# Patient Record
Sex: MALE | Race: WHITE | Employment: UNEMPLOYED | URBAN - METROPOLITAN AREA
[De-identification: names, ages, dates, MRNs, and addresses within clinical notes are randomized per-mention and may not be internally consistent; named-entity substitution may affect disease eponyms.]

---

## 2017-01-05 DIAGNOSIS — C92.10 CML (CHRONIC MYELOCYTIC LEUKEMIA) (H): ICD-10-CM

## 2017-01-05 DIAGNOSIS — E05.90 HYPERTHYROIDISM: ICD-10-CM

## 2017-01-05 LAB
ALBUMIN SERPL-MCNC: 3.7 G/DL (ref 3.4–5)
ALP SERPL-CCNC: 97 U/L (ref 40–150)
ALT SERPL W P-5'-P-CCNC: 27 U/L (ref 0–70)
ANION GAP SERPL CALCULATED.3IONS-SCNC: 13 MMOL/L (ref 3–14)
AST SERPL W P-5'-P-CCNC: 20 U/L (ref 0–45)
BASOPHILS # BLD AUTO: 0 10E9/L (ref 0–0.2)
BASOPHILS NFR BLD AUTO: 0.7 %
BILIRUB SERPL-MCNC: 0.2 MG/DL (ref 0.2–1.3)
BUN SERPL-MCNC: 21 MG/DL (ref 7–30)
CALCIUM SERPL-MCNC: 9.2 MG/DL (ref 8.5–10.1)
CHLORIDE SERPL-SCNC: 104 MMOL/L (ref 94–109)
CO2 SERPL-SCNC: 21 MMOL/L (ref 20–32)
CREAT SERPL-MCNC: 1.47 MG/DL (ref 0.66–1.25)
DIFFERENTIAL METHOD BLD: ABNORMAL
EOSINOPHIL # BLD AUTO: 0.1 10E9/L (ref 0–0.7)
EOSINOPHIL NFR BLD AUTO: 1.5 %
ERYTHROCYTE [DISTWIDTH] IN BLOOD BY AUTOMATED COUNT: 16.9 % (ref 10–15)
GFR SERPL CREATININE-BSD FRML MDRD: 51 ML/MIN/1.7M2
GLUCOSE SERPL-MCNC: 92 MG/DL (ref 70–99)
HCT VFR BLD AUTO: 26.3 % (ref 40–53)
HGB BLD-MCNC: 8.1 G/DL (ref 13.3–17.7)
LYMPHOCYTES # BLD AUTO: 0.8 10E9/L (ref 0.8–5.3)
LYMPHOCYTES NFR BLD AUTO: 13.6 %
MAGNESIUM SERPL-MCNC: 2.1 MG/DL (ref 1.6–2.3)
MCH RBC QN AUTO: 26.1 PG (ref 26.5–33)
MCHC RBC AUTO-ENTMCNC: 30.8 G/DL (ref 31.5–36.5)
MCV RBC AUTO: 85 FL (ref 78–100)
MONOCYTES # BLD AUTO: 0.5 10E9/L (ref 0–1.3)
MONOCYTES NFR BLD AUTO: 8.7 %
NEUTROPHILS # BLD AUTO: 4.5 10E9/L (ref 1.6–8.3)
NEUTROPHILS NFR BLD AUTO: 75.5 %
PLATELET # BLD AUTO: 303 10E9/L (ref 150–450)
POTASSIUM SERPL-SCNC: 4 MMOL/L (ref 3.4–5.3)
PROT SERPL-MCNC: 7.3 G/DL (ref 6.8–8.8)
RBC # BLD AUTO: 3.1 10E12/L (ref 4.4–5.9)
SODIUM SERPL-SCNC: 138 MMOL/L (ref 133–144)
WBC # BLD AUTO: 6 10E9/L (ref 4–11)

## 2017-01-05 PROCEDURE — 99000 SPECIMEN HANDLING OFFICE-LAB: CPT | Performed by: INTERNAL MEDICINE

## 2017-01-05 PROCEDURE — 85025 COMPLETE CBC W/AUTO DIFF WBC: CPT | Performed by: INTERNAL MEDICINE

## 2017-01-05 PROCEDURE — 36415 COLL VENOUS BLD VENIPUNCTURE: CPT | Performed by: INTERNAL MEDICINE

## 2017-01-05 PROCEDURE — 80053 COMPREHEN METABOLIC PANEL: CPT | Performed by: INTERNAL MEDICINE

## 2017-01-05 PROCEDURE — 83735 ASSAY OF MAGNESIUM: CPT | Performed by: INTERNAL MEDICINE

## 2017-01-05 PROCEDURE — 80195 ASSAY OF SIROLIMUS: CPT | Mod: 90 | Performed by: INTERNAL MEDICINE

## 2017-01-06 LAB
CMV DNA SPEC NAA+PROBE-ACNC: NORMAL [IU]/ML
CMV DNA SPEC NAA+PROBE-LOG#: NORMAL {LOG_IU}/ML
SIROLIMUS BLD-MCNC: 8.9 UG/L (ref 5–15)
SPECIMEN SOURCE: NORMAL
TME LAST DOSE: 2330 H

## 2017-01-11 ENCOUNTER — TRANSFERRED RECORDS (OUTPATIENT)
Dept: HEALTH INFORMATION MANAGEMENT | Facility: CLINIC | Age: 52
End: 2017-01-11

## 2017-01-13 DIAGNOSIS — Z94.81 STATUS POST BONE MARROW TRANSPLANT (H): ICD-10-CM

## 2017-01-13 DIAGNOSIS — D84.9 IMMUNOSUPPRESSION (H): ICD-10-CM

## 2017-01-13 DIAGNOSIS — E05.00 GRAVES DISEASE: ICD-10-CM

## 2017-01-13 DIAGNOSIS — C92.10 CML (CHRONIC MYELOCYTIC LEUKEMIA) (H): Primary | ICD-10-CM

## 2017-01-13 DIAGNOSIS — I48.0 PAROXYSMAL ATRIAL FIBRILLATION (H): ICD-10-CM

## 2017-01-13 RX ORDER — MAGNESIUM OXIDE 400 MG/1
400 TABLET ORAL 2 TIMES DAILY
Qty: 180 TABLET | Refills: 2 | Status: SHIPPED
Start: 2017-01-13 | End: 2017-05-30

## 2017-01-16 ENCOUNTER — OFFICE VISIT (OUTPATIENT)
Dept: PODIATRY | Facility: CLINIC | Age: 52
End: 2017-01-16
Payer: COMMERCIAL

## 2017-01-16 VITALS — DIASTOLIC BLOOD PRESSURE: 64 MMHG | SYSTOLIC BLOOD PRESSURE: 120 MMHG

## 2017-01-16 DIAGNOSIS — B07.0 PLANTAR WART: Primary | ICD-10-CM

## 2017-01-16 PROCEDURE — 17110 DESTRUCTION B9 LES UP TO 14: CPT | Performed by: PODIATRIST

## 2017-01-16 ASSESSMENT — PAIN SCALES - GENERAL: PAINLEVEL: NO PAIN (0)

## 2017-01-16 NOTE — PATIENT INSTRUCTIONS
Thanks for coming today.  [facility]  [address] [place] [address]    To schedule future appointments in Ortho Clinic, you may call [phone].    To schedule ordered imaging by your provider:   Call Central [facility] [phone]    To schedule an injection ordered by your provider:  Call [facility] Injection scheduling line: [phone]  [employer] available online at:  [email or web address]    Please call if any further questions or concerns ([phone]).  Clinic hours 8 am to 5 pm.    Return to clinic (call) if symptoms worsen or fail to improve.

## 2017-01-16 NOTE — PROGRESS NOTES
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)     Hyperthyroidism     Hypogonadism male     Fatigue, unspecified type     Past Surgical History   Procedure Laterality Date     ------------other------------- Left 1/1/1985     left hand graft flap- work accident     ------------other------------- Right 1/1/1985     right foot graft- work accident     Esophagoscopy, gastroscopy, duodenoscopy (egd), combined N/A 3/23/2016     Procedure: COMBINED ESOPHAGOSCOPY, GASTROSCOPY, DUODENOSCOPY (EGD), BIOPSY SINGLE OR MULTIPLE;  Surgeon: Jay Tracy MD;  Location: [facility]     Social History     Social History     Marital Status:      Spouse Name: Nu     Number of Children: 4     Years of Education: N/A     Occupational History     Not on file.     Social History Main Topics     Smoking status: Former Smoker -- 0.50 packs/day for 30 years     Start date: 01/01/1985     Quit date: 06/28/2015     Smokeless tobacco: Not on file     Alcohol Use: Yes      Comment: once a week     Drug Use: No     Sexual Activity: Not on file      Comment:      Other Topics Concern     Not on file     Social History Narrative     to wife Nu.    4 children: 1 step daughter Ro age 27, 3 biological sons, 19 yo Jose L, 15 yo Sergio ([place]), 13 yo Elvis    Lives in [place]    Used to work in clerical work, now in school for electrical engineering    11 brothers and sisters, 3 live in the [facility], 1 brother incarcerated, 1 brother hepatitis C         Family History   Problem Relation Age of Onset     Thyroid Disease Sister      Thyroid Disease Sister       Thyroid Disease Sister      Thyroid Disease Mother      Lung Cancer Mother      Prostate Cancer Father      Dementia Father      Heart Murmur Father      Hepatitis Brother      Heart Murmur Brother      Asthma Son        SUBJECTIVE:  A 51-year-old male returns to clinic for plantar warts bilaterally.  He relates his heels are getting much better.  They are flattening out.        OBJECTIVE:  Plantar warts have hyperkeratotic tissue buildup with pinpoint ecchymosis.  These are thinning on the left first MPJ, left fifth MPJ, left fifth metatarsal base, left fourth toe, right fifth MPJ and left first MPJ.       ASSESSMENT/PLAN:  Plantar warts bilaterally.  These are improving.  Diagnosis and treatment options discussed with him.  The warts bilaterally were frozen with liquid nitrogen upon consent x3.  This is the 6th time we have frozen this.  He will return to clinic and see me in about 2-4 weeks.

## 2017-01-16 NOTE — NURSING NOTE
"Panchito Pierce's goals for this visit include: Recheck bilateral warts.  He requests these members of his care team be copied on today's visit information: yes    PCP: Leann Berry    Referring Provider:  Referred Self, MD  No address on file    Chief Complaint   Patient presents with     RECHECK     Recheck warts       Initial /64 mmHg Estimated body mass index is 29.78 kg/(m^2) as calculated from the following:    Height as of 12/1/16: 1.715 m (5' 7.5\").    Weight as of 12/16/16: 87.59 kg (193 lb 1.6 oz).  BP completed using cuff size: regular    "

## 2017-01-18 ENCOUNTER — ONCOLOGY VISIT (OUTPATIENT)
Dept: TRANSPLANT | Facility: CLINIC | Age: 52
End: 2017-01-18
Attending: INTERNAL MEDICINE
Payer: COMMERCIAL

## 2017-01-18 VITALS
OXYGEN SATURATION: 97 % | BODY MASS INDEX: 29.78 KG/M2 | DIASTOLIC BLOOD PRESSURE: 74 MMHG | RESPIRATION RATE: 16 BRPM | TEMPERATURE: 97.9 F | SYSTOLIC BLOOD PRESSURE: 134 MMHG | WEIGHT: 193.1 LBS | HEART RATE: 83 BPM

## 2017-01-18 DIAGNOSIS — C92.10 CML (CHRONIC MYELOCYTIC LEUKEMIA) (H): Primary | ICD-10-CM

## 2017-01-18 DIAGNOSIS — C92.10 CML (CHRONIC MYELOCYTIC LEUKEMIA) (H): ICD-10-CM

## 2017-01-18 DIAGNOSIS — Z94.81 STATUS POST BONE MARROW TRANSPLANT (H): ICD-10-CM

## 2017-01-18 LAB
ALBUMIN SERPL-MCNC: 3.5 G/DL (ref 3.4–5)
ALP SERPL-CCNC: 109 U/L (ref 40–150)
ALT SERPL W P-5'-P-CCNC: 20 U/L (ref 0–70)
ANION GAP SERPL CALCULATED.3IONS-SCNC: 6 MMOL/L (ref 3–14)
AST SERPL W P-5'-P-CCNC: 16 U/L (ref 0–45)
BASOPHILS # BLD AUTO: 0 10E9/L (ref 0–0.2)
BASOPHILS NFR BLD AUTO: 0.4 %
BILIRUB SERPL-MCNC: 0.2 MG/DL (ref 0.2–1.3)
BUN SERPL-MCNC: 19 MG/DL (ref 7–30)
CALCIUM SERPL-MCNC: 8.8 MG/DL (ref 8.5–10.1)
CHLORIDE SERPL-SCNC: 109 MMOL/L (ref 94–109)
CO2 SERPL-SCNC: 24 MMOL/L (ref 20–32)
CREAT SERPL-MCNC: 1.41 MG/DL (ref 0.66–1.25)
DIFFERENTIAL METHOD BLD: ABNORMAL
EOSINOPHIL # BLD AUTO: 0.1 10E9/L (ref 0–0.7)
EOSINOPHIL NFR BLD AUTO: 2.6 %
ERYTHROCYTE [DISTWIDTH] IN BLOOD BY AUTOMATED COUNT: 17 % (ref 10–15)
GFR SERPL CREATININE-BSD FRML MDRD: 53 ML/MIN/1.7M2
GLUCOSE SERPL-MCNC: 100 MG/DL (ref 70–99)
HCT VFR BLD AUTO: 24.7 % (ref 40–53)
HGB BLD-MCNC: 7.6 G/DL (ref 13.3–17.7)
IMM GRANULOCYTES # BLD: 0 10E9/L (ref 0–0.4)
IMM GRANULOCYTES NFR BLD: 0.4 %
LYMPHOCYTES # BLD AUTO: 0.6 10E9/L (ref 0.8–5.3)
LYMPHOCYTES NFR BLD AUTO: 13.4 %
MAGNESIUM SERPL-MCNC: 2.3 MG/DL (ref 1.6–2.3)
MCH RBC QN AUTO: 25.5 PG (ref 26.5–33)
MCHC RBC AUTO-ENTMCNC: 30.8 G/DL (ref 31.5–36.5)
MCV RBC AUTO: 83 FL (ref 78–100)
MONOCYTES # BLD AUTO: 0.5 10E9/L (ref 0–1.3)
MONOCYTES NFR BLD AUTO: 11.1 %
NEUTROPHILS # BLD AUTO: 3.4 10E9/L (ref 1.6–8.3)
NEUTROPHILS NFR BLD AUTO: 72.1 %
NRBC # BLD AUTO: 0 10*3/UL
NRBC BLD AUTO-RTO: 0 /100
PLATELET # BLD AUTO: 277 10E9/L (ref 150–450)
POTASSIUM SERPL-SCNC: 3.7 MMOL/L (ref 3.4–5.3)
PROT SERPL-MCNC: 7 G/DL (ref 6.8–8.8)
RBC # BLD AUTO: 2.98 10E12/L (ref 4.4–5.9)
SIROLIMUS BLD-MCNC: 8.5 UG/L (ref 5–15)
SODIUM SERPL-SCNC: 139 MMOL/L (ref 133–144)
TME LAST DOSE: NORMAL H
WBC # BLD AUTO: 4.7 10E9/L (ref 4–11)

## 2017-01-18 PROCEDURE — 80053 COMPREHEN METABOLIC PANEL: CPT | Performed by: INTERNAL MEDICINE

## 2017-01-18 PROCEDURE — 36415 COLL VENOUS BLD VENIPUNCTURE: CPT | Performed by: INTERNAL MEDICINE

## 2017-01-18 PROCEDURE — 83735 ASSAY OF MAGNESIUM: CPT | Performed by: INTERNAL MEDICINE

## 2017-01-18 PROCEDURE — 85025 COMPLETE CBC W/AUTO DIFF WBC: CPT | Performed by: INTERNAL MEDICINE

## 2017-01-18 PROCEDURE — 99213 OFFICE O/P EST LOW 20 MIN: CPT | Mod: ZF

## 2017-01-18 PROCEDURE — 80195 ASSAY OF SIROLIMUS: CPT | Performed by: INTERNAL MEDICINE

## 2017-01-18 NOTE — PROGRESS NOTES
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 He continues to do well is off steroids now. Has achy joint in AM but he used to be like that. More tired it seems and mood does not seem as good but does not feel depressed. Denied any fevers, chills, cough, or new cold like symptoms. No symptoms of oral and optho chronic GVH . No bleeding. Libido still poor but occasional improvement. Found to have sleep apnea.      Review of Systems: 10 point ROS negative except as noted above.    PHYSICAL EXAM   /74 mmHg  Pulse 83  Temp(Src) 97.9  F (36.6  C) (Oral)  Resp 16  Wt 87.59 kg (193 lb 1.6 oz)  SpO2 97%  Wt Readings from Last 4 Encounters:   01/18/17 87.59 kg (193 lb 1.6 oz)   12/16/16 87.59 kg (193 lb 1.6 oz)   12/13/16 89.404 kg (197 lb 1.6 oz)   12/01/16 87.045 kg (191 lb 14.4 oz)   General: NAD, pleasant appearing male, much less chushingoid     Eyes: CARY, sclera anicteric     Nose/Mouth/Throat: OP clear, buccal mucosa dry, no ulcerations; still coated tongue     Lungs: CTA bilaterally    Cardiovascular: RRR, no M/R/G    Abdominal/Rectal: +BS, soft, NT, ND, No masses    Lymphatics: no LE edema    Skin: no rashes and no fibrotic changes.  Neuro: A&O, grossly nonfocal.      LABS AND IMAGING      Lab Results   Component Value Date    WBC 4.7 01/18/2017    ANEU 3.4 01/18/2017    HGB 7.6* 01/18/2017    HCT 24.7* 01/18/2017     01/18/2017     01/18/2017    POTASSIUM 3.7 01/18/2017    CHLORIDE 109 01/18/2017    CO2 24 01/18/2017    * 01/18/2017    BUN 19 01/18/2017    CR 1.41* 01/18/2017    MAG 2.3  01/18/2017    INR 0.9 06/15/2016    BILITOTAL 0.2 01/18/2017    AST 16 01/18/2017    ALT 20 01/18/2017    ALKPHOS 109 01/18/2017    PROTTOTAL 7.0 01/18/2017    ALBUMIN 3.5 01/18/2017   AST       16   1/18/2017  ALT       20   1/18/2017  No results found for this basename: BiliConj   BILITOTAL      0.2   1/18/2017  ALBUMIN      3.5   1/18/2017  PROTTOTAL      7.0   1/18/2017   ALKPHOS      109   1/18/2017      ASSESSMENT BY SYSTEMS   Panchito Pierce is a 51 year old man s/p MUD SCT for CML  - trasplanted in second chronic phase after blast crisis;    BMT/ CML:  continue dasatinib 40mg/d (5/24); repeat PCR 12/2016 and 100% donor. Plan to continue TKI for another 3 months and stop in April.  - restage per protocol, 1 year evaluation upcoming     HEMATOLOGY/COAGULATION: anemia. Plan to recheck in 2 weeks. Ordered retic count for next visit. Transfuse if < 7.0. Continue folate. Possibly secondary to dasatinib. No bleeding. Despite HGB 7.6, no transfusions needed.   - Stable good counts with mild anemia not requiring transfusions. PLT and WBC stable.      INFECTIONS AND PROPHYLAXIS: Afebrile. No focal sxs.  CMV Repeat pending. Flu shot 11/4/16. Start immunizations today 12/16/17. Continue reduced dose acyclovir and bactrim in half to adjust for renal function.    Prophylaxis: Levaquin (steroids), LD ACV (CMV-, HSV-, EBV+, VZV+), Fluconazole, Pentamidine (last given 7/27/16).    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 Stopped prednisone on 12/06/16. Continue biotene and dexa mouth wash. Sirolimus level is good.   -  No N/V/D.   - Advised to wear sun block when outside for extended periods of time to prevent flare of GVH  - Chronic: Schirmer's test in clinic 4/5 showed R 0 mm and L 0 mm; lip biopsy negative though mouth was and remains dry though improving. Also failure to thrive; 80 lb weight loss.   - EGD negative for GVHD, but with Schirmer's test results,   - he was started on systemic steroids 1  mg/kg on 4/5/16 stop 12/06.     GASTROINTESTINAL/LIVER: Appetite good, wt stable. 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 Upper GI biopsies (3/23 EGD negative for GVHD, CMV, H.Pylori) negative as was lip bx.   - Continue Protonix for GI prophylaxis.     - dry mouth: biotene mouthwash and toothpaste (continue)  - soft stools: monitor; can decrease oral Mg if worse    RENAL/FEN/Nutrition: Cr 1.40 and electrolytes WNL  - Hx HypoMg: cont MgOx 400mg bid and if okay today we will stop replacement.     CARDIOVASCULAR:  Normotensive. Hx Hypertension. Cont metoprolol, cont Norvasc. Normotensive, asymptomatic today. Discontinue Norvasc.   - Hx of A fib with RVR (new 12/24); resolved with dilt, now on Metoprolol 12.5mg BID.     ENDOCRINE: following with Dr Guevara. thyroid and testosterone issues. Hx Graves Disease s/p VELA therapy. Cont synthroid and liothyronine. TSH and Free T3 low; Free T4 elevated. Mixed picture; After endocrine consult:  Mild thyrotoxicosis due to large weight loss. On levothyroxine 112mcg.    - On selenium supplement, T3 and T4.    PSYCHIATRIC:  Mood seems to be down. If needed we may use Wellbutrim that has no libido side effects.     DERMATOLOGY: no rashes.   - Sun: Advised to wear SPF when out in the sun for prolonged periods of time  - Acne: resolved. Likely due to Siro. A few lesions scattered over shoulders and chest. Consider topical clinda cream or doxy if persists or becomes worse.       Plan:  Continue Dasatinib to 40mg/d  Hold sirolimus for level 2/3/17    RTC Rick on 2/3/17 with labs

## 2017-01-18 NOTE — NURSING NOTE
BMT Heme Malignancy Rooming Note    Panchito Pierce - 1/18/2017 11:45 AM     Chief Complaint   Patient presents with     Blood Draw     lab draw in right arm, vitals taken     RECHECK     post bmt for CML here for labs and md visit        /74 mmHg  Pulse 83  Temp(Src) 97.9  F (36.6  C) (Oral)  Resp 16  Wt 87.59 kg (193 lb 1.6 oz)  SpO2 97%     Medications reviewed: Yes    Labs drawn: Yes    Drawn by: Clinic Staff  Via: venipuncture  See Doc Flowsheet for details.      Dressing changed: Not applicable     Medications given: No    Staff time:8    Additional information if applicable: pt reports feeling well    Kemi Greene, RN

## 2017-01-18 NOTE — MR AVS SNAPSHOT
After Visit Summary   1/18/2017    Panchito Pierce    MRN: [id number]           Patient Information     Date Of Birth          1965        Visit Information        Provider Department      1/18/2017 12:00 PM Jermaine Cabrera MD [facility] Blood and Marrow Transplant        Today's Diagnoses     CML (chronic myelocytic leukemia) (H)    -  1     Status post bone marrow transplant (H)               [facility] ([facility])  [address] [place] [id number]  Phone: [phone]  Clinic Hours:   Monday-Friday:    8am to 4:30pm   Weekends and holidays:    8am to noon (in general)  If your fever is 100.5  or greater,   call the clinic.  After hours call the   hospital at [phone] or   [phone]. Ask for the BMT   fellow for pediatric or adult patients           Care Instructions    2/3 10am arrival with labs and         Follow-ups after your visit        Your next 10 appointments already scheduled     Feb 03, 2017  9:00 AM   (Arrive by 8:30 AM)   RETURN ENDOCRINE with Pattie Guevara MD   [facility] Endocrinology ([facility])    [address] [place] [address]  3rd [facility] [id number] [phone]            Feb 03, 2017 10:30 AM   Return with  BMT [facility] Blood and Marrow Transplant ([facility])    [address] [place] [address]  2nd [facility] [id number] [phone]            Feb 14, 2017 11:00 AM   Return Visit with Brando Sagastume DPM   [facility] ([facility])    [address] [place] [id number] [phone]              Future tests that were ordered for you today     Open Future Orders        Priority Expected Expires Ordered    Lactate Dehydrogenase Routine 2/3/2017 2/3/2017 1/18/2017    CBC with platelets differential Routine 2/3/2017 2/3/2017 1/18/2017    Comprehensive metabolic panel Routine 2/3/2017 2/3/2017 1/18/2017    Magnesium  Routine 2/3/2017 2/3/2017 1/18/2017    Sirolimus level Routine 2/3/2017 2/3/2017 1/18/2017    Reticulocyte count Routine 2/3/2017 2/3/2017 1/18/2017            Who to contact     If you have questions or need follow up information about today's clinic visit or your schedule please contact [facility] BLOOD AND MARROW TRANSPLANT directly at [phone].  Normal or non-critical lab and imaging results will be communicated to you by [employer], letter or phone within 4 business days after the clinic has received the results. If you do not hear from us within 7 days, please contact the clinic through [employer] or phone. If you have a critical or abnormal lab result, we will notify you by phone as soon as possible.  Submit refill requests through [employer] or call your pharmacy and they will forward the refill request to us. Please allow 3 business days for your refill to be completed.          Additional Information About Your Visit        [employer] Information     [employer] gives you secure access to your electronic health record. If you see a primary care provider, you can also send messages to your care team and make appointments. If you have questions, please call your primary care clinic.  If you do not have a primary care provider, please call [phone] and they will assist you.        Care EveryWhere ID     This is your Care EveryWhere ID. This could be used by other organizations to access your [facility] medical records  [id number]        Your Vitals Were     Pulse Temperature Respirations Pulse Oximetry          83 97.9  F (36.6  C) (Oral) 16 97%         Blood Pressure from Last 3 Encounters:   01/18/17 134/74   01/16/17 120/64   12/16/16 121/69    Weight from Last 3 Encounters:   01/18/17 87.59 kg (193 lb 1.6 oz)   12/16/16 87.59 kg (193 lb 1.6 oz)   12/13/16 89.404 kg (197 lb 1.6 oz)               Recent Review Flowsheet Data     BMT Recent Results Latest Ref Rng 11/4/2016 11/30/2016 12/1/2016 12/13/2016 12/16/2016  1/5/2017 1/18/2017    WBC 4.0 - 11.0 10e9/L 5.8 4.2 - 5.0 4.9 6.0 4.7    Hemoglobin 13.3 - 17.7 g/dL 9.0(L) 9.0(L) - 7.9(L) 7.9(L) 8.1(L) 7.6(L)    Platelet Count 150 - 450 10e9/L 239 260 - 238 239 303 277    Neutrophils (Absolute) 1.6 - 8.3 10e9/L 4.5 3.1 - 3.8 3.6 4.5 3.4    Sodium 133 - 144 mmol/L 141 142 - 141 138 138 139    Potassium 3.4 - 5.3 mmol/L 3.8 3.9 - 3.8 4.0 4.0 3.7    Chloride 94 - 109 mmol/L 109 106 - 106 107 104 109    Glucose 70 - 99 mg/dL 110(H) 109(H) - 107(H) 89 92 100(H)    Urea Nitrogen 7 - 30 mg/dL 19 18 - 20 20 21 19    Creatinine 0.66 - 1.25 mg/dL 1.36(H) 1.47(H) - 1.40(H) 1.41(H) 1.47(H) 1.41(H)    Calcium (Total) 8.5 - 10.1 mg/dL 8.8 9.0 - 8.6 8.8 9.2 8.8    Protein (Total) 6.8 - 8.8 g/dL 7.2 7.3 - 6.8 6.8 7.3 7.0    Albumin 3.4 - 5.0 g/dL 3.8 3.6 - 3.5 3.5 3.7 3.5    Alkaline Phosphatase 40 - 150 U/L 78 85 - 91 89 97 109    AST 0 - 45 U/L 20 21 20 20 19 20 16    ALT 0 - 70 U/L 34 28 28 27 22 27 20    MCV 78 - 100 fl 87 85 - 84 85 85 83               Primary Care Provider Office Phone # Fax #    Leann Berry [phone]       [facility] [address] [place] [address]        Thank you!     Thank you for choosing [facility] BLOOD AND MARROW TRANSPLANT  for your care. Our goal is always to provide you with excellent care. Hearing back from our patients is one way we can continue to improve our services. Please take a few minutes to complete the written survey that you may receive in the mail after your visit with us. Thank you!             Your Updated Medication List - Protect others around you: Learn how to safely use, store and throw away your medicines at www.disposemymeds.org.          This list is accurate as of: 1/18/17 12:39 PM.  Always use your most recent med list.                   Brand Name Dispense Instructions for use    acyclovir 200 MG capsule    ZOVIRAX    120 capsule    Take 2 capsules (400 mg) by mouth 2 times daily       artificial  saliva Liqd liquid     1 Bottle    Swish and spit 10 mLs in mouth 4 times daily       BIOTENE DRY MOUTH SENSITIVE Pste     1 Tube    Apply 1 inch to affected area 2 times daily       calcium carbonate-vitamin D 500-400 MG-UNIT Tabs per tablet     180 tablet    Take 2 tablets by mouth daily       cycloSPORINE 0.05 % ophthalmic emulsion    RESTASIS    2 Box    Apply 1 drop to eye every 12 hours       dasatinib 20 MG tablet CHEMO    SPRYCEL    60 tablet    Take 2 tablets (40 mg) by mouth daily       fluconazole 100 MG tablet    DIFLUCAN    30 tablet    Take 1 tablet (100 mg) by mouth daily       levofloxacin 250 MG tablet    LEVAQUIN    30 tablet    Take 1 tablet (250 mg) by mouth daily       levothyroxine 112 MCG tablet    SYNTHROID/LEVOTHROID    90 tablet    Take 1 tablet (112 mcg) by mouth daily       liothyronine 5 MCG tablet    CYTOMEL    180 tablet    Take 1 tablet (5 mcg) by mouth 2 times daily       LORazepam 0.5 MG tablet    ATIVAN    30 tablet    Take 1-2 tablets (0.5-1 mg) by mouth every 6 hours as needed for other (nausea)       magnesium oxide 400 MG tablet    MAG-OX    180 tablet    Take 1 tablet (400 mg) by mouth 2 times daily       metoprolol 25 MG tablet    LOPRESSOR    60 tablet    Take 0.5 tablets (12.5 mg) by mouth 2 times daily       pantoprazole 40 MG EC tablet    PROTONIX    30 tablet    Take 1 tablet (40 mg) by mouth daily       Selenium 100 MCG Caps     90 capsule    1 tablet daily       Senna 8.6-50 MG tablet     60 tablet    Take 1 tablet by mouth 2 times daily as needed for constipation       sirolimus 1 MG tablet    RAPAMUNE - GENERIC EQUIVALENT    120 tablet    Take 4 tablets (4 mg) by mouth daily       sulfamethoxazole-trimethoprim 800-160 MG per tablet    BACTRIM DS/SEPTRA DS    30 tablet    Take 1 tablet twice a day on Mondays and Tuesdays       INDU HUDSON

## 2017-01-18 NOTE — NURSING NOTE
Chief Complaint   Patient presents with     Blood Draw     lab draw in right arm, vitals taken     Sandra Mai CMA

## 2017-01-20 ASSESSMENT — ENCOUNTER SYMPTOMS
DEPRESSION: 0
EYE WATERING: 0
CONSTIPATION: 0
POLYDIPSIA: 0
BOWEL INCONTINENCE: 0
HALLUCINATIONS: 0
POOR WOUND HEALING: 0
JAUNDICE: 0
EYE REDNESS: 0
DECREASED CONCENTRATION: 1
STIFFNESS: 1
ALTERED TEMPERATURE REGULATION: 1
DIARRHEA: 1
MYALGIAS: 1
JOINT SWELLING: 0
MUSCLE WEAKNESS: 1
BACK PAIN: 0
HEARTBURN: 0
EYE PAIN: 0
NERVOUS/ANXIOUS: 1
NECK PAIN: 0
DOUBLE VISION: 0
ARTHRALGIAS: 1
VOMITING: 1
FEVER: 0
NAUSEA: 1
FATIGUE: 1
SKIN CHANGES: 0
INSOMNIA: 1
RECTAL PAIN: 0
RECTAL BLEEDING: 0
NIGHT SWEATS: 1
BLOATING: 1
POLYPHAGIA: 0
BLOOD IN STOOL: 0
WEIGHT LOSS: 0
DECREASED APPETITE: 0
NAIL CHANGES: 0
INCREASED ENERGY: 1
PANIC: 0
MUSCLE CRAMPS: 0
WEIGHT GAIN: 0
CHILLS: 1
EYE IRRITATION: 0
ABDOMINAL PAIN: 0

## 2017-01-23 ENCOUNTER — MYC MEDICAL ADVICE (OUTPATIENT)
Dept: ENDOCRINOLOGY | Facility: CLINIC | Age: 52
End: 2017-01-23

## 2017-01-24 ENCOUNTER — TRANSFERRED RECORDS (OUTPATIENT)
Dept: HEALTH INFORMATION MANAGEMENT | Facility: CLINIC | Age: 52
End: 2017-01-24

## 2017-02-03 ENCOUNTER — APPOINTMENT (OUTPATIENT)
Dept: LAB | Facility: CLINIC | Age: 52
End: 2017-02-03
Attending: INTERNAL MEDICINE
Payer: COMMERCIAL

## 2017-02-03 ENCOUNTER — OFFICE VISIT (OUTPATIENT)
Dept: ENDOCRINOLOGY | Facility: CLINIC | Age: 52
End: 2017-02-03

## 2017-02-03 ENCOUNTER — ONCOLOGY VISIT (OUTPATIENT)
Dept: TRANSPLANT | Facility: CLINIC | Age: 52
End: 2017-02-03
Attending: INTERNAL MEDICINE
Payer: COMMERCIAL

## 2017-02-03 VITALS — OXYGEN SATURATION: 100 % | TEMPERATURE: 97.5 F | WEIGHT: 193 LBS | BODY MASS INDEX: 28.49 KG/M2

## 2017-02-03 VITALS
SYSTOLIC BLOOD PRESSURE: 132 MMHG | WEIGHT: 196.7 LBS | BODY MASS INDEX: 29.13 KG/M2 | HEART RATE: 82 BPM | DIASTOLIC BLOOD PRESSURE: 73 MMHG | HEIGHT: 69 IN

## 2017-02-03 DIAGNOSIS — C92.10 CML (CHRONIC MYELOCYTIC LEUKEMIA) (H): ICD-10-CM

## 2017-02-03 DIAGNOSIS — E29.1 HYPOGONADISM MALE: Primary | ICD-10-CM

## 2017-02-03 DIAGNOSIS — Z94.81 STATUS POST BONE MARROW TRANSPLANT (H): ICD-10-CM

## 2017-02-03 LAB
ALBUMIN SERPL-MCNC: 3.7 G/DL (ref 3.4–5)
ALP SERPL-CCNC: 108 U/L (ref 40–150)
ALT SERPL W P-5'-P-CCNC: 20 U/L (ref 0–70)
ANION GAP SERPL CALCULATED.3IONS-SCNC: 11 MMOL/L (ref 3–14)
AST SERPL W P-5'-P-CCNC: 16 U/L (ref 0–45)
BASOPHILS # BLD AUTO: 0 10E9/L (ref 0–0.2)
BASOPHILS NFR BLD AUTO: 0.2 %
BILIRUB SERPL-MCNC: 0.2 MG/DL (ref 0.2–1.3)
BUN SERPL-MCNC: 19 MG/DL (ref 7–30)
CALCIUM SERPL-MCNC: 8.8 MG/DL (ref 8.5–10.1)
CHLORIDE SERPL-SCNC: 105 MMOL/L (ref 94–109)
CO2 SERPL-SCNC: 23 MMOL/L (ref 20–32)
CREAT SERPL-MCNC: 1.35 MG/DL (ref 0.66–1.25)
DIFFERENTIAL METHOD BLD: ABNORMAL
EOSINOPHIL # BLD AUTO: 0.1 10E9/L (ref 0–0.7)
EOSINOPHIL NFR BLD AUTO: 1.8 %
ERYTHROCYTE [DISTWIDTH] IN BLOOD BY AUTOMATED COUNT: 17.1 % (ref 10–15)
GFR SERPL CREATININE-BSD FRML MDRD: 56 ML/MIN/1.7M2
GLUCOSE SERPL-MCNC: 96 MG/DL (ref 70–99)
HCT VFR BLD AUTO: 25.6 % (ref 40–53)
HGB BLD-MCNC: 7.9 G/DL (ref 13.3–17.7)
IMM GRANULOCYTES # BLD: 0 10E9/L (ref 0–0.4)
IMM GRANULOCYTES NFR BLD: 0.3 %
LDH SERPL L TO P-CCNC: 173 U/L (ref 85–227)
LYMPHOCYTES # BLD AUTO: 0.8 10E9/L (ref 0.8–5.3)
LYMPHOCYTES NFR BLD AUTO: 13.3 %
MAGNESIUM SERPL-MCNC: 2.3 MG/DL (ref 1.6–2.3)
MCH RBC QN AUTO: 25.6 PG (ref 26.5–33)
MCHC RBC AUTO-ENTMCNC: 30.9 G/DL (ref 31.5–36.5)
MCV RBC AUTO: 83 FL (ref 78–100)
MONOCYTES # BLD AUTO: 0.5 10E9/L (ref 0–1.3)
MONOCYTES NFR BLD AUTO: 8.1 %
NEUTROPHILS # BLD AUTO: 4.7 10E9/L (ref 1.6–8.3)
NEUTROPHILS NFR BLD AUTO: 76.3 %
NRBC # BLD AUTO: 0 10*3/UL
NRBC BLD AUTO-RTO: 0 /100
PLATELET # BLD AUTO: 263 10E9/L (ref 150–450)
POTASSIUM SERPL-SCNC: 4.1 MMOL/L (ref 3.4–5.3)
PROT SERPL-MCNC: 7.1 G/DL (ref 6.8–8.8)
RBC # BLD AUTO: 3.09 10E12/L (ref 4.4–5.9)
RETICS # AUTO: 68 10E9/L (ref 25–95)
RETICS/RBC NFR AUTO: 2.2 % (ref 0.5–2)
SIROLIMUS BLD-MCNC: 9.8 UG/L (ref 5–15)
SODIUM SERPL-SCNC: 138 MMOL/L (ref 133–144)
TME LAST DOSE: NORMAL H
WBC # BLD AUTO: 6.2 10E9/L (ref 4–11)

## 2017-02-03 PROCEDURE — 85045 AUTOMATED RETICULOCYTE COUNT: CPT | Performed by: INTERNAL MEDICINE

## 2017-02-03 PROCEDURE — 80195 ASSAY OF SIROLIMUS: CPT | Performed by: INTERNAL MEDICINE

## 2017-02-03 PROCEDURE — 85025 COMPLETE CBC W/AUTO DIFF WBC: CPT | Performed by: INTERNAL MEDICINE

## 2017-02-03 PROCEDURE — 83735 ASSAY OF MAGNESIUM: CPT | Performed by: INTERNAL MEDICINE

## 2017-02-03 PROCEDURE — 36415 COLL VENOUS BLD VENIPUNCTURE: CPT

## 2017-02-03 PROCEDURE — 83615 LACTATE (LD) (LDH) ENZYME: CPT | Performed by: INTERNAL MEDICINE

## 2017-02-03 PROCEDURE — 80053 COMPREHEN METABOLIC PANEL: CPT | Performed by: INTERNAL MEDICINE

## 2017-02-03 ASSESSMENT — PAIN SCALES - GENERAL: PAINLEVEL: NO PAIN (0)

## 2017-02-03 NOTE — Clinical Note
2/3/2017       RE: Panchito Pierce  [address]  [place] [address]     Dear Colleague,    Thank you for referring your patient, Panchito Pierce, to the [facility] ENDOCRINOLOGY at [facility]. Please see a copy of my visit note below.    Endocrinology Clinic Visit    Subjective:     HPI: Panchito Pierce is a 51 year old year old male with history of Graves Disease and Hyperthyroidism s/p VELA and CML s/p MUD SCT who is seen for 2 month follow up.     #1 hypothyroidism due to Graves' disease status post radioactive iodide  Patient report, was diagnosed with Graves' disease in 2002.  He underwent radioactive therapy of the thyroid and was treated with levothyroxine.  Per patient report, he does best on a T3 and T4 combination.  Patient does have a history of Graves eye disease and is currently on selenium. Had been on steroids (for jutgc-dwjmfh-dqwx disease since April 2016) but now off steroids. Pt  noted to be mildly thyrotoxic with weight loss during his inpatient hospitalization where April 2016 TSH was undetectable, free T4 was high at 1.51 and free T3 was low at 1.9.  Levothyroxine was adjusted to 125 ug each day and he continued with Cytomel 5  g twice daily    He has a family history of hypothyroidism.    Interval history:  November 2016 TSH is 1.11 and 1.28 at last visit on 12/13/16.  Patient reports doing very well from a thyroid standpoint and continues on levothyroxine at 112  g daily and Cytomel at 5  g twice daily.  He denies any eye symptoms and currently does not really uses eyedrops.  He reports that he does take his selenium every day. Does endorse some cold intolerance, but denies weight changes, palpitations hair changes or restlessness.     #2 Joint Pain  Patient reports joint aches and pain for the last month that is primarily affecting his ankles and knees, but does endorse generalized joint pains occasionally. Mainly noticeable when he goes to stand up. Has  "pain in the am that lasts for only about 20 mins. Had knee surgery in 2004/2005 and noted to have \"arthritis\" during surgery. Denies joint swelling, erythema or warmth. Is not taking any medications for his joint pain.     #3 Fatigue  During previous visit in 12/1/16, patient endorsed worsening fatigue and there was concern for sleep apnea. Patient's wife had accompanied him and reported excessive snoring as well as periods of hypocapnic episodes, even apneic episodes at night per her report. Patient was also complaining of excessive daytime somnolence. Recommendation at that time was for patient to have sleep study performed.     Interval History: Patient had sleep polysomnography performed at [facility]. Report indicated severe ALVARO with AHI of 53. Recommendation per Dr. Metcalf at that time was pursue CPAP which patient was very hesitant to proceed with. Patient continues to endorse excessive daytime somnolence, often having a mid-afternoon nap or else he will need to go to bed at 8pm. He endorses continued fatigue and wife continues to endorse snoring and periods of apnea. Patient states his sleep is often very restless.     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  He also reports a history of rare morning erections.  He reports he shakes every few weeks and has also noted loss of his axillary hair, chest here, as well as decrease in testicular size.  September 2016 total testosterone of 141, PSA of 0.5.  Repeat check in October 2016 showed a testosterone 186, FSH of 25.2, and elevated to 7.7.     Interval History: Patient had labs done 12/1/16 with FSH elevated at 28.9. LH, Prolactin, SHBG were all normal. Testosterone had increased to 354 and free testosterone was also normal at 8.25. Patient states that his morning erections are occuring more frequently and he had a nighttime emission a couple weeks ago.     #5 History of " steroid use  He reports that he had been put on prednisone daily since April 2016 for hyqtr-sbawns-bafj disease.  Was tapered with last dose in mid December.         Allergies   Allergen Reactions     Allopurinol Rash     Unclear if it was from allopurinol, but went away when it was stopped.  But also had steroid cream at the same time.       Current Outpatient Prescriptions   Medication Sig Dispense Refill     magnesium oxide (MAG-OX) 400 MG tablet Take 1 tablet (400 mg) by mouth 2 times daily 180 tablet 2     Ondansetron HCl (ZOFRAN PO)        acyclovir (ZOVIRAX) 200 MG capsule Take 2 capsules (400 mg) by mouth 2 times daily 120 capsule 11     pantoprazole (PROTONIX) 40 MG enteric coated tablet Take 1 tablet (40 mg) by mouth daily 30 tablet 11     sirolimus (RAPAMUNE - GENERIC EQUIVALENT) 1 MG tablet Take 4 tablets (4 mg) by mouth daily 120 tablet 3     sulfamethoxazole-trimethoprim (BACTRIM DS,SEPTRA DS) 800-160 MG per tablet Take 1 tablet twice a day on Mondays and Tuesdays 30 tablet 6     dasatinib (SPRYCEL) 20 MG tablet Take 2 tablets (40 mg) by mouth daily 60 tablet 8     levothyroxine (SYNTHROID, LEVOTHROID) 112 MCG tablet Take 1 tablet (112 mcg) by mouth daily 90 tablet 3     liothyronine (CYTOMEL) 5 MCG tablet Take 1 tablet (5 mcg) by mouth 2 times daily 180 tablet 3     Selenium 100 MCG CAPS 1 tablet daily 90 capsule 3     cycloSPORINE (RESTASIS) 0.05 % ophthalmic emulsion Apply 1 drop to eye every 12 hours 2 Box 11     fluconazole (DIFLUCAN) 100 MG tablet Take 1 tablet (100 mg) by mouth daily 30 tablet 11     Sennosides-Docusate Sodium (SENNA) 8.6-50 MG tablet Take 1 tablet by mouth 2 times daily as needed for constipation 60 tablet 3     metoprolol (LOPRESSOR) 25 MG tablet Take 0.5 tablets (12.5 mg) by mouth 2 times daily 60 tablet 11     Dentifrices (BIOTENE DRY MOUTH SENSITIVE) PSTE Apply 1 inch to affected area 2 times daily 1 Tube 2     levofloxacin (LEVAQUIN) 250 MG tablet Take 1 tablet (250 mg) by  mouth daily 30 tablet 3     calcium carbonate-vitamin D 500-400 MG-UNIT TABS tablt Take 2 tablets by mouth daily 180 tablet 3     artificial saliva (BIOTENE DRY MOUTHWASH) LIQD solution Swish and spit 10 mLs in mouth 4 times daily 1 Bottle 2     LORazepam (ATIVAN) 0.5 MG tablet Take 1-2 tablets (0.5-1 mg) by mouth every 6 hours as needed for other (nausea) 30 tablet 5       Past Medical History   Diagnosis Date     Graves disease 1/1/2003     treated with radioiodine, meds T3 and T4     Murmur, heart      has not been fully evaluated       Past Surgical History   Procedure Laterality Date     ------------other------------- Left 1/1/1985     left hand graft flap- work accident     ------------other------------- Right 1/1/1985     right foot graft- work accident     Esophagoscopy, gastroscopy, duodenoscopy (egd), combined N/A 3/23/2016     Procedure: COMBINED ESOPHAGOSCOPY, GASTROSCOPY, DUODENOSCOPY (EGD), BIOPSY SINGLE OR MULTIPLE;  Surgeon: Jay Tracy MD;  Location:  GI       Family History   Problem Relation Age of Onset     Thyroid Disease Sister      Thyroid Disease Sister      Thyroid Disease Sister      Thyroid Disease Mother      Lung Cancer Mother      Prostate Cancer Father      Dementia Father      Heart Murmur Father      Hepatitis Brother      Heart Murmur Brother      Asthma Son        Social History     Social History     Marital Status:      Spouse Name: Nu     Number of Children: 4     Years of Education: N/A     Social History Main Topics     Smoking status: Former Smoker -- 0.50 packs/day for 30 years     Start date: 01/01/1985     Quit date: 06/28/2015     Smokeless tobacco: Not on file     Alcohol Use: Yes      Comment: once a week     Drug Use: No     Sexual Activity: Not on file      Comment:      Other Topics Concern     Not on file     Social History Narrative     to wife Nu.    4 children: 1 step daughter Ro age 27, 3 biological sons, 19 yo Jose L,  "15 yo Sergio (Asbjessica), 11 yo Elvis    Lives in [place]    Used to work in clerical work, now in school for electrical engineering    11 brothers and sisters, 3 live in the [facility], 1 brother incarcerated, 1 brother hepatitis C           Objective:   /73 mmHg  Pulse 82  Ht 1.753 m (5' 9\")  Wt 89.223 kg (196 lb 11.2 oz)  BMI 29.03 kg/m2  Constitutional: Pleasant no acute cardiopulmonary distress.   EYES: anicteric, normal extra-ocular movements, no lid lag or retraction   HEENT: Mouth/Throat: Mucous membrane is moist. Oropharynx is clear. No adenopathy. Normal thyroid   Cardiovascular: RRR, S1, S2 normal. No m/g/r   Pulmonary/Chest: CTAB. No wheezing or rales   Abdominal: +BS. Non tender to palpation. No organomegaly present.  Neurological: Alert and oriented. CNII-XII intact. Muscle strength 5/5. Sensory is intact.  Extremities: No clubbing, cyanosis or inflammation   Skin: normal texture, color  Lymphatic: no cervical lymphadenopathy.  Psychological: appropriate mood and affect     In House Labs:   No results found for this basename: a1c    TSH   Date Value Ref Range Status   12/13/2016 1.28 0.40 - 4.00 mU/L Final   11/30/2016 1.11 0.40 - 4.00 mU/L Final   08/19/2016 0.88 0.40 - 4.00 mU/L Final   06/03/2016 0.08* 0.40 - 4.00 mU/L Final   04/08/2016 <0.01* 0.40 - 4.00 mU/L Final     T4 FREE   Date Value Ref Range Status   12/13/2016 0.80 0.76 - 1.46 ng/dL Final   06/03/2016 0.78 0.76 - 1.46 ng/dL Final   04/08/2016 1.51* 0.76 - 1.46 ng/dL Final   03/04/2016 1.39 0.76 - 1.46 ng/dL Final   02/12/2016 1.31 0.76 - 1.46 ng/dL Final       CREATININE   Date Value Ref Range Status   01/18/2017 1.41* 0.66 - 1.25 mg/dL Final   ]    Recent Labs   Lab Test  04/11/16   0305  04/07/16   0313   TRIG  377*  159*       25 OH VIT D2   Date Value Ref Range Status   04/06/2016 <5 ug/L Final     25 OH VIT D3   Date Value Ref Range Status   04/06/2016 21 ug/L Final     25 OH VIT D TOTAL   Date Value Ref Range Status "   04/06/2016  20 - 75 ug/L Final    <26  Season, race, dietary intake, and treatment affect the concentration of   25-hydroxy-Vitamin D. Values may decrease during winter months and increase   during summer months. Values 20-29 ug/L may indicate Vitamin D insufficiency   and values <20 ug/L may indicate Vitamin D deficiency.   This test was developed and its performance characteristics determined by the   [facility],  [facility]. It has   not been cleared or approved by the FDA. The laboratory is regulated under CLIA   as qualified to perform high-complexity testing. This test is used for clinical   purposes. It should not be regarded as investigational or for research.           Assessment/Treatment Plan:    Panchito Pierce is a 51 year old year old male with history of Graves Disease and Hyperthyroidism s/p VELA and CML s/p MUD SCT who is seen for 2 month follow up and now with new diagnosis of ALVARO.     #1 hypothyroidism due to Graves' disease status post radioactive iodide  Pt needs continue on Cytomel in addition to his levothyroxine.  We will also have the patient continue with the selenium at 100  g daily for now.  December 2016 TSH and free T4 were normal.    #2 Joint Pain  No concerning signs or symptoms suggestive of inflammatory arthritis or septic joint and patient appears very stable. Likely osteoarthritis or secondary to the chemotherapy. Patient recently discontinued his steroids as well and his joint pain could simply be rebound from that. Encouraged patient to follow up with Heme-Onc provider and see if there were options for oral or even topical treatments, but understand risks in covering up fever. May start topical gel in the future.     #3 History of low testosterone/Decreased Libido - Resolved  The patient has a history of low testosterone.  October 2016 FSH was slightly higher.  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  The patient also had  evidence of smaller than expected testicles (about 8 grams) on exam. Confirmed ALVARO could also lower testosterone as well. Recheck of labs at visit in 12/1/16 were unremarkable with normal testosterone but persistently elevated FSH. Explained to patient that there was currently no need for testosterone replacement, as unlikely to get higher than current value. Will recheck FSH and free and total testosterone today to follow trend. Concerns regarding replacement that could also affect ALVARO.     #4 Obstructive Sleep Apnea  Patient had sleep polysomnography performed on 1/11/17 that was positive for severe ALVARO with AHI of 53.3. Recommendation was for CPAP at min pressure of 5 and max of 15 with heated humidity and lifetime use most likely. Patient expressed some overall hesitancy to initiate CPAP until discussion today. Went over the health benefits of treatment with CPAP and patient is willing to try it in order to help treat his excessive daytime somnolence. Patient will follow up with Dr Metcalf at [facility] to discuss treatment options and mask fitting. If fatigue does not improve with treated ALVARO, can consider alternative etiologies such as adrenal insufficiency with prolonged steroid use (unlikely given normal electrolytes) or side effects of chemo medications.       There are no Patient Instructions on file for this visit.    I will contact the patient with the test results.  Return to clinic in 4 months.    Test and/or medications prescribed today:  Orders Placed This Encounter   Procedures     Follicle stimulating hormone     Testosterone Free and Total     Patient seen and discussed with endocrinology attending, Dr. Ismael Arboleda MD  PGY-2 Internal Medicine  P: [phone]    I have seen and examined the patient and agree with the resident's plan of care as noted.  Dr. Pattie Guevara [phone]

## 2017-02-03 NOTE — NURSING NOTE
"Chief Complaint   Patient presents with     RECHECK     f/u hypogonadism        Initial /73 mmHg  Pulse 82  Ht 1.753 m (5' 9\")  Wt 89.223 kg (196 lb 11.2 oz)  BMI 29.03 kg/m2 Estimated body mass index is 29.03 kg/(m^2) as calculated from the following:    Height as of this encounter: 1.753 m (5' 9\").    Weight as of this encounter: 89.223 kg (196 lb 11.2 oz).  BP completed using cuff size: nicola Manning CMA     "

## 2017-02-03 NOTE — MR AVS SNAPSHOT
After Visit Summary   2/3/2017    Panchito Pierce    MRN: [id number]           Patient Information     Date Of Birth          1965        Visit Information        Provider Department      2/3/2017 10:30 AM  BMT [facility] Blood and Marrow Transplant        Today's Diagnoses     CML (chronic myelocytic leukemia) (H)         Status post bone marrow transplant (H)               [facility] ([facility])  [address] [place] [id number]  Phone: [phone]  Clinic Hours:   Monday-Friday:    8am to 4:30pm   Weekends and holidays:    8am to noon (in general)  If your fever is 100.5  or greater,   call the clinic.  After hours call the   hospital at [phone] or   [phone]. Ask for the BMT   fellow for pediatric or adult patients           Care Instructions    3/17 1130am arrival with labs and         Follow-ups after your visit        Your next 10 appointments already scheduled     Feb 14, 2017 11:00 AM   [facility]    [address] [place] [address] [phone]            Mar 17, 2017 12:00 PM   Return with Jermaine Cabrera MD   [facility] Blood and Marrow Transplant ([facility])    [address] [place] [address] [phone]              Future tests that were ordered for you today     Open Future Orders        Priority Expected Expires Ordered    Magnesium Routine 3/17/2017 3/17/2017 2/3/2017    CBC with platelets differential Routine 3/17/2017 3/17/2017 2/3/2017    CMV DNA quantification Routine 3/17/2017 3/17/2017 2/3/2017    BCR ABL1 Major Breakpoint Quant p210 Routine 3/17/2017 3/17/2017 2/3/2017    Comprehensive metabolic panel Routine 3/17/2017 3/17/2017 2/3/2017            Who to contact     If you have questions or need follow up information about today's clinic visit or your  schedule please contact [facility] directly at [phone].  Normal or non-critical lab and imaging results will be communicated to you by MyChart, letter or phone within 4 business days after the clinic has received the results. If you do not hear from us within 7 days, please contact the clinic through [employer] or phone. If you have a critical or abnormal lab result, we will notify you by phone as soon as possible.  Submit refill requests through [employer] or call your pharmacy and they will forward the refill request to us. Please allow 3 business days for your refill to be completed.          Additional Information About Your Visit        [employer] Information     [employer] gives you secure access to your electronic health record. If you see a primary care provider, you can also send messages to your care team and make appointments. If you have questions, please call your primary care clinic.  If you do not have a primary care provider, please call [phone] and they will assist you.        Care EveryWhere ID     This is your Care EveryWhere ID. This could be used by other organizations to access your [place] medical records  [id number]        Your Vitals Were     Temperature Pulse Oximetry                97.5  F (36.4  C) (Oral) 100%           Blood Pressure from Last 3 Encounters:   02/03/17 132/73   01/18/17 134/74   01/16/17 120/64    Weight from Last 3 Encounters:   02/03/17 87.544 kg (193 lb)   02/03/17 89.223 kg (196 lb 11.2 oz)   01/18/17 87.59 kg (193 lb 1.6 oz)              We Performed the Following     CBC with platelets differential     Comprehensive metabolic panel     Lactate Dehydrogenase     Magnesium     Reticulocyte count     Sirolimus level        Recent Review Flowsheet Data     BMT Recent Results Latest Ref Rng 11/30/2016 12/1/2016 12/13/2016 12/16/2016 1/5/2017 1/18/2017 2/3/2017    WBC 4.0 - 11.0 10e9/L 4.2 - 5.0 4.9 6.0 4.7 6.2    Hemoglobin 13.3 - 17.7 g/dL  9.0(L) - 7.9(L) 7.9(L) 8.1(L) 7.6(L) 7.9(L)    Platelet Count 150 - 450 10e9/L 260 - 238 239 303 277 263    Neutrophils (Absolute) 1.6 - 8.3 10e9/L 3.1 - 3.8 3.6 4.5 3.4 4.7    Sodium 133 - 144 mmol/L 142 - 141 138 138 139 138    Potassium 3.4 - 5.3 mmol/L 3.9 - 3.8 4.0 4.0 3.7 4.1    Chloride 94 - 109 mmol/L 106 - 106 107 104 109 105    Glucose 70 - 99 mg/dL 109(H) - 107(H) 89 92 100(H) 96    Urea Nitrogen 7 - 30 mg/dL 18 - 20 20 21 19 19    Creatinine 0.66 - 1.25 mg/dL 1.47(H) - 1.40(H) 1.41(H) 1.47(H) 1.41(H) 1.35(H)    Calcium (Total) 8.5 - 10.1 mg/dL 9.0 - 8.6 8.8 9.2 8.8 8.8    Protein (Total) 6.8 - 8.8 g/dL 7.3 - 6.8 6.8 7.3 7.0 7.1    Albumin 3.4 - 5.0 g/dL 3.6 - 3.5 3.5 3.7 3.5 3.7    Alkaline Phosphatase 40 - 150 U/L 85 - 91 89 97 109 108    AST 0 - 45 U/L 21 20 20 19 20 16 16    ALT 0 - 70 U/L 28 28 27 22 27 20 20    MCV 78 - 100 fl 85 - 84 85 85 83 83               Primary Care Provider Office Phone # Fax #    Leann Berry [phone]       [facility] [id number] [address] [address]        Thank you!     Thank you for choosing [facility]ANSPL[facility]  for your care. Our goal is always to provide you with excellent care. Hearing back from our patients is one way we can continue to improve our services. Please take a few minutes to complete the written survey that you may receive in the mail after your visit with us. Thank you!             Your Updated Medication List - Protect others around you: Learn how to safely use, store and throw away your medicines at www.disposemymeds.org.          This list is accurate as of: 2/3/17 11:32 AM.  Always use your most recent med list.                   Brand Name Dispense Instructions for use    acyclovir 200 MG capsule    ZOVIRAX    120 capsule    Take 2 capsules (400 mg) by mouth 2 times daily       artificial saliva Liqd liquid     1 Bottle    Swish and spit 10 mLs in mouth 4 times daily       BIOTENE DRY  MOUTH SENSITIVE Pste     1 Tube    Apply 1 inch to affected area 2 times daily       calcium carbonate-vitamin D 500-400 MG-UNIT Tabs per tablet     180 tablet    Take 2 tablets by mouth daily       cycloSPORINE 0.05 % ophthalmic emulsion    RESTASIS    2 Box    Apply 1 drop to eye every 12 hours       dasatinib 20 MG tablet CHEMO    SPRYCEL    60 tablet    Take 2 tablets (40 mg) by mouth daily       fluconazole 100 MG tablet    DIFLUCAN    30 tablet    Take 1 tablet (100 mg) by mouth daily       levofloxacin 250 MG tablet    LEVAQUIN    30 tablet    Take 1 tablet (250 mg) by mouth daily       levothyroxine 112 MCG tablet    SYNTHROID/LEVOTHROID    90 tablet    Take 1 tablet (112 mcg) by mouth daily       liothyronine 5 MCG tablet    CYTOMEL    180 tablet    Take 1 tablet (5 mcg) by mouth 2 times daily       LORazepam 0.5 MG tablet    ATIVAN    30 tablet    Take 1-2 tablets (0.5-1 mg) by mouth every 6 hours as needed for other (nausea)       magnesium oxide 400 MG tablet    MAG-OX    180 tablet    Take 1 tablet (400 mg) by mouth 2 times daily       metoprolol 25 MG tablet    LOPRESSOR    60 tablet    Take 0.5 tablets (12.5 mg) by mouth 2 times daily       pantoprazole 40 MG EC tablet    PROTONIX    30 tablet    Take 1 tablet (40 mg) by mouth daily       Selenium 100 MCG Caps     90 capsule    1 tablet daily       Senna 8.6-50 MG tablet     60 tablet    Take 1 tablet by mouth 2 times daily as needed for constipation       sirolimus 1 MG tablet    RAPAMUNE - GENERIC EQUIVALENT    120 tablet    Take 4 tablets (4 mg) by mouth daily       sulfamethoxazole-trimethoprim 800-160 MG per tablet    BACTRIM DS/SEPTRA DS    30 tablet    Take 1 tablet twice a day on Mondays and Tuesdays       INDU HUDSON

## 2017-02-03 NOTE — PROGRESS NOTES
"Endocrinology Clinic Visit    Subjective:     HPI: Panchito Pierce is a 51 year old year old male with history of Graves Disease and Hyperthyroidism s/p VELA and CML s/p MUD SCT who is seen for 2 month follow up.     #1 hypothyroidism due to Graves' disease status post radioactive iodide  Patient report, was diagnosed with Graves' disease in 2002.  He underwent radioactive therapy of the thyroid and was treated with levothyroxine.  Per patient report, he does best on a T3 and T4 combination.  Patient does have a history of Graves eye disease and is currently on selenium. Had been on steroids (for iqisv-ekwcca-jziq disease since April 2016) but now off steroids. Pt  noted to be mildly thyrotoxic with weight loss during his inpatient hospitalization where April 2016 TSH was undetectable, free T4 was high at 1.51 and free T3 was low at 1.9.  Levothyroxine was adjusted to 125 ug each day and he continued with Cytomel 5  g twice daily    He has a family history of hypothyroidism.    Interval history:  November 2016 TSH is 1.11 and 1.28 at last visit on 12/13/16.  Patient reports doing very well from a thyroid standpoint and continues on levothyroxine at 112  g daily and Cytomel at 5  g twice daily.  He denies any eye symptoms and currently does not really uses eyedrops.  He reports that he does take his selenium every day. Does endorse some cold intolerance, but denies weight changes, palpitations hair changes or restlessness.     #2 Joint Pain  Patient reports joint aches and pain for the last month that is primarily affecting his ankles and knees, but does endorse generalized joint pains occasionally. Mainly noticeable when he goes to stand up. Has pain in the am that lasts for only about 20 mins. Had knee surgery in 2004/2005 and noted to have \"arthritis\" during surgery. Denies joint swelling, erythema or warmth. Is not taking any medications for his joint pain.     #3 Fatigue  During previous visit in 12/1/16, patient " endorsed worsening fatigue and there was concern for sleep apnea. Patient's wife had accompanied him and reported excessive snoring as well as periods of hypocapnic episodes, even apneic episodes at night per her report. Patient was also complaining of excessive daytime somnolence. Recommendation at that time was for patient to have sleep study performed.     Interval History: Patient had sleep polysomnography performed at [facility]. Report indicated severe ALVARO with AHI of 53. Recommendation per Dr. Metcalf at that time was pursue CPAP which patient was very hesitant to proceed with. Patient continues to endorse excessive daytime somnolence, often having a mid-afternoon nap or else he will need to go to bed at 8pm. He endorses continued fatigue and wife continues to endorse snoring and periods of apnea. Patient states his sleep is often very restless.     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  He also reports a history of rare morning erections.  He reports he shakes every few weeks and has also noted loss of his axillary hair, chest here, as well as decrease in testicular size.  September 2016 total testosterone of 141, PSA of 0.5.  Repeat check in October 2016 showed a testosterone 186, FSH of 25.2, and elevated to 7.7.     Interval History: Patient had labs done 12/1/16 with FSH elevated at 28.9. LH, Prolactin, SHBG were all normal. Testosterone had increased to 354 and free testosterone was also normal at 8.25. Patient states that his morning erections are occuring more frequently and he had a nighttime emission a couple weeks ago.     #5 History of steroid use  He reports that he had been put on prednisone daily since April 2016 for tlqdk-iagllo-crkf disease.  Was tapered with last dose in mid December.         Allergies   Allergen Reactions     Allopurinol Rash     Unclear if it was from allopurinol, but went away when it  was stopped.  But also had steroid cream at the same time.       Current Outpatient Prescriptions   Medication Sig Dispense Refill     magnesium oxide (MAG-OX) 400 MG tablet Take 1 tablet (400 mg) by mouth 2 times daily 180 tablet 2     Ondansetron HCl (ZOFRAN PO)        acyclovir (ZOVIRAX) 200 MG capsule Take 2 capsules (400 mg) by mouth 2 times daily 120 capsule 11     pantoprazole (PROTONIX) 40 MG enteric coated tablet Take 1 tablet (40 mg) by mouth daily 30 tablet 11     sirolimus (RAPAMUNE - GENERIC EQUIVALENT) 1 MG tablet Take 4 tablets (4 mg) by mouth daily 120 tablet 3     sulfamethoxazole-trimethoprim (BACTRIM DS,SEPTRA DS) 800-160 MG per tablet Take 1 tablet twice a day on Mondays and Tuesdays 30 tablet 6     dasatinib (SPRYCEL) 20 MG tablet Take 2 tablets (40 mg) by mouth daily 60 tablet 8     levothyroxine (SYNTHROID, LEVOTHROID) 112 MCG tablet Take 1 tablet (112 mcg) by mouth daily 90 tablet 3     liothyronine (CYTOMEL) 5 MCG tablet Take 1 tablet (5 mcg) by mouth 2 times daily 180 tablet 3     Selenium 100 MCG CAPS 1 tablet daily 90 capsule 3     cycloSPORINE (RESTASIS) 0.05 % ophthalmic emulsion Apply 1 drop to eye every 12 hours 2 Box 11     fluconazole (DIFLUCAN) 100 MG tablet Take 1 tablet (100 mg) by mouth daily 30 tablet 11     Sennosides-Docusate Sodium (SENNA) 8.6-50 MG tablet Take 1 tablet by mouth 2 times daily as needed for constipation 60 tablet 3     metoprolol (LOPRESSOR) 25 MG tablet Take 0.5 tablets (12.5 mg) by mouth 2 times daily 60 tablet 11     Dentifrices (BIOTENE DRY MOUTH SENSITIVE) PSTE Apply 1 inch to affected area 2 times daily 1 Tube 2     levofloxacin (LEVAQUIN) 250 MG tablet Take 1 tablet (250 mg) by mouth daily 30 tablet 3     calcium carbonate-vitamin D 500-400 MG-UNIT TABS tablt Take 2 tablets by mouth daily 180 tablet 3     artificial saliva (BIOTENE DRY MOUTHWASH) LIQD solution Swish and spit 10 mLs in mouth 4 times daily 1 Bottle 2     LORazepam (ATIVAN) 0.5 MG tablet  Take 1-2 tablets (0.5-1 mg) by mouth every 6 hours as needed for other (nausea) 30 tablet 5       Review of Systems       Answers for HPI/ROS submitted by the patient on 1/20/2017   General Symptoms: Yes  Skin Symptoms: Yes  HENT Symptoms: No  EYE SYMPTOMS: Yes  HEART SYMPTOMS: No  LUNG SYMPTOMS: No  INTESTINAL SYMPTOMS: Yes  URINARY SYMPTOMS: No  REPRODUCTIVE SYMPTOMS: Yes  SKELETAL SYMPTOMS: Yes  BLOOD SYMPTOMS: No  NERVOUS SYSTEM SYMPTOMS: No  MENTAL HEALTH SYMPTOMS: Yes  Fever: No  Loss of appetite: No  Weight loss: No  Weight gain: No  Fatigue: Yes  Night sweats: Yes  Chills: Yes  Increased stress: Yes  Excessive hunger: No  Excessive thirst: No  Feeling hot or cold when others believe the temperature is normal: Yes  Loss of height: No  Post-operative complications: No  Surgical site pain: No  Hallucinations: No  Change in or Loss of Energy: Yes  Hyperactivity: No  Confusion: No  Changes in hair: No  Changes in moles/birth marks: No  Itching: Yes  Rashes: No  Changes in nails: No  Acne: No  Change in facial hair: No  Warts: Yes  Non-healing sores: No  Scarring: No  Flaking of skin: No  Color changes of hands/feet in cold : No  Sun sensitivity: No  Skin thickening: No  Eye pain: No  Vision loss: No  Dry eyes: Yes  Watery eyes: No  Eye bulging: No  Double vision: No  Flashing of lights: No  Spots: No  Floaters: Yes  Redness: No  Crossed eyes: No  Tunnel Vision: No  Yellowing of eyes: No  Eye irritation: No  Heart burn or indigestion: No  Nausea: Yes  Vomiting: Yes  Abdominal pain: No  Bloating: Yes  Constipation: No  Diarrhea: Yes  Blood in stool: No  Black stools: No  Rectal or Anal pain: No  Fecal incontinence: No  Rectal bleeding: No  Yellowing of skin or eyes: No  Vomit with blood: No  Change in stools: No  Hemorrhoids: No  Back pain: No  Muscle aches: Yes  Neck pain: No  Swollen joints: No  Joint pain: Yes  Bone pain: No  Muscle cramps: No  Muscle weakness: Yes  Joint stiffness: Yes  Bone fracture:  No  Nervous or Anxious: Yes  Depression: No  Trouble sleeping: Yes  Trouble thinking or concentrating: Yes  Mood changes: Yes  Panic attacks: No    Past Medical History   Diagnosis Date     Graves disease 1/1/2003     treated with radioiodine, meds T3 and T4     Murmur, heart      has not been fully evaluated       Past Surgical History   Procedure Laterality Date     ------------other------------- Left 1/1/1985     left hand graft flap- work accident     ------------other------------- Right 1/1/1985     right foot graft- work accident     Esophagoscopy, gastroscopy, duodenoscopy (egd), combined N/A 3/23/2016     Procedure: COMBINED ESOPHAGOSCOPY, GASTROSCOPY, DUODENOSCOPY (EGD), BIOPSY SINGLE OR MULTIPLE;  Surgeon: Jay Tracy MD;  Location: [facility]       Family History   Problem Relation Age of Onset     Thyroid Disease Sister      Thyroid Disease Sister      Thyroid Disease Sister      Thyroid Disease Mother      Lung Cancer Mother      Prostate Cancer Father      Dementia Father      Heart Murmur Father      Hepatitis Brother      Heart Murmur Brother      Asthma Son        Social History     Social History     Marital Status:      Spouse Name: Nu     Number of Children: 4     Years of Education: N/A     Social History Main Topics     Smoking status: Former Smoker -- 0.50 packs/day for 30 years     Start date: 01/01/1985     Quit date: 06/28/2015     Smokeless tobacco: Not on file     Alcohol Use: Yes      Comment: once a week     Drug Use: No     Sexual Activity: Not on file      Comment:      Other Topics Concern     Not on file     Social History Narrative     to wife Nu.    4 children: 1 step daughter Ro age 27, 3 biological sons, 19 yo Jose L, 15 yo Sergio ([place]berg[place]), 11 yo Elvis    Lives in [place]    Used to work in clerical work, now in school for electrical engineering    11 brothers and sisters, 3 live in the [facility], 1 brother incarcerated, 1 brother  "hepatitis C           Objective:   /73 mmHg  Pulse 82  Ht 1.753 m (5' 9\")  Wt 89.223 kg (196 lb 11.2 oz)  BMI 29.03 kg/m2  Constitutional: Pleasant no acute cardiopulmonary distress.   EYES: anicteric, normal extra-ocular movements, no lid lag or retraction   HEENT: Mouth/Throat: Mucous membrane is moist. Oropharynx is clear. No adenopathy. Normal thyroid   Cardiovascular: RRR, S1, S2 normal. No m/g/r   Pulmonary/Chest: CTAB. No wheezing or rales   Abdominal: +BS. Non tender to palpation. No organomegaly present.  Neurological: Alert and oriented. CNII-XII intact. Muscle strength 5/5. Sensory is intact.  Extremities: No clubbing, cyanosis or inflammation   Skin: normal texture, color  Lymphatic: no cervical lymphadenopathy.  Psychological: appropriate mood and affect     In House Labs:   No results found for this basename: a1c    TSH   Date Value Ref Range Status   12/13/2016 1.28 0.40 - 4.00 mU/L Final   11/30/2016 1.11 0.40 - 4.00 mU/L Final   08/19/2016 0.88 0.40 - 4.00 mU/L Final   06/03/2016 0.08* 0.40 - 4.00 mU/L Final   04/08/2016 <0.01* 0.40 - 4.00 mU/L Final     T4 FREE   Date Value Ref Range Status   12/13/2016 0.80 0.76 - 1.46 ng/dL Final   06/03/2016 0.78 0.76 - 1.46 ng/dL Final   04/08/2016 1.51* 0.76 - 1.46 ng/dL Final   03/04/2016 1.39 0.76 - 1.46 ng/dL Final   02/12/2016 1.31 0.76 - 1.46 ng/dL Final       CREATININE   Date Value Ref Range Status   01/18/2017 1.41* 0.66 - 1.25 mg/dL Final   ]    Recent Labs   Lab Test  04/11/16   0305  04/07/16   0313   TRIG  377*  159*       25 OH VIT D2   Date Value Ref Range Status   04/06/2016 <5 ug/L Final     25 OH VIT D3   Date Value Ref Range Status   04/06/2016 21 ug/L Final     25 OH VIT D TOTAL   Date Value Ref Range Status   04/06/2016  20 - 75 ug/L Final    <26  Season, race, dietary intake, and treatment affect the concentration of   25-hydroxy-Vitamin D. Values may decrease during winter months and increase   during summer months. Values 20-29 " ug/L may indicate Vitamin D insufficiency   and values <20 ug/L may indicate Vitamin D deficiency.   This test was developed and its performance characteristics determined by the   [facility],  Special Chemistry Laboratory. It has   not been cleared or approved by the FDA. The laboratory is regulated under CLIA   as qualified to perform high-complexity testing. This test is used for clinical   purposes. It should not be regarded as investigational or for research.           Assessment/Treatment Plan:    Panchito Pierce is a 51 year old year old male with history of Graves Disease and Hyperthyroidism s/p VELA and CML s/p MUD SCT who is seen for 2 month follow up and now with new diagnosis of ALVARO.     #1 hypothyroidism due to Graves' disease status post radioactive iodide  Pt needs continue on Cytomel in addition to his levothyroxine.  We will also have the patient continue with the selenium at 100  g daily for now.  December 2016 TSH and free T4 were normal.    #2 Joint Pain  No concerning signs or symptoms suggestive of inflammatory arthritis or septic joint and patient appears very stable. Likely osteoarthritis or secondary to the chemotherapy. Patient recently discontinued his steroids as well and his joint pain could simply be rebound from that. Encouraged patient to follow up with Heme-Onc provider and see if there were options for oral or even topical treatments, but understand risks in covering up fever. May start topical gel in the future.     #3 History of low testosterone/Decreased Libido - Resolved  The patient has a history of low testosterone.  October 2016 FSH was slightly higher.  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  The patient also had evidence of smaller than expected testicles (about 8 grams) on exam. Confirmed ALVARO could also lower testosterone as well. Recheck of labs at visit in 12/1/16 were unremarkable with normal testosterone but persistently elevated FSH.  Explained to patient that there was currently no need for testosterone replacement, as unlikely to get higher than current value. Will recheck FSH and free and total testosterone today to follow trend. Concerns regarding replacement that could also affect ALAVRO.     #4 Obstructive Sleep Apnea  Patient had sleep polysomnography performed on 1/11/17 that was positive for severe ALVARO with AHI of 53.3. Recommendation was for CPAP at min pressure of 5 and max of 15 with heated humidity and lifetime use most likely. Patient expressed some overall hesitancy to initiate CPAP until discussion today. Went over the health benefits of treatment with CPAP and patient is willing to try it in order to help treat his excessive daytime somnolence. Patient will follow up with Dr Metcalf at [facility] to discuss treatment options and mask fitting. If fatigue does not improve with treated ALVARO, can consider alternative etiologies such as adrenal insufficiency with prolonged steroid use (unlikely given normal electrolytes) or side effects of chemo medications.       There are no Patient Instructions on file for this visit.    I will contact the patient with the test results.  Return to clinic in 4 months.    Test and/or medications prescribed today:  Orders Placed This Encounter   Procedures     Follicle stimulating hormone     Testosterone Free and Total     Patient seen and discussed with endocrinology attending, Dr. Ismael Arboleda MD  PGY-2 Internal Medicine  P: [phone]    I have seen and examined the patient and agree with the resident's plan of care as noted.  Dr. Pattie Guevara [phone]

## 2017-02-03 NOTE — NURSING NOTE
Chief Complaint   Patient presents with     Blood Draw     vs/labs by cma     RECHECK     S/P BMT for CML--here for labs and to see provider     Pt is here for labs and to see provider.    Jami Morton, MA

## 2017-02-03 NOTE — PROGRESS NOTES
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 He continues to do well is off steroids now. achy joints are better. Better energy and appetite as compared to last visit. Found to have sleep apnea on sleep study positive and he will get a CPAP machine. does not feel depressed. Denied any fevers, chills, cough, or new cold like symptoms. No symptoms of oral and optho chronic GVH . No bleeding. Libido still poor but occasional improvement.      Review of Systems: 10 point ROS negative except as noted above.    PHYSICAL EXAM   Temp(Src) 97.5  F (36.4  C) (Oral)  Wt 87.544 kg (193 lb)  SpO2 100%  Wt Readings from Last 4 Encounters:   02/03/17 87.544 kg (193 lb)   02/03/17 89.223 kg (196 lb 11.2 oz)   01/18/17 87.59 kg (193 lb 1.6 oz)   12/16/16 87.59 kg (193 lb 1.6 oz)   General: NAD, pleasant appearing male, much less chushingoid     Eyes: CARY, sclera anicteric     Nose/Mouth/Throat: OP clear, buccal mucosa dry, no ulcerations; still coated tongue     Lungs: CTA bilaterally    Cardiovascular: RRR, no M/R/G    Abdominal/Rectal: +BS, soft, NT, ND, No masses    Lymphatics: no LE edema    Skin: no rashes and no fibrotic changes.  Neuro: A&O, grossly nonfocal.      LABS AND IMAGING      Lab Results   Component Value Date    WBC 6.2 02/03/2017    ANEU 4.7 02/03/2017    HGB 7.9* 02/03/2017    HCT 25.6* 02/03/2017     02/03/2017     02/03/2017    POTASSIUM 4.1 02/03/2017    CHLORIDE 105 02/03/2017    CO2 23 02/03/2017    GLC 96 02/03/2017    BUN 19 02/03/2017    CR 1.35* 02/03/2017    MAG 2.3 02/03/2017    INR 0.9 06/15/2016     BILITOTAL 0.2 02/03/2017    AST 16 02/03/2017    ALT 20 02/03/2017    ALKPHOS 108 02/03/2017    PROTTOTAL 7.1 02/03/2017    ALBUMIN 3.7 02/03/2017   AST       16   2/3/2017  ALT       20   2/3/2017  No results found for this basename: BiliConj   BILITOTAL      0.2   2/3/2017  ALBUMIN      3.7   2/3/2017  PROTTOTAL      7.1   2/3/2017   ALKPHOS      108   2/3/2017      ASSESSMENT BY SYSTEMS   Panchito Pierce is a 51 year old man s/p MUD SCT for CML  - trasplanted in second chronic phase after blast crisis;    BMT/ CML:  continue dasatinib 40mg/d (5/24); repeat PCR 12/2016 and 100% donor. Plan to continue TKI for another 3 months and stop in April.  - restage per protocol, 1 year evaluation upcoming     HEMATOLOGY/COAGULATION: anemia. Due to dasatinib. Slight elevated retic count. LDH normal. HGB stable. Transfuse if < 7.0. Continue folate. No bleeding.   - Stable good counts with mild anemia not requiring transfusions. PLT and WBC stable.      INFECTIONS AND PROPHYLAXIS: Afebrile. No focal sxs.  CMV Repeat pending. Flu shot 11/4/16. Start immunizations today 12/16/17. Continue reduced dose acyclovir and bactrim in half to adjust for renal function.    Prophylaxis: Levaquin (steroids), LD ACV (CMV-, HSV-, EBV+, VZV+), Fluconazole, Pentamidine (last given 7/27/16).    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 Stopped prednisone on 12/06/16. Continue biotene and dexa mouth wash. Sirolimus level is good.   -  No N/V/D.   - Advised to wear sun block when outside for extended periods of time to prevent flare of GVH  - Chronic: Schirmer's test in clinic 4/5 showed R 0 mm and L 0 mm; lip biopsy negative though mouth was and remains dry though improving. Also failure to thrive; 80 lb weight loss.   - EGD negative for GVHD, but with Schirmer's test results,   - he was started on systemic steroids 1 mg/kg on 4/5/16 stop 12/06.     GASTROINTESTINAL/LIVER: Appetite good, wt stable. 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 Upper GI biopsies (3/23 EGD negative for GVHD, CMV, H.Pylori) negative as was lip bx.   - Continue Protonix for GI prophylaxis.     - dry mouth: biotene mouthwash and toothpaste (continue)  - soft stools: monitor; can decrease oral Mg if worse    RENAL/FEN/Nutrition: Cr and electrolytes stable  - Hx HypoMg: cont MgOx 400mg bid and if okay today we will stop replacement.     CARDIOVASCULAR:  Normotensive. Hx Hypertension. Cont metoprolol, cont Norvasc. Normotensive, asymptomatic today. Discontinue Norvasc.   - Hx of A fib with RVR (new 12/24); resolved with dilt, now on Metoprolol 12.5mg BID.     ENDOCRINE: following with Dr Guevara. thyroid and testosterone issues. Hx Graves Disease s/p VELA therapy. Cont synthroid and liothyronine. TSH and Free T3 low; Free T4 elevated. Mixed picture; After endocrine consult:  Mild thyrotoxicosis due to large weight loss. On levothyroxine 112mcg.    - On selenium supplement, T3 and T4.    PSYCHIATRIC:  Mood seems to be down. If needed we may use Wellbutrim that has no libido side effects.     DERMATOLOGY: no rashes.   - Sun: Advised to wear SPF when out in the sun for prolonged periods of time  - Acne: resolved. Likely due to Siro. A few lesions scattered over shoulders and chest. Consider topical clinda cream or doxy if persists or becomes worse.       Plan:  Continue Dasatinib to 40mg/d  Hold sirolimus for level 3/17/17    RTC Rick on 3/17/17 with labs

## 2017-02-06 ENCOUNTER — TELEPHONE (OUTPATIENT)
Dept: TRANSPLANT | Facility: CLINIC | Age: 52
End: 2017-02-06

## 2017-02-06 NOTE — TELEPHONE ENCOUNTER
Prior Authorization Approval    Authorization Effective Date: 2/3/2017  Authorization Expiration Date: 2/3/2018  Medication: Sprycel - Approved  Approved Dose/Quantity: 60 per 30 days  Reference #: [id number]   Insurance Company: [employer] - Phone [phone] Fax [phone]  Expected CoPay:       CoPay Card Available:      Foundation Assistance Needed:    Which Pharmacy is filling the prescription (Not needed for infusion/clinic administered): [facility] - [place], [place] - [facility] [address]  Pharmacy Notified: Yes  Patient Notified: No

## 2017-02-09 ENCOUNTER — TELEPHONE (OUTPATIENT)
Dept: ENDOCRINOLOGY | Facility: CLINIC | Age: 52
End: 2017-02-09

## 2017-02-09 NOTE — TELEPHONE ENCOUNTER
Panchito will see Podiatry [facility] next week and will  Make sure he goes for the lab draw . There are  2 orders  for labs is he to get both labs done or just the testosterone?

## 2017-02-09 NOTE — TELEPHONE ENCOUNTER
----- Message from Pattie Guevara MD sent at 2/6/2017  9:43 PM CST -----  Regarding: RE: ADD on lab  Well, I wanted them to do it on the same day as 2/3 to minimize travel and blood draw for pt, but it looks like they missed it. Can the pt do it at a [facility] locally or when he comes back for another draw?     ----- Message -----     From: Juliann Fam, RN     Sent: 2/6/2017   8:02 AM       To: Pattie Guevara MD  Subject: ADD on lab                                       The labs that were drawn  2/3/17 were from another provider and  There was no red tube so testosterone  cannot be added. When did you want him to do the  Lab orders you  Entered 2/3/17.  ----- Message -----     From: Pattie Guevara MD     Sent: 2/4/2017  11:47 PM       To: Juliann Fam RN    Can testosterone be added to recent blood draw?

## 2017-02-14 ENCOUNTER — OFFICE VISIT (OUTPATIENT)
Dept: PODIATRY | Facility: CLINIC | Age: 52
End: 2017-02-14
Payer: COMMERCIAL

## 2017-02-14 VITALS — HEART RATE: 83 BPM | DIASTOLIC BLOOD PRESSURE: 65 MMHG | OXYGEN SATURATION: 98 % | SYSTOLIC BLOOD PRESSURE: 120 MMHG

## 2017-02-14 DIAGNOSIS — B07.0 PLANTAR WART: ICD-10-CM

## 2017-02-14 DIAGNOSIS — B35.3 TINEA PEDIS OF BOTH FEET: Primary | ICD-10-CM

## 2017-02-14 PROCEDURE — 17110 DESTRUCTION B9 LES UP TO 14: CPT | Performed by: PODIATRIST

## 2017-02-14 PROCEDURE — 99212 OFFICE O/P EST SF 10 MIN: CPT | Mod: 25 | Performed by: PODIATRIST

## 2017-02-14 RX ORDER — CLOTRIMAZOLE AND BETAMETHASONE DIPROPIONATE 10; .64 MG/G; MG/G
CREAM TOPICAL 2 TIMES DAILY
Qty: 45 G | Refills: 1 | Status: SHIPPED | OUTPATIENT
Start: 2017-02-14 | End: 2017-02-28

## 2017-02-14 NOTE — PROGRESS NOTES
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)     Hyperthyroidism     Hypogonadism male     Fatigue, unspecified type     Past Surgical History   Procedure Laterality Date     ------------other------------- Left 1/1/1985     left hand graft flap- work accident     ------------other------------- Right 1/1/1985     right foot graft- work accident     Esophagoscopy, gastroscopy, duodenoscopy (egd), combined N/A 3/23/2016     Procedure: COMBINED ESOPHAGOSCOPY, GASTROSCOPY, DUODENOSCOPY (EGD), BIOPSY SINGLE OR MULTIPLE;  Surgeon: Jay Tracy MD;  Location: [facility]     Social History     Social History     Marital status:      Spouse name: Nu     Number of children: 4     Years of education: N/A     Occupational History     Not on file.     Social History Main Topics     Smoking status: Former Smoker     Packs/day: 0.50     Years: 30.00     Start date: 1/1/1985     Quit date: 6/28/2015     Smokeless tobacco: Not on file     Alcohol use Yes      Comment: once a week     Drug use: No     Sexual activity: Not on file      Comment:      Other Topics Concern     Not on file     Social History Narrative     to wife Nu.    4 children: 1 step daughter Ro age 27, 3 biological sons, 17 yo Jose L, 15 yo Sergio (Asbergers), 11 yo Elvis    Lives in [place]    Used to work in clerical work, now in school for electrical engineering    11 brothers and sisters, 3 live in the [facility], 1 brother incarcerated, 1 brother hepatitis C         Family History   Problem Relation Age of Onset     Thyroid Disease Sister      Thyroid Disease Sister       Thyroid Disease Sister      Thyroid Disease Mother      Lung Cancer Mother      Prostate Cancer Father      Dementia Father      Heart Murmur Father      Hepatitis Brother      Heart Murmur Brother      Asthma Son      SUBJECTIVE FINDINGS:  A 51-year-old male returns to clinic for plantar warts bilaterally.  He relates they are doing well, no new problems, other than he relates he does have a rash on the medial ankle that he just noticed a couple days ago.  No injuries.  No specific relieving or aggravating factors.        OBJECTIVE FINDINGS:  He has hyperkeratotic tissue buildup with pinpoint ecchymosis, plantar right fifth MPJ, plantar left second toe, left fourth toe, left fifth metatarsal base.  These are thinning out.  He has a tinea type rash on the medial ankle on the right with some varicosities present.  There is no erythema, no drainage, no odor, no calor bilaterally.      ASSESSMENT AND PLAN:  Plantar warts bilaterally.  These are doing well.  They are nearly resolved.  Tinea pedis on the right.  Diagnosis and treatment discussed with him.  All the lesions were frozen with liquid nitrogen x3 upon consent.  This is the second time we have frozen these.  A prescription for Lotrisone cream for the tinea given and use discussed with him.  He will return to clinic in 2-4 weeks.

## 2017-02-14 NOTE — MR AVS SNAPSHOT
After Visit Summary   2/14/2017    Panchito Pierce    MRN: [id number]           Patient Information     Date Of Birth          1965        Visit Information        Provider Department      2/14/2017 11:00 AM Brando Sagastume DPM [facility]        Today's Diagnoses     Tinea pedis of both feet    -  1    Plantar wart          Care Instructions    Thanks for coming today.  Ortho/Sports Medicine Clinic  [address] [place] [address]    To schedule future appointments in Ortho Clinic, you may call [phone].    To schedule ordered imaging by your Provider: Call [facility] at [phone]    Socruise available online at:   [email or web address]    Please call if any further questions or concerns [phone] and ask for the Orthopedic Department. Clinic hours 8 am to 5 pm.    Return to clinic if symptoms worsen.          Follow-ups after your visit        Your next 10 appointments already scheduled     Mar 07, 2017 10:00 AM CST   Return Visit with Brando Sagastume DPM   [facility] ([facility]    [address] [place] [id number] [phone]            Mar 17, 2017  2:30 PM CDT   Masonic Lab Draw with  MASONIC LAB DRAW   [facility] Masonic Lab Draw ([facility])    [address] [place] [address] [phone]            Mar 17, 2017  3:00 PM CDT   Return with Jermaine Cabrera MD   [facility] Blood and Marrow Transplant ([facility])    [address] [place] [address] [phone]            Jun 02, 2017  9:30 AM CDT   (Arrive by 9:15 AM)   RETURN ENDOCRINE with Pattie Guevara MD   [facility] Endocrinology ([facility])    [address] [place] [address] [phone]              Who to contact     If you have questions or need follow up  information about today's clinic visit or your schedule please contact [facility] directly at [phone].  Normal or non-critical lab and imaging results will be communicated to you by DNA Guidehart, letter or phone within 4 business days after the clinic has received the results. If you do not hear from us within 7 days, please contact the clinic through DNA Guidehart or phone. If you have a critical or abnormal lab result, we will notify you by phone as soon as possible.  Submit refill requests through Meez or call your pharmacy and they will forward the refill request to us. Please allow 3 business days for your refill to be completed.          Additional Information About Your Visit        DNA Guidehart Information     Meez gives you secure access to your electronic health record. If you see a primary care provider, you can also send messages to your care team and make appointments. If you have questions, please call your primary care clinic.  If you do not have a primary care provider, please call [phone] and they will assist you.      Meez is an electronic gateway that provides easy, online access to your medical records. With Meez, you can request a clinic appointment, read your test results, renew a prescription or communicate with your care team.     To access your existing account, please contact your [facility] or call [phone] for assistance.        Care EveryWhere ID     This is your Care EveryWhere ID. This could be used by other organizations to access your [place] medical records  [id number]        Your Vitals Were     Pulse Pulse Oximetry                83 98%           Blood Pressure from Last 3 Encounters:   02/14/17 120/65   02/03/17 132/73   01/18/17 134/74    Weight from Last 3 Encounters:   02/03/17 87.5 kg (193 lb)   02/03/17 89.2 kg (196 lb 11.2 oz)   01/18/17 87.6 kg (193 lb 1.6 oz)              We Performed the Following     DESTRUCT  BENIGN LESION, UP TO 14          Today's Medication Changes          These changes are accurate as of: 2/14/17  1:47 PM.  If you have any questions, ask your nurse or doctor.               Start taking these medicines.        Dose/Directions    clotrimazole-betamethasone cream   Commonly known as:  LOTRISONE   Used for:  Tinea pedis of both feet        Apply topically 2 times daily for 14 days   Quantity:  45 g   Refills:  1            Where to get your medicines      These medications were sent to [id number] - [place] [[facility] [place] - [address] [facility]  [address], [place]  [place] [address]     Phone:  [phone]     clotrimazole-betamethasone cream                Primary Care Provider Office Phone # Fax #    Leann REICH Jamal [phone]       [facility] [place] [address] [address]  [facility] [address]        Thank you!     Thank you for choosing [facility]  for your care. Our goal is always to provide you with excellent care. Hearing back from our patients is one way we can continue to improve our services. Please take a few minutes to complete the written survey that you may receive in the mail after your visit with us. Thank you!             Your Updated Medication List - Protect others around you: Learn how to safely use, store and throw away your medicines at www.disposemymeds.org.          This list is accurate as of: 2/14/17  1:47 PM.  Always use your most recent med list.                   Brand Name Dispense Instructions for use    acyclovir 200 MG capsule    ZOVIRAX    120 capsule    Take 2 capsules (400 mg) by mouth 2 times daily       artificial saliva Liqd liquid     1 Bottle    Swish and spit 10 mLs in mouth 4 times daily       BIOTENE DRY MOUTH SENSITIVE Pste     1 Tube    Apply 1 inch to affected area 2 times daily       calcium carbonate-vitamin D 500-400 MG-UNIT Tabs per tablet     180 tablet    Take 2 tablets by mouth daily        clotrimazole-betamethasone cream    LOTRISONE    45 g    Apply topically 2 times daily for 14 days       cycloSPORINE 0.05 % ophthalmic emulsion    RESTASIS    2 Box    Apply 1 drop to eye every 12 hours       dasatinib 20 MG tablet CHEMO    SPRYCEL    60 tablet    Take 2 tablets (40 mg) by mouth daily       fluconazole 100 MG tablet    DIFLUCAN    30 tablet    Take 1 tablet (100 mg) by mouth daily       levofloxacin 250 MG tablet    LEVAQUIN    30 tablet    Take 1 tablet (250 mg) by mouth daily       levothyroxine 112 MCG tablet    SYNTHROID/LEVOTHROID    90 tablet    Take 1 tablet (112 mcg) by mouth daily       liothyronine 5 MCG tablet    CYTOMEL    180 tablet    Take 1 tablet (5 mcg) by mouth 2 times daily       LORazepam 0.5 MG tablet    ATIVAN    30 tablet    Take 1-2 tablets (0.5-1 mg) by mouth every 6 hours as needed for other (nausea)       magnesium oxide 400 MG tablet    MAG-OX    180 tablet    Take 1 tablet (400 mg) by mouth 2 times daily       metoprolol 25 MG tablet    LOPRESSOR    60 tablet    Take 0.5 tablets (12.5 mg) by mouth 2 times daily       pantoprazole 40 MG EC tablet    PROTONIX    30 tablet    Take 1 tablet (40 mg) by mouth daily       Selenium 100 MCG Caps     90 capsule    1 tablet daily       Senna 8.6-50 MG tablet     60 tablet    Take 1 tablet by mouth 2 times daily as needed for constipation       sirolimus 1 MG tablet    RAPAMUNE - GENERIC EQUIVALENT    120 tablet    Take 4 tablets (4 mg) by mouth daily       sulfamethoxazole-trimethoprim 800-160 MG per tablet    BACTRIM DS/SEPTRA DS    30 tablet    Take 1 tablet twice a day on Mondays and Tuesdays       INDU HUDSON

## 2017-02-14 NOTE — PATIENT INSTRUCTIONS
Thanks for coming today.  Ortho/Sports Medicine Clinic  [address] [place] [address]    To schedule future appointments in Ortho Clinic, you may call [phone].    To schedule ordered imaging by your Provider: Call [facility] at [phone]    ExactFlat available online at:   [email or web address]    Please call if any further questions or concerns [phone] and ask for the Orthopedic Department. Clinic hours 8 am to 5 pm.    Return to clinic if symptoms worsen.

## 2017-02-14 NOTE — NURSING NOTE
"Panchito Pierce's goals for this visit include: Recheck plantars warts  He requests these members of his care team be copied on today's visit information: yes    PCP: Leann Berry    Referring Provider:  No referring provider defined for this encounter.    Chief Complaint   Patient presents with     RECHECK     recheck plantars warts       Initial There were no vitals taken for this visit. Estimated body mass index is 28.5 kg/(m^2) as calculated from the following:    Height as of 2/3/17: 1.753 m (5' 9\").    Weight as of 2/3/17: 87.5 kg (193 lb).  Medication Reconciliation: complete    "

## 2017-02-27 ENCOUNTER — TELEPHONE (OUTPATIENT)
Dept: PODIATRY | Facility: CLINIC | Age: 52
End: 2017-02-27

## 2017-03-07 RX ORDER — NYSTATIN 100000 U/G
CREAM TOPICAL
Qty: 90 G | Refills: 5 | Status: SHIPPED | OUTPATIENT
Start: 2017-03-07 | End: 2017-05-30

## 2017-03-07 NOTE — TELEPHONE ENCOUNTER
Spoke with Medicaid PA team and was told that the following are covered alternatives: ketaconozole cream, nystatin, generic loprox, clotrimazole, OTC micatin, or OTC tinactin. Routing to Dr. Sagastume for review and new presciption.   SHANE BerumenA

## 2017-03-07 NOTE — TELEPHONE ENCOUNTER
Medication changed to covered alternative and sent in to pharmacy by Dr. Sagastume. Closing PA encounter and routing to that pool as DELIA.   CLAUS Berumen

## 2017-03-14 ENCOUNTER — OFFICE VISIT (OUTPATIENT)
Dept: PODIATRY | Facility: CLINIC | Age: 52
End: 2017-03-14
Payer: COMMERCIAL

## 2017-03-14 VITALS — DIASTOLIC BLOOD PRESSURE: 68 MMHG | OXYGEN SATURATION: 93 % | SYSTOLIC BLOOD PRESSURE: 126 MMHG | HEART RATE: 83 BPM

## 2017-03-14 DIAGNOSIS — B07.0 PLANTAR WART: Primary | ICD-10-CM

## 2017-03-14 PROCEDURE — 17110 DESTRUCTION B9 LES UP TO 14: CPT | Performed by: PODIATRIST

## 2017-03-14 NOTE — PROGRESS NOTES
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)     Hyperthyroidism     Hypogonadism male     Fatigue, unspecified type     Past Surgical History   Procedure Laterality Date     ------------other------------- Left 1/1/1985     left hand graft flap- work accident     ------------other------------- Right 1/1/1985     right foot graft- work accident     Esophagoscopy, gastroscopy, duodenoscopy (egd), combined N/A 3/23/2016     Procedure: COMBINED ESOPHAGOSCOPY, GASTROSCOPY, DUODENOSCOPY (EGD), BIOPSY SINGLE OR MULTIPLE;  Surgeon: Jay Tracy MD;  Location: [facility]     Social History     Social History     Marital status:      Spouse name: Nu     Number of children: 4     Years of education: N/A     Occupational History     Not on file.     Social History Main Topics     Smoking status: Former Smoker     Packs/day: 0.50     Years: 30.00     Start date: 1/1/1985     Quit date: 6/28/2015     Smokeless tobacco: Not on file     Alcohol use Yes      Comment: once a week     Drug use: No     Sexual activity: Not on file      Comment:      Other Topics Concern     Not on file     Social History Narrative     to wife Nu.    4 children: 1 step daughter Ro age 27, 3 biological sons, 19 yo Jose L, 15 yo Sergio (Asbergers), 13 yo Elvis    Lives in [place]    Used to work in clerical work, now in school for electrical engineering    11 brothers and sisters, 3 live in the [facility], 1 brother incarcerated, 1 brother hepatitis C         Family History   Problem Relation Age of Onset     Thyroid Disease Sister      Thyroid Disease Sister       Thyroid Disease Sister      Thyroid Disease Mother      Lung Cancer Mother      Prostate Cancer Father      Dementia Father      Heart Murmur Father      Hepatitis Brother      Heart Murmur Brother      Asthma Son      SUBJECTIVE FINDINGS:  This 51-year-old male returns to clinic for plantar warts bilaterally.  Relates he is doing well.  No new problems.      OBJECTIVE FINDINGS:  He has hyperkeratotic tissue buildup with pinpoint ecchymosis in the left second and fourth toes.  There is no erythema, no drainage, no odor, no calor bilaterally.      ASSESSMENT AND PLAN:  Plantar warts bilaterally.  He still has some remaining on the second and fourth toes on the left and the other ones appear to be gone.  Tinea pedis appears to be resolved.  This is doing well.  Diagnosis and treatment discussed with him.  The lesions on the second and fourth toe, left foot, were frozen with liquid nitrogen upon consent x3.  He will return to clinic to see me in about 2-4 weeks.

## 2017-03-14 NOTE — NURSING NOTE
"Panchito Pierce's goals for this visit include: Recheck plantars warts  He requests these members of his care team be copied on today's visit information: yes    PCP: Leann Berry    Referring Provider:  ESTABLISHED PATIENT  No address on file    Chief Complaint   Patient presents with     RECHECK     Plantars wart recheck       Initial /68  Pulse 83  SpO2 93% Estimated body mass index is 28.5 kg/(m^2) as calculated from the following:    Height as of 2/3/17: 1.753 m (5' 9\").    Weight as of 2/3/17: 87.5 kg (193 lb).  Medication Reconciliation: complete    "

## 2017-03-14 NOTE — PATIENT INSTRUCTIONS
Thanks for coming today.  Ortho/Sports Medicine Clinic  [address] [place] [address]    To schedule future appointments in Ortho Clinic, you may call [phone].    To schedule ordered imaging by your Provider: Call [facility] at [phone]    [employer] available online at:   [email or web address]    Please call if any further questions or concerns [phone] and ask for the Orthopedic Department. Clinic hours 8 am to 5 pm.    Return to clinic if symptoms worsen.

## 2017-03-14 NOTE — MR AVS SNAPSHOT
After Visit Summary   3/14/2017    Panchito Pierce    MRN: [id number]           Patient Information     Date Of Birth          1965        Visit Information        Provider Department      3/14/2017 2:30 PM Brando Sagastume DPM [facility]        Today's Diagnoses     Plantar wart    -  1      Care Instructions    Thanks for coming today.  Ortho/Sports Medicine Clinic  [address] [place] [address]    To schedule future appointments in Ortho Clinic, you may call [phone].    To schedule ordered imaging by your Provider: Call [facility] at [phone]    Buru Buru available online at:   [email or web address]    Please call if any further questions or concerns [phone] and ask for the Orthopedic Department. Clinic hours 8 am to 5 pm.    Return to clinic if symptoms worsen.          Follow-ups after your visit        Your next 10 appointments already scheduled     Mar 17, 2017  2:30 PM CDT   Masonic Lab Draw with  MASONIC LAB DRAW   [facility] Masonic Lab Draw ([facility])    [address] [place] [id number] [phone]            Mar 17, 2017  3:00 PM CDT   Return with Jermaine Cabrera MD   [facility] Blood and Marrow Transplant ([facility])    [address] [place] [address] [phone]            Apr 17, 2017  9:00 AM CDT   Return Visit with Brando Sagastume DPM   [facility]    [address] [place] [address] [phone]            Jun 02, 2017  9:30 AM CDT   (Arrive by 9:15 AM)   RETURN ENDOCRINE with Pattie Guevara MD   [facility] Endocrinology ([facility])    [address] [place] [address] [phone]              Who to contact     If you have questions or need follow up information about today's clinic visit  or your schedule please contact [facility] directly at [phone].  Normal or non-critical lab and imaging results will be communicated to you by StartBullhart, letter or phone within 4 business days after the clinic has received the results. If you do not hear from us within 7 days, please contact the clinic through StartBullhart or phone. If you have a critical or abnormal lab result, we will notify you by phone as soon as possible.  Submit refill requests through [email or web address] or call your pharmacy and they will forward the refill request to us. Please allow 3 business days for your refill to be completed.          Additional Information About Your Visit        [email or web address] Information     [email or web address] gives you secure access to your electronic health record. If you see a primary care provider, you can also send messages to your care team and make appointments. If you have questions, please call your primary care clinic.  If you do not have a primary care provider, please call [phone] and they will assist you.      [email or web address] is an electronic gateway that provides easy, online access to your medical records. With [email or web address], you can request a clinic appointment, read your test results, renew a prescription or communicate with your care team.     To access your existing account, please contact your [facility] or call [phone] for assistance.        Care EveryWhere ID     This is your Care EveryWhere ID. This could be used by other organizations to access your [place] medical records  [id number]        Your Vitals Were     Pulse Pulse Oximetry                83 93%           Blood Pressure from Last 3 Encounters:   03/14/17 126/68   02/14/17 120/65   02/03/17 132/73    Weight from Last 3 Encounters:   02/03/17 87.5 kg (193 lb)   02/03/17 89.2 kg (196 lb 11.2 oz)   01/18/17 87.6 kg (193 lb 1.6 oz)              We Performed the Following     DESTRUCT BENIGN LESION, UP TO 14        Primary  Care Provider Office Phone # Fax #    Leann Berry [phone]       [facility] [address] [address] [address]        Thank you!     Thank you for choosing [facility]  for your care. Our goal is always to provide you with excellent care. Hearing back from our patients is one way we can continue to improve our services. Please take a few minutes to complete the written survey that you may receive in the mail after your visit with us. Thank you!             Your Updated Medication List - Protect others around you: Learn how to safely use, store and throw away your medicines at www.disposemymeds.org.          This list is accurate as of: 3/14/17 11:59 PM.  Always use your most recent med list.                   Brand Name Dispense Instructions for use    acyclovir 200 MG capsule    ZOVIRAX    120 capsule    Take 2 capsules (400 mg) by mouth 2 times daily       artificial saliva Liqd liquid     1 Bottle    Swish and spit 10 mLs in mouth 4 times daily       BIOTENE DRY MOUTH SENSITIVE Pste     1 Tube    Apply 1 inch to affected area 2 times daily       calcium carbonate-vitamin D 500-400 MG-UNIT Tabs per tablet     180 tablet    Take 2 tablets by mouth daily       cycloSPORINE 0.05 % ophthalmic emulsion    RESTASIS    2 Box    Apply 1 drop to eye every 12 hours       dasatinib 20 MG tablet CHEMO    SPRYCEL    60 tablet    Take 2 tablets (40 mg) by mouth daily       fluconazole 100 MG tablet    DIFLUCAN    30 tablet    Take 1 tablet (100 mg) by mouth daily       levofloxacin 250 MG tablet    LEVAQUIN    30 tablet    Take 1 tablet (250 mg) by mouth daily       levothyroxine 112 MCG tablet    SYNTHROID/LEVOTHROID    90 tablet    Take 1 tablet (112 mcg) by mouth daily       liothyronine 5 MCG tablet    CYTOMEL    180 tablet    Take 1 tablet (5 mcg) by mouth 2 times daily       LORazepam 0.5 MG tablet    ATIVAN    30 tablet    Take 1-2 tablets (0.5-1 mg) by mouth  every 6 hours as needed for other (nausea)       magnesium oxide 400 MG tablet    MAG-OX    180 tablet    Take 1 tablet (400 mg) by mouth 2 times daily       metoprolol 25 MG tablet    LOPRESSOR    60 tablet    Take 0.5 tablets (12.5 mg) by mouth 2 times daily       nystatin cream    MYCOSTATIN    90 g    To feet twice daily.       pantoprazole 40 MG EC tablet    PROTONIX    30 tablet    Take 1 tablet (40 mg) by mouth daily       Selenium 100 MCG Caps     90 capsule    1 tablet daily       Senna 8.6-50 MG tablet     60 tablet    Take 1 tablet by mouth 2 times daily as needed for constipation       sirolimus 1 MG tablet    RAPAMUNE - GENERIC EQUIVALENT    120 tablet    Take 4 tablets (4 mg) by mouth daily       sulfamethoxazole-trimethoprim 800-160 MG per tablet    BACTRIM DS/SEPTRA DS    30 tablet    Take 1 tablet twice a day on Mondays and Tuesdays       INDU HUDSON

## 2017-03-17 ENCOUNTER — ONCOLOGY VISIT (OUTPATIENT)
Dept: TRANSPLANT | Facility: CLINIC | Age: 52
End: 2017-03-17
Attending: INTERNAL MEDICINE
Payer: COMMERCIAL

## 2017-03-17 VITALS
TEMPERATURE: 97.6 F | OXYGEN SATURATION: 95 % | HEART RATE: 79 BPM | SYSTOLIC BLOOD PRESSURE: 125 MMHG | DIASTOLIC BLOOD PRESSURE: 61 MMHG | RESPIRATION RATE: 16 BRPM | WEIGHT: 192 LBS | BODY MASS INDEX: 28.35 KG/M2

## 2017-03-17 DIAGNOSIS — C92.10 CML (CHRONIC MYELOCYTIC LEUKEMIA) (H): ICD-10-CM

## 2017-03-17 DIAGNOSIS — Z94.81 STATUS POST BONE MARROW TRANSPLANT (H): ICD-10-CM

## 2017-03-17 DIAGNOSIS — E29.1 HYPOGONADISM MALE: ICD-10-CM

## 2017-03-17 LAB
ALBUMIN SERPL-MCNC: 3.6 G/DL (ref 3.4–5)
ALBUMIN UR-MCNC: 30 MG/DL
ALP SERPL-CCNC: 117 U/L (ref 40–150)
ALT SERPL W P-5'-P-CCNC: 19 U/L (ref 0–70)
ANION GAP SERPL CALCULATED.3IONS-SCNC: 9 MMOL/L (ref 3–14)
APPEARANCE UR: CLEAR
AST SERPL W P-5'-P-CCNC: 16 U/L (ref 0–45)
BASOPHILS # BLD AUTO: 0 10E9/L (ref 0–0.2)
BASOPHILS NFR BLD AUTO: 0.2 %
BILIRUB SERPL-MCNC: 0.2 MG/DL (ref 0.2–1.3)
BILIRUB UR QL STRIP: NEGATIVE
BUN SERPL-MCNC: 22 MG/DL (ref 7–30)
CALCIUM SERPL-MCNC: 8.5 MG/DL (ref 8.5–10.1)
CHLORIDE SERPL-SCNC: 108 MMOL/L (ref 94–109)
CO2 SERPL-SCNC: 25 MMOL/L (ref 20–32)
COLOR UR AUTO: YELLOW
CREAT SERPL-MCNC: 1.64 MG/DL (ref 0.66–1.25)
DIFFERENTIAL METHOD BLD: ABNORMAL
EOSINOPHIL # BLD AUTO: 0.1 10E9/L (ref 0–0.7)
EOSINOPHIL NFR BLD AUTO: 1.9 %
ERYTHROCYTE [DISTWIDTH] IN BLOOD BY AUTOMATED COUNT: 18 % (ref 10–15)
FSH SERPL-ACNC: 27.7 IU/L (ref 0.7–10.8)
GFR SERPL CREATININE-BSD FRML MDRD: 45 ML/MIN/1.7M2
GLUCOSE SERPL-MCNC: 96 MG/DL (ref 70–99)
GLUCOSE UR STRIP-MCNC: NEGATIVE MG/DL
HCT VFR BLD AUTO: 22.7 % (ref 40–53)
HGB BLD-MCNC: 7.3 G/DL (ref 13.3–17.7)
HGB UR QL STRIP: NEGATIVE
IMM GRANULOCYTES # BLD: 0 10E9/L (ref 0–0.4)
IMM GRANULOCYTES NFR BLD: 0.4 %
KETONES UR STRIP-MCNC: NEGATIVE MG/DL
LEUKOCYTE ESTERASE UR QL STRIP: NEGATIVE
LYMPHOCYTES # BLD AUTO: 0.6 10E9/L (ref 0.8–5.3)
LYMPHOCYTES NFR BLD AUTO: 12.1 %
MAGNESIUM SERPL-MCNC: 2.5 MG/DL (ref 1.6–2.3)
MCH RBC QN AUTO: 25.8 PG (ref 26.5–33)
MCHC RBC AUTO-ENTMCNC: 32.2 G/DL (ref 31.5–36.5)
MCV RBC AUTO: 80 FL (ref 78–100)
MONOCYTES # BLD AUTO: 0.6 10E9/L (ref 0–1.3)
MONOCYTES NFR BLD AUTO: 11.9 %
MUCOUS THREADS #/AREA URNS LPF: PRESENT /LPF
NEUTROPHILS # BLD AUTO: 3.5 10E9/L (ref 1.6–8.3)
NEUTROPHILS NFR BLD AUTO: 73.5 %
NITRATE UR QL: NEGATIVE
NRBC # BLD AUTO: 0 10*3/UL
NRBC BLD AUTO-RTO: 0 /100
PH UR STRIP: 6 PH (ref 5–7)
PLATELET # BLD AUTO: 223 10E9/L (ref 150–450)
POTASSIUM SERPL-SCNC: 4.1 MMOL/L (ref 3.4–5.3)
PROT SERPL-MCNC: 6.8 G/DL (ref 6.8–8.8)
RBC # BLD AUTO: 2.83 10E12/L (ref 4.4–5.9)
RBC #/AREA URNS AUTO: <1 /HPF (ref 0–2)
SODIUM SERPL-SCNC: 141 MMOL/L (ref 133–144)
SP GR UR STRIP: 1.02 (ref 1–1.03)
URN SPEC COLLECT METH UR: ABNORMAL
UROBILINOGEN UR STRIP-MCNC: 0 MG/DL (ref 0–2)
WBC # BLD AUTO: 4.8 10E9/L (ref 4–11)
WBC #/AREA URNS AUTO: 1 /HPF (ref 0–2)

## 2017-03-17 PROCEDURE — 81001 URINALYSIS AUTO W/SCOPE: CPT | Performed by: INTERNAL MEDICINE

## 2017-03-17 PROCEDURE — 83001 ASSAY OF GONADOTROPIN (FSH): CPT | Performed by: INTERNAL MEDICINE

## 2017-03-17 PROCEDURE — 81206 BCR/ABL1 GENE MAJOR BP: CPT | Performed by: INTERNAL MEDICINE

## 2017-03-17 PROCEDURE — 85025 COMPLETE CBC W/AUTO DIFF WBC: CPT | Performed by: INTERNAL MEDICINE

## 2017-03-17 PROCEDURE — 83735 ASSAY OF MAGNESIUM: CPT | Performed by: INTERNAL MEDICINE

## 2017-03-17 PROCEDURE — 80053 COMPREHEN METABOLIC PANEL: CPT | Performed by: INTERNAL MEDICINE

## 2017-03-17 PROCEDURE — 99211 OFF/OP EST MAY X REQ PHY/QHP: CPT | Mod: ZF

## 2017-03-17 PROCEDURE — 36415 COLL VENOUS BLD VENIPUNCTURE: CPT

## 2017-03-17 RX ORDER — SIROLIMUS 0.5 MG/1
0.5 TABLET, FILM COATED ORAL DAILY
Qty: 30 TABLET | Refills: 0 | Status: SHIPPED | OUTPATIENT
Start: 2017-03-17 | End: 2017-04-13

## 2017-03-17 NOTE — MR AVS SNAPSHOT
After Visit Summary   3/17/2017    Panchito Pierce    MRN: [id number]           Patient Information     Date Of Birth          1965        Visit Information        Provider Department      3/17/2017 3:00 PM Jermaine Cabrera MD [facility] Blood [facility] Marrow Transplant        Today's Diagnoses     CML (chronic myelocytic leukemia) (H)        Status post bone marrow transplant (H)              [facility] ([facility]  [address] [place] [address]  Phone: [phone]  Clinic Hours:   Monday-Friday:    8am to 4:30pm   Weekends and holidays:    8am to noon (in general)  If your fever is 100.5  or greater,   call the clinic.  After hours call the   hospital at [phone] or   [phone]. Ask for the BMT   fellow for pediatric or adult patients           Care Instructions    Discontinue levaquin and dasatinib and magnesium  Start to taper the sirolimus  RTC Rick on 4/14 with labs  Urin for UA and sediment today      4/14 1130am labs and visit        Follow-ups after your visit        Your next 10 appointments already scheduled     Apr 14, 2017 11:30 AM CDT   [facility] with  [facility]   [facility] ([facility])    [address] [place] [id number] [phone]            Apr 14, 2017 12:00 PM CDT   Return with Jermaine Cabrera MD   [facility] Marrow Transplant ([facility])    [address] [place] [id number] [phone]            Apr 17, 2017  9:00 AM CDT   [facility] Brando Sagastume DPM   [facility]    [id number] [place] [id number] [phone]            Jun 02, 2017  9:30 AM CDT   (Arrive by 9:15 AM)   RETURN ENDOCRINE with Pattie Guevara MD   [facility] Endocrinology ([facility])    [address] [place] [address]  [facility] [address] [phone]              Future tests that were ordered for you today     Open Future Orders        Priority Expected Expires Ordered    Comprehensive metabolic panel Routine 4/14/2017 4/21/2017 3/17/2017    CBC with platelets differential Routine 4/14/2017 4/21/2017 3/17/2017    Basic metabolic panel Routine 3/22/2017 9/13/2017 3/17/2017            Who to contact     If you have questions or need follow up information about today's clinic visit or your schedule please contact [facility] directly at [phone].  Normal or non-critical lab and imaging results will be communicated to you by SureSpeakhart, letter or phone within 4 business days after the clinic has received the results. If you do not hear from us within 7 days, please contact the clinic through [employer] or phone. If you have a critical or abnormal lab result, we will notify you by phone as soon as possible.  Submit refill requests through Milabra or call your pharmacy and they will forward the refill request to us. Please allow 3 business days for your refill to be completed.          Additional Information About Your Visit        SureSpeakhart Information     Milabra gives you secure access to your electronic health record. If you see a primary care provider, you can also send messages to your care team and make appointments. If you have questions, please call your primary care clinic.  If you do not have a primary care provider, please call [phone] and they will assist you.        Care EveryWhere ID     This is your Care EveryWhere ID. This could be used by other organizations to access your [facility] medical records  [id number]        Your Vitals Were     Pulse Temperature Respirations Pulse Oximetry BMI (Body Mass Index)       79 97.6  F (36.4  C) (Tympanic) 16 95% 28.35 kg/m2        Blood Pressure from Last 3 Encounters:   03/17/17 125/61   03/14/17 126/68   02/14/17 120/65    Weight from  Last 3 Encounters:   03/17/17 87.1 kg (192 lb)   02/03/17 87.5 kg (193 lb)   02/03/17 89.2 kg (196 lb 11.2 oz)              We Performed the Following     BCR ABL1 Major Breakpoint Quant p210     CBC with platelets differential     CMV DNA quantification     Comprehensive metabolic panel     Magnesium     Routine UA with Microscopic - NOW          Today's Medication Changes          These changes are accurate as of: 3/17/17  3:17 PM.  If you have any questions, ask your nurse or doctor.               These medicines have changed or have updated prescriptions.        Dose/Directions    * sirolimus 1 MG tablet   Commonly known as:  RAPAMUNE - GENERIC EQUIVALENT   This may have changed:  Another medication with the same name was added. Make sure you understand how and when to take each.   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 Make sure you understand how and when to take each.   Used for:  Status post bone marrow transplant (H)   Changed by:  Jermaine Cabrera MD        Dose:  0.5 mg   Take 1 tablet (0.5 mg) by mouth daily   Quantity:  30 tablet   Refills:  0       * Notice:  This list has 2 medication(s) that are the same as other medications prescribed for you. Read the directions carefully, and ask your doctor or other care provider to review them with you.      Stop taking these medicines if you haven't already. Please contact  your care team if you have questions.     dasatinib 20 MG tablet CHEMO   Commonly known as:  SPRYCEL   Stopped by:  Jermaine Cabrera MD           levofloxacin 250 MG tablet   Commonly known as:  LEVAQUIN   Stopped by:  Jermaine Cabrera MD                Where to get your medicines      These medications were sent to [id number] - [place] - [address] [facility]  [address] [place] [address]     Phone:  [phone]     sirolimus 0.5 MG tablet                Recent Review Flowsheet Data     BMT Recent Results Latest Ref Rng & Units 12/1/2016 12/13/2016 12/16/2016 1/5/2017 1/18/2017 2/3/2017 3/17/2017    WBC 4.0 - 11.0 10e9/L - 5.0 4.9 6.0 4.7 6.2 4.8    Hemoglobin 13.3 - 17.7 g/dL - 7.9(L) 7.9(L) 8.1(L) 7.6(L) 7.9(L) 7.3(L)    Platelet Count 150 - 450 10e9/L - 238 239 303 277 263 223    Neutrophils (Absolute) 1.6 - 8.3 10e9/L - 3.8 3.6 4.5 3.4 4.7 3.5    Sodium 133 - 144 mmol/L - 141 138 138 139 138 141    Potassium 3.4 - 5.3 mmol/L - 3.8 4.0 4.0 3.7 4.1 4.1    Chloride 94 - 109 mmol/L - 106 107 104 109 105 108    Glucose 70 - 99 mg/dL - 107(H) 89 92 100(H) 96 96    Urea Nitrogen 7 - 30 mg/dL - 20 20 21 19 19 22    Creatinine 0.66 - 1.25 mg/dL - 1.40(H) 1.41(H) 1.47(H) 1.41(H) 1.35(H) 1.64(H)    Calcium (Total) 8.5 - 10.1 mg/dL - 8.6 8.8 9.2 8.8 8.8 8.5    Protein (Total) 6.8 - 8.8 g/dL - 6.8 6.8 7.3 7.0 7.1 6.8    Albumin 3.4 - 5.0 g/dL - 3.5 3.5 3.7 3.5 3.7 3.6    Alkaline Phosphatase 40 - 150 U/L - 91 89 97 109 108 117    AST 0 - 45 U/L 20 20 19 20 16 16 16    ALT 0 - 70 U/L 28 27 22 27 20 20 19    MCV 78 - 100 fl - 84 85 85 83 83 80               Primary Care Provider Office Phone # Fax #    Leann M Jamal [phone]       [facility] [address] [address] [address]        Thank you!     Thank you for choosing [facility]ARR[facility]PL[facility]  for your care. Our goal is always to provide you with excellent care.  Hearing back from our patients is one way we can continue to improve our services. Please take a few minutes to complete the written survey that you may receive in the mail after your visit with us. Thank you!             Your Updated Medication List - Protect others around you: Learn how to safely use, store and throw away your medicines at www.disposemymeds.org.          This list is accurate as of: 3/17/17  3:17 PM.  Always use your most recent med list.                   Brand Name Dispense Instructions for use    acyclovir 200 MG capsule    ZOVIRAX    120 capsule    Take 2 capsules (400 mg) by mouth 2 times daily       artificial saliva Liqd liquid     1 Bottle    Swish and spit 10 mLs in mouth 4 times daily       BIOTENE DRY MOUTH SENSITIVE Pste     1 Tube    Apply 1 inch to affected area 2 times daily       calcium carbonate-vitamin D 500-400 MG-UNIT Tabs per tablet     180 tablet    Take 2 tablets by mouth daily       cycloSPORINE 0.05 % ophthalmic emulsion    RESTASIS    2 Box    Apply 1 drop to eye every 12 hours       fluconazole 100 MG tablet    DIFLUCAN    30 tablet    Take 1 tablet (100 mg) by mouth daily       levothyroxine 112 MCG tablet    SYNTHROID/LEVOTHROID    90 tablet    Take 1 tablet (112 mcg) by mouth daily       liothyronine 5 MCG tablet    CYTOMEL    180 tablet    Take 1 tablet (5 mcg) by mouth 2 times daily       LORazepam 0.5 MG tablet    ATIVAN    30 tablet    Take 1-2 tablets (0.5-1 mg) by mouth every 6 hours as needed for other (nausea)       magnesium oxide 400 MG tablet    MAG-OX    180 tablet    Take 1 tablet (400 mg) by mouth 2 times daily       metoprolol 25 MG tablet    LOPRESSOR    60 tablet    Take 0.5 tablets (12.5 mg) by mouth 2 times daily       nystatin cream    MYCOSTATIN    90 g    To feet twice daily.       pantoprazole 40 MG EC tablet    PROTONIX    30 tablet    Take 1 tablet (40 mg) by mouth daily       Selenium 100 MCG Caps     90 capsule    1 tablet daily       Senna  8.6-50 MG tablet     60 tablet    Take 1 tablet by mouth 2 times daily as needed for constipation       * sirolimus 1 MG tablet    RAPAMUNE - GENERIC EQUIVALENT    120 tablet    Take 4 tablets (4 mg) by mouth daily       * sirolimus 0.5 MG tablet    RAPAMUNE - GENERIC EQUIVALENT    30 tablet    Take 1 tablet (0.5 mg) by mouth daily       sulfamethoxazole-trimethoprim 800-160 MG per tablet    BACTRIM DS/SEPTRA DS    30 tablet    Take 1 tablet twice a day on Mondays and Tuesdays       FR PO          * Notice:  This list has 2 medication(s) that are the same as other medications prescribed for you. Read the directions carefully, and ask your doctor or other care provider to review them with you.

## 2017-03-17 NOTE — PHARMACY-TAPERING SERVICE
Sirolimus taper    James Monday Tuesday Wednesday Thursday Friday Saturday   12-Mar-2017 13-Mar-2017 14-Mar-2017 15-Mar-2017 16-Mar-2017 17-Mar-2017 18-Mar-2017   4 mg 4 mg 4 mg 4 mg 4 mg 4 mg 3.5 mg   19-Mar-2017 20-Mar-2017 21-Mar-2017 22-Mar-2017 23-Mar-2017 24-Mar-2017 25-Mar-2017   3.5 mg 3.5 mg 3.5 mg 3.5 mg 3.5 mg 3.5 mg 3.5 mg   26-Mar-2017 27-Mar-2017 28-Mar-2017 29-Mar-2017 30-Mar-2017 31-Mar-2017 1-Apr-2017   3.5 mg 3.5 mg 3.5 mg 3.5 mg 3.5 mg 3.5 mg 2.5 mg   2-Apr-2017 3-Apr-2017 4-Apr-2017 5-Apr-2017 6-Apr-2017 7-Apr-2017 8-Apr-2017   2.5 mg 2.5 mg 2.5 mg 2.5 mg 2.5 mg 2.5 mg 2 mg   9-Apr-2017 10-Apr-2017 11-Apr-2017 12-Apr-2017 13-Apr-2017 14-Apr-2017 15-Apr-2017   2 mg 2 mg 2 mg 2 mg 2 mg 2 mg 1.5 mg   16-Apr-2017 17-Apr-2017 18-Apr-2017 19-Apr-2017 20-Apr-2017 21-Apr-2017 22-Apr-2017   1.5 mg 1.5 mg 1.5 mg 1.5 mg 1.5 mg 1.5 mg 1 mg   23-Apr-2017 24-Apr-2017 25-Apr-2017 26-Apr-2017 27-Apr-2017 28-Apr-2017 29-Apr-2017   1 mg 1 mg 1 mg 1 mg 1 mg 1 mg 1 mg   30-Apr-2017 1-May-2017 2-May-2017 3-May-2017 4-May-2017 5-May-2017 6-May-2017   1 mg 1 mg 1 mg 1 mg 1 mg 1 mg 0.5 mg   7-May-2017 8-May-2017 9-May-2017 10-May-2017 11-May-2017 12-May-2017 13-May-2017   0.5 mg 0.5 mg 0.5 mg 0.5 mg 0.5 mg 0.5 mg 0.5 mg   14-May-2017 15-May-2017 16-May-2017 17-May-2017 18-May-2017 19-May-2017 20-May-2017   0.5 mg 0.5 mg 0 0.5 mg 0 0.5 mg 0.5 mg   21-May-2017 22-May-2017 23-May-2017 24-May-2017 25-May-2017 26-May-2017 27-May-2017   0.5 mg 0 0.5 mg 0 0.5 mg 0 0.5 mg   28-May-2017 29-May-2017 30-May-2017 31-May-2017 1-Frank-2017 2-Frank-2017 3-Frank-2017   0 STOP

## 2017-03-17 NOTE — PROGRESS NOTES
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 He continues to do well is off steroids now. No achy joints. Stable energy and good appetite. Found to have sleep apnea on sleep study positive and he will get a CPAP machine. Does not feel depressed. Denied any fevers, chills, cough, or new cold like symptoms. No symptoms of oral and optho chronic GVH . No bleeding. Libido still poor but occasional improvement.      Review of Systems: 10 point ROS negative except as noted above.    PHYSICAL EXAM   /61  Pulse 79  Temp 97.6  F (36.4  C) (Tympanic)  Resp 16  Wt 87.1 kg (192 lb)  SpO2 95%  BMI 28.35 kg/m2  Wt Readings from Last 4 Encounters:   03/17/17 87.1 kg (192 lb)   02/03/17 87.5 kg (193 lb)   02/03/17 89.2 kg (196 lb 11.2 oz)   01/18/17 87.6 kg (193 lb 1.6 oz)   General: NAD, pleasant appearing male, much less chushingoid     Eyes: CARY, sclera anicteric     Nose/Mouth/Throat: OP clear, buccal mucosa dry, no ulcerations; still coated tongue     Lungs: CTA bilaterally    Cardiovascular: RRR, no M/R/G    Abdominal/Rectal: +BS, soft, NT, ND, No masses    Lymphatics: no LE edema    Skin: no rashes and no fibrotic changes.  Neuro: A&O, grossly nonfocal.      LABS AND IMAGING      Lab Results   Component Value Date    WBC 4.8 03/17/2017    ANEU 3.5 03/17/2017    HGB 7.3 (L) 03/17/2017    HCT 22.7 (L) 03/17/2017     03/17/2017     03/17/2017    POTASSIUM 4.1 03/17/2017    CHLORIDE 108 03/17/2017    CO2 25 03/17/2017    GLC 96 03/17/2017    BUN 22 03/17/2017    CR 1.64 (H) 03/17/2017    MAG 2.5 (H) 03/17/2017    INR 0.9  06/15/2016    BILITOTAL 0.2 03/17/2017    AST 16 03/17/2017    ALT 19 03/17/2017    ALKPHOS 117 03/17/2017    PROTTOTAL 6.8 03/17/2017    ALBUMIN 3.6 03/17/2017   AST       16   2/3/2017  ALT       20   2/3/2017  No results found for this basename: BiliConj   BILITOTAL      0.2   2/3/2017  ALBUMIN      3.7   2/3/2017  PROTTOTAL      7.1   2/3/2017   ALKPHOS      108   2/3/2017      ASSESSMENT BY SYSTEMS   Panchito REICH Kari is a 51 year old man s/p MUD SCT for CML  - trasplanted in second chronic phase after blast crisis;    BMT/ CML:  continue dasatinib 40mg/d (5/24); repeat PCR 12/2016 and 100% donor. We will discontinue dasatinib today 3/17/17  - restage per protocol, 1 year evaluation upcoming     HEMATOLOGY/COAGULATION: anemia. Likely due to dasatinib. Cr 1.6 could be contributing. Slight elevated retic count. LDH normal. HGB stable. Transfuse if < 7.0. Continue folate. No bleeding.   - Stable good counts with mild anemia not requiring transfusions. PLT and WBC stable.      INFECTIONS AND PROPHYLAXIS: Afebrile. No focal sxs.  CMV Repeat pending. Flu shot 11/4/16. Discontinue levaquin. Start immunizations today 12/16/17. Continue reduced dose acyclovir and bactrim in half to adjust for renal function.    Prophylaxis: LD ACV (CMV-, HSV-, EBV+, VZV+), Fluconazole, Pentamidine (last given 7/27/16).    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 Stopped prednisone on 12/06/16. Start tapering sirolimus tomorrow 3/18; provided printed taper. May continue biotene and dexa mouth wash. No need to further follow Sirolimus level.   -  No N/V/D.   - Advised to wear sun block when outside for extended periods of time to prevent flare of GVH  - Chronic: Schirmer's test in clinic 4/5 showed R 0 mm and L 0 mm; lip biopsy negative though mouth was and remains dry though improving. Also failure to thrive; 80 lb weight loss.   - EGD negative for GVHD, but with Schirmer's test results,   - he was  started on systemic steroids 1 mg/kg on 4/5/16 stop 12/06.     GASTROINTESTINAL/LIVER: Appetite good, wt stable. 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 Upper GI biopsies (3/23 EGD negative for GVHD, CMV, H.Pylori) negative as was lip bx.   - Continue Protonix for GI prophylaxis.     - dry mouth: biotene mouthwash and toothpaste (continue)  - soft stools: monitor; can decrease oral Mg if worse    RENAL/FEN/Nutrition: Cr up to 1.6. Encourage PO fluids, sent UA and sediment and repeat next week. If worse needs to see Renal.  electrolytes are good. May stop oral Mg    CARDIOVASCULAR:  Normotensive. Hx Hypertension. Cont metoprolol. Normotensive  - Hx of A fib with RVR (new 12/24); resolved with dilt, now on Metoprolol 12.5mg BID.     ENDOCRINE: following with Dr Guevara. thyroid and testosterone issues. Hx Graves Disease s/p VELA therapy. Cont synthroid and liothyronine. TSH and Free T3 low; Free T4 elevated. Mixed picture; After endocrine consult:  Mild thyrotoxicosis due to large weight loss. On levothyroxine 112mcg.    - On selenium supplement, T3 and T4.    PSYCHIATRIC:  Mood seems good today. If needed we may use Wellbutrim that has no libido side effects.     DERMATOLOGY: no rashes.   - Sun: Advised to wear SPF when out in the sun for prolonged periods of time  - Acne: resolved. Likely due to Siro. A few lesions scattered over shoulders and chest. Consider topical clinda cream or doxy if persists or becomes worse.       Plan:  Discontinue levaquin and dasatinib   Start to taper the sirolimus  SHWETHA Cabrera on 4/14 with labs  Labs only on 3/22 to check Cr

## 2017-03-17 NOTE — PATIENT INSTRUCTIONS
Discontinue levaquin and dasatinib and magnesium  Start to taper the sirolimus  RTMARYANN Cabrera on 4/14 with labs  Urine for UA and sediment today      4/14 1130am labs and visit

## 2017-03-17 NOTE — NURSING NOTE
Chief Complaint   Patient presents with     Blood Draw     Vitals done and labs drawn peripherally from right arm.    Melissa Barajas, NAYEN

## 2017-03-17 NOTE — NURSING NOTE
BMT Heme Malignancy Rooming Note    Panchito Pierce - 3/17/2017 2:59 PM     Chief Complaint   Patient presents with     Blood Draw     RECHECK     Patient with CML here for provider visit and lab review         /61  Pulse 79  Temp 97.6  F (36.4  C) (Tympanic)  Resp 16  Wt 87.1 kg (192 lb)  SpO2 95%  BMI 28.35 kg/m2     Medications reviewed: No, provider was ready to see patient    Dressing changed: No     Medications given: No    Staff time:2 minutes     Additional information if applicable:      Jaida Elizalde, CMA

## 2017-03-18 LAB
CMV DNA SPEC NAA+PROBE-ACNC: NORMAL [IU]/ML
CMV DNA SPEC NAA+PROBE-LOG#: NORMAL {LOG_IU}/ML
SPECIMEN SOURCE: NORMAL

## 2017-03-22 DIAGNOSIS — Z94.81 STATUS POST BONE MARROW TRANSPLANT (H): ICD-10-CM

## 2017-03-22 LAB
ANION GAP SERPL CALCULATED.3IONS-SCNC: 8 MMOL/L (ref 3–14)
BUN SERPL-MCNC: 19 MG/DL (ref 7–30)
CALCIUM SERPL-MCNC: 8.4 MG/DL (ref 8.5–10.1)
CHLORIDE SERPL-SCNC: 109 MMOL/L (ref 94–109)
CO2 SERPL-SCNC: 25 MMOL/L (ref 20–32)
COPATH REPORT: NORMAL
CREAT SERPL-MCNC: 1.41 MG/DL (ref 0.66–1.25)
GFR SERPL CREATININE-BSD FRML MDRD: 53 ML/MIN/1.7M2
GLUCOSE SERPL-MCNC: 89 MG/DL (ref 70–99)
POTASSIUM SERPL-SCNC: 4.2 MMOL/L (ref 3.4–5.3)
SODIUM SERPL-SCNC: 142 MMOL/L (ref 133–144)

## 2017-03-22 PROCEDURE — 80048 BASIC METABOLIC PNL TOTAL CA: CPT | Performed by: INTERNAL MEDICINE

## 2017-03-22 NOTE — NURSING NOTE
Chief Complaint   Patient presents with     Blood Draw     Patient with CML here for lab draw     Lab was drawn from left arm by NICOLE.   Jaida Elizalde CMA March 22, 2017

## 2017-04-04 ENCOUNTER — MYC MEDICAL ADVICE (OUTPATIENT)
Dept: ORTHOPEDICS | Facility: CLINIC | Age: 52
End: 2017-04-04

## 2017-04-11 ENCOUNTER — OFFICE VISIT (OUTPATIENT)
Dept: PODIATRY | Facility: CLINIC | Age: 52
End: 2017-04-11
Payer: COMMERCIAL

## 2017-04-11 VITALS — SYSTOLIC BLOOD PRESSURE: 123 MMHG | DIASTOLIC BLOOD PRESSURE: 77 MMHG | HEART RATE: 70 BPM | OXYGEN SATURATION: 100 %

## 2017-04-11 DIAGNOSIS — B07.0 PLANTAR WARTS: Primary | ICD-10-CM

## 2017-04-11 PROCEDURE — 17110 DESTRUCTION B9 LES UP TO 14: CPT | Performed by: PODIATRIST

## 2017-04-11 NOTE — MR AVS SNAPSHOT
After Visit Summary   4/11/2017    Panchito Pierce    MRN: [id number]           Patient Information     Date Of Birth          1965        Visit Information        Provider Department      4/11/2017 8:45 AM Brando Sagastume DPM [facility]        Today's Diagnoses     Plantar warts    -  1      Care Instructions    Thanks for coming today.  Ortho/Sports Medicine Clinic  [address] [address] [place] [address]    To schedule future appointments in Ortho Clinic, you may call [phone].    To schedule ordered imaging by your Provider: Call [facility] at [phone]    FastCAP available online at:   [email or web address]    Please call if any further questions or concerns [phone] and ask for the Orthopedic Department. Clinic hours 8 am to 5 pm.    Return to clinic if symptoms worsen.          Follow-ups after your visit        Your next 10 appointments already scheduled     Apr 12, 2017 11:15 AM CDT   [facility] with  [facility]   [facility] Draw ([facility])    [address] [place] [address] [phone]            Apr 21, 2017  2:00 PM CDT   Return with Jermaine Cabrera MD   [facility] Blood and Marrow Transplant ([facility])    [address] [place] [address] [phone]            May 02, 2017  8:30 AM CDT   Return Visit with Brando Sagastume DPM   [facility] ([facility])    [address] [place] [address] [phone]            Jun 02, 2017  9:30 AM CDT   (Arrive by 9:15 AM)   RETURN ENDOCRINE with Pattie Guevara MD   [facility] Endocrinology ([facility])    [address] [place] [address] [phone]              Who to contact     If you have questions or need follow up information about today's clinic visit  or your schedule please contact [facility] directly at [phone].  Normal or non-critical lab and imaging results will be communicated to you by FOURward Thoughthart, letter or phone within 4 business days after the clinic has received the results. If you do not hear from us within 7 days, please contact the clinic through FOURward Thoughthart or phone. If you have a critical or abnormal lab result, we will notify you by phone as soon as possible.  Submit refill requests through Play It Interactive or call your pharmacy and they will forward the refill request to us. Please allow 3 business days for your refill to be completed.          Additional Information About Your Visit        Play It Interactive Information     Play It Interactive gives you secure access to your electronic health record. If you see a primary care provider, you can also send messages to your care team and make appointments. If you have questions, please call your primary care clinic.  If you do not have a primary care provider, please call [phone] and they will assist you.      Play It Interactive is an electronic gateway that provides easy, online access to your medical records. With Play It Interactive, you can request a clinic appointment, read your test results, renew a prescription or communicate with your care team.     To access your existing account, please contact your [facility] or call [phone] for assistance.        Care EveryWhere ID     This is your Care EveryWhere ID. This could be used by other organizations to access your [facility] medical records  [id number]        Your Vitals Were     Pulse Pulse Oximetry                70 100%           Blood Pressure from Last 3 Encounters:   04/11/17 123/77   03/17/17 125/61   03/14/17 126/68    Weight from Last 3 Encounters:   03/17/17 87.1 kg (192 lb)   02/03/17 87.5 kg (193 lb)   02/03/17 89.2 kg (196 lb 11.2 oz)              We Performed the Following     DESTRUCT BENIGN LESION, UP TO 14        Primary Care  Provider Office Phone # Fax #    Leann Berry [phone]       [facility] [address] [address] [address]        Thank you!     Thank you for choosing [facility]  for your care. Our goal is always to provide you with excellent care. Hearing back from our patients is one way we can continue to improve our services. Please take a few minutes to complete the written survey that you may receive in the mail after your visit with us. Thank you!             Your Updated Medication List - Protect others around you: Learn how to safely use, store and throw away your medicines at www.disposemymeds.org.          This list is accurate as of: 4/11/17  1:50 PM.  Always use your most recent med list.                   Brand Name Dispense Instructions for use    acyclovir 200 MG capsule    ZOVIRAX    120 capsule    Take 2 capsules (400 mg) by mouth 2 times daily       artificial saliva Liqd liquid     1 Bottle    Swish and spit 10 mLs in mouth 4 times daily       BIOTENE DRY MOUTH SENSITIVE Pste     1 Tube    Apply 1 inch to affected area 2 times daily       calcium carbonate-vitamin D 500-400 MG-UNIT Tabs per tablet     180 tablet    Take 2 tablets by mouth daily       cycloSPORINE 0.05 % ophthalmic emulsion    RESTASIS    2 Box    Apply 1 drop to eye every 12 hours       fluconazole 100 MG tablet    DIFLUCAN    30 tablet    Take 1 tablet (100 mg) by mouth daily       levothyroxine 112 MCG tablet    SYNTHROID/LEVOTHROID    90 tablet    Take 1 tablet (112 mcg) by mouth daily       liothyronine 5 MCG tablet    CYTOMEL    180 tablet    Take 1 tablet (5 mcg) by mouth 2 times daily       LORazepam 0.5 MG tablet    ATIVAN    30 tablet    Take 1-2 tablets (0.5-1 mg) by mouth every 6 hours as needed for other (nausea)       magnesium oxide 400 MG tablet    MAG-OX    180 tablet    Take 1 tablet (400 mg) by mouth 2 times daily       metoprolol 25 MG tablet    LOPRESSOR    60  tablet    Take 0.5 tablets (12.5 mg) by mouth 2 times daily       nystatin cream    MYCOSTATIN    90 g    To feet twice daily.       pantoprazole 40 MG EC tablet    PROTONIX    30 tablet    Take 1 tablet (40 mg) by mouth daily       Selenium 100 MCG Caps     90 capsule    1 tablet daily       Senna 8.6-50 MG tablet     60 tablet    Take 1 tablet by mouth 2 times daily as needed for constipation       * sirolimus 1 MG tablet    RAPAMUNE - GENERIC EQUIVALENT    120 tablet    Take 4 tablets (4 mg) by mouth daily       * sirolimus 0.5 MG tablet    RAPAMUNE - GENERIC EQUIVALENT    30 tablet    Take 1 tablet (0.5 mg) by mouth daily       sulfamethoxazole-trimethoprim 800-160 MG per tablet    BACTRIM DS/SEPTRA DS    30 tablet    Take 1 tablet twice a day on Mondays and Tuesdays       [facility] PO          * Notice:  This list has 2 medication(s) that are the same as other medications prescribed for you. Read the directions carefully, and ask your doctor or other care provider to review them with you.

## 2017-04-11 NOTE — PROGRESS NOTES
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)     Hyperthyroidism     Hypogonadism male     Fatigue, unspecified type     Past Surgical History:   Procedure Laterality Date     ------------OTHER------------- Left 1/1/1985    left hand graft flap- work accident     ------------OTHER------------- Right 1/1/1985    right foot graft- work accident     ESOPHAGOSCOPY, GASTROSCOPY, DUODENOSCOPY (EGD), COMBINED N/A 3/23/2016    Procedure: COMBINED ESOPHAGOSCOPY, GASTROSCOPY, DUODENOSCOPY (EGD), BIOPSY SINGLE OR MULTIPLE;  Surgeon: Jay Tracy MD;  Location: [facility]     Social History     Social History     Marital status:      Spouse name: Nu     Number of children: 4     Years of education: N/A     Occupational History     Not on file.     Social History Main Topics     Smoking status: Former Smoker     Packs/day: 0.50     Years: 30.00     Start date: 1/1/1985     Quit date: 6/28/2015     Smokeless tobacco: Not on file     Alcohol use Yes      Comment: once a week     Drug use: No     Sexual activity: Not on file      Comment:      Other Topics Concern     Not on file     Social History Narrative     to wife Nu.    4 children: 1 step daughter Ro age 27, 3 biological sons, 17 yo Jose L, 15 yo Sergio (Asbergers), 11 yo Elvis    Lives in [place]    Used to work in clerical work, now in school for electrical engineering    11 brothers and sisters, 3 live in the [facility], 1 brother incarcerated, 1 brother hepatitis C         Family History   Problem Relation Age of Onset     Thyroid Disease Sister      Thyroid Disease Sister       Thyroid Disease Sister      Thyroid Disease Mother      Lung Cancer Mother      Prostate Cancer Father      Dementia Father      Heart Murmur Father      Hepatitis Brother      Heart Murmur Brother      Asthma Son    SUBJECTIVE FINDINGS:  52-year-old male returns to clinic for plantar warts bilaterally.  Relates he is doing well.  No new problems.      OBJECTIVE FINDINGS:  He has hyperkeratotic tissue buildup and pinpoint ecchymoses-type lesions left second and fourth toes.  These are nearly resolved.  No erythema, no drainage, no odor, no calor bilaterally.      ASSESSMENT/PLAN:  Plantar warts left second and fourth toes.  Diagnosis and treatment options discussed with patient.  Lesions were frozen with liquid nitrogen x3.  This is the ninth time we have frozen these it looks like.  He will return to clinic and see me in 2-4 weeks.

## 2017-04-11 NOTE — NURSING NOTE
"Panchito Pierce's goals for this visit include: Plantarmauricio brunst recheck   He requests these members of his care team be copied on today's visit information: yes    PCP: Leann Berry    Referring Provider:  No referring provider defined for this encounter.    Chief Complaint   Patient presents with     RECHECK     Plantars wart recheck        Initial /77  Pulse 70  SpO2 100% Estimated body mass index is 28.35 kg/(m^2) as calculated from the following:    Height as of 2/3/17: 1.753 m (5' 9\").    Weight as of 3/17/17: 87.1 kg (192 lb).  Medication Reconciliation: complete    "

## 2017-04-11 NOTE — PATIENT INSTRUCTIONS
Thanks for coming today.  Ortho/Sports Medicine Clinic  [address] [place] [address]    To schedule future appointments in Ortho Clinic, you may call [phone].    To schedule ordered imaging by your Provider: Call [facility] at [phone]    Finario available online at:   [email or web address]    Please call if any further questions or concerns [phone] and ask for the Orthopedic Department. Clinic hours 8 am to 5 pm.    Return to clinic if symptoms worsen.

## 2017-04-12 DIAGNOSIS — Z94.81 STATUS POST BONE MARROW TRANSPLANT (H): ICD-10-CM

## 2017-04-12 LAB
ALBUMIN SERPL-MCNC: 3.6 G/DL (ref 3.4–5)
ALP SERPL-CCNC: 130 U/L (ref 40–150)
ALT SERPL W P-5'-P-CCNC: 28 U/L (ref 0–70)
ANION GAP SERPL CALCULATED.3IONS-SCNC: 6 MMOL/L (ref 3–14)
AST SERPL W P-5'-P-CCNC: 21 U/L (ref 0–45)
BASOPHILS # BLD AUTO: 0 10E9/L (ref 0–0.2)
BASOPHILS NFR BLD AUTO: 0.4 %
BILIRUB SERPL-MCNC: 0.2 MG/DL (ref 0.2–1.3)
BUN SERPL-MCNC: 23 MG/DL (ref 7–30)
CALCIUM SERPL-MCNC: 9 MG/DL (ref 8.5–10.1)
CHLORIDE SERPL-SCNC: 108 MMOL/L (ref 94–109)
CO2 SERPL-SCNC: 25 MMOL/L (ref 20–32)
CREAT SERPL-MCNC: 1.52 MG/DL (ref 0.66–1.25)
DIFFERENTIAL METHOD BLD: ABNORMAL
EOSINOPHIL # BLD AUTO: 0.1 10E9/L (ref 0–0.7)
EOSINOPHIL NFR BLD AUTO: 1.3 %
ERYTHROCYTE [DISTWIDTH] IN BLOOD BY AUTOMATED COUNT: 17.4 % (ref 10–15)
GFR SERPL CREATININE-BSD FRML MDRD: 49 ML/MIN/1.7M2
GLUCOSE SERPL-MCNC: 101 MG/DL (ref 70–99)
HCT VFR BLD AUTO: 29.7 % (ref 40–53)
HGB BLD-MCNC: 9.1 G/DL (ref 13.3–17.7)
IMM GRANULOCYTES # BLD: 0 10E9/L (ref 0–0.4)
IMM GRANULOCYTES NFR BLD: 0.2 %
LYMPHOCYTES # BLD AUTO: 0.6 10E9/L (ref 0.8–5.3)
LYMPHOCYTES NFR BLD AUTO: 11.8 %
MCH RBC QN AUTO: 25.1 PG (ref 26.5–33)
MCHC RBC AUTO-ENTMCNC: 30.6 G/DL (ref 31.5–36.5)
MCV RBC AUTO: 82 FL (ref 78–100)
MONOCYTES # BLD AUTO: 0.5 10E9/L (ref 0–1.3)
MONOCYTES NFR BLD AUTO: 9.7 %
NEUTROPHILS # BLD AUTO: 3.6 10E9/L (ref 1.6–8.3)
NEUTROPHILS NFR BLD AUTO: 76.6 %
NRBC # BLD AUTO: 0 10*3/UL
NRBC BLD AUTO-RTO: 0 /100
PLATELET # BLD AUTO: 215 10E9/L (ref 150–450)
POTASSIUM SERPL-SCNC: 4.3 MMOL/L (ref 3.4–5.3)
PROT SERPL-MCNC: 7.2 G/DL (ref 6.8–8.8)
RBC # BLD AUTO: 3.62 10E12/L (ref 4.4–5.9)
SODIUM SERPL-SCNC: 139 MMOL/L (ref 133–144)
WBC # BLD AUTO: 4.8 10E9/L (ref 4–11)

## 2017-04-12 PROCEDURE — 36415 COLL VENOUS BLD VENIPUNCTURE: CPT | Performed by: INTERNAL MEDICINE

## 2017-04-12 PROCEDURE — 80053 COMPREHEN METABOLIC PANEL: CPT | Performed by: INTERNAL MEDICINE

## 2017-04-12 PROCEDURE — 85025 COMPLETE CBC W/AUTO DIFF WBC: CPT | Performed by: INTERNAL MEDICINE

## 2017-04-12 NOTE — NURSING NOTE
Chief Complaint   Patient presents with     Blood Draw     lab draw in left arm, vitals not needed     Sandra Mai, CMA

## 2017-04-13 DIAGNOSIS — F43.21 ADJUSTMENT DISORDER WITH DEPRESSED MOOD: ICD-10-CM

## 2017-04-13 DIAGNOSIS — C92.10 CML (CHRONIC MYELOCYTIC LEUKEMIA) (H): ICD-10-CM

## 2017-04-13 DIAGNOSIS — Z94.81 STATUS POST BONE MARROW TRANSPLANT (H): ICD-10-CM

## 2017-04-13 DIAGNOSIS — K59.00 CONSTIPATION, UNSPECIFIED CONSTIPATION TYPE: ICD-10-CM

## 2017-04-13 DIAGNOSIS — I48.0 PAROXYSMAL ATRIAL FIBRILLATION (H): ICD-10-CM

## 2017-04-13 DIAGNOSIS — E05.00 GRAVES DISEASE: ICD-10-CM

## 2017-04-13 RX ORDER — SIROLIMUS 0.5 MG/1
0.5 TABLET, FILM COATED ORAL DAILY
Qty: 54 TABLET | Refills: 1 | Status: SHIPPED | OUTPATIENT
Start: 2017-04-13 | End: 2017-05-30

## 2017-04-13 RX ORDER — SIROLIMUS 0.5 MG/1
TABLET, FILM COATED ORAL
Qty: 30 TABLET | Refills: 0 | Status: CANCELLED | OUTPATIENT
Start: 2017-04-13

## 2017-04-18 RX ORDER — LEVOFLOXACIN 250 MG/1
TABLET, FILM COATED ORAL
Qty: 30 TABLET | Refills: 10 | OUTPATIENT
Start: 2017-04-18

## 2017-04-19 RX ORDER — FOLIC ACID 1 MG/1
1 TABLET ORAL DAILY
Qty: 30 TABLET | Refills: 3 | Status: SHIPPED | OUTPATIENT
Start: 2017-04-19 | End: 2017-06-14

## 2017-04-21 ENCOUNTER — ONCOLOGY VISIT (OUTPATIENT)
Dept: TRANSPLANT | Facility: CLINIC | Age: 52
End: 2017-04-21
Attending: INTERNAL MEDICINE
Payer: COMMERCIAL

## 2017-04-21 ENCOUNTER — APPOINTMENT (OUTPATIENT)
Dept: LAB | Facility: CLINIC | Age: 52
End: 2017-04-21
Attending: INTERNAL MEDICINE
Payer: COMMERCIAL

## 2017-04-21 VITALS
BODY MASS INDEX: 28.6 KG/M2 | SYSTOLIC BLOOD PRESSURE: 128 MMHG | WEIGHT: 193.7 LBS | HEART RATE: 69 BPM | TEMPERATURE: 97.5 F | DIASTOLIC BLOOD PRESSURE: 81 MMHG | OXYGEN SATURATION: 100 %

## 2017-04-21 DIAGNOSIS — Z94.81 STATUS POST BONE MARROW TRANSPLANT (H): ICD-10-CM

## 2017-04-21 DIAGNOSIS — E29.1 HYPOGONADISM MALE: ICD-10-CM

## 2017-04-21 DIAGNOSIS — C92.10 CML (CHRONIC MYELOCYTIC LEUKEMIA) (H): Primary | ICD-10-CM

## 2017-04-21 DIAGNOSIS — E05.90 HYPERTHYROIDISM: ICD-10-CM

## 2017-04-21 DIAGNOSIS — R21 RASH AND NONSPECIFIC SKIN ERUPTION: ICD-10-CM

## 2017-04-21 LAB
ALBUMIN SERPL-MCNC: 3.4 G/DL (ref 3.4–5)
ALP SERPL-CCNC: 130 U/L (ref 40–150)
ALT SERPL W P-5'-P-CCNC: 37 U/L (ref 0–70)
ANION GAP SERPL CALCULATED.3IONS-SCNC: 10 MMOL/L (ref 3–14)
AST SERPL W P-5'-P-CCNC: 26 U/L (ref 0–45)
BASOPHILS # BLD AUTO: 0 10E9/L (ref 0–0.2)
BASOPHILS NFR BLD AUTO: 0.2 %
BILIRUB SERPL-MCNC: 0.2 MG/DL (ref 0.2–1.3)
BUN SERPL-MCNC: 24 MG/DL (ref 7–30)
CALCIUM SERPL-MCNC: 8.8 MG/DL (ref 8.5–10.1)
CHLORIDE SERPL-SCNC: 106 MMOL/L (ref 94–109)
CO2 SERPL-SCNC: 24 MMOL/L (ref 20–32)
CREAT SERPL-MCNC: 1.09 MG/DL (ref 0.66–1.25)
DIFFERENTIAL METHOD BLD: ABNORMAL
EOSINOPHIL # BLD AUTO: 0.1 10E9/L (ref 0–0.7)
EOSINOPHIL NFR BLD AUTO: 1.7 %
ERYTHROCYTE [DISTWIDTH] IN BLOOD BY AUTOMATED COUNT: 17.3 % (ref 10–15)
GFR SERPL CREATININE-BSD FRML MDRD: 71 ML/MIN/1.7M2
GLUCOSE SERPL-MCNC: 95 MG/DL (ref 70–99)
HCT VFR BLD AUTO: 29 % (ref 40–53)
HGB BLD-MCNC: 9 G/DL (ref 13.3–17.7)
IMM GRANULOCYTES # BLD: 0 10E9/L (ref 0–0.4)
IMM GRANULOCYTES NFR BLD: 0.7 %
LYMPHOCYTES # BLD AUTO: 0.5 10E9/L (ref 0.8–5.3)
LYMPHOCYTES NFR BLD AUTO: 12 %
MCH RBC QN AUTO: 25.4 PG (ref 26.5–33)
MCHC RBC AUTO-ENTMCNC: 31 G/DL (ref 31.5–36.5)
MCV RBC AUTO: 82 FL (ref 78–100)
MONOCYTES # BLD AUTO: 0.5 10E9/L (ref 0–1.3)
MONOCYTES NFR BLD AUTO: 12.7 %
NEUTROPHILS # BLD AUTO: 2.9 10E9/L (ref 1.6–8.3)
NEUTROPHILS NFR BLD AUTO: 72.7 %
NRBC # BLD AUTO: 0 10*3/UL
NRBC BLD AUTO-RTO: 0 /100
PLATELET # BLD AUTO: 227 10E9/L (ref 150–450)
POTASSIUM SERPL-SCNC: 4.2 MMOL/L (ref 3.4–5.3)
PROT SERPL-MCNC: 6.4 G/DL (ref 6.8–8.8)
RBC # BLD AUTO: 3.55 10E12/L (ref 4.4–5.9)
SODIUM SERPL-SCNC: 140 MMOL/L (ref 133–144)
WBC # BLD AUTO: 4 10E9/L (ref 4–11)

## 2017-04-21 PROCEDURE — 36415 COLL VENOUS BLD VENIPUNCTURE: CPT

## 2017-04-21 PROCEDURE — 25000134 H RX MED IP 250 OP 636 PS 250: Mod: ZF | Performed by: INTERNAL MEDICINE

## 2017-04-21 PROCEDURE — 90648 HIB PRP-T VACCINE 4 DOSE IM: CPT | Mod: ZF | Performed by: INTERNAL MEDICINE

## 2017-04-21 PROCEDURE — 80053 COMPREHEN METABOLIC PANEL: CPT | Performed by: INTERNAL MEDICINE

## 2017-04-21 PROCEDURE — 84270 ASSAY OF SEX HORMONE GLOBUL: CPT | Performed by: INTERNAL MEDICINE

## 2017-04-21 PROCEDURE — 90723 DTAP-HEP B-IPV VACCINE IM: CPT | Mod: ZF | Performed by: INTERNAL MEDICINE

## 2017-04-21 PROCEDURE — 90670 PCV13 VACCINE IM: CPT | Mod: ZF | Performed by: INTERNAL MEDICINE

## 2017-04-21 PROCEDURE — 85025 COMPLETE CBC W/AUTO DIFF WBC: CPT | Performed by: INTERNAL MEDICINE

## 2017-04-21 PROCEDURE — G0009 ADMIN PNEUMOCOCCAL VACCINE: HCPCS

## 2017-04-21 PROCEDURE — 25000125 ZZHC RX 250: Mod: ZF | Performed by: INTERNAL MEDICINE

## 2017-04-21 PROCEDURE — 25000128 H RX IP 250 OP 636: Mod: ZF | Performed by: INTERNAL MEDICINE

## 2017-04-21 PROCEDURE — 90472 IMMUNIZATION ADMIN EACH ADD: CPT

## 2017-04-21 PROCEDURE — 84403 ASSAY OF TOTAL TESTOSTERONE: CPT | Performed by: INTERNAL MEDICINE

## 2017-04-21 RX ORDER — TRIAMCINOLONE ACETONIDE 1 MG/G
CREAM TOPICAL
Qty: 160 G | Refills: 3 | Status: SHIPPED | OUTPATIENT
Start: 2017-04-21 | End: 2017-09-13

## 2017-04-21 RX ADMIN — PNEUMOCOCCAL 13-VALENT CONJUGATE VACCINE 0.5 ML: 2.2; 2.2; 2.2; 2.2; 2.2; 4.4; 2.2; 2.2; 2.2; 2.2; 2.2; 2.2; 2.2 INJECTION, SUSPENSION INTRAMUSCULAR at 15:24

## 2017-04-21 RX ADMIN — DIPHTHERIA AND TETANUS TOXOIDS AND ACELLULAR PERTUSSIS ADSORBED, HEPATITIS B (RECOMBINANT) AND INACTIVATED POLIOVIRUS VACCINE COMBINED 0.5 ML: 25; 10; 25; 25; 8; 10; 40; 8; 32 INJECTION, SUSPENSION INTRAMUSCULAR at 15:22

## 2017-04-21 RX ADMIN — HAEMOPHILUS B POLYSACCHARIDE CONJUGATE VACCINE FOR INJ 0.5 ML: RECON SOLN at 15:24

## 2017-04-21 NOTE — NURSING NOTE
Chief Complaint   Patient presents with     Blood Draw   See flowsheets.  Genie Kaiser,[facility]

## 2017-04-21 NOTE — LETTER
Patient:  Panchito Pierce  :   1965  MRN:     [id number]        Mr.Paul RACHANA Pierce  [address] [facility] [address]        2017    Dear Panchito    Here is your testosterone level which is slightly lower than previous levels, 4 months ago but still much better than it was 6 months ago.  I would recommend that you we continue to monitor your testosterone at this time. I look forward to seeing you in follow up.    If you have any questions, please feel free to contact my nurse at [phone] select option #3 for triage nurse  or  option #1 for scheduling related questions.    Regards    Pattie Guevara MD        Resulted Orders   Testosterone Free and Total   Result Value Ref Range    Testosterone Total 238 (L) 240 - 950 ng/dL      Comment:      This test was developed and its performance characteristics determined by the   [facility],  Special Chemistry Laboratory. It has   not been cleared or approved by the FDA. The laboratory is regulated under   CLIA   as qualified to perform high-complexity testing. This test is used for   clinical   purposes. It should not be regarded as investigational or for research.      Sex Hormone Binding Globulin 22 11 - 80 nmol/L    Free Testosterone Calculated 5.69 4.7 - 24.4 ng/dL       Jermaine Cabrera MD

## 2017-04-21 NOTE — NURSING NOTE
Chief Complaint   Patient presents with     Blood Draw     RECHECK     CML       Molly Zimmer,CMA April 21, 2017 3:31 PM  3 IM injections were given and tolerated well. See MAR.

## 2017-04-21 NOTE — PROGRESS NOTES
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 He continues to do well is off steroids and tapering the sirolimus. Good energy and good appetite. Had great trip with his son. Does not feel depressed. Denied any fevers, chills, cough, or new cold like symptoms. No symptoms of oral and optho chronic GVH . Quarter size rash on each calf. Occasional peeling from scalp. Weak fingernails. No bleeding.      Review of Systems: 10 point ROS negative except as noted above.    PHYSICAL EXAM   /81  Pulse 69  Temp 97.5  F (36.4  C) (Oral)  Wt 87.9 kg (193 lb 11.2 oz)  SpO2 100%  BMI 28.6 kg/m2  Wt Readings from Last 4 Encounters:   04/21/17 87.9 kg (193 lb 11.2 oz)   03/17/17 87.1 kg (192 lb)   02/03/17 87.5 kg (193 lb)   02/03/17 89.2 kg (196 lb 11.2 oz)   General: NAD, pleasant appearing male, much less chushingoid     Eyes: CARY, sclera anicteric     Nose/Mouth/Throat: OP clear, buccal mucosa dry, no ulcerations; still coated tongue     Lungs: CTA bilaterally    Cardiovascular: RRR, no M/R/G    Abdominal/Rectal: +BS, soft, NT, ND, No masses    Lymphatics: no LE edema    Skin: has 2 quarter size lesion one on each calf/ did not find dry lesions on scalp. Fingernails thin but normal appearing. rashes and no fibrotic changes.  Neuro: A&O, grossly nonfocal.      LABS AND IMAGING      Lab Results   Component Value Date    WBC 4.0 04/21/2017    ANEU 2.9 04/21/2017    HGB 9.0 (L) 04/21/2017    HCT 29.0 (L) 04/21/2017     04/21/2017     04/21/2017    POTASSIUM 4.2 04/21/2017    CHLORIDE 106 04/21/2017    CO2 24 04/21/2017     GLC 95 04/21/2017    BUN 24 04/21/2017    CR 1.09 04/21/2017    MAG 2.5 (H) 03/17/2017    INR 0.9 06/15/2016    BILITOTAL 0.2 04/21/2017    AST 26 04/21/2017    ALT 37 04/21/2017    ALKPHOS 130 04/21/2017    PROTTOTAL 6.4 (L) 04/21/2017    ALBUMIN 3.4 04/21/2017     ASSESSMENT BY SYSTEMS   Panchito Pierce is a 51 year old man s/p MUD SCT for CML  - trasplanted in second chronic phase after blast crisis;    BMT/ CML:  Off dasatinib ; repeat PCR 3/2017 and 100% donor. - restage per protocol,     HEMATOLOGY/COAGULATION: improved anemia. Likely due to dasatinib. Slight elevated retic count. LDH normal. HGB stable. Transfuse if < 7.0. Continue folate. No bleeding.     INFECTIONS AND PROPHYLAXIS: Afebrile. No focal sxs.  Flu shot 11/4/16. 2nd dose of immunizations today 4/21/17. Continue reduced dose acyclovir and bactrim in half to adjust for renal function.    Prophylaxis: LD ACV (CMV-, HSV-, EBV+, VZV+), Fluconazole, Pentamidine (last given 7/27/16).    GRAFT VS HOST DISEASE: quarter size rash, fingernails and scalp could be GVHD. 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 Stopped prednisone on 12/06/16. Started tapering sirolimus march 2017; provided printed taper. May continue biotene and dexa mouth wash. No need to further follow Sirolimus level.   -  No N/V/D.   - Advised to wear sun block when outside for extended periods of time to prevent flare of GVH  - Chronic: Schirmer's test in clinic 4/5 showed R 0 mm and L 0 mm; lip biopsy negative though mouth was and remains dry though improving. Also failure to thrive; 80 lb weight loss.   - EGD negative for GVHD, but with Schirmer's test results,   - he was started on systemic steroids 1 mg/kg on 4/5/16 stop 12/06.     GASTROINTESTINAL/LIVER: Appetite good, wt stable. 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 Upper GI biopsies (3/23 EGD negative for GVHD, CMV, H.Pylori) negative as was lip bx.   - Continue Protonix for GI prophylaxis.     - dry mouth: biotene mouthwash  and toothpaste (continue)  - soft stools: monitor; can decrease oral Mg if worse    RENAL/FEN/Nutrition: Cr normal. Good po intake. No weight loss.     CARDIOVASCULAR:  Normotensive. Hx Hypertension. Cont metoprolol. Normotensive  - Hx of A fib with RVR (new 12/24); resolved with dilt, now on Metoprolol 12.5mg BID.     ENDOCRINE: following with Dr Guevara. thyroid and testosterone issues. Hx Graves Disease s/p VELA therapy. Cont synthroid and liothyronine. TSH and Free T3 low; Free T4 elevated. Mixed picture; After endocrine consult:  Mild thyrotoxicosis due to large weight loss. On levothyroxine 112mcg.    - On selenium supplement, T3 and T4.    PSYCHIATRIC:  Mood seems good today. If needed we may use Wellbutrim that has no libido side effects.     DERMATOLOGY: no rashes.   - Sun: Advised to wear SPF when out in the sun for prolonged periods of time  - Acne: resolved. Likely due to Siro. A few lesions scattered over shoulders and chest. Consider topical clinda cream or doxy if persists or becomes worse.       Plan:  Continue tapering the sirolimus  [facility] on 6/2/17 with labs  Vaccines round 2 today 4/21/17  Dermatology referral in Epic. Evaluate rash, on leg, scalp and brittle fingernails

## 2017-04-21 NOTE — MR AVS SNAPSHOT
After Visit Summary   4/21/2017    Panchito Pierce    MRN: [id number]           Patient Information     Date Of Birth          1965        Visit Information        Provider Department      4/21/2017 2:00 PM Jermaine Cabrera MD [facility] Blood and Marrow Transplant        Today's Diagnoses     CML (chronic myelocytic leukemia) (H)    -  1    Status post bone marrow transplant (H)        Hyperthyroidism        Hypogonadism male        Rash and nonspecific skin eruption              [facility] [place] [place]  [address] [place] [address]  Phone: [phone]  Clinic Hours:   Monday-Friday:    8am to 4:30pm   Weekends and holidays:    8am to noon (in general)  If your fever is 100.5  or greater,   call the clinic.  After hours call the   hospital at [phone] or   [phone]. Ask for the BMT   fellow for pediatric or adult patients           Care Instructions    Continue tapering the sirolimus  RTC Rick on 6/2/17 with labs  Vaccines round 2 today 4/21/17  Dermatology referral in Epic. Evaluate rash, on leg, scalp and brittle fingernails      6/2 10am arrival with labs and visit        Follow-ups after your visit        Additional Services     Dermatology Referral                 Your next 10 appointments already scheduled     May 02, 2017  8:30 AM CDT   [facility]    [address] [place] [id number] [phone]            Jun 02, 2017  9:30 AM CDT   (Arrive by 9:15 AM)   RETURN ENDOCRINE with Pattie Guevara MD   [facility] Endocrinology ([facility])    [address] [place] [id number] [phone]            Jun 02, 2017 10:00 AM CDT   [facility] with  [facility]   [facility] ([facility])    [address] [place] [id number]    [phone]            Jun 02, 2017 10:30 AM CDT   Return with Jermaine Cabrera MD   [facility] [place]    [address] [place] [id number] [phone]            Jun 14, 2017 11:00 AM CDT   [facility] Draw with  [facility] D[facility]   [facility] [place]    [address] [place] [id number] [phone]            Jun 14, 2017 11:30 AM CDT   BMT Anniversary Visit with Jermaine Cabrera MD   [facility]row [facility] [place]    [address] [place] [id number] [phone]              Future tests that were ordered for you today     Open Future Orders        Priority Expected Expires Ordered    CBC with platelets differential Routine 6/2/2017 8/21/2017 4/21/2017    Comprehensive metabolic panel Routine 6/2/2017 8/21/2017 4/21/2017    BCR ABL1 Major Breakpoint Quant p210 Routine 6/2/2017 8/21/2017 4/21/2017            Who to contact     If you have questions or need follow up information about today's clinic visit or your schedule please contact [facility] BLOOD AND MARROW TRANSPLANT directly at [phone].  Normal or non-critical lab and imaging results will be communicated to you by Bswifthart, letter or phone within 4 business days after the clinic has received the results. If you do not hear from us within 7 days, please contact the clinic through newScalet or phone. If you have a critical or abnormal lab result, we will notify you by phone as soon as possible.  Submit refill requests through [employer] or call your pharmacy and they will forward the refill request to us. Please allow 3 business days for your refill to be completed.          Additional Information About Your Visit        [employer] Information     [employer] gives you secure access to your electronic health record. If  you see a primary care provider, you can also send messages to your care team and make appointments. If you have questions, please call your primary care clinic.  If you do not have a primary care provider, please call [phone] and they will assist you.        Care EveryWhere ID     This is your Care EveryWhere ID. This could be used by other organizations to access your [facility] medical records  [id number]        Your Vitals Were     Pulse Temperature Pulse Oximetry BMI (Body Mass Index)          69 97.5  F (36.4  C) (Oral) 100% 28.6 kg/m2         Blood Pressure from Last 3 Encounters:   04/21/17 128/81   04/11/17 123/77   03/17/17 125/61    Weight from Last 3 Encounters:   04/21/17 87.9 kg (193 lb 11.2 oz)   03/17/17 87.1 kg (192 lb)   02/03/17 87.5 kg (193 lb)              We Performed the Following     CBC with platelets differential     Comprehensive metabolic panel     Dermatology Referral     Testosterone Free and Total          Today's Medication Changes          These changes are accurate as of: 4/21/17  3:38 PM.  If you have any questions, ask your nurse or doctor.               Start taking these medicines.        Dose/Directions    folic acid 1 MG tablet   Commonly known as:  FOLVITE   Used for:  CML (chronic myelocytic leukemia) (H), Hyperthyroidism, Status post bone marrow transplant (H), Hypogonadism male, Rash and nonspecific skin eruption   Started by:  Jermaine Cabrera MD        Dose:  1 mg   Take 1 tablet (1 mg) by mouth daily   Quantity:  30 tablet   Refills:  3       triamcinolone 0.1 % cream   Commonly known as:  KENALOG   Used for:  CML (chronic myelocytic leukemia) (H), Status post bone marrow transplant (H), Hyperthyroidism, Hypogonadism male, Rash and nonspecific skin eruption   Started by:  Jermaine Cabrera MD        Apply sparingly to affected area three times daily as needed   Quantity:  160 g   Refills:  3            Where to get your medicines      These  medications were sent to [id number] - [place] - [address]  [address] [place]  [place] [address]     Phone:  [phone]     folic acid 1 MG tablet    triamcinolone 0.1 % cream                Recent Review Flowsheet Data     BMT Recent Results Latest Ref Rng & Units 1/5/2017 1/18/2017 2/3/2017 3/17/2017 3/22/2017 4/12/2017 4/21/2017    WBC 4.0 - 11.0 10e9/L 6.0 4.7 6.2 4.8 - 4.8 4.0    Hemoglobin 13.3 - 17.7 g/dL 8.1(L) 7.6(L) 7.9(L) 7.3(L) - 9.1(L) 9.0(L)    Platelet Count 150 - 450 10e9/L 303 277 263 223 - 215 227    Neutrophils (Absolute) 1.6 - 8.3 10e9/L 4.5 3.4 4.7 3.5 - 3.6 2.9    Sodium 133 - 144 mmol/L 138 139 138 141 142 139 140    Potassium 3.4 - 5.3 mmol/L 4.0 3.7 4.1 4.1 4.2 4.3 4.2    Chloride 94 - 109 mmol/L 104 109 105 108 109 108 106    Glucose 70 - 99 mg/dL 92 100(H) 96 96 89 101(H) 95    Urea Nitrogen 7 - 30 mg/dL 21 19 19 22 19 23 24    Creatinine 0.66 - 1.25 mg/dL 1.47(H) 1.41(H) 1.35(H) 1.64(H) 1.41(H) 1.52(H) 1.09    Calcium (Total) 8.5 - 10.1 mg/dL 9.2 8.8 8.8 8.5 8.4(L) 9.0 8.8    Protein (Total) 6.8 - 8.8 g/dL 7.3 7.0 7.1 6.8 - 7.2 6.4(L)    Albumin 3.4 - 5.0 g/dL 3.7 3.5 3.7 3.6 - 3.6 3.4    Alkaline Phosphatase 40 - 150 U/L 97 109 108 117 - 130 130    AST 0 - 45 U/L 20 16 16 16 - 21 26    ALT 0 - 70 U/L 27 20 20 19 - 28 37    MCV 78 - 100 fl 85 83 83 80 - 82 82               Primary Care Provider Office Phone # Fax #    Leann Berry [phone]       [facility] [id number] [address] [address]        Thank you!     Thank you for choosing [facility] BLOOD AND MARROW TRANSPLANT  for your care. Our goal is always to provide you with excellent care. Hearing back from our patients is one way we can continue to improve our services. Please take a few minutes to complete the written survey that you may receive in the mail after your visit with us. Thank you!             Your Updated Medication List - Protect others  around you: Learn how to safely use, store and throw away your medicines at www.disposemymeds.org.          This list is accurate as of: 4/21/17  3:38 PM.  Always use your most recent med list.                   Brand Name Dispense Instructions for use    acyclovir 200 MG capsule    ZOVIRAX    120 capsule    Take 2 capsules (400 mg) by mouth 2 times daily       calcium carbonate-vitamin D 500-400 MG-UNIT Tabs per tablet     180 tablet    Take 2 tablets by mouth daily       fluconazole 100 MG tablet    DIFLUCAN    30 tablet    Take 1 tablet (100 mg) by mouth daily       folic acid 1 MG tablet    FOLVITE    30 tablet    Take 1 tablet (1 mg) by mouth daily       levothyroxine 112 MCG tablet    SYNTHROID/LEVOTHROID    90 tablet    Take 1 tablet (112 mcg) by mouth daily       liothyronine 5 MCG tablet    CYTOMEL    180 tablet    Take 1 tablet (5 mcg) by mouth 2 times daily       LORazepam 0.5 MG tablet    ATIVAN    30 tablet    Take 1-2 tablets (0.5-1 mg) by mouth every 6 hours as needed for other (nausea)       magnesium oxide 400 MG tablet    MAG-OX    180 tablet    Take 1 tablet (400 mg) by mouth 2 times daily       metoprolol 25 MG tablet    LOPRESSOR    60 tablet    Take 0.5 tablets (12.5 mg) by mouth 2 times daily       nystatin cream    MYCOSTATIN    90 g    To feet twice daily.       pantoprazole 40 MG EC tablet    PROTONIX    30 tablet    Take 1 tablet (40 mg) by mouth daily       Selenium 100 MCG Caps     90 capsule    1 tablet daily       * sirolimus 1 MG tablet    RAPAMUNE - GENERIC EQUIVALENT    120 tablet    Take 4 tablets (4 mg) by mouth daily       * sirolimus 0.5 MG tablet    RAPAMUNE - GENERIC EQUIVALENT    54 tablet    Take 1 tablet (0.5 mg) by mouth daily Dosing per taper calendar from [facility] clinic       sulfamethoxazole-trimethoprim 800-160 MG per tablet    BACTRIM DS/SEPTRA DS    30 tablet    Take 1 tablet twice a day on Mondays and Tuesdays       triamcinolone 0.1 % cream    KENALOG    160 g    Apply  sparingly to affected area three times daily as needed       ZOFRAN PO          * Notice:  This list has 2 medication(s) that are the same as other medications prescribed for you. Read the directions carefully, and ask your doctor or other care provider to review them with you.

## 2017-04-21 NOTE — LETTER
4/21/2017      RE: Panchito Pierce  [address]  [place] [address]       BMT Clinic Progress Note   04/21/2017    Patient ID:  Panchito Pierce is a 52 year old man D+ 493 s/p MUD SCT for CML     Diagnosis CMLP+ Chronic myelogeneous leukemia, Ph1+  HCT Type Allogeneic    Prep Regimen Cytoxan  TBI   Donor Source Unrelated BM    GVHD Prophylaxis Methotrexate  Cyclosporine  Primary BMT Provider Jermaine Cabrera MD     CC: follow-up     INTERVAL  HISTORY   Interval History: Panchito was seen in the BMT clinic today. He continues to do well is off steroids and tapering the sirolimus. Good energy and good appetite. Had great trip with his son. Does not feel depressed. Denied any fevers, chills, cough, or new cold like symptoms. No symptoms of oral and optho chronic GVH . Quarter size rash on each calf. Occasional peeling from scalp. Weak fingernails. No bleeding.      Review of Systems: 10 point ROS negative except as noted above.    PHYSICAL EXAM   /81  Pulse 69  Temp 97.5  F (36.4  C) (Oral)  Wt 87.9 kg (193 lb 11.2 oz)  SpO2 100%  BMI 28.6 kg/m2  Wt Readings from Last 4 Encounters:   04/21/17 87.9 kg (193 lb 11.2 oz)   03/17/17 87.1 kg (192 lb)   02/03/17 87.5 kg (193 lb)   02/03/17 89.2 kg (196 lb 11.2 oz)   General: NAD, pleasant appearing male, much less chushingoid     Eyes: CARY, sclera anicteric     Nose/Mouth/Throat: OP clear, buccal mucosa dry, no ulcerations; still coated tongue     Lungs: CTA bilaterally    Cardiovascular: RRR, no M/R/G    Abdominal/Rectal: +BS, soft, NT, ND, No masses    Lymphatics: no LE edema    Skin: has 2 quarter size lesion one on each calf/ did not find dry lesions on scalp. Fingernails thin but normal appearing. rashes and no fibrotic changes.  Neuro: A&O, grossly nonfocal.      LABS AND IMAGING      Lab Results   Component Value Date    WBC 4.0 04/21/2017    ANEU 2.9 04/21/2017    HGB 9.0 (L) 04/21/2017    HCT 29.0 (L) 04/21/2017     04/21/2017      04/21/2017    POTASSIUM 4.2 04/21/2017    CHLORIDE 106 04/21/2017    CO2 24 04/21/2017    GLC 95 04/21/2017    BUN 24 04/21/2017    CR 1.09 04/21/2017    MAG 2.5 (H) 03/17/2017    INR 0.9 06/15/2016    BILITOTAL 0.2 04/21/2017    AST 26 04/21/2017    ALT 37 04/21/2017    ALKPHOS 130 04/21/2017    PROTTOTAL 6.4 (L) 04/21/2017    ALBUMIN 3.4 04/21/2017     ASSESSMENT BY SYSTEMS   Panchito RACHANA Pierce is a 51 year old man s/p MUD SCT for CML  - trasplanted in second chronic phase after blast crisis;    BMT/ CML:  Off dasatinib ; repeat PCR 3/2017 and 100% donor. - restage per protocol,     HEMATOLOGY/COAGULATION: improved anemia. Likely due to dasatinib. Slight elevated retic count. LDH normal. HGB stable. Transfuse if < 7.0. Continue folate. No bleeding.     INFECTIONS AND PROPHYLAXIS: Afebrile. No focal sxs.  Flu shot 11/4/16. 2nd dose of immunizations today 4/21/17. Continue reduced dose acyclovir and bactrim in half to adjust for renal function.    Prophylaxis: LD ACV (CMV-, HSV-, EBV+, VZV+), Fluconazole, Pentamidine (last given 7/27/16).    GRAFT VS HOST DISEASE: quarter size rash, fingernails and scalp could be GVHD. 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 Stopped prednisone on 12/06/16. Started tapering sirolimus march 2017; provided printed taper. May continue biotene and dexa mouth wash. No need to further follow Sirolimus level.   -  No N/V/D.   - Advised to wear sun block when outside for extended periods of time to prevent flare of GVH  - Chronic: Schirmer's test in clinic 4/5 showed R 0 mm and L 0 mm; lip biopsy negative though mouth was and remains dry though improving. Also failure to thrive; 80 lb weight loss.   - EGD negative for GVHD, but with Schirmer's test results,   - he was started on systemic steroids 1 mg/kg on 4/5/16 stop 12/06.     GASTROINTESTINAL/LIVER: Appetite good, wt stable. 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 Upper GI biopsies (3/23 EGD negative for GVHD, CMV, H.Pylori) negative as was  lip bx.   - Continue Protonix for GI prophylaxis.     - dry mouth: biotene mouthwash and toothpaste (continue)  - soft stools: monitor; can decrease oral Mg if worse    RENAL/FEN/Nutrition: Cr normal. Good po intake. No weight loss.     CARDIOVASCULAR:  Normotensive. Hx Hypertension. Cont metoprolol. Normotensive  - Hx of A fib with RVR (new 12/24); resolved with dilt, now on Metoprolol 12.5mg BID.     ENDOCRINE: following with Dr Guevara. thyroid and testosterone issues. Hx Graves Disease s/p VELA therapy. Cont synthroid and liothyronine. TSH and Free T3 low; Free T4 elevated. Mixed picture; After endocrine consult:  Mild thyrotoxicosis due to large weight loss. On levothyroxine 112mcg.    - On selenium supplement, T3 and T4.    PSYCHIATRIC:  Mood seems good today. If needed we may use Wellbutrim that has no libido side effects.     DERMATOLOGY: no rashes.   - Sun: Advised to wear SPF when out in the sun for prolonged periods of time  - Acne: resolved. Likely due to Siro. A few lesions scattered over shoulders and chest. Consider topical clinda cream or doxy if persists or becomes worse.       Plan:  Continue tapering the sirolimus  [facility] Rick on 6/2/17 with labs  Vaccines round 2 today 4/21/17  Dermatology referral in Epic. Evaluate rash, on leg, scalp and brittle fingernails        Jermaine Cabrera MD

## 2017-04-21 NOTE — PATIENT INSTRUCTIONS
Continue tapering the sirolimus  RTC Rick on 6/2/17 with labs  Vaccines round 2 today 4/21/17  Dermatology referral in Epic. Evaluate rash, on leg, scalp and brittle fingernails      6/2 10am arrival with labs and visit

## 2017-04-27 LAB
SHBG SERPL-SCNC: 22 NMOL/L (ref 11–80)
TESTOST FREE SERPL-MCNC: 5.69 NG/DL (ref 4.7–24.4)
TESTOST SERPL-MCNC: 238 NG/DL (ref 240–950)

## 2017-04-27 NOTE — PROGRESS NOTES
Dear Panchito    Here is your testosterone level which is slightly lower than previous levels, 4 months ago but still much better than it was 6 months ago.  I would recommend that you we continue to monitor your testosterone at this time. I look forward to seeing you in follow up.    If you have any questions, please feel free to contact my nurse at [phone] select option #3 for triage nurse  or  option #1 for scheduling related questions.    Regards    Pattie Guevara MD

## 2017-05-01 ENCOUNTER — MEDICAL CORRESPONDENCE (OUTPATIENT)
Dept: TRANSPLANT | Facility: CLINIC | Age: 52
End: 2017-05-01

## 2017-05-02 ENCOUNTER — MYC MEDICAL ADVICE (OUTPATIENT)
Dept: ORTHOPEDICS | Facility: CLINIC | Age: 52
End: 2017-05-02

## 2017-05-16 DIAGNOSIS — D84.9 IMMUNOSUPPRESSION (H): ICD-10-CM

## 2017-05-16 DIAGNOSIS — K59.00 CONSTIPATION, UNSPECIFIED CONSTIPATION TYPE: ICD-10-CM

## 2017-05-16 DIAGNOSIS — E05.00 GRAVES DISEASE: ICD-10-CM

## 2017-05-16 DIAGNOSIS — D89.813 GVH (GRAFT VERSUS HOST DISEASE) (H): ICD-10-CM

## 2017-05-16 DIAGNOSIS — I48.0 PAROXYSMAL ATRIAL FIBRILLATION (H): ICD-10-CM

## 2017-05-16 DIAGNOSIS — C92.10 CML (CHRONIC MYELOCYTIC LEUKEMIA) (H): ICD-10-CM

## 2017-05-16 DIAGNOSIS — Z94.81 STATUS POST BONE MARROW TRANSPLANT (H): ICD-10-CM

## 2017-05-16 RX ORDER — METOPROLOL TARTRATE 25 MG/1
12.5 TABLET, FILM COATED ORAL 2 TIMES DAILY
Qty: 30 TABLET | Refills: 10 | Status: SHIPPED | OUTPATIENT
Start: 2017-05-16 | End: 2018-05-17

## 2017-05-30 ENCOUNTER — OFFICE VISIT (OUTPATIENT)
Dept: DERMATOLOGY | Facility: CLINIC | Age: 52
End: 2017-05-30

## 2017-05-30 DIAGNOSIS — L82.0 SEBORRHEIC KERATOSES, INFLAMED: ICD-10-CM

## 2017-05-30 DIAGNOSIS — L73.9 FOLLICULITIS: ICD-10-CM

## 2017-05-30 DIAGNOSIS — L30.9 CHRONIC DERMATITIS OF HANDS: Primary | ICD-10-CM

## 2017-05-30 RX ORDER — CLINDAMYCIN PHOSPHATE 10 UG/ML
LOTION TOPICAL 2 TIMES DAILY
Qty: 60 ML | Refills: 3 | Status: SHIPPED | OUTPATIENT
Start: 2017-05-30 | End: 2017-09-13

## 2017-05-30 RX ORDER — CLOBETASOL PROPIONATE 0.5 MG/G
OINTMENT TOPICAL 2 TIMES DAILY
Qty: 60 G | Refills: 1 | Status: SHIPPED | OUTPATIENT
Start: 2017-05-30 | End: 2017-09-13

## 2017-05-30 ASSESSMENT — ENCOUNTER SYMPTOMS
POOR WOUND HEALING: 0
LIGHT-HEADEDNESS: 0
SNORES LOUDLY: 1
LEG SWELLING: 1
ORTHOPNEA: 0
RESPIRATORY PAIN: 0
HYPERTENSION: 0
RECTAL BLEEDING: 0
CONSTIPATION: 0
COUGH DISTURBING SLEEP: 0
SLEEP DISTURBANCES DUE TO BREATHING: 0
HEARTBURN: 0
ABDOMINAL PAIN: 0
LEG PAIN: 0
TACHYCARDIA: 0
SYNCOPE: 0
CLAUDICATION: 0
BOWEL INCONTINENCE: 0
DYSPNEA ON EXERTION: 1
BLOOD IN STOOL: 0
PALPITATIONS: 0
JAUNDICE: 0
SHORTNESS OF BREATH: 0
RECTAL PAIN: 0
NAIL CHANGES: 1
SKIN CHANGES: 0
WHEEZING: 0
DIARRHEA: 1
HEMOPTYSIS: 0
HYPOTENSION: 0
COUGH: 0
BLOATING: 0
POSTURAL DYSPNEA: 0
EXERCISE INTOLERANCE: 0
VOMITING: 0
SPUTUM PRODUCTION: 0
NAUSEA: 0

## 2017-05-30 NOTE — NURSING NOTE
Dermatology Rooming Note    Panchito Pierce's goals for this visit include:   Chief Complaint   Patient presents with     Derm Problem     Panchito is here today for a skin check.  States he has a skin condition on lower extremities and bumps on his scalp, also has a spot on his upper back that is concerning.         Mimi Hayden LPN

## 2017-05-30 NOTE — PROGRESS NOTES
CHIEF COMPLAINT:  Sore spot on back.      HISTORY OF PRESENT ILLNESS:  Panchito is an extremely pleasant 52-year-old male with a history of CML status post matched unrelated donor stem cell transplant in 2016.  His course was complicated by GVHD, which he reports was predominantly gastrointestinal.  He has never had a skin biopsy and he does not believe he has had any definite evidence of cutaneous GVHD, though Dr. Cabrera's note from 03/17/2017 suggests that he practiced careful sun protection to prevent cutaneous involvement.      Today Panchito has several concerns, the first of which is a persistent irritated spot on his mid-back which he thinks appeared shortly after his transplant in the hospital.  This area is itchy and is occasionally sore.  He also has several sore crusted areas on his scalp, which he also thinks may have appeared around the time of his transplant.  He also has several small areas of rash on his legs in areas were fluid tends to build up, for which Dr. Cabrera gave him triamcinolone ointment which seems to improve the affected areas.  Finally, he has persistent sore itchy spots on his thumbs and other fingers around the nails.  He acknowledges that he picks at these and scratches frequently, but they do come and go.      REVIEW OF SYSTEMS:  No oral sores.  No recent fevers or chills.      PHYSICAL EXAMINATION:   GENERAL:  This is a well-appearing, well-nourished male with a normal mood and affect who is oriented x3.   SKIN:  A cutaneous exam of the head, neck, scalp, back, bilateral upper and lower extremities was performed.  On the crown of the scalp, there are scattered discrete 2-3 mm erosions with slight crust.  No palpable component is apparent on any of these lesions.  On the upper mid-back, there is a crusted, domed, 3 mm, flesh-toned papule which demonstrates uniform hairpin telangiectasia on dermoscopy.  On the bilateral shins and calves, there are 3 oval faint pink 1 cm plaques with  fine scale consistent with partially treated eczematous dermatitis.  On the bilateral thumbs in a periungual distribution as well as on the finger, there are ill-defined, erythematous, crusted eczematous plaques with fissures.      ASSESSMENT AND PLAN:   1.  Likely irritated seborrheic keratosis of the upper back.  I explained to Panchito today that it is not uncommon for patients with preexisting benign keratoses to become inflamed after chemotherapy.  I saw no features either grossly or on dermoscopy that this area is suspicious for skin cancer or infection.  It was treated with liquid nitrogen x10 seconds x2 cycles.  He tolerated this without complication.   2.  Scattered erosions of the scalp.  I suspect that this represents a simple folliculitis.  He was given a prescription for clindamycin 1% solution to be applied once to twice daily.  If at his next visit, this is not improved, we could consider either a biopsy or a topical steroid.   3.  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  He was given a prescription for clobetasol ointment to be applied once to twice daily to the affected areas.  We also emphasized heavy liberal emollients as an important part of skin care.  We also explained the concept of dilute bleach soaks given the fissuring and he may perform these once to twice weekly.  Written instructions were provided.   4.  Likely stasis dermatitis of the legs.  This appears overall to be improving and he may spot treat with clobetasol on an as needed basis.   5.  He will follow up in our clinic in 2 months' time.       Thompson Landeros MD  Dermatology Attending

## 2017-05-30 NOTE — LETTER
5/30/2017       RE: Panchito Pierce  [address]  [place] [address]     Dear Colleague,    Thank you for referring your patient, Panchito Pierce, to the [facility] DERMATOLOGY at [facility]. Please see a copy of my visit note below.    CHIEF COMPLAINT:  Sore spot on back.      HISTORY OF PRESENT ILLNESS:  Panchito is an extremely pleasant 52-year-old male with a history of CML status post matched unrelated donor stem cell transplant in 2016.  His course was complicated by GVHD, which he reports was predominantly gastrointestinal.  He has never had a skin biopsy and he does not believe he has had any definite evidence of cutaneous GVHD, though Dr. Cabrera's note from 03/17/2017 suggests that he practiced careful sun protection to prevent cutaneous involvement.      Today Panchito has several concerns, the first of which is a persistent irritated spot on his mid-back which he thinks appeared shortly after his transplant in the hospital.  This area is itchy and is occasionally sore.  He also has several sore crusted areas on his scalp, which he also thinks may have appeared around the time of his transplant.  He also has several small areas of rash on his legs in areas were fluid tends to build up, for which Dr. Cabrera gave him triamcinolone ointment which seems to improve the affected areas.  Finally, he has persistent sore itchy spots on his thumbs and other fingers around the nails.  He acknowledges that he picks at these and scratches frequently, but they do come and go.      REVIEW OF SYSTEMS:  No oral sores.  No recent fevers or chills.      PHYSICAL EXAMINATION:   GENERAL:  This is a well-appearing, well-nourished male with a normal mood and affect who is oriented x3.   SKIN:  A cutaneous exam of the head, neck, scalp, back, bilateral upper and lower extremities was performed.  On the crown of the scalp, there are scattered discrete 2-3 mm erosions with slight crust.  No  palpable component is apparent on any of these lesions.  On the upper mid-back, there is a crusted, domed, 3 mm, flesh-toned papule which demonstrates uniform hairpin telangiectasia on dermoscopy.  On the bilateral shins and calves, there are 3 oval faint pink 1 cm plaques with fine scale consistent with partially treated eczematous dermatitis.  On the bilateral thumbs in a periungual distribution as well as on the finger, there are ill-defined, erythematous, crusted eczematous plaques with fissures.      ASSESSMENT AND PLAN:   1.  Likely irritated seborrheic keratosis of the upper back.  I explained to Panchito today that it is not uncommon for patients with preexisting benign keratoses to become inflamed after chemotherapy.  I saw no features either grossly or on dermoscopy that this area is suspicious for skin cancer or infection.  It was treated with liquid nitrogen x10 seconds x2 cycles.  He tolerated this without complication.   2.  Scattered erosions of the scalp.  I suspect that this represents a simple folliculitis.  He was given a prescription for clindamycin 1% solution to be applied once to twice daily.  If at his next visit, this is not improved, we could consider either a biopsy or a topical steroid.   3.  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  He was given a prescription for clobetasol ointment to be applied once to twice daily to the affected areas.  We also emphasized heavy liberal emollients as an important part of skin care.  We also explained the concept of dilute bleach soaks given the fissuring and he may perform these once to twice weekly.  Written instructions were provided.   4.  Likely stasis dermatitis of the legs.  This appears overall to be improving and he may spot treat with clobetasol on an as needed basis.   5.  He will follow up in our clinic in 2 months' time.       Thompson Landeros MD  Dermatology Attending

## 2017-05-30 NOTE — PATIENT INSTRUCTIONS
Get an empty milk jug    Fill with tap water    Add 1 tablespoon clorox bleach    Twice a week, soak the cracked areas for 15 minutes, then apply either the steroid cream or a heavy moisturizer like Aquaphor or vaseline    Cryotherapy    What is it?    Use of a very cold liquid, such as liquid nitrogen, to freeze and destroy abnormal skin cells that need to be removed    What should I expect?    Tenderness and redness    A small blister that might grow and fill with dark purple blood. There may be crusting.    More than one treatment may be needed if the lesions do not go away.    How do I care for the treated area?    Gently wash the area with your hands when bathing.    Use a thin layer of Vaseline to help with healing. You may use a Band-Aid.     The area should heal within 7-10 days and may leave behind a pink or lighter color.     Do not use an antibiotic or Neosporin ointment.     You may take acetaminophen (Tylenol) for pain.     Call your Doctor if you have:    Severe pain    Signs of infection (warmth, redness, cloudy yellow drainage, and or a bad smell)    Questions or concerns    Who should I call with questions?       [facility]: [phone]       [facility]: [phone]       For urgent needs outside of business hours call the [facility] at [phone]        and ask for the dermatology resident on call

## 2017-05-30 NOTE — MR AVS SNAPSHOT
After Visit Summary   5/30/2017    Panchito Pierce    MRN: [id number]           Patient Information     Date Of Birth          1965        Visit Information        Provider Department      5/30/2017 11:00 AM Thompson Landeros MD [facility] Dermatology        Today's Diagnoses     Chronic dermatitis of hands    -  1    Folliculitis          Care Instructions    Get an empty milk jug    Fill with tap water    Add 1 tablespoon clorox bleach    Twice a week, soak the cracked areas for 15 minutes, then apply either the steroid cream or a heavy moisturizer like Aquaphor or vaseline    Cryotherapy    What is it?    Use of a very cold liquid, such as liquid nitrogen, to freeze and destroy abnormal skin cells that need to be removed    What should I expect?    Tenderness and redness    A small blister that might grow and fill with dark purple blood. There may be crusting.    More than one treatment may be needed if the lesions do not go away.    How do I care for the treated area?    Gently wash the area with your hands when bathing.    Use a thin layer of Vaseline to help with healing. You may use a Band-Aid.     The area should heal within 7-10 days and may leave behind a pink or lighter color.     Do not use an antibiotic or Neosporin ointment.     You may take acetaminophen (Tylenol) for pain.     Call your Doctor if you have:    Severe pain    Signs of infection (warmth, redness, cloudy yellow drainage, and or a bad smell)    Questions or concerns    Who should I call with questions?       [facility]: [phone]       [facility]: [phone]       For urgent needs outside of business hours call the [facility] at [phone]        and ask for the dermatology resident on call            Follow-ups after your visit        Your next 10 appointments already scheduled     Jun 02, 2017  9:30 AM CDT   (Arrive by 9:15 AM)   RETURN  ENDOCRINE with Pattie Guevara MD   [facility] Endocrinology ([facility])    [address] [place] [address]  3rd [facility] [address]   [phone]            Jun 02, 2017 10:00 AM CDT   Masonic Lab Draw with  [facility] LAB DRAW   [facility] Masonic Lab Draw ([facility])    [address] [place] [address]   [phone]            Jun 02, 2017 10:30 AM CDT   Return with Jermaine Cabrera MD   [facility] Blood and Marrow Transplant ([facility])    [address] [place] [address]   [phone]            Jun 14, 2017 11:00 AM CDT   Masonic Lab Draw with  M[facility]ONIC LAB DRAW   [facility] Masonic Lab Draw ([facility])    [address] [place] [address]   [phone]            Jun 14, 2017 11:30 AM CDT   BMT Anniversary Visit with Jermaine Cabrera MD   [facility] Blood and Marrow Transplant ([facility])    [address] [place] [address]   [phone]            Aug 08, 2017  9:15 AM CDT   (Arrive by 9:00 AM)   Return Visit with Thompson Landeros MD   [facility] Dermatology ([facility])    [address] [place] [address]   [phone]              Who to contact     Please call your clinic at [phone] to:    Ask questions about your health    Make or cancel appointments    Discuss your medicines    Learn about your test results    Speak to your doctor   If you have compliments or concerns about an experience at your clinic, or if you wish to file a complaint, please contact [facility] Patient Relations at [phone] or email us at [email or web address]         Additional Information About Your Visit        MyChart Information     [employer] gives you secure access to your electronic health  record. If you see a primary care provider, you can also send messages to your care team and make appointments. If you have questions, please call your primary care clinic.  If you do not have a primary care provider, please call [phone] and they will assist you.      Gota[employer] is an electronic gateway that provides easy, online access to your medical records. With [employer], you can request a clinic appointment, read your test results, renew a prescription or communicate with your care team.     To access your existing account, please contact your [facility] or call [phone] for assistance.        Care EveryWhere ID     This is your Care EveryWhere ID. This could be used by other organizations to access your [place] medical records  [id number]         Blood Pressure from Last 3 Encounters:   04/21/17 128/81   04/11/17 123/77   03/17/17 125/61    Weight from Last 3 Encounters:   04/21/17 87.9 kg (193 lb 11.2 oz)   03/17/17 87.1 kg (192 lb)   02/03/17 87.5 kg (193 lb)              Today, you had the following     No orders found for display         Today's Medication Changes          These changes are accurate as of: 5/30/17 12:05 PM.  If you have any questions, ask your nurse or doctor.               Start taking these medicines.        Dose/Directions    clindamycin 1 % lotion   Commonly known as:  CLEOCIN T   Used for:  Folliculitis   Started by:  Thompson Landeros MD        Apply topically 2 times daily To areas of itch on scalp   Quantity:  60 mL   Refills:  3       clobetasol 0.05 % ointment   Commonly known as:  TEMOVATE   Used for:  Chronic dermatitis of hands   Started by:  Thompson Landeros MD        Apply topically 2 times daily   Quantity:  60 g   Refills:  1         Stop taking these medicines if you haven't already. Please contact your care team if you have questions.     LORazepam 0.5 MG tablet   Commonly known as:  ATIVAN   Stopped by:  Thompson Landeros MD            magnesium oxide 400 MG tablet   Commonly known as:  MAG-OX   Stopped by:  Thompson Landeros MD           nystatin cream   Commonly known as:  MYCOSTATIN   Stopped by:  Thompson Landeros MD           sirolimus 0.5 MG tablet   Commonly known as:  RAPAMUNE - GENERIC EQUIVALENT   Stopped by:  Thompson Landeros MD           sirolimus 1 MG tablet   Commonly known as:  RAPAMUNE - GENERIC EQUIVALENT   Stopped by:  Thompson Landeros MD                Where to get your medicines      These medications were sent to [id number] - [place] [[facility] [place] - [address]  [address], [facility] [address]     Phone:  [phone]     clindamycin 1 % lotion    clobetasol 0.05 % ointment                Primary Care Provider Office Phone # Fax #    Leann Berry [phone]       [facility] [address] [address] [address]        Thank you!     Thank you for choosing [facility] DERMATOLOGY  for your care. Our goal is always to provide you with excellent care. Hearing back from our patients is one way we can continue to improve our services. Please take a few minutes to complete the written survey that you may receive in the mail after your visit with us. Thank you!             Your Updated Medication List - Protect others around you: Learn how to safely use, store and throw away your medicines at www.disposemymeds.org.          This list is accurate as of: 5/30/17 12:05 PM.  Always use your most recent med list.                   Brand Name Dispense Instructions for use    acyclovir 200 MG capsule    ZOVIRAX    120 capsule    Take 2 capsules (400 mg) by mouth 2 times daily       calcium carbonate-vitamin D 500-400 MG-UNIT Tabs per tablet     180 tablet    Take 2 tablets by mouth daily       clindamycin 1 % lotion    CLEOCIN T    60 mL    Apply topically 2 times daily To areas of itch on scalp       clobetasol 0.05 % ointment    TEMOVATE    60 g    Apply topically 2  times daily       fluconazole 100 MG tablet    DIFLUCAN    30 tablet    Take 1 tablet (100 mg) by mouth daily       folic acid 1 MG tablet    FOLVITE    30 tablet    Take 1 tablet (1 mg) by mouth daily       levothyroxine 112 MCG tablet    SYNTHROID/LEVOTHROID    90 tablet    Take 1 tablet (112 mcg) by mouth daily       liothyronine 5 MCG tablet    CYTOMEL    180 tablet    Take 1 tablet (5 mcg) by mouth 2 times daily       metoprolol 25 MG tablet    LOPRESSOR    30 tablet    Take 0.5 tablets (12.5 mg) by mouth 2 times daily       pantoprazole 40 MG EC tablet    PROTONIX    30 tablet    Take 1 tablet (40 mg) by mouth daily       Selenium 100 MCG Caps     90 capsule    1 tablet daily       sulfamethoxazole-trimethoprim 800-160 MG per tablet    BACTRIM DS/SEPTRA DS    30 tablet    Take 1 tablet twice a day on Mondays and Tuesdays       triamcinolone 0.1 % cream    KENALOG    160 g    Apply sparingly to affected area three times daily as needed       ZOFRAN PO

## 2017-06-02 ENCOUNTER — TELEPHONE (OUTPATIENT)
Dept: ENDOCRINOLOGY | Facility: CLINIC | Age: 52
End: 2017-06-02

## 2017-06-02 ENCOUNTER — OFFICE VISIT (OUTPATIENT)
Dept: ENDOCRINOLOGY | Facility: CLINIC | Age: 52
End: 2017-06-02

## 2017-06-02 ENCOUNTER — ONCOLOGY VISIT (OUTPATIENT)
Dept: TRANSPLANT | Facility: CLINIC | Age: 52
End: 2017-06-02
Attending: INTERNAL MEDICINE
Payer: COMMERCIAL

## 2017-06-02 VITALS
SYSTOLIC BLOOD PRESSURE: 130 MMHG | DIASTOLIC BLOOD PRESSURE: 82 MMHG | HEART RATE: 81 BPM | HEIGHT: 69 IN | WEIGHT: 201 LBS | BODY MASS INDEX: 29.77 KG/M2

## 2017-06-02 DIAGNOSIS — C92.10 CML (CHRONIC MYELOCYTIC LEUKEMIA) (H): ICD-10-CM

## 2017-06-02 DIAGNOSIS — E05.90 HYPERTHYROIDISM: ICD-10-CM

## 2017-06-02 DIAGNOSIS — Z94.81 STATUS POST BONE MARROW TRANSPLANT (H): ICD-10-CM

## 2017-06-02 DIAGNOSIS — E05.90 HYPERTHYROIDISM: Primary | ICD-10-CM

## 2017-06-02 DIAGNOSIS — R21 RASH AND NONSPECIFIC SKIN ERUPTION: ICD-10-CM

## 2017-06-02 DIAGNOSIS — E29.1 HYPOGONADISM MALE: ICD-10-CM

## 2017-06-02 LAB
ALBUMIN SERPL-MCNC: 3.6 G/DL (ref 3.4–5)
ALP SERPL-CCNC: 169 U/L (ref 40–150)
ALT SERPL W P-5'-P-CCNC: 54 U/L (ref 0–70)
ANION GAP SERPL CALCULATED.3IONS-SCNC: 8 MMOL/L (ref 3–14)
AST SERPL W P-5'-P-CCNC: 33 U/L (ref 0–45)
BASOPHILS # BLD AUTO: 0.1 10E9/L (ref 0–0.2)
BASOPHILS NFR BLD AUTO: 0.6 %
BILIRUB SERPL-MCNC: 0.3 MG/DL (ref 0.2–1.3)
BUN SERPL-MCNC: 24 MG/DL (ref 7–30)
CALCIUM SERPL-MCNC: 9.2 MG/DL (ref 8.5–10.1)
CD19 CELLS # BLD: 141 CELLS/UL (ref 107–698)
CD19 CELLS NFR BLD: 15 % (ref 6–27)
CD3 CELLS # BLD: 549 CELLS/UL (ref 603–2990)
CD3 CELLS NFR BLD: 57 % (ref 49–84)
CD3+CD4+ CELLS # BLD: 364 CELLS/UL (ref 441–2156)
CD3+CD4+ CELLS NFR BLD: 38 % (ref 28–63)
CD3+CD4+ CELLS/CD3+CD8+ CLL BLD: 2.38 % (ref 1.4–2.6)
CD3+CD8+ CELLS # BLD: 150 CELLS/UL (ref 125–1312)
CD3+CD8+ CELLS NFR BLD: 16 % (ref 10–40)
CD3-CD16+CD56+ CELLS # BLD: 259 CELLS/UL (ref 95–640)
CD3-CD16+CD56+ CELLS NFR BLD: 27 % (ref 4–25)
CHLORIDE SERPL-SCNC: 107 MMOL/L (ref 94–109)
CO2 SERPL-SCNC: 25 MMOL/L (ref 20–32)
CREAT SERPL-MCNC: 1.16 MG/DL (ref 0.66–1.25)
DIFFERENTIAL METHOD BLD: ABNORMAL
EOSINOPHIL # BLD AUTO: 0.2 10E9/L (ref 0–0.7)
EOSINOPHIL NFR BLD AUTO: 2.8 %
ERYTHROCYTE [DISTWIDTH] IN BLOOD BY AUTOMATED COUNT: 18.6 % (ref 10–15)
GFR SERPL CREATININE-BSD FRML MDRD: 66 ML/MIN/1.7M2
GLUCOSE SERPL-MCNC: 94 MG/DL (ref 70–99)
HCT VFR BLD AUTO: 33.8 % (ref 40–53)
HGB BLD-MCNC: 10.9 G/DL (ref 13.3–17.7)
IFC SPECIMEN: ABNORMAL
IGG SERPL-MCNC: 584 MG/DL (ref 695–1620)
IMM GRANULOCYTES # BLD: 0 10E9/L (ref 0–0.4)
IMM GRANULOCYTES NFR BLD: 0.3 %
IMMUNODEFICIENCY MARKERS SPEC-IMP: ABNORMAL
LYMPHOCYTES # BLD AUTO: 0.9 10E9/L (ref 0.8–5.3)
LYMPHOCYTES NFR BLD AUTO: 10.1 %
MCH RBC QN AUTO: 26.4 PG (ref 26.5–33)
MCHC RBC AUTO-ENTMCNC: 32.2 G/DL (ref 31.5–36.5)
MCV RBC AUTO: 82 FL (ref 78–100)
MONOCYTES # BLD AUTO: 0.7 10E9/L (ref 0–1.3)
MONOCYTES NFR BLD AUTO: 8.3 %
NEUTROPHILS # BLD AUTO: 6.8 10E9/L (ref 1.6–8.3)
NEUTROPHILS NFR BLD AUTO: 77.9 %
NRBC # BLD AUTO: 0 10*3/UL
NRBC BLD AUTO-RTO: 0 /100
PLATELET # BLD AUTO: 207 10E9/L (ref 150–450)
POTASSIUM SERPL-SCNC: 4.4 MMOL/L (ref 3.4–5.3)
PROT SERPL-MCNC: 7 G/DL (ref 6.8–8.8)
RBC # BLD AUTO: 4.13 10E12/L (ref 4.4–5.9)
SODIUM SERPL-SCNC: 139 MMOL/L (ref 133–144)
T3 SERPL-MCNC: 96 NG/DL (ref 60–181)
T4 FREE SERPL-MCNC: 0.66 NG/DL (ref 0.76–1.46)
TSH SERPL DL<=0.05 MIU/L-ACNC: 6.49 MU/L (ref 0.4–4)
WBC # BLD AUTO: 8.7 10E9/L (ref 4–11)

## 2017-06-02 PROCEDURE — 86360 T CELL ABSOLUTE COUNT/RATIO: CPT | Performed by: INTERNAL MEDICINE

## 2017-06-02 PROCEDURE — 84443 ASSAY THYROID STIM HORMONE: CPT | Performed by: INTERNAL MEDICINE

## 2017-06-02 PROCEDURE — 84439 ASSAY OF FREE THYROXINE: CPT | Performed by: INTERNAL MEDICINE

## 2017-06-02 PROCEDURE — 84270 ASSAY OF SEX HORMONE GLOBUL: CPT | Performed by: INTERNAL MEDICINE

## 2017-06-02 PROCEDURE — 99211 OFF/OP EST MAY X REQ PHY/QHP: CPT | Mod: ZF

## 2017-06-02 PROCEDURE — 85025 COMPLETE CBC W/AUTO DIFF WBC: CPT | Performed by: INTERNAL MEDICINE

## 2017-06-02 PROCEDURE — 84480 ASSAY TRIIODOTHYRONINE (T3): CPT | Performed by: INTERNAL MEDICINE

## 2017-06-02 PROCEDURE — 81206 BCR/ABL1 GENE MAJOR BP: CPT | Performed by: INTERNAL MEDICINE

## 2017-06-02 PROCEDURE — 86359 T CELLS TOTAL COUNT: CPT | Performed by: INTERNAL MEDICINE

## 2017-06-02 PROCEDURE — 82784 ASSAY IGA/IGD/IGG/IGM EACH: CPT | Performed by: INTERNAL MEDICINE

## 2017-06-02 PROCEDURE — 84403 ASSAY OF TOTAL TESTOSTERONE: CPT | Performed by: INTERNAL MEDICINE

## 2017-06-02 PROCEDURE — 86357 NK CELLS TOTAL COUNT: CPT | Performed by: INTERNAL MEDICINE

## 2017-06-02 PROCEDURE — 80053 COMPREHEN METABOLIC PANEL: CPT | Performed by: INTERNAL MEDICINE

## 2017-06-02 PROCEDURE — 36415 COLL VENOUS BLD VENIPUNCTURE: CPT | Performed by: INTERNAL MEDICINE

## 2017-06-02 PROCEDURE — 86355 B CELLS TOTAL COUNT: CPT | Performed by: INTERNAL MEDICINE

## 2017-06-02 RX ORDER — LEVOTHYROXINE SODIUM 137 UG/1
137 TABLET ORAL DAILY
Qty: 90 TABLET | Refills: 3 | Status: SHIPPED | OUTPATIENT
Start: 2017-06-02 | End: 2017-12-21

## 2017-06-02 ASSESSMENT — PAIN SCALES - GENERAL: PAINLEVEL: NO PAIN (0)

## 2017-06-02 NOTE — TELEPHONE ENCOUNTER
Called pt - results reviewed    --  Dear Panchito    Here are your  It looks like you might need a little bit more thyroid hormone.  I increased your levothyroxine to 137  g daily.  Let's plan on rechecking your thyroid function test in roughly 2 months.  Please let me know if you have any questions    If you have any questions, please feel free to contact my nurse at [phone] select option #3 for triage nurse  or  option #1 for scheduling related questions.    Regards    Pattie Guevara MD

## 2017-06-02 NOTE — MR AVS SNAPSHOT
After Visit Summary   6/2/2017    Panchito Pierce    MRN: [id number]           Patient Information     Date Of Birth          1965        Visit Information        Provider Department      6/2/2017 10:30 AM Jermaine Cabrera MD [facility] Blood and Marrow Transplant        Today's Diagnoses     CML (chronic myelocytic leukemia) (H)        Status post bone marrow transplant (H)        Hyperthyroidism        Hypogonadism male        Rash and nonspecific skin eruption              [facility] ([facility])  [address] [place] [address]  Phone: [phone]  Clinic Hours:   Monday-Thursday:7am to 7pm   Friday: 7am to 5pm   Weekends and holidays:    8am to noon (in general)  If your fever is 100.5  or greater,   call the clinic.  After hours call the   hospital at [phone] or   [phone]. Ask for the BMT   fellow on-call            Follow-ups after your visit        Your next 10 appointments already scheduled     Jun 14, 2017 11:00 AM CDT   M[facility] Lab Draw with  [facility] LAB DRAW   [facility] Masonic Lab Draw ([facility])    [address] [place] [id number] [phone]            Jun 14, 2017 11:30 AM CDT   BMT Anniversary Visit with Jermaine Cabrera MD   [facility] Blood and Marrow Transplant ([facility])    [address] [place] [id number] [phone]            Aug 08, 2017  9:15 AM CDT   (Arrive by 9:00 AM)   Return Visit with Thompson Landeros MD   [facility] Dermatology ([facility])    [address] [place] [id number] [phone]              Who to contact     If you have questions or need follow up information about today's clinic visit or your schedule please contact [facility] BLOOD AND MARROW TRANSPLANT directly at [phone].  Normal or non-critical lab and imaging results will be communicated  to you by SendMet, letter or phone within 4 business days after the clinic has received the results. If you do not hear from us within 7 days, please contact the clinic through Thismoment or phone. If you have a critical or abnormal lab result, we will notify you by phone as soon as possible.  Submit refill requests through Thismoment or call your pharmacy and they will forward the refill request to us. Please allow 3 business days for your refill to be completed.          Additional Information About Your Visit        Th[employer]oment Information     [employer] gives you secure access to your electronic health record. If you see a primary care provider, you can also send messages to your care team and make appointments. If you have questions, please call your primary care clinic.  If you do not have a primary care provider, please call [phone] and they will assist you.        Care EveryWhere ID     This is your Care EveryWhere ID. This could be used by other organizations to access your [place] medical records  [id number]         Blood Pressure from Last 3 Encounters:   06/02/17 130/82   04/21/17 128/81   04/11/17 123/77    Weight from Last 3 Encounters:   06/02/17 91.2 kg (201 lb)   04/21/17 87.9 kg (193 lb 11.2 oz)   03/17/17 87.1 kg (192 lb)              We Performed the Following     BCR ABL1 Major Breakpoint Quant p210     CBC with platelets differential     Comprehensive metabolic panel     IgG     T cell subset extended profile        Recent Review Flowsheet Data     BMT Recent Results Latest Ref Rng & Units 1/18/2017 2/3/2017 3/17/2017 3/22/2017 4/12/2017 4/21/2017 6/2/2017    WBC 4.0 - 11.0 10e9/L 4.7 6.2 4.8 - 4.8 4.0 8.7    Hemoglobin 13.3 - 17.7 g/dL 7.6(L) 7.9(L) 7.3(L) - 9.1(L) 9.0(L) 10.9(L)    Platelet Count 150 - 450 10e9/L 277 263 223 - 215 227 207    Neutrophils (Absolute) 1.6 - 8.3 10e9/L 3.4 4.7 3.5 - 3.6 2.9 6.8    Sodium 133 - 144 mmol/L 139 138 141 142 139 140 139    Potassium 3.4 - 5.3 mmol/L 3.7 4.1  4.1 4.2 4.3 4.2 4.4    Chloride 94 - 109 mmol/L 109 105 108 109 108 106 107    Glucose 70 - 99 mg/dL 100(H) 96 96 89 101(H) 95 94    Urea Nitrogen 7 - 30 mg/dL 19 19 22 19 23 24 24    Creatinine 0.66 - 1.25 mg/dL 1.41(H) 1.35(H) 1.64(H) 1.41(H) 1.52(H) 1.09 1.16    Calcium (Total) 8.5 - 10.1 mg/dL 8.8 8.8 8.5 8.4(L) 9.0 8.8 9.2    Protein (Total) 6.8 - 8.8 g/dL 7.0 7.1 6.8 - 7.2 6.4(L) 7.0    Albumin 3.4 - 5.0 g/dL 3.5 3.7 3.6 - 3.6 3.4 3.6    Alkaline Phosphatase 40 - 150 U/L 109 108 117 - 130 130 169(H)    AST 0 - 45 U/L 16 16 16 - 21 26 33    ALT 0 - 70 U/L 20 20 19 - 28 37 54    MCV 78 - 100 fl 83 83 80 - 82 82 82               Primary Care Provider Office Phone # Fax #    Leann Berry [phone]       [facility] [address] [address] [address]        Thank you!     Thank you for choosing [facility] BLOOD AND MARROW TRANSPLANT  for your care. Our goal is always to provide you with excellent care. Hearing back from our patients is one way we can continue to improve our services. Please take a few minutes to complete the written survey that you may receive in the mail after your visit with us. Thank you!             Your Updated Medication List - Protect others around you: Learn how to safely use, store and throw away your medicines at www.disposemymeds.org.          This list is accurate as of: 6/2/17 11:23 AM.  Always use your most recent med list.                   Brand Name Dispense Instructions for use    acyclovir 200 MG capsule    ZOVIRAX    120 capsule    Take 2 capsules (400 mg) by mouth 2 times daily       calcium carbonate-vitamin D 500-400 MG-UNIT Tabs per tablet     180 tablet    Take 2 tablets by mouth daily       clindamycin 1 % lotion    CLEOCIN T    60 mL    Apply topically 2 times daily To areas of itch on scalp       clobetasol 0.05 % ointment    TEMOVATE    60 g    Apply topically 2 times daily       fluconazole 100 MG tablet    DIFLUCAN    30 tablet     Take 1 tablet (100 mg) by mouth daily       folic acid 1 MG tablet    FOLVITE    30 tablet    Take 1 tablet (1 mg) by mouth daily       levothyroxine 112 MCG tablet    SYNTHROID/LEVOTHROID    90 tablet    Take 1 tablet (112 mcg) by mouth daily       liothyronine 5 MCG tablet    CYTOMEL    180 tablet    Take 1 tablet (5 mcg) by mouth 2 times daily       metoprolol 25 MG tablet    LOPRESSOR    30 tablet    Take 0.5 tablets (12.5 mg) by mouth 2 times daily       pantoprazole 40 MG EC tablet    PROTONIX    30 tablet    Take 1 tablet (40 mg) by mouth daily       Selenium 100 MCG Caps     90 capsule    1 tablet daily       sulfamethoxazole-trimethoprim 800-160 MG per tablet    BACTRIM DS/SEPTRA DS    30 tablet    Take 1 tablet twice a day on Mondays and Tuesdays       triamcinolone 0.1 % cream    KENALOG    160 g    Apply sparingly to affected area three times daily as needed       ZOFRAN PO

## 2017-06-02 NOTE — NURSING NOTE
Chief Complaint   Patient presents with     Blood Draw     LABS ONLY     Vitals and labs done peripherally.  See doc flow sheets for details.  Emely Dorman CMA

## 2017-06-02 NOTE — LETTER
6/2/2017      RE: Panchito Pierce  [address]  [place] [address]       BMT Clinic Progress Note   06/02/2017    Patient ID:  Panchito Pierce is a 52 year old man D+ 17 months s/p MUD SCT for CML     Diagnosis CMLP+ Chronic myelogeneous leukemia, Ph1+  HCT Type Allogeneic    Prep Regimen Cytoxan  TBI   Donor Source Unrelated BM    GVHD Prophylaxis Methotrexate  Cyclosporine  Primary BMT Provider Jermaine Cabrera MD     CC: follow-up     INTERVAL  HISTORY   Interval History: Panchito was seen in the BMT clinic today. He continues to do well is off steroids and tapering the sirolimus. Good energy and good appetite. Had great trip with his son. Does not feel depressed. Denied any fevers, chills, cough, or new cold like symptoms. No symptoms of oral and optho chronic GVH . Quarter size rash on each calf. Occasional peeling from scalp. Weak fingernails. No bleeding.      Review of Systems: 10 point ROS negative except as noted above.    PHYSICAL EXAM   There were no vitals taken for this visit. BP   Wt Readings from Last 4 Encounters:   06/02/17 91.2 kg (201 lb)   04/21/17 87.9 kg (193 lb 11.2 oz)   03/17/17 87.1 kg (192 lb)   02/03/17 87.5 kg (193 lb)   General: NAD, pleasant appearing male, much less chushingoid     Eyes: CARY, sclera anicteric     Nose/Mouth/Throat: OP clear, buccal mucosa dry, no ulcerations; still coated tongue     Lungs: CTA bilaterally    Cardiovascular: RRR, no M/R/G    Abdominal/Rectal: +BS, soft, NT, ND, No masses    Lymphatics: no LE edema    Skin: almost resolved calf lesions. Fingernails thin but normal appearing. No other rashes and no fibrotic changes.  Neuro: A&O, grossly nonfocal.      LABS AND IMAGING      Lab Results   Component Value Date    WBC 8.7 06/02/2017    ANEU 6.8 06/02/2017    HGB 10.9 (L) 06/02/2017    HCT 33.8 (L) 06/02/2017     06/02/2017     04/21/2017    POTASSIUM 4.2 04/21/2017    CHLORIDE 106 04/21/2017    CO2 24 04/21/2017    GLC 95 04/21/2017    BUN  24 04/21/2017    CR 1.09 04/21/2017    MAG 2.5 (H) 03/17/2017    INR 0.9 06/15/2016    BILITOTAL 0.2 04/21/2017    AST 26 04/21/2017    ALT 37 04/21/2017    ALKPHOS 130 04/21/2017    PROTTOTAL 6.4 (L) 04/21/2017    ALBUMIN 3.4 04/21/2017     ASSESSMENT BY SYSTEMS   Panchito Pierce is a 51 year old man s/p MUD SCT for CML  - trasplanted in second chronic phase after blast crisis;    BMT/ CML:  Off dasatinib ; repeat BCR/ABL from today pending. - restage per protocol,     HEMATOLOGY/COAGULATION: improved anemia. Transfuse if < 7.0. Continue folate. No bleeding.     INFECTIONS AND PROPHYLAXIS: Afebrile. No focal sxs.  Checking T cell subset and IgG to decide on need to continue acyclovir and bactrim, in half to adjust for renal function.    Prophylaxis: LD ACV (CMV-, HSV-, EBV+, VZV+), Fluconazole, Pentamidine (last given 7/27/16).    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 Off immunesuppression. 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 Stopped prednisone on 12/06/16. Off sirolimus April 2017. May continue biotene and dexa mouth wash.   -  No N/V/D.   - Advised to wear sun block when outside for extended periods of time to prevent flare of GVH    GASTROINTESTINAL/LIVER: Appetite good, wt stable. No active issues    RENAL/FEN/Nutrition: Cr normal. Good po intake. No weight loss.     CARDIOVASCULAR:  Normotensive. Hx Hypertension. Cont metoprolol. Normotensive  - Hx of A fib with RVR (new 12/24); resolved with dilt, now on Metoprolol 12.5mg BID.     ENDOCRINE: following with Dr Guevara. thyroid and testosterone issues. Hx Graves Disease s/p VELA therapy. Cont synthroid and liothyronine. TSH and Free T3 low; Free T4 elevated. Mixed picture; After endocrine consult:  Mild thyrotoxicosis due to large weight loss. On levothyroxine 112mcg.    - On selenium supplement, T3 and T4.    PSYCHIATRIC:  Good stable mood. If needed we may use Wellbutrim that has no libido side effects.     DERMATOLOGY: no rashes. Using  cream prescribed by Dr Landeros.   - Sun: Advised to wear SPF when out in the sun for prolonged periods of time  - Acne: resolved. Likely due to Siro. A few lesions scattered over shoulders and chest. Consider topical clinda cream or doxy if persists or becomes worse.       Plan:  SHWETHA Cabrera on 6/14/17 with labs  Draw T cell subset and IgG to decide on antibiotic prophylaxis    Jermaine Cabrera MD

## 2017-06-02 NOTE — LETTER
6/2/2017       RE: Panchito Pierce  [address]  [place] [address]     Dear Colleague,    Thank you for referring your patient, Panchito Pierce, to the [facility] ENDOCRINOLOGY at [facility]. Please see a copy of my visit note below.    Panchito presents today for RECHECK (graves disease )  .    HPI  #1 hypothyroidism due to Graves' disease status post radioactive iodide  Patient report, was diagnosed with Graves' disease in 2002.  He underwent radioactive therapy of the thyroid and was treated with levothyroxine.  Per patient report, he does best on a T3 and T4 combination.  Patient does have a history of Graves eye disease and is currently on selenium and most recently, and steroids (for erlmb-fhuwqm-dppp disease since April 2016) Pt  noted to be mildly thyrotoxic with weight loss during his inpatient hospitalization.  April 2016 TSH was undetectable, free T4 was high at 1.51 and free T3 was low at 1.9.  Levothyroxine was adjusted and he continued with Cytomel 5  g twice daily. November 2016 TSH is 1.11.    This a family history of hypothyroidism.    Interval history: He continues to feel extremely good energy and generally well.  He continues on the levothyroxine and Cytomel program.  He denies any eye symptoms.  He continues on the selenium.      #2 steroid use  He reports that he's been put on prednisone daily since April 2016 for ffusc-ufpakh-jxop disease.  He is currently on a taper.     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  He also reports a rare morning erections.  He reports he shakes every few weeks and has also noted loss of his axillary hair, chest here, as well as decrease in testicular size.  September 2016 total testosterone of 141, PSA of 0.5.  Repeat check in October 2016 showed a testosterone 186, FSH  of 25.2, and elevated to 7.7.     He reports a history of prostate cancer in his father.  He does report extremely low libido which concerns his wife.      Interval history: Per patient report this has improved    #4 sleep apnea  This is noted at his previous visit.  He has seen sleep medicine in 2017 with a sleep study.  Evaluation is suggestive of sleep apnea (sat at 81%, 96 hypoapneic episodes over 4 hrs) but he has not yet started C Pap.    #5 slightly impaired vision  Patient reports frequently needing to use the magnifying glass.  He would like to see ophthalmology    Past Medical History  Past Medical History:   Diagnosis Date     Graves disease 1/1/2003    treated with radioiodine, meds T3 and T4     Murmur, heart     has not been fully evaluated       Allergies  Allergies   Allergen Reactions     Allopurinol Rash     Unclear if it was from allopurinol, but went away when it was stopped.  But also had steroid cream at the same time.     Medications  Current Outpatient Prescriptions   Medication Sig Dispense Refill     clobetasol (TEMOVATE) 0.05 % ointment Apply topically 2 times daily 60 g 1     clindamycin (CLEOCIN T) 1 % lotion Apply topically 2 times daily To areas of itch on scalp 60 mL 3     metoprolol (LOPRESSOR) 25 MG tablet Take 0.5 tablets (12.5 mg) by mouth 2 times daily 30 tablet 10     triamcinolone (KENALOG) 0.1 % cream Apply sparingly to affected area three times daily as needed 160 g 3     folic acid (FOLVITE) 1 MG tablet Take 1 tablet (1 mg) by mouth daily 30 tablet 3     calcium carbonate-vitamin D 500-400 MG-UNIT TABS per tablet Take 2 tablets by mouth daily 180 tablet 11     Ondansetron HCl (ZOFRAN PO)        acyclovir (ZOVIRAX) 200 MG capsule Take 2 capsules (400 mg) by mouth 2 times daily 120 capsule 11     pantoprazole (PROTONIX) 40 MG enteric coated tablet Take 1 tablet (40 mg) by mouth daily 30 tablet 11     sulfamethoxazole-trimethoprim (BACTRIM DS,SEPTRA DS) 800-160 MG per tablet  "Take 1 tablet twice a day on Mondays and Tuesdays 30 tablet 6     levothyroxine (SYNTHROID, LEVOTHROID) 112 MCG tablet Take 1 tablet (112 mcg) by mouth daily 90 tablet 3     liothyronine (CYTOMEL) 5 MCG tablet Take 1 tablet (5 mcg) by mouth 2 times daily 180 tablet 3     Selenium 100 MCG CAPS 1 tablet daily 90 capsule 3     fluconazole (DIFLUCAN) 100 MG tablet Take 1 tablet (100 mg) by mouth daily 30 tablet 11     Family History  family history includes Asthma in his son; Dementia in his father; Heart Murmur in his brother and father; Hepatitis in his brother; Lung Cancer in his mother; Prostate Cancer in his father; Thyroid Disease in his mother, sister, sister, and sister.  Social History  Social History     Social History     Marital status:      Spouse name: Nu     Number of children: 4     Years of education: N/A     Occupational History     Not on file.     Social History Main Topics     Smoking status: Former Smoker     Packs/day: 0.50     Years: 30.00     Start date: 1/1/1985     Quit date: 6/28/2015     Smokeless tobacco: Not on file     Alcohol use Yes      Comment: once a week     Drug use: No     Sexual activity: Not on file      Comment:      Other Topics Concern     Not on file     Social History Narrative     to wife Nu.    4 children: 1 step daughter Ro age 27, 3 biological sons, 19 yo Jose L, 15 yo Sergio (Asbjessica), 11 yo Elvis    Lives in [place]    Used to work in clerical work, now in school for electrical engineering    11 brothers and sisters, 3 live in the [facility], 1 brother incarcerated, 1 brother hepatitis C         ROS    Physical Exam  /82  Pulse 81  Ht 1.753 m (5' 9\")  Wt 91.2 kg (201 lb)  BMI 29.68 kg/m2  Body mass index is 29.68 kg/(m^2).    Exam:  GENERAL APPEARANCE: Alert and no distress  NECK: No lymphadenopathy appreciated  Thyroid: No obvious nodules palpated   CV: RRR   Lungs: CTA bilaterally  Abdomen: Soft, Nontender, non distended, " positive bowel sounds      Results:  Orders Only on 06/02/2017   Component Date Value Ref Range Status     T4 Free 06/02/2017 0.66* 0.76 - 1.46 ng/dL Final     Triiodothyronine (T3) 06/02/2017 96  60 - 181 ng/dL Final     TSH 06/02/2017 6.49* 0.40 - 4.00 mU/L Final           ASSESSMENT:    #1 hypothyroidism due to Graves' disease status post radioactive iodide  Pt needs continue on Cytomel in addition to his levothyroxine.  We will also the patient continue with the selenium at 100  g daily for now.  Since his TSH today is 6.49, T3 is normal at 96, and free T4 was little lower 0.66, we will have the patient increase his levothyroxine 137  g daily and have him recheck in roughly 6-8 weeks.  Patient to continue with Cytomel at 5  g twice daily.    #2 steroid use  Patient has stopped his steroids in December 2016 and currently feels quite well.    #3 history of low testosterone  The patient has a history of low testosterone.  October 2016 FSH was slightly higher.  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  The patient also has evidence of smaller than expected testicles (about 8 grams) on exam.      Currently, the patient reports feeling better with improved libido and good hair growth.  We will recheck testosterone levels.    #4 possible sleep apnea  The patient has seen sleep medicine and he does have sleep apnea.  Encourage patient to consider C Pap.  This will improve his libido, energy, and testosterone levels.  He will look at this.    #5 decreased libido  This has improved    #6 slightly impaired vision  The patient may need glasses.  Referral made for ophthalmology    Return visit planned in 6 months    25 minutes spent with patient of which 20 minutes was spent in face-to-face discussion coordination of care      Pattie Guevara MD

## 2017-06-02 NOTE — MR AVS SNAPSHOT
After Visit Summary   6/2/2017    Panchito Pierce    MRN: [id number]           Patient Information     Date Of Birth          1965        Visit Information        Provider Department      6/2/2017 9:30 AM Pattie Guevara MD [facility]        Today's Diagnoses     Hyperthyroidism    -  1      Care Instructions    EYE CLINIC: [phone] (Request appt here at the [facility])    Pt to consider getting sleep apnea treatment    To expedite your medication refill(s), please contact your pharmacy and have them fax a refill request to: [phone].  *Please allow 3 business days for routine medication refills.  *Please allow 5 business days for controlled substance medication refills.  --------------------  For scheduling appointments (including lab work), please request an appointment through [employer], or call: [phone].    For questions for your provider or the endocrine nurse, please send a [employer] message.  For after-hours urgent issues, please dial [phone], and ask to speak with the Endocrinologist On-Call.  --------------------  Please Note: If you are active on [employer], all future test results will be sent by [employer] message only and will no longer be sent by mail. You may also receive communication directly from your physician.            Follow-ups after your visit        Additional Services     OPHTHALMOLOGY ADULT REFERRAL       Pt reports need for magnifying glass use - see here at [facility] -hx of thyroid disease and cancer                  Follow-up notes from your care team     Return in about 6 months (around 12/2/2017).      Your next 10 appointments already scheduled     Jun 14, 2017 11:00 AM CDT   [facility] Lab Draw with  [employer] LAB DRAW   [facility] Draw ([facility] [[email or web address] and [facility])    [address] [address] [phone]            Jun 14, 2017 11:30 AM CDT   BMT Anniversary Visit with Jermaine Cabrera MD     [facility] Blood and Marrow Transplant ([facility])    [address]  2nd [address]  [facility] [address]   [phone]            Aug 08, 2017  9:15 AM CDT   (Arrive by 9:00 AM)   Return Visit with Thompson Landeros MD   [facility] Dermatology ([facility])    [address]  [address] [facility] [address]   [phone]            Dec 01, 2017  9:30 AM CST   (Arrive by 9:15 AM)   RETURN ENDOCRINE with Pattie Guevara MD   [facility] Endocrinology ([facility])    [address]  [address] [facility] [address] [phone]              Who to contact     Please call your clinic at [phone] to:    Ask questions about your health    Make or cancel appointments    Discuss your medicines    Learn about your test results    Speak to your doctor   If you have compliments or concerns about an experience at your clinic, or if you wish to file a complaint, please contact [facility] Patient Relations at [phone] or email us at [email or web address]         Additional Information About Your Visit        [employer] Content Information     [employer] gives you secure access to your electronic health record. If you see a primary care provider, you can also send messages to your care team and make appointments. If you have questions, please call your primary care clinic.  If you do not have a primary care provider, please call [phone] and they will assist you.      [employer] is an electronic gateway that provides easy, online access to your medical records. With [employer], you can request a clinic appointment, read your test results, renew a prescription or communicate with your care team.     To access your existing account, please contact your [facility] Clinic or call [phone] for assistance.        Care EveryWhere ID     This is your Care EveryWhere ID. This could be  "used by other organizations to access your [place] medical records  [id number]        Your Vitals Were     Pulse Height BMI (Body Mass Index)             81 1.753 m (5' 9\") 29.68 kg/m2          Blood Pressure from Last 3 Encounters:   06/02/17 130/82   04/21/17 128/81   04/11/17 123/77    Weight from Last 3 Encounters:   06/02/17 91.2 kg (201 lb)   04/21/17 87.9 kg (193 lb 11.2 oz)   03/17/17 87.1 kg (192 lb)              We Performed the Following     OPHTHALMOLOGY ADULT REFERRAL        Primary Care Provider Office Phone # Fax #    Leann REICH Jamal [phone]       [facility] [address] [address] [address]        Thank you!     Thank you for choosing [facility] ENDOCRINOLOGY  for your care. Our goal is always to provide you with excellent care. Hearing back from our patients is one way we can continue to improve our services. Please take a few minutes to complete the written survey that you may receive in the mail after your visit with us. Thank you!             Your Updated Medication List - Protect others around you: Learn how to safely use, store and throw away your medicines at www.disposemymeds.org.          This list is accurate as of: 6/2/17 11:39 AM.  Always use your most recent med list.                   Brand Name Dispense Instructions for use    acyclovir 200 MG capsule    ZOVIRAX    120 capsule    Take 2 capsules (400 mg) by mouth 2 times daily       calcium carbonate-vitamin D 500-400 MG-UNIT Tabs per tablet     180 tablet    Take 2 tablets by mouth daily       clindamycin 1 % lotion    CLEOCIN T    60 mL    Apply topically 2 times daily To areas of itch on scalp       clobetasol 0.05 % ointment    TEMOVATE    60 g    Apply topically 2 times daily       fluconazole 100 MG tablet    DIFLUCAN    30 tablet    Take 1 tablet (100 mg) by mouth daily       folic acid 1 MG tablet    FOLVITE    30 tablet    Take 1 tablet (1 mg) by mouth daily       levothyroxine 112 MCG " tablet    SYNTHROID/LEVOTHROID    90 tablet    Take 1 tablet (112 mcg) by mouth daily       liothyronine 5 MCG tablet    CYTOMEL    180 tablet    Take 1 tablet (5 mcg) by mouth 2 times daily       metoprolol 25 MG tablet    LOPRESSOR    30 tablet    Take 0.5 tablets (12.5 mg) by mouth 2 times daily       pantoprazole 40 MG EC tablet    PROTONIX    30 tablet    Take 1 tablet (40 mg) by mouth daily       Selenium 100 MCG Caps     90 capsule    1 tablet daily       sulfamethoxazole-trimethoprim 800-160 MG per tablet    BACTRIM DS/SEPTRA DS    30 tablet    Take 1 tablet twice a day on Mondays and Tuesdays       triamcinolone 0.1 % cream    KENALOG    160 g    Apply sparingly to affected area three times daily as needed       ZOFRAN PO

## 2017-06-02 NOTE — PROGRESS NOTES
Dear Panchito    Here are your  It looks like you might need a little bit more thyroid hormone.  I increased your levothyroxine to 137  g daily.  Let's plan on rechecking your thyroid function test in roughly 2 months.  Please let me know if you have any questions    If you have any questions, please feel free to contact my nurse at [phone] select option #3 for triage nurse  or  option #1 for scheduling related questions.    Regards    Pattie Guevara MD

## 2017-06-02 NOTE — PROGRESS NOTES
BMT Clinic Progress Note   06/02/2017    Patient ID:  Panchito Pierce is a 52 year old man D+ 17 months s/p MUD SCT for CML     Diagnosis CMLP+ Chronic myelogeneous leukemia, Ph1+  HCT Type Allogeneic    Prep Regimen Cytoxan  TBI   Donor Source Unrelated BM    GVHD Prophylaxis Methotrexate  Cyclosporine  Primary BMT Provider Jermaine Cabrera MD     CC: follow-up     INTERVAL  HISTORY   Interval History: Panchito was seen in the BMT clinic today. He continues to do well is off steroids and tapering the sirolimus. Good energy and good appetite. Had great trip with his son. Does not feel depressed. Denied any fevers, chills, cough, or new cold like symptoms. No symptoms of oral and optho chronic GVH . Quarter size rash on each calf. Occasional peeling from scalp. Weak fingernails. No bleeding.      Review of Systems: 10 point ROS negative except as noted above.    PHYSICAL EXAM   There were no vitals taken for this visit. BP   Wt Readings from Last 4 Encounters:   06/02/17 91.2 kg (201 lb)   04/21/17 87.9 kg (193 lb 11.2 oz)   03/17/17 87.1 kg (192 lb)   02/03/17 87.5 kg (193 lb)   General: NAD, pleasant appearing male, much less chushingoid     Eyes: CARY, sclera anicteric     Nose/Mouth/Throat: OP clear, buccal mucosa dry, no ulcerations; still coated tongue     Lungs: CTA bilaterally    Cardiovascular: RRR, no M/R/G    Abdominal/Rectal: +BS, soft, NT, ND, No masses    Lymphatics: no LE edema    Skin: almost resolved calf lesions. Fingernails thin but normal appearing. No other rashes and no fibrotic changes.  Neuro: A&O, grossly nonfocal.      LABS AND IMAGING      Lab Results   Component Value Date    WBC 8.7 06/02/2017    ANEU 6.8 06/02/2017    HGB 10.9 (L) 06/02/2017    HCT 33.8 (L) 06/02/2017     06/02/2017     04/21/2017    POTASSIUM 4.2 04/21/2017    CHLORIDE 106 04/21/2017    CO2 24 04/21/2017    GLC 95 04/21/2017    BUN 24 04/21/2017    CR 1.09 04/21/2017    MAG 2.5 (H) 03/17/2017    INR 0.9  06/15/2016    BILITOTAL 0.2 04/21/2017    AST 26 04/21/2017    ALT 37 04/21/2017    ALKPHOS 130 04/21/2017    PROTTOTAL 6.4 (L) 04/21/2017    ALBUMIN 3.4 04/21/2017     ASSESSMENT BY SYSTEMS   Panchito RACHANA Pierce is a 51 year old man s/p MUD SCT for CML  - trasplanted in second chronic phase after blast crisis;    BMT/ CML:  Off dasatinib ; repeat BCR/ABL from today pending. - restage per protocol,     HEMATOLOGY/COAGULATION: improved anemia. Transfuse if < 7.0. Continue folate. No bleeding.     INFECTIONS AND PROPHYLAXIS: Afebrile. No focal sxs.  Checking T cell subset and IgG to decide on need to continue acyclovir and bactrim, in half to adjust for renal function.    Prophylaxis: LD ACV (CMV-, HSV-, EBV+, VZV+), Fluconazole, Pentamidine (last given 7/27/16).    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 Off immunesuppression. 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 Stopped prednisone on 12/06/16. Off sirolimus April 2017. May continue biotene and dexa mouth wash.   -  No N/V/D.   - Advised to wear sun block when outside for extended periods of time to prevent flare of GVH    GASTROINTESTINAL/LIVER: Appetite good, wt stable. No active issues    RENAL/FEN/Nutrition: Cr normal. Good po intake. No weight loss.     CARDIOVASCULAR:  Normotensive. Hx Hypertension. Cont metoprolol. Normotensive  - Hx of A fib with RVR (new 12/24); resolved with dilt, now on Metoprolol 12.5mg BID.     ENDOCRINE: following with Dr Guevara. thyroid and testosterone issues. Hx Graves Disease s/p VELA therapy. Cont synthroid and liothyronine. TSH and Free T3 low; Free T4 elevated. Mixed picture; After endocrine consult:  Mild thyrotoxicosis due to large weight loss. On levothyroxine 112mcg.    - On selenium supplement, T3 and T4.    PSYCHIATRIC:  Good stable mood. If needed we may use Wellbutrim that has no libido side effects.     DERMATOLOGY: no rashes. Using cream prescribed by Dr Landeros.   - Sun: Advised to wear SPF when out in the  sun for prolonged periods of time  - Acne: resolved. Likely due to Siro. A few lesions scattered over shoulders and chest. Consider topical clinda cream or doxy if persists or becomes worse.       Plan:  SHWETHA Cabrera on 6/14/17 with labs  Draw T cell subset and IgG to decide on antibiotic prophylaxis

## 2017-06-02 NOTE — PROGRESS NOTES
Panchito presents today for RECHECK (graves disease )  .    HPI  #1 hypothyroidism due to Graves' disease status post radioactive iodide  Patient report, was diagnosed with Graves' disease in 2002.  He underwent radioactive therapy of the thyroid and was treated with levothyroxine.  Per patient report, he does best on a T3 and T4 combination.  Patient does have a history of Graves eye disease and is currently on selenium and most recently, and steroids (for mhpix-tvrxhv-kesw disease since April 2016) Pt  noted to be mildly thyrotoxic with weight loss during his inpatient hospitalization.  April 2016 TSH was undetectable, free T4 was high at 1.51 and free T3 was low at 1.9.  Levothyroxine was adjusted and he continued with Cytomel 5  g twice daily. November 2016 TSH is 1.11.    This a family history of hypothyroidism.    Interval history: He continues to feel extremely good energy and generally well.  He continues on the levothyroxine and Cytomel program.  He denies any eye symptoms.  He continues on the selenium.      #2 steroid use  He reports that he's been put on prednisone daily since April 2016 for mfwqs-cjuudz-nmsa disease.  He is currently on a taper.     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  He also reports a rare morning erections.  He reports he shakes every few weeks and has also noted loss of his axillary hair, chest here, as well as decrease in testicular size.  September 2016 total testosterone of 141, PSA of 0.5.  Repeat check in October 2016 showed a testosterone 186, FSH of 25.2, and elevated to 7.7.     He reports a history of prostate cancer in his father.  He does report extremely low libido which concerns his wife.      Interval history: Per patient report this has improved    #4 sleep apnea  This is noted at his previous visit.  He has  seen sleep medicine in 2017 with a sleep study.  Evaluation is suggestive of sleep apnea (sat at 81%, 96 hypoapneic episodes over 4 hrs) but he has not yet started C Pap.    #5 slightly impaired vision  Patient reports frequently needing to use the magnifying glass.  He would like to see ophthalmology    Past Medical History  Past Medical History:   Diagnosis Date     Graves disease 1/1/2003    treated with radioiodine, meds T3 and T4     Murmur, heart     has not been fully evaluated       Allergies  Allergies   Allergen Reactions     Allopurinol Rash     Unclear if it was from allopurinol, but went away when it was stopped.  But also had steroid cream at the same time.     Medications  Current Outpatient Prescriptions   Medication Sig Dispense Refill     clobetasol (TEMOVATE) 0.05 % ointment Apply topically 2 times daily 60 g 1     clindamycin (CLEOCIN T) 1 % lotion Apply topically 2 times daily To areas of itch on scalp 60 mL 3     metoprolol (LOPRESSOR) 25 MG tablet Take 0.5 tablets (12.5 mg) by mouth 2 times daily 30 tablet 10     triamcinolone (KENALOG) 0.1 % cream Apply sparingly to affected area three times daily as needed 160 g 3     folic acid (FOLVITE) 1 MG tablet Take 1 tablet (1 mg) by mouth daily 30 tablet 3     calcium carbonate-vitamin D 500-400 MG-UNIT TABS per tablet Take 2 tablets by mouth daily 180 tablet 11     Ondansetron HCl (ZOFRAN PO)        acyclovir (ZOVIRAX) 200 MG capsule Take 2 capsules (400 mg) by mouth 2 times daily 120 capsule 11     pantoprazole (PROTONIX) 40 MG enteric coated tablet Take 1 tablet (40 mg) by mouth daily 30 tablet 11     sulfamethoxazole-trimethoprim (BACTRIM DS,SEPTRA DS) 800-160 MG per tablet Take 1 tablet twice a day on Mondays and Tuesdays 30 tablet 6     levothyroxine (SYNTHROID, LEVOTHROID) 112 MCG tablet Take 1 tablet (112 mcg) by mouth daily 90 tablet 3     liothyronine (CYTOMEL) 5 MCG tablet Take 1 tablet (5 mcg) by mouth 2 times daily 180 tablet 3      Selenium 100 MCG CAPS 1 tablet daily 90 capsule 3     fluconazole (DIFLUCAN) 100 MG tablet Take 1 tablet (100 mg) by mouth daily 30 tablet 11     Family History  family history includes Asthma in his son; Dementia in his father; Heart Murmur in his brother and father; Hepatitis in his brother; Lung Cancer in his mother; Prostate Cancer in his father; Thyroid Disease in his mother, sister, sister, and sister.  Social History  Social History     Social History     Marital status:      Spouse name: Nu     Number of children: 4     Years of education: N/A     Occupational History     Not on file.     Social History Main Topics     Smoking status: Former Smoker     Packs/day: 0.50     Years: 30.00     Start date: 1/1/1985     Quit date: 6/28/2015     Smokeless tobacco: Not on file     Alcohol use Yes      Comment: once a week     Drug use: No     Sexual activity: Not on file      Comment:      Other Topics Concern     Not on file     Social History Narrative     to wife Nu.    4 children: 1 step daughter Ro age 27, 3 biological sons, 19 yo Jose L, 15 yo Sergio ([facility]), 13 yo Elvis    Lives in [place]    Used to work in clerical work, now in school for electrical engineering    11 brothers and sisters, 3 live in the [facility], 1 brother incarcerated, 1 brother hepatitis C           ROS  Answers for HPI/ROS submitted by the patient on 5/30/2017   General Symptoms: No  Skin Symptoms: Yes  HENT Symptoms: No  EYE SYMPTOMS: No  HEART SYMPTOMS: Yes  LUNG SYMPTOMS: Yes  INTESTINAL SYMPTOMS: Yes  URINARY SYMPTOMS: No  REPRODUCTIVE SYMPTOMS: No  SKELETAL SYMPTOMS: No  BLOOD SYMPTOMS: No  NERVOUS SYSTEM SYMPTOMS: No  MENTAL HEALTH SYMPTOMS: No  Changes in hair: No  Changes in moles/birth marks: No  Itching: No  Rashes: No  Changes in nails: Yes  Acne: No  Change in facial hair: No  Warts: No  Non-healing sores: No  Scarring: No  Flaking of skin: No  Color changes of hands/feet in cold : No  Sun  "sensitivity: No  Skin thickening: No  Cough: No  Sputum or phlegm: No  Coughing up blood: No  Difficulty breating or shortness of breath: No  Snoring: Yes  Wheezing: No  Difficulty breathing on exertion: Yes  Respiratory pain: No  Nighttime Cough: No  Difficulty breathing when lying flat: No  Chest pain or pressure: No  Fast or irregular heartbeat: No  Pain in legs with walking: No  Swelling in feet or ankles: Yes  Trouble breathing while lying down: No  Fingers or Toes appear blue: No  High blood pressure: No  Low blood pressure: No  Fainting: No  Murmurs: No  Chest pain on exertion: No  Chest pain at rest: No  Cramping pain in leg during exercise: No  Pacemaker: No  Varicose veins: Yes  Edema or swelling: Yes  Fast heart beat: No  Wake up at night with shortness of breath: No  Heart flutters: No  Light-headedness: No  Exercise intolerance: No  Heart burn or indigestion: No  Nausea: No  Vomiting: No  Abdominal pain: No  Bloating: No  Constipation: No  Diarrhea: Yes  Blood in stool: No  Black stools: No  Rectal or Anal pain: No  Fecal incontinence: No  Rectal bleeding: No  Yellowing of skin or eyes: No  Vomit with blood: No  Change in stools: No  Hemorrhoids: No      Physical Exam  /82  Pulse 81  Ht 1.753 m (5' 9\")  Wt 91.2 kg (201 lb)  BMI 29.68 kg/m2  Body mass index is 29.68 kg/(m^2).    Exam:  GENERAL APPEARANCE: Alert and no distress  NECK: No lymphadenopathy appreciated  Thyroid: No obvious nodules palpated   CV: RRR   Lungs: CTA bilaterally  Abdomen: Soft, Nontender, non distended, positive bowel sounds      Results:  Orders Only on 06/02/2017   Component Date Value Ref Range Status     T4 Free 06/02/2017 0.66* 0.76 - 1.46 ng/dL Final     Triiodothyronine (T3) 06/02/2017 96  60 - 181 ng/dL Final     TSH 06/02/2017 6.49* 0.40 - 4.00 mU/L Final           ASSESSMENT:    #1 hypothyroidism due to Graves' disease status post radioactive iodide  Pt needs continue on Cytomel in addition to his " levothyroxine.  We will also the patient continue with the selenium at 100  g daily for now.  Since his TSH today is 6.49, T3 is normal at 96, and free T4 was little lower 0.66, we will have the patient increase his levothyroxine 137  g daily and have him recheck in roughly 6-8 weeks.  Patient to continue with Cytomel at 5  g twice daily.    #2 steroid use  Patient has stopped his steroids in December 2016 and currently feels quite well.    #3 history of low testosterone  The patient has a history of low testosterone.  October 2016 FSH was slightly higher.  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  The patient also has evidence of smaller than expected testicles (about 8 grams) on exam.      Currently, the patient reports feeling better with improved libido and good hair growth.  We will recheck testosterone levels.    #4 possible sleep apnea  The patient has seen sleep medicine and he does have sleep apnea.  Encourage patient to consider C Pap.  This will improve his libido, energy, and testosterone levels.  He will look at this.    #5 decreased libido  This has improved    #6 slightly impaired vision  The patient may need glasses.  Referral made for ophthalmology    Return visit planned in 6 months    25 minutes spent with patient of which 20 minutes was spent in face-to-face discussion coordination of care

## 2017-06-02 NOTE — LETTER
Patient:  Panchito Pierce  :   1965  MRN:     [id number]        Mr.Paul RACHANA Pierce  [address] [facility] [address]        2017    Dear Panchito    Here are your  It looks like you might need a little bit more thyroid hormone.  I increased your levothyroxine to 137  g daily.  Let's plan on rechecking your thyroid function test in roughly 2 months.  Please let me know if you have any questions    If you have any questions, please feel free to contact my nurse at [phone] select option #3 for triage nurse  or  option #1 for scheduling related questions.    Regards    Pattie Guevara MD      Resulted Orders   T4 free   Result Value Ref Range    T4 Free 0.66 (L) 0.76 - 1.46 ng/dL   T3 total   Result Value Ref Range    Triiodothyronine (T3) 96 60 - 181 ng/dL   TSH   Result Value Ref Range    TSH 6.49 (H) 0.40 - 4.00 mU/L       [facility] Lab Draw

## 2017-06-02 NOTE — PATIENT INSTRUCTIONS
EYE CLINIC: [phone] (Request appt here at the [facility])    Pt to consider getting sleep apnea treatment    To expedite your medication refill(s), please contact your pharmacy and have them fax a refill request to: [phone].  *Please allow 3 business days for routine medication refills.  *Please allow 5 business days for controlled substance medication refills.  --------------------  For scheduling appointments (including lab work), please request an appointment through Maktoob, or call: [phone].    For questions for your provider or the endocrine nurse, please send a Maktoob message.  For after-hours urgent issues, please dial [phone], and ask to speak with the Endocrinologist On-Call.  --------------------  Please Note: If you are active on Maktoob, all future test results will be sent by Maktoob message only and will no longer be sent by mail. You may also receive communication directly from your physician.

## 2017-06-02 NOTE — LETTER
Patient:  Panchito Pierce  :   1965  MRN:     [id number]        Mr.Paul RACHANA Pierce  [address] [facility] [address]        2017    Dear Panchito    This is a very normal testosterone level. This is great news. We will work on the thyroid by increasing the dosage of the levothyroxine as discussed.    If you have any questions, please feel free to contact my nurse at [phone] select option #3 for triage nurse  or  option #1 for scheduling related questions.    Regards    Pattie Guevara MD        Resulted Orders   T4 free   Result Value Ref Range    T4 Free 0.66 (L) 0.76 - 1.46 ng/dL   T3 total   Result Value Ref Range    Triiodothyronine (T3) 96 60 - 181 ng/dL   TSH   Result Value Ref Range    TSH 6.49 (H) 0.40 - 4.00 mU/L   Testosterone Free and Total   Result Value Ref Range    Testosterone Total 333 240 - 950 ng/dL      Comment:      This test was developed and its performance characteristics determined by the   [facility],  Special [facility] Laboratory. It has   not been cleared or approved by the FDA. The laboratory is regulated under   CLIA   as qualified to perform high-complexity testing. This test is used for   clinical   purposes. It should not be regarded as investigational or for research.      Sex Hormone Binding Globulin 25 11 - 80 nmol/L    Free Testosterone Calculated 7.71 4.7 - 24.4 ng/dL       [facility] Lab Draw

## 2017-06-05 LAB — COPATH REPORT: NORMAL

## 2017-06-06 LAB
SHBG SERPL-SCNC: 25 NMOL/L (ref 11–80)
TESTOST FREE SERPL-MCNC: 7.71 NG/DL (ref 4.7–24.4)
TESTOST SERPL-MCNC: 333 NG/DL (ref 240–950)

## 2017-06-06 NOTE — PROGRESS NOTES
Dear Panchito    This is a very normal testosterone level. This is great news. We will work on the thyroid by increasing the dosage of the levothyroxine as discussed.    If you have any questions, please feel free to contact my nurse at [phone] select option #3 for triage nurse  or  option #1 for scheduling related questions.    Regards    Pattie Guevara MD

## 2017-06-14 ENCOUNTER — OFFICE VISIT (OUTPATIENT)
Dept: TRANSPLANT | Facility: CLINIC | Age: 52
End: 2017-06-14
Attending: INTERNAL MEDICINE
Payer: COMMERCIAL

## 2017-06-14 VITALS
RESPIRATION RATE: 18 BRPM | WEIGHT: 202.5 LBS | SYSTOLIC BLOOD PRESSURE: 130 MMHG | DIASTOLIC BLOOD PRESSURE: 82 MMHG | OXYGEN SATURATION: 98 % | HEART RATE: 63 BPM | TEMPERATURE: 97.9 F | BODY MASS INDEX: 29.9 KG/M2

## 2017-06-14 DIAGNOSIS — Z94.81 STATUS POST BONE MARROW TRANSPLANT (H): ICD-10-CM

## 2017-06-14 DIAGNOSIS — C92.10 CML (CHRONIC MYELOCYTIC LEUKEMIA) (H): ICD-10-CM

## 2017-06-14 DIAGNOSIS — C92.10 CML (CHRONIC MYELOCYTIC LEUKEMIA) (H): Primary | ICD-10-CM

## 2017-06-14 PROBLEM — L30.9 CHRONIC DERMATITIS OF HANDS: Status: ACTIVE | Noted: 2017-06-14

## 2017-06-14 PROBLEM — L82.0 SEBORRHEIC KERATOSES, INFLAMED: Status: ACTIVE | Noted: 2017-06-14

## 2017-06-14 LAB
ALBUMIN SERPL-MCNC: 3.4 G/DL (ref 3.4–5)
ALP SERPL-CCNC: 122 U/L (ref 40–150)
ALT SERPL W P-5'-P-CCNC: 35 U/L (ref 0–70)
ANION GAP SERPL CALCULATED.3IONS-SCNC: 8 MMOL/L (ref 3–14)
AST SERPL W P-5'-P-CCNC: 25 U/L (ref 0–45)
BASOPHILS # BLD AUTO: 0 10E9/L (ref 0–0.2)
BASOPHILS NFR BLD AUTO: 0.5 %
BILIRUB SERPL-MCNC: 0.3 MG/DL (ref 0.2–1.3)
BUN SERPL-MCNC: 22 MG/DL (ref 7–30)
CALCIUM SERPL-MCNC: 9.1 MG/DL (ref 8.5–10.1)
CHLORIDE SERPL-SCNC: 107 MMOL/L (ref 94–109)
CO2 SERPL-SCNC: 24 MMOL/L (ref 20–32)
CREAT SERPL-MCNC: 1.2 MG/DL (ref 0.66–1.25)
DIFFERENTIAL METHOD BLD: ABNORMAL
EOSINOPHIL # BLD AUTO: 0.3 10E9/L (ref 0–0.7)
EOSINOPHIL NFR BLD AUTO: 4.1 %
ERYTHROCYTE [DISTWIDTH] IN BLOOD BY AUTOMATED COUNT: 19.5 % (ref 10–15)
GFR SERPL CREATININE-BSD FRML MDRD: 64 ML/MIN/1.7M2
GLUCOSE SERPL-MCNC: 88 MG/DL (ref 70–99)
HCT VFR BLD AUTO: 32.4 % (ref 40–53)
HGB BLD-MCNC: 10.3 G/DL (ref 13.3–17.7)
IMM GRANULOCYTES # BLD: 0 10E9/L (ref 0–0.4)
IMM GRANULOCYTES NFR BLD: 0.5 %
LYMPHOCYTES # BLD AUTO: 0.9 10E9/L (ref 0.8–5.3)
LYMPHOCYTES NFR BLD AUTO: 15.4 %
MCH RBC QN AUTO: 26.8 PG (ref 26.5–33)
MCHC RBC AUTO-ENTMCNC: 31.8 G/DL (ref 31.5–36.5)
MCV RBC AUTO: 84 FL (ref 78–100)
MONOCYTES # BLD AUTO: 0.6 10E9/L (ref 0–1.3)
MONOCYTES NFR BLD AUTO: 9.8 %
NEUTROPHILS # BLD AUTO: 4.2 10E9/L (ref 1.6–8.3)
NEUTROPHILS NFR BLD AUTO: 69.7 %
NRBC # BLD AUTO: 0 10*3/UL
NRBC BLD AUTO-RTO: 0 /100
PLATELET # BLD AUTO: 191 10E9/L (ref 150–450)
POTASSIUM SERPL-SCNC: 4.4 MMOL/L (ref 3.4–5.3)
PROT SERPL-MCNC: 6.6 G/DL (ref 6.8–8.8)
RBC # BLD AUTO: 3.85 10E12/L (ref 4.4–5.9)
SODIUM SERPL-SCNC: 139 MMOL/L (ref 133–144)
WBC # BLD AUTO: 6.1 10E9/L (ref 4–11)

## 2017-06-14 PROCEDURE — 36415 COLL VENOUS BLD VENIPUNCTURE: CPT | Performed by: INTERNAL MEDICINE

## 2017-06-14 PROCEDURE — 85025 COMPLETE CBC W/AUTO DIFF WBC: CPT | Performed by: INTERNAL MEDICINE

## 2017-06-14 PROCEDURE — 99212 OFFICE O/P EST SF 10 MIN: CPT | Mod: ZF

## 2017-06-14 PROCEDURE — 80053 COMPREHEN METABOLIC PANEL: CPT | Performed by: INTERNAL MEDICINE

## 2017-06-14 ASSESSMENT — PAIN SCALES - GENERAL: PAINLEVEL: NO PAIN (0)

## 2017-06-14 NOTE — PATIENT INSTRUCTIONS
Discontinue acyclovir, fluconazole and bactrim  SHWETHA Cabrera on 9/8/17 with labs  See Dr Crawley in 6 weeks for reconnecting.

## 2017-06-14 NOTE — MR AVS SNAPSHOT
After Visit Summary   6/14/2017    Panchito Pierce    MRN: [id number]           Patient Information     Date Of Birth          1965        Visit Information        Provider Department      6/14/2017 11:30 AM Jermaine Cabrera MD [facility]        Today's Diagnoses     CML (chronic myelocytic leukemia) (H)        Status post bone marrow transplant (H)              [facility]  [address] [place] [address]  Phone: [phone]  Clinic Hours:   Monday-Thursday:7am to 7pm   Friday: 7am to 5pm   Weekends and holidays:    8am to noon (in general)  If your fever is 100.5  or greater,   call the clinic.  After hours call the   hospital at [phone] or   [phone]. Ask for the BMT   fellow on-call           Care Instructions    Discontinue acyclovir, fluconazole and bactrim  RTC Rick on 9/8/17 with labs  See Dr Crawley in 6 weeks for reconnecting.            Follow-ups after your visit        Your next 10 appointments already scheduled     Aug 08, 2017  9:15 AM CDT   (Arrive by 9:00 AM)   Return Visit with Thompson Landeros MD   [facility] ([facility])    [address] [place] [id number] [phone]            Sep 08, 2017 10:00 AM CDT   [facility] with  [facility]   [facility] ([facility])    [address] [place] [id number]   [phone]            Sep 08, 2017 10:30 AM CDT   Return with Jermaine Cabrera MD   [facility] ([facility])    [address] [place] [id number]   [phone]            Dec 01, 2017  9:30 AM CST   (Arrive by 9:15 AM)   RETURN ENDOCRINE with Pattie Guevara MD   [facility] ([facility])    [address] [place] [address]  [id number] [phone]              Future tests that were ordered for you today     Open Future Orders        Priority Expected Expires Ordered    CBC with platelets differential Routine 9/8/2017 9/8/2017 6/14/2017    Comprehensive metabolic panel Routine 9/8/2017 9/8/2017 6/14/2017    Magnesium Routine 9/8/2017 9/8/2017 6/14/2017    BCR ABL1 Major Breakpoint Quant p210 Routine 9/8/2017 9/8/2017 6/14/2017            Who to contact     If you have questions or need follow up information about today's clinic visit or your schedule please contact [facility] directly at [phone].  Normal or non-critical lab and imaging results will be communicated to you by [email or web address], letter or phone within 4 business days after the clinic has received the results. If you do not hear from us within 7 days, please contact the clinic through [employer] or phone. If you have a critical or abnormal lab result, we will notify you by phone as soon as possible.  Submit refill requests through [employer] or call your pharmacy and they will forward the refill request to us. Please allow 3 business days for your refill to be completed.          Additional Information About Your Visit        KidStart Information     KidStart gives you secure access to your electronic health record. If you see a primary care provider, you can also send messages to your care team and make appointments. If you have questions, please call your primary care clinic.  If you do not have a primary care provider, please call [phone] and they will assist you.        Care EveryWhere ID     This is your Care EveryWhere ID. This could be used by other organizations to access your [facility] medical records  [id number]        Your Vitals Were     Pulse Temperature Respirations Pulse Oximetry BMI (Body Mass Index)       63 97.9  F (36.6  C) (Oral) 18 98% 29.9 kg/m2        Blood Pressure from Last 3 Encounters:   06/14/17 130/82   06/02/17 130/82    04/21/17 128/81    Weight from Last 3 Encounters:   06/14/17 91.9 kg (202 lb 8 oz)   06/02/17 91.2 kg (201 lb)   04/21/17 87.9 kg (193 lb 11.2 oz)                 Today's Medication Changes          These changes are accurate as of: 6/14/17  1:51 PM.  If you have any questions, ask your nurse or doctor.               Stop taking these medicines if you haven't already. Please contact your care team if you have questions.     acyclovir 200 MG capsule   Commonly known as:  ZOVIRAX   Stopped by:  Jermaine Cabrera MD           fluconazole 100 MG tablet   Commonly known as:  DIFLUCAN   Stopped by:  Jermaine Cabrera MD           folic acid 1 MG tablet   Commonly known as:  FOLVITE   Stopped by:  Jermaine Cabrera MD           sulfamethoxazole-trimethoprim 800-160 MG per tablet   Commonly known as:  BACTRIM DS/SEPTRA DS   Stopped by:  Jermaine Cabrera MD           ZOFRAN PO   Stopped by:  Jermaine Cabrera MD                    Recent Review Flowsheet Data     BMT Recent Results Latest Ref Rng & Units 2/3/2017 3/17/2017 3/22/2017 4/12/2017 4/21/2017 6/2/2017 6/14/2017    WBC 4.0 - 11.0 10e9/L 6.2 4.8 - 4.8 4.0 8.7 6.1    Hemoglobin 13.3 - 17.7 g/dL 7.9(L) 7.3(L) - 9.1(L) 9.0(L) 10.9(L) 10.3(L)    Platelet Count 150 - 450 10e9/L 263 223 - 215 227 207 191    Neutrophils (Absolute) 1.6 - 8.3 10e9/L 4.7 3.5 - 3.6 2.9 6.8 4.2    Sodium 133 - 144 mmol/L 138 141 142 139 140 139 139    Potassium 3.4 - 5.3 mmol/L 4.1 4.1 4.2 4.3 4.2 4.4 4.4    Chloride 94 - 109 mmol/L 105 108 109 108 106 107 107    Glucose 70 - 99 mg/dL 96 96 89 101(H) 95 94 88    Urea Nitrogen 7 - 30 mg/dL 19 22 19 23 24 24 22    Creatinine 0.66 - 1.25 mg/dL 1.35(H) 1.64(H) 1.41(H) 1.52(H) 1.09 1.16 1.20    Calcium (Total) 8.5 - 10.1 mg/dL 8.8 8.5 8.4(L) 9.0 8.8 9.2 9.1    Protein (Total) 6.8 - 8.8 g/dL 7.1 6.8 - 7.2 6.4(L) 7.0 6.6(L)    Albumin 3.4 - 5.0 g/dL 3.7 3.6 - 3.6 3.4 3.6 3.4    Alkaline Phosphatase 40 -  150 U/L 108 117 - 130 130 169(H) 122    AST 0 - 45 U/L 16 16 - 21 26 33 25    ALT 0 - 70 U/L 20 19 - 28 37 54 35    MCV 78 - 100 fl 83 80 - 82 82 82 84               Primary Care Provider Office Phone # Fax #    Leann Berry [phone]       [facility] [address] [facility] [address]  [facility] [address]        Thank you!     Thank you for choosing [facility]  for your care. Our goal is always to provide you with excellent care. Hearing back from our patients is one way we can continue to improve our services. Please take a few minutes to complete the written survey that you may receive in the mail after your visit with us. Thank you!             Your Updated Medication List - Protect others around you: Learn how to safely use, store and throw away your medicines at www.disposemymeds.org.          This list is accurate as of: 6/14/17  1:52 PM.  Always use your most recent med list.                   Brand Name Dispense Instructions for use    calcium carbonate-vitamin D 500-400 MG-UNIT Tabs per tablet     180 tablet    Take 2 tablets by mouth daily       clindamycin 1 % lotion    CLEOCIN T    60 mL    Apply topically 2 times daily To areas of itch on scalp       clobetasol 0.05 % ointment    TEMOVATE    60 g    Apply topically 2 times daily       levothyroxine 137 MCG tablet    SYNTHROID/LEVOTHROID    90 tablet    Take 1 tablet (137 mcg) by mouth daily       liothyronine 5 MCG tablet    CYTOMEL    180 tablet    Take 1 tablet (5 mcg) by mouth 2 times daily       metoprolol 25 MG tablet    LOPRESSOR    30 tablet    Take 0.5 tablets (12.5 mg) by mouth 2 times daily       pantoprazole 40 MG EC tablet    PROTONIX    30 tablet    Take 1 tablet (40 mg) by mouth daily       Selenium 100 MCG Caps     90 capsule    1 tablet daily       triamcinolone 0.1 % cream    KENALOG    160 g    Apply sparingly to affected area three times daily as needed

## 2017-06-14 NOTE — NURSING NOTE
"Oncology Rooming Note    June 14, 2017 11:51 AM   Panchito Pierce is a 52 year old male who presents for:    Chief Complaint   Patient presents with     RECHECK     Post BMT for CML here for labs and provider     Initial Vitals: /82  Pulse 63  Temp 97.9  F (36.6  C) (Oral)  Resp 18  Wt 91.9 kg (202 lb 8 oz)  SpO2 98%  BMI 29.9 kg/m2 Estimated body mass index is 29.9 kg/(m^2) as calculated from the following:    Height as of 6/2/17: 1.753 m (5' 9\").    Weight as of this encounter: 91.9 kg (202 lb 8 oz). Body surface area is 2.12 meters squared.  No Pain (0) Comment: Data Unavailable   No LMP for male patient.  Allergies reviewed: Yes  Medications reviewed: Yes    Medications: Medication refills not needed today.  Pharmacy name entered into Youneeq:    [id number] - [place] [[place], [place] - [address] [place] [address] PHARMACY [facility] [place], [place] - [address] [facility] [phone]  [place], [place] - [address] [place] [address] [facility] - [place], [place] - [address] [place] [address]    Clinical concerns: NA NA was NOT notified.    5 minutes for nursing intake (face to face time)     Molly Zimmer [facility]              "

## 2017-06-14 NOTE — LETTER
6/14/2017      RE: Panchito Pierce  [address]  [place] [address]       BMT Clinic Progress Note   06/14/2017    Patient ID:  Panchito Pierce is a 52 year old man D+ 17 months s/p MUD SCT for CML     Diagnosis CMLP+ Chronic myelogeneous leukemia, Ph1+  HCT Type Allogeneic    Prep Regimen Cytoxan  TBI   Donor Source Unrelated BM    GVHD Prophylaxis Methotrexate  Cyclosporine  Primary BMT Provider Jermaine Cabrera MD     CC: follow-up     INTERVAL  HISTORY   Interval History: Panchito was seen in the BMT clinic today. He continues to do well is off steroids and sirolimus. Good energy and good appetite. Does not feel depressed although there is some anxiety with wife's health issues. Denied any fevers, chills, cough, or new cold like symptoms. No symptoms of oral and optho chronic GVH . Occasional peeling from scalp. Weak fingernails. No bleeding.      Review of Systems: 10 point ROS negative except as noted above.    PHYSICAL EXAM   /82  Pulse 63  Temp 97.9  F (36.6  C) (Oral)  Resp 18  Wt 91.9 kg (202 lb 8 oz)  SpO2 98%  BMI 29.9 kg/m2 BP   Wt Readings from Last 4 Encounters:   06/14/17 91.9 kg (202 lb 8 oz)   06/02/17 91.2 kg (201 lb)   04/21/17 87.9 kg (193 lb 11.2 oz)   03/17/17 87.1 kg (192 lb)   General: NAD, pleasant appearing male, much less chushingoid       LABS AND IMAGING      Lab Results   Component Value Date    WBC 6.1 06/14/2017    ANEU 4.2 06/14/2017    HGB 10.3 (L) 06/14/2017    HCT 32.4 (L) 06/14/2017     06/14/2017     06/14/2017    POTASSIUM 4.4 06/14/2017    CHLORIDE 107 06/14/2017    CO2 24 06/14/2017    GLC 88 06/14/2017    BUN 22 06/14/2017    CR 1.20 06/14/2017    MAG 2.5 (H) 03/17/2017    INR 0.9 06/15/2016    BILITOTAL 0.3 06/14/2017    AST 25 06/14/2017    ALT 35 06/14/2017    ALKPHOS 122 06/14/2017    PROTTOTAL 6.6 (L) 06/14/2017    ALBUMIN 3.4 06/14/2017     BcrAbl undetectable    ASSESSMENT BY SYSTEMS   Panchito Pierce is a 51 year old man s/p MUD SCT for  CML  - trasplanted in second chronic phase after blast crisis;    BMT/ CML:  Off dasatinib maintenance (12 months); repeat BCR/ABL undetectable - restage per protocol,     HEMATOLOGY/COAGULATION: good and stable blood counts.     INFECTIONS AND PROPHYLAXIS: Afebrile. No focal sxs.  Goo IgG and CD4+ counts. 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 Off immunesuppression. 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 Stopped prednisone on 12/06/16. Off sirolimus April 2017. May continue biotene and dexa mouth wash.   -  No N/V/D.   - Advised to wear sun block when outside for extended periods of time to prevent flare of GVH    GASTROINTESTINAL/LIVER: Appetite good, wt stable. No active issues    RENAL/FEN/Nutrition: Cr normal. Good po intake. No weight loss.     CARDIOVASCULAR:  Normotensive. Hx Hypertension. Cont metoprolol. Normotensive  - Hx of A fib with RVR (new 12/24); resolved with dilt, now on Metoprolol 12.5mg BID.     ENDOCRINE: following with Dr Guevara. thyroid and testosterone issues. Recently adjust tyroid hormone. Hx Graves Disease s/p VELA therapy. Cont synthroid and liothyronine. TSH and Free T3 low; Free T4 elevated. Mixed picture; After endocrine consult:  Mild thyrotoxicosis due to large weight loss. On levothyroxine 112mcg.    - On selenium supplement, T3 and T4.    PSYCHIATRIC:  Good stable mood. If needed we may use Wellbutrim that has no libido side effects.     DERMATOLOGY: no rashes. Using cream prescribed by Dr Landeros.   - Sun: Advised to wear SPF when out in the sun for prolonged periods of time  - Acne: resolved. Likely due to Siro. A few lesions scattered over shoulders and chest. Consider topical clinda cream or doxy if persists or becomes worse.       Plan:  Discontinue acyclovir, fluconazole and bactrim  SHWETHA Cabrera on 9/8/17 with labs  See Dr Crawley in 6 weeks for reconnecting.        Jermaine Cabrera MD

## 2017-06-14 NOTE — PROGRESS NOTES
BMT Clinic Progress Note   06/14/2017    Patient ID:  Panchito Pierce is a 52 year old man D+ 17 months s/p MUD SCT for CML     Diagnosis CMLP+ Chronic myelogeneous leukemia, Ph1+  HCT Type Allogeneic    Prep Regimen Cytoxan  TBI   Donor Source Unrelated BM    GVHD Prophylaxis Methotrexate  Cyclosporine  Primary BMT Provider Jermaine Cabrera MD     CC: follow-up     INTERVAL  HISTORY   Interval History: Panchito was seen in the BMT clinic today. He continues to do well is off steroids and sirolimus. Good energy and good appetite. Does not feel depressed although there is some anxiety with wife's health issues. Denied any fevers, chills, cough, or new cold like symptoms. No symptoms of oral and optho chronic GVH . Occasional peeling from scalp. Weak fingernails. No bleeding.      Review of Systems: 10 point ROS negative except as noted above.    PHYSICAL EXAM   /82  Pulse 63  Temp 97.9  F (36.6  C) (Oral)  Resp 18  Wt 91.9 kg (202 lb 8 oz)  SpO2 98%  BMI 29.9 kg/m2 BP   Wt Readings from Last 4 Encounters:   06/14/17 91.9 kg (202 lb 8 oz)   06/02/17 91.2 kg (201 lb)   04/21/17 87.9 kg (193 lb 11.2 oz)   03/17/17 87.1 kg (192 lb)   General: NAD, pleasant appearing male, much less chushingoid       LABS AND IMAGING      Lab Results   Component Value Date    WBC 6.1 06/14/2017    ANEU 4.2 06/14/2017    HGB 10.3 (L) 06/14/2017    HCT 32.4 (L) 06/14/2017     06/14/2017     06/14/2017    POTASSIUM 4.4 06/14/2017    CHLORIDE 107 06/14/2017    CO2 24 06/14/2017    GLC 88 06/14/2017    BUN 22 06/14/2017    CR 1.20 06/14/2017    MAG 2.5 (H) 03/17/2017    INR 0.9 06/15/2016    BILITOTAL 0.3 06/14/2017    AST 25 06/14/2017    ALT 35 06/14/2017    ALKPHOS 122 06/14/2017    PROTTOTAL 6.6 (L) 06/14/2017    ALBUMIN 3.4 06/14/2017     BcrAbl undetectable    ASSESSMENT BY SYSTEMS   Panchito Pierce is a 51 year old man s/p MUD SCT for CML  - trasplanted in second chronic phase after blast crisis;    BMT/ CML:  Off  dasatinib maintenance (12 months); repeat BCR/ABL undetectable - restage per protocol,     HEMATOLOGY/COAGULATION: good and stable blood counts.     INFECTIONS AND PROPHYLAXIS: Afebrile. No focal sxs.  Goo IgG and CD4+ counts. 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 Off immunesuppression. 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 Stopped prednisone on 12/06/16. Off sirolimus April 2017. May continue biotene and dexa mouth wash.   -  No N/V/D.   - Advised to wear sun block when outside for extended periods of time to prevent flare of GVH    GASTROINTESTINAL/LIVER: Appetite good, wt stable. No active issues    RENAL/FEN/Nutrition: Cr normal. Good po intake. No weight loss.     CARDIOVASCULAR:  Normotensive. Hx Hypertension. Cont metoprolol. Normotensive  - Hx of A fib with RVR (new 12/24); resolved with dilt, now on Metoprolol 12.5mg BID.     ENDOCRINE: following with Dr Guevara. thyroid and testosterone issues. Recently adjust tyroid hormone. Hx Graves Disease s/p VELA therapy. Cont synthroid and liothyronine. TSH and Free T3 low; Free T4 elevated. Mixed picture; After endocrine consult:  Mild thyrotoxicosis due to large weight loss. On levothyroxine 112mcg.    - On selenium supplement, T3 and T4.    PSYCHIATRIC:  Good stable mood. If needed we may use Wellbutrim that has no libido side effects.     DERMATOLOGY: no rashes. Using cream prescribed by Dr Landeros.   - Sun: Advised to wear SPF when out in the sun for prolonged periods of time  - Acne: resolved. Likely due to Siro. A few lesions scattered over shoulders and chest. Consider topical clinda cream or doxy if persists or becomes worse.       Plan:  Discontinue acyclovir, fluconazole and bactrim  RTMARYANN Cabrera on 9/8/17 with labs  See Dr Crawley in 6 weeks for reconnecting.

## 2017-06-30 RX ORDER — FLUCONAZOLE 100 MG/1
TABLET ORAL
Qty: 30 TABLET | Refills: 10 | OUTPATIENT
Start: 2017-06-30

## 2017-07-20 ENCOUNTER — TRANSFERRED RECORDS (OUTPATIENT)
Dept: HEALTH INFORMATION MANAGEMENT | Facility: CLINIC | Age: 52
End: 2017-07-20

## 2017-08-08 ENCOUNTER — OFFICE VISIT (OUTPATIENT)
Dept: DERMATOLOGY | Facility: CLINIC | Age: 52
End: 2017-08-08

## 2017-08-08 DIAGNOSIS — L30.9 CHRONIC DERMATITIS OF HANDS: ICD-10-CM

## 2017-08-08 DIAGNOSIS — I73.00 RAYNAUD'S PHENOMENON WITHOUT GANGRENE: Primary | ICD-10-CM

## 2017-08-08 RX ORDER — NIFEDIPINE 30 MG/1
30 TABLET, EXTENDED RELEASE ORAL DAILY
Qty: 30 TABLET | Refills: 1 | Status: SHIPPED | OUTPATIENT
Start: 2017-08-08 | End: 2017-10-08

## 2017-08-08 ASSESSMENT — PAIN SCALES - GENERAL: PAINLEVEL: NO PAIN (0)

## 2017-08-08 NOTE — PROGRESS NOTES
"[facility] Dermatology Note      Dermatology Problem List:  1.Scalp folliculitis: improved  2.Chronic hand dermatitis: improved  3.Stasis dermatitis of legs: improved    Encounter Date: Aug 8, 2017    CC:   Chief Complaint   Patient presents with     Derm Problem     Panchito is here today for a follow up, He states \" it is better since he was last in\"       History of Present Illness:  Mr. Panchito Pierce is a 52 year old male who presents for 2 months follow up for his irritated seborrheic keratosis, scattered erosions of scalp, chronic hand dermatitis with fissuring, and xerosis of legs. Patient was prescribed clobetasol ointment, but by the time the pharmacy was able to fill his medication (~2 weeks), he reports that all of his skin conditions had improved. Because of this, he has still not used the ointment. His skin has remained clear since then and he was thinking of canceling his appointment today.     He mentions that during winter months, he has a severe sensitivity to cold. If his hands are exposed to the cold, they turn white and become numb. It can take up to an hour for them to return to normal.     He is otherwise feeling healthy today and denies fever, chills, unexplained weight loss, or swollen/tender lymph nodes.     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)     Hyperthyroidism     Hypogonadism male     Fatigue, unspecified type     Seborrheic keratoses, inflamed     Chronic dermatitis of hands     Past Medical History:   Diagnosis Date     Graves disease 1/1/2003    treated with radioiodine, meds T3 and T4     Murmur, heart     has not been fully evaluated     Past Surgical History:   Procedure Laterality Date     ------------OTHER------------- Left 1/1/1985    left " hand graft flap- work accident     ------------OTHER------------- Right 1/1/1985    right foot graft- work accident     ESOPHAGOSCOPY, GASTROSCOPY, DUODENOSCOPY (EGD), COMBINED N/A 3/23/2016    Procedure: COMBINED ESOPHAGOSCOPY, GASTROSCOPY, DUODENOSCOPY (EGD), BIOPSY SINGLE OR MULTIPLE;  Surgeon: Jay Trcay MD;  Location:  GI         Medications:  Current Outpatient Prescriptions   Medication Sig Dispense Refill     levothyroxine (SYNTHROID/LEVOTHROID) 137 MCG tablet Take 1 tablet (137 mcg) by mouth daily 90 tablet 3     clobetasol (TEMOVATE) 0.05 % ointment Apply topically 2 times daily 60 g 1     clindamycin (CLEOCIN T) 1 % lotion Apply topically 2 times daily To areas of itch on scalp 60 mL 3     metoprolol (LOPRESSOR) 25 MG tablet Take 0.5 tablets (12.5 mg) by mouth 2 times daily 30 tablet 10     triamcinolone (KENALOG) 0.1 % cream Apply sparingly to affected area three times daily as needed 160 g 3     calcium carbonate-vitamin D 500-400 MG-UNIT TABS per tablet Take 2 tablets by mouth daily 180 tablet 11     pantoprazole (PROTONIX) 40 MG enteric coated tablet Take 1 tablet (40 mg) by mouth daily 30 tablet 11     liothyronine (CYTOMEL) 5 MCG tablet Take 1 tablet (5 mcg) by mouth 2 times daily 180 tablet 3     Selenium 100 MCG CAPS 1 tablet daily 90 capsule 3        Allergies   Allergen Reactions     Allopurinol Rash     Unclear if it was from allopurinol, but went away when it was stopped.  But also had steroid cream at the same time.         Review of Systems:  -As per HPI  -Constitutional: The patient denies fatigue, fevers, chills, unintended weight loss, and night sweats.  -HEENT: Patient denies nonhealing oral sores.  -Skin: As above in HPI. No additional skin concerns.    Physical exam:  Vitals: There were no vitals taken for this visit.  GEN: This is a well developed, well-nourished male in no acute distress, in a pleasant mood.    SKIN: Focused examination of the scalp, face, neck, upper  extremities, lower extremities was performed.  -Scattered brown macules on sun exposed areas.  -Multiple regular brown pigmented macules and papules are identified on the upper and lower extremities.   -No erythema, scarring, or alopecia of scalp  -No other lesions of concern on areas examined.     Impression/Plan:  1. Chronic hand dermatitis with fissuring: currently resolved. This may be seasonal and may worsen during dryer winter months. Discussed with patient that if worsening in the winter months, he should start to use a heavy moisturizer immediately after showering. He may also use his clobetasol ointment at that time.    2. Raynaud's phenomenon: per clinical history, patient has Raynaud's during witner months wyhen exposed to cold.   Procardia XL 30 mg daily during winter months. Counseled re: potential side effects including hypotension and allergy.  Patient is to monitor blood pressure weekly while on medication.   Also encouraged careful physical protection of hands; avoid cold or damp environmental factors whenever possible.    CC Dr. Cabrera on close of this encounter.  Follow-up 6-8 months.    Dr. Landeros staffed the patient.    Staff Involved:  Resident(Bebo Ayers)/Staff(as above)    Bebo Ayers MD, PhD  Medicine-Dermatology PGY-2    I have seen and examined this patient and agree with the assessment and plan as documented in the resident's note.    Thompson Landeros MD  Dermatology Attending

## 2017-08-08 NOTE — NURSING NOTE
"Dermatology Rooming Note    Panchito Pierce's goals for this visit include:   Chief Complaint   Patient presents with     Derm Problem     Panchito is here today for a follow up, He states \" it is better since he was last in\"     Jenna Sarmiento MA student  "

## 2017-08-08 NOTE — MR AVS SNAPSHOT
After Visit Summary   8/8/2017    Panchito Pierce    MRN: [id number]           Patient Information     Date Of Birth          1965        Visit Information        Provider Department      8/8/2017 9:15 AM Thompson Landeros MD [facility] Dermatology        Today's Diagnoses     Raynaud's phenomenon without gangrene    -  1      Care Instructions    - If your eczema becomes worse in the fall or winter, you should start to use a heavy moisturizer immediately after taking a shower to help hold the moisture in your skin.     - You may take nifedipine XL 30 mg once daily during winter months to prevent flares of Raynaud's phenomenon. Make sure to check your blood pressure while taking this medication.           Follow-ups after your visit        Follow-up notes from your care team     Return in about 8 months (around 4/8/2018).      Your next 10 appointments already scheduled     Sep 08, 2017 10:00 AM CDT   [facility] with  [facility]   [facility] ([facility])    [address] [place] [address] [id number] [phone]            Sep 08, 2017 10:30 AM CDT   Return with Jermaine Cabrera MD   [facility] Blood and Marrow Transplant ([facility])    [address] [place] [address] [id number] [phone]            Dec 01, 2017  9:30 AM CST   (Arrive by 9:15 AM)   RETURN ENDOCRINE with Pattie Guevara MD   [facility] Endocrinology ([facility])    [address] [place] [address] [id number] [phone]              Who to contact     Please call your clinic at [phone] to:    Ask questions about your health    Make or cancel appointments    Discuss your medicines    Learn about your test results    Speak to your doctor   If you have compliments or concerns about an experience at your clinic, or if you wish to file a complaint, please contact  [facility] Patient Relations at [phone] or email us at [email or web address]         Additional Information About Your Visit        [employer] Information     [employer] gives you secure access to your electronic health record. If you see a primary care provider, you can also send messages to your care team and make appointments. If you have questions, please call your primary care clinic.  If you do not have a primary care provider, please call [phone] and they will assist you.      [employer] is an electronic gateway that provides easy, online access to your medical records. With [employer], you can request a clinic appointment, read your test results, renew a prescription or communicate with your care team.     To access your existing account, please contact your [facility] or call [phone] for assistance.        Care EveryWhere ID     This is your Care EveryWhere ID. This could be used by other organizations to access your [place] medical records  [id number]         Blood Pressure from Last 3 Encounters:   06/14/17 130/82   06/02/17 130/82   04/21/17 128/81    Weight from Last 3 Encounters:   06/14/17 91.9 kg (202 lb 8 oz)   06/02/17 91.2 kg (201 lb)   04/21/17 87.9 kg (193 lb 11.2 oz)              Today, you had the following     No orders found for display         Today's Medication Changes          These changes are accurate as of: 8/8/17 10:04 AM.  If you have any questions, ask your nurse or doctor.               Start taking these medicines.        Dose/Directions    NIFEdipine ER osmotic 30 MG 24 hr tablet   Commonly known as:  PROCARDIA XL   Used for:  Raynaud's phenomenon without gangrene   Started by:  Thompson Landeros MD        Dose:  30 mg   Take 1 tablet (30 mg) by mouth daily Take 1 tablet by mouth daily during winter months to prevent flares.   Quantity:  30 tablet   Refills:  1            Where to get your medicines       These medications were sent to [id number] - [place] [[facility] [place] - [address]  [place] [address]     Phone:  [phone]     NIFEdipine ER osmotic 30 MG 24 hr tablet                Primary Care Provider Office Phone # Fax #    Leann Berry [phone]       [facility] [address] [address]  [place] [address]        Equal Access to Services     [facility]: Hadii aad ku hadasho Soomaali, waaxda luqadaha, qaybta kaalmada adeegyada, waxay idiin hayaan adeeg kharash la'maxin . So [facility] [phone].    ATENCIÓN: Si habla español, tiene a vigil disposición servicios gratuitos de asistencia lingüística. [facility] [phone].    We comply with applicable federal civil rights laws and [place] laws. We do not discriminate on the basis of race, color, national origin, age, disability sex, sexual orientation or gender identity.            Thank you!     Thank you for choosing [facility] DERMATOLOGY  for your care. Our goal is always to provide you with excellent care. Hearing back from our patients is one way we can continue to improve our services. Please take a few minutes to complete the written survey that you may receive in the mail after your visit with us. Thank you!             Your Updated Medication List - Protect others around you: Learn how to safely use, store and throw away your medicines at www.disposemymeds.org.          This list is accurate as of: 8/8/17 10:04 AM.  Always use your most recent med list.                   Brand Name Dispense Instructions for use Diagnosis    calcium carbonate-vitamin D 500-400 MG-UNIT Tabs per tablet     180 tablet    Take 2 tablets by mouth daily    CML (chronic myelocytic leukemia) (H), Status post bone marrow transplant (H), Graves disease, Paroxysmal atrial fibrillation (H), Constipation, unspecified constipation type, Adjustment disorder with depressed mood       clindamycin 1 % lotion     CLEOCIN T    60 mL    Apply topically 2 times daily To areas of itch on scalp    Folliculitis       clobetasol 0.05 % ointment    TEMOVATE    60 g    Apply topically 2 times daily    Chronic dermatitis of hands       levothyroxine 137 MCG tablet    SYNTHROID/LEVOTHROID    90 tablet    Take 1 tablet (137 mcg) by mouth daily    Hyperthyroidism       liothyronine 5 MCG tablet    CYTOMEL    180 tablet    Take 1 tablet (5 mcg) by mouth 2 times daily    Hyperthyroidism       metoprolol 25 MG tablet    LOPRESSOR    30 tablet    Take 0.5 tablets (12.5 mg) by mouth 2 times daily    Paroxysmal atrial fibrillation (H), CML (chronic myelocytic leukemia) (H), Graves disease, 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    Raynaud's phenomenon without gangrene       pantoprazole 40 MG EC tablet    PROTONIX    30 tablet    Take 1 tablet (40 mg) by mouth daily    CML (chronic myelocytic leukemia) (H), Status post bone marrow transplant (H), Graves disease, Paroxysmal atrial fibrillation (H), Constipation, unspecified constipation type, Adjustment disorder with depressed mood       Selenium 100 MCG Caps     90 capsule    1 tablet daily    Graves disease       triamcinolone 0.1 % cream    KENALOG    160 g    Apply sparingly to affected area three times daily as needed    CML (chronic myelocytic leukemia) (H), Status post bone marrow transplant (H), Hyperthyroidism, Hypogonadism male, Rash and nonspecific skin eruption

## 2017-08-08 NOTE — PATIENT INSTRUCTIONS
- If your eczema becomes worse in the fall or winter, you should start to use a heavy moisturizer immediately after taking a shower to help hold the moisture in your skin.     - You may take nifedipine XL 30 mg once daily during winter months to prevent flares of Raynaud's phenomenon. Make sure to check your blood pressure while taking this medication.

## 2017-08-08 NOTE — LETTER
"8/8/2017       RE: Panchito Pierce  [address]  [place] [address]     Dear Colleague,    Thank you for referring your patient, Panchito Pierce, to the [facility] DERMATOLOGY at [facility]. Please see a copy of my visit note below.    [facility] Dermatology Note      Dermatology Problem List:  1.Scalp folliculitis: improved  2.Chronic hand dermatitis: improved  3.Stasis dermatitis of legs: improved    Encounter Date: Aug 8, 2017    CC:   Chief Complaint   Patient presents with     Derm Problem     Panchito is here today for a follow up, He states \" it is better since he was last in\"       History of Present Illness:  Mr. Panchito Pierce is a 52 year old male who presents for 2 months follow up for his irritated seborrheic keratosis, scattered erosions of scalp, chronic hand dermatitis with fissuring, and xerosis of legs. Patient was prescribed clobetasol ointment, but by the time the pharmacy was able to fill his medication (~2 weeks), he reports that all of his skin conditions had improved. Because of this, he has still not used the ointment. His skin has remained clear since then and he was thinking of canceling his appointment today.     He mentions that during winter months, he has a severe sensitivity to cold. If his hands are exposed to the cold, they turn white and become numb. It can take up to an hour for them to return to normal.     He is otherwise feeling healthy today and denies fever, chills, unexplained weight loss, or swollen/tender lymph nodes.     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)     Hyperthyroidism     Hypogonadism male     Fatigue, unspecified type     Seborrheic keratoses, inflamed     Chronic dermatitis of hands "     Past Medical History:   Diagnosis Date     Graves disease 1/1/2003    treated with radioiodine, meds T3 and T4     Murmur, heart     has not been fully evaluated     Past Surgical History:   Procedure Laterality Date     ------------OTHER------------- Left 1/1/1985    left hand graft flap- work accident     ------------OTHER------------- Right 1/1/1985    right foot graft- work accident     ESOPHAGOSCOPY, GASTROSCOPY, DUODENOSCOPY (EGD), COMBINED N/A 3/23/2016    Procedure: COMBINED ESOPHAGOSCOPY, GASTROSCOPY, DUODENOSCOPY (EGD), BIOPSY SINGLE OR MULTIPLE;  Surgeon: Jay Tracy MD;  Location:  GI         Medications:  Current Outpatient Prescriptions   Medication Sig Dispense Refill     levothyroxine (SYNTHROID/LEVOTHROID) 137 MCG tablet Take 1 tablet (137 mcg) by mouth daily 90 tablet 3     clobetasol (TEMOVATE) 0.05 % ointment Apply topically 2 times daily 60 g 1     clindamycin (CLEOCIN T) 1 % lotion Apply topically 2 times daily To areas of itch on scalp 60 mL 3     metoprolol (LOPRESSOR) 25 MG tablet Take 0.5 tablets (12.5 mg) by mouth 2 times daily 30 tablet 10     triamcinolone (KENALOG) 0.1 % cream Apply sparingly to affected area three times daily as needed 160 g 3     calcium carbonate-vitamin D 500-400 MG-UNIT TABS per tablet Take 2 tablets by mouth daily 180 tablet 11     pantoprazole (PROTONIX) 40 MG enteric coated tablet Take 1 tablet (40 mg) by mouth daily 30 tablet 11     liothyronine (CYTOMEL) 5 MCG tablet Take 1 tablet (5 mcg) by mouth 2 times daily 180 tablet 3     Selenium 100 MCG CAPS 1 tablet daily 90 capsule 3        Allergies   Allergen Reactions     Allopurinol Rash     Unclear if it was from allopurinol, but went away when it was stopped.  But also had steroid cream at the same time.         Review of Systems:  -As per HPI  -Constitutional: The patient denies fatigue, fevers, chills, unintended weight loss, and night sweats.  -HEENT: Patient denies nonhealing oral  sores.  -Skin: As above in HPI. No additional skin concerns.    Physical exam:  Vitals: There were no vitals taken for this visit.  GEN: This is a well developed, well-nourished male in no acute distress, in a pleasant mood.    SKIN: Focused examination of the scalp, face, neck, upper extremities, lower extremities was performed.  -Scattered brown macules on sun exposed areas.  -Multiple regular brown pigmented macules and papules are identified on the upper and lower extremities.   -No erythema, scarring, or alopecia of scalp  -No other lesions of concern on areas examined.     Impression/Plan:  1. Chronic hand dermatitis with fissuring: currently resolved. This may be seasonal and may worsen during dryer winter months. Discussed with patient that if worsening in the winter months, he should start to use a heavy moisturizer immediately after showering. He may also use his clobetasol ointment at that time.    2. Raynaud's phenomenon: per clinical history, patient has Raynaud's during witner months wyhen exposed to cold.   Procardia XL 30 mg daily during winter months. Counseled re: potential side effects including hypotension and allergy.  Patient is to monitor blood pressure weekly while on medication.   Also encouraged careful physical protection of hands; avoid cold or damp environmental factors whenever possible.    CC Dr. Cabrera on close of this encounter.  Follow-up 6-8 months.    Dr. Landeros staffed the patient.    Staff Involved:  Resident(Bebo Ayers)/Staff(as above)    Bebo Ayers MD, PhD  Medicine-Dermatology PGY-2    I have seen and examined this patient and agree with the assessment and plan as documented in the resident's note.    Thompson Landeros MD  Dermatology Attending

## 2017-08-21 ENCOUNTER — CARE COORDINATION (OUTPATIENT)
Dept: TRANSPLANT | Facility: CLINIC | Age: 52
End: 2017-08-21

## 2017-08-21 DIAGNOSIS — D84.9 IMMUNOSUPPRESSION (H): ICD-10-CM

## 2017-08-21 DIAGNOSIS — C92.10 CML (CHRONIC MYELOCYTIC LEUKEMIA) (H): Primary | ICD-10-CM

## 2017-08-21 DIAGNOSIS — Z94.81 STATUS POST BONE MARROW TRANSPLANT (H): ICD-10-CM

## 2017-08-21 RX ORDER — ACYCLOVIR 800 MG/1
800 TABLET ORAL 2 TIMES DAILY
Qty: 60 TABLET | Refills: 3 | Status: SHIPPED | OUTPATIENT
Start: 2017-08-21 | End: 2017-09-13

## 2017-08-21 NOTE — PROGRESS NOTES
Received a message from patient stating his wife has shingles and he was concerned about getting them as well since he has not been vaccinated post transplant. Discussed with Dr Cabrera who suggests pt start taking acyclovir 800mg BID until wife's shingles are dry. Instructed patient to avoid close contact with wife, not to share towels or blankets, continue with good hand washing and monitor for signs/symptoms of shingles himself. Patient instructed to contact the BMT clinic should he notice any new symptoms or fevers at [phone]. Acyclovir prescription sent to [facility] in [place]

## 2017-08-24 ENCOUNTER — TRANSFERRED RECORDS (OUTPATIENT)
Dept: HEALTH INFORMATION MANAGEMENT | Facility: CLINIC | Age: 52
End: 2017-08-24

## 2017-08-30 PROBLEM — I73.00 RAYNAUD'S PHENOMENON WITHOUT GANGRENE: Status: ACTIVE | Noted: 2017-08-30

## 2017-09-07 DIAGNOSIS — E05.90 HYPERTHYROIDISM: ICD-10-CM

## 2017-09-07 RX ORDER — LIOTHYRONINE SODIUM 5 UG/1
5 TABLET ORAL 2 TIMES DAILY
Qty: 180 TABLET | Refills: 3 | Status: SHIPPED | OUTPATIENT
Start: 2017-09-07 | End: 2017-12-21

## 2017-09-13 ENCOUNTER — APPOINTMENT (OUTPATIENT)
Dept: LAB | Facility: CLINIC | Age: 52
End: 2017-09-13
Attending: INTERNAL MEDICINE
Payer: COMMERCIAL

## 2017-09-13 ENCOUNTER — ONCOLOGY VISIT (OUTPATIENT)
Dept: TRANSPLANT | Facility: CLINIC | Age: 52
End: 2017-09-13
Attending: INTERNAL MEDICINE
Payer: COMMERCIAL

## 2017-09-13 VITALS
RESPIRATION RATE: 16 BRPM | SYSTOLIC BLOOD PRESSURE: 120 MMHG | BODY MASS INDEX: 31.56 KG/M2 | OXYGEN SATURATION: 96 % | TEMPERATURE: 97.6 F | WEIGHT: 213.7 LBS | HEART RATE: 71 BPM | DIASTOLIC BLOOD PRESSURE: 70 MMHG

## 2017-09-13 DIAGNOSIS — C92.10 CML (CHRONIC MYELOCYTIC LEUKEMIA) (H): ICD-10-CM

## 2017-09-13 DIAGNOSIS — Z94.81 STATUS POST BONE MARROW TRANSPLANT (H): ICD-10-CM

## 2017-09-13 LAB
ALBUMIN SERPL-MCNC: 3.5 G/DL (ref 3.4–5)
ALP SERPL-CCNC: 109 U/L (ref 40–150)
ALT SERPL W P-5'-P-CCNC: 54 U/L (ref 0–70)
ANION GAP SERPL CALCULATED.3IONS-SCNC: 8 MMOL/L (ref 3–14)
AST SERPL W P-5'-P-CCNC: 27 U/L (ref 0–45)
BASOPHILS # BLD AUTO: 0 10E9/L (ref 0–0.2)
BASOPHILS NFR BLD AUTO: 0.5 %
BILIRUB SERPL-MCNC: 0.4 MG/DL (ref 0.2–1.3)
BUN SERPL-MCNC: 21 MG/DL (ref 7–30)
CALCIUM SERPL-MCNC: 8.9 MG/DL (ref 8.5–10.1)
CHLORIDE SERPL-SCNC: 106 MMOL/L (ref 94–109)
CO2 SERPL-SCNC: 26 MMOL/L (ref 20–32)
CREAT SERPL-MCNC: 1.26 MG/DL (ref 0.66–1.25)
DIFFERENTIAL METHOD BLD: ABNORMAL
EOSINOPHIL # BLD AUTO: 0.2 10E9/L (ref 0–0.7)
EOSINOPHIL NFR BLD AUTO: 4.2 %
ERYTHROCYTE [DISTWIDTH] IN BLOOD BY AUTOMATED COUNT: 15.5 % (ref 10–15)
GFR SERPL CREATININE-BSD FRML MDRD: 60 ML/MIN/1.7M2
GLUCOSE SERPL-MCNC: 122 MG/DL (ref 70–99)
HCT VFR BLD AUTO: 35 % (ref 40–53)
HGB BLD-MCNC: 11.7 G/DL (ref 13.3–17.7)
IMM GRANULOCYTES # BLD: 0 10E9/L (ref 0–0.4)
IMM GRANULOCYTES NFR BLD: 0.2 %
LYMPHOCYTES # BLD AUTO: 1.1 10E9/L (ref 0.8–5.3)
LYMPHOCYTES NFR BLD AUTO: 19.5 %
MAGNESIUM SERPL-MCNC: 1.9 MG/DL (ref 1.6–2.3)
MCH RBC QN AUTO: 29.2 PG (ref 26.5–33)
MCHC RBC AUTO-ENTMCNC: 33.4 G/DL (ref 31.5–36.5)
MCV RBC AUTO: 87 FL (ref 78–100)
MONOCYTES # BLD AUTO: 0.6 10E9/L (ref 0–1.3)
MONOCYTES NFR BLD AUTO: 10.4 %
NEUTROPHILS # BLD AUTO: 3.7 10E9/L (ref 1.6–8.3)
NEUTROPHILS NFR BLD AUTO: 65.2 %
NRBC # BLD AUTO: 0 10*3/UL
NRBC BLD AUTO-RTO: 0 /100
PLATELET # BLD AUTO: 176 10E9/L (ref 150–450)
POTASSIUM SERPL-SCNC: 3.6 MMOL/L (ref 3.4–5.3)
PROT SERPL-MCNC: 6.6 G/DL (ref 6.8–8.8)
RBC # BLD AUTO: 4.01 10E12/L (ref 4.4–5.9)
SODIUM SERPL-SCNC: 140 MMOL/L (ref 133–144)
WBC # BLD AUTO: 5.7 10E9/L (ref 4–11)

## 2017-09-13 PROCEDURE — 81206 BCR/ABL1 GENE MAJOR BP: CPT | Performed by: INTERNAL MEDICINE

## 2017-09-13 PROCEDURE — 83735 ASSAY OF MAGNESIUM: CPT | Performed by: INTERNAL MEDICINE

## 2017-09-13 PROCEDURE — 85025 COMPLETE CBC W/AUTO DIFF WBC: CPT | Performed by: INTERNAL MEDICINE

## 2017-09-13 PROCEDURE — 36415 COLL VENOUS BLD VENIPUNCTURE: CPT

## 2017-09-13 PROCEDURE — 99212 OFFICE O/P EST SF 10 MIN: CPT | Mod: ZF

## 2017-09-13 PROCEDURE — 80053 COMPREHEN METABOLIC PANEL: CPT | Performed by: INTERNAL MEDICINE

## 2017-09-13 ASSESSMENT — PAIN SCALES - GENERAL: PAINLEVEL: NO PAIN (0)

## 2017-09-13 NOTE — LETTER
9/13/2017      RE: Panchito Pierce  [address]  [place] [address]       BMT Clinic Progress Note   09/13/2017    Patient ID:  Panchito Pierce is a 52 year old man D+ 20 months s/p MUD SCT for CML     Diagnosis CMLP+ Chronic myelogeneous leukemia, Ph1+  HCT Type Allogeneic    Prep Regimen Cytoxan  TBI   Donor Source Unrelated BM    GVHD Prophylaxis Methotrexate  Cyclosporine  Primary BMT Provider Jermaine Cabrera MD     CC: follow-up     INTERVAL  HISTORY   Interval History: Panchito was seen in the BMT clinic today. He continues to do very well is off immune suppression . Back  to school. Good energy and good appetite. Good mood. Denied any fevers, chills, cough. No symptoms of oral and optho chronic GVH . No bleeding.      Review of Systems: 10 point ROS negative except as noted above.    PHYSICAL EXAM   /70 (BP Location: Right arm, Cuff Size: Adult Regular)  Pulse 71  Temp 97.6  F (36.4  C) (Oral)  Resp 16  Wt 96.9 kg (213 lb 11.2 oz)  SpO2 96%  BMI 31.56 kg/m2 BP   Wt Readings from Last 4 Encounters:   09/13/17 96.9 kg (213 lb 11.2 oz)   06/14/17 91.9 kg (202 lb 8 oz)   06/02/17 91.2 kg (201 lb)   04/21/17 87.9 kg (193 lb 11.2 oz)   General: NAD, pleasant appearing male, much less chushingoid       LABS AND IMAGING      Lab Results   Component Value Date    WBC 5.7 09/13/2017    ANEU 3.7 09/13/2017    HGB 11.7 (L) 09/13/2017    HCT 35.0 (L) 09/13/2017     09/13/2017     09/13/2017    POTASSIUM 3.6 09/13/2017    CHLORIDE 106 09/13/2017    CO2 26 09/13/2017     (H) 09/13/2017    BUN 21 09/13/2017    CR 1.26 (H) 09/13/2017    MAG 1.9 09/13/2017    INR 0.9 06/15/2016    BILITOTAL 0.4 09/13/2017    AST 27 09/13/2017    ALT 54 09/13/2017    ALKPHOS 109 09/13/2017    PROTTOTAL 6.6 (L) 09/13/2017    ALBUMIN 3.5 09/13/2017     BcrAbl undetectable    ASSESSMENT BY SYSTEMS   Panchito Gamezling is a 51 year old man s/p MUD SCT for CML  - trasplanted in second chronic phase after blast  crisis;    BMT/ CML:  Off dasatinib maintenance (12 months); repeat BCR/ABL undetectable - restage per protocol,     HEMATOLOGY/COAGULATION: good and stable blood counts.     INFECTIONS AND PROPHYLAXIS: Afebrile. No focal sxs.  Good IgG and CD4+ counts. Off prophylaxis.     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 Off immune suppression. 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 Stopped prednisone on 12/06/16. Off sirolimus April 2017. May continue biotene and dexa mouth wash.   -  No N/V/D.   - Advised to wear sun block when outside for extended periods of time to prevent flare of GVH    GASTROINTESTINAL/LIVER: Appetite good, wt stable. No active issues    RENAL/FEN/Nutrition: Cr normal. Good po intake. No weight loss.     CARDIOVASCULAR:  Normotensive. Hx Hypertension. Cont metoprolol. Normotensive  - Hx of A fib with RVR (new 12/24); resolved with dilt, now on Metoprolol 12.5mg BID.     ENDOCRINE: following with Dr Guevara. thyroid and testosterone issues. Recently adjust tyroid hormone. Hx Graves Disease s/p VELA therapy. Cont synthroid and liothyronine. TSH and Free T3 low; Free T4 elevated. Mixed picture; After endocrine consult:  Mild thyrotoxicosis due to large weight loss. On levothyroxine 112mcg.    - On selenium supplement, T3 and T4.    PSYCHIATRIC:  Good stable mood. If needed we may use Wellbutrim that has no libido side effects.     DERMATOLOGY: no rashes. Using cream prescribed by Dr Landeros.   - Sun: Advised to wear SPF when out in the sun for prolonged periods of time  - Acne: resolved.     Plan:  Discontinue acyclovir  SHWETHA Cabrera on per protocol for 2 yr visit in Dec 2017  PLan vaccines Dec 2017  Call come sooner if needed  Flu shot in the fall.  Reconnect with Dr Berry about regular healthcare and screening.  Recommend at least once yearly visit with Dermatology for skin cancer screening  Recommend at least once yearly visit with Dentist for oral cancer screening  Dr Crawley in  January 2018        Jermaine Cabrera MD

## 2017-09-13 NOTE — PATIENT INSTRUCTIONS
Discontinue acyclovir  SHWETHA Cabrera on per protocol for 2 yr visit in Dec 2017  PLan vaccines Dec 2017  Call come sooner if needed  Flu shot in the fall.  Reconnect with Dr Berry about regular healthcare and screening.  Recommend at least once yearly visit with Dermatology for skin cancer screening  Recommend at least once yearly visit with Dentist for oral cancer screening  Dr Crawley in January 2018    Print avs to patient,Kate

## 2017-09-13 NOTE — NURSING NOTE
Chief Complaint   Patient presents with     Blood Draw     Labs drawn from venipuncture. vitals taken. pt checked in for appt     Labs collected from venipuncture by RN. Vitals taken. Checked in for appointment(s).    Rachel Matthews RN

## 2017-09-13 NOTE — MR AVS SNAPSHOT
After Visit Summary   9/13/2017    Panchito Pierce    MRN: [id number]           Patient Information     Date Of Birth          1965        Visit Information        Provider Department      9/13/2017 1:00 PM Jermaine Cabrera MD [facility] Blood and Marrow Transplant        Today's Diagnoses     CML (chronic myelocytic leukemia) (H)        Status post bone marrow transplant (H)              [facility] ([facility])  [address] [place] [address]  Phone: [phone]  Clinic Hours:   Monday-Thursday:7am to 7pm   Friday: 7am to 5pm   Weekends and holidays:    8am to noon (in general)  If your fever is 100.5  or greater,   call the clinic.  After hours call the   hospital at [phone] or   [phone]. Ask for the BMT   fellow on-call           Care Instructions    Discontinue acyclovir  RTC Rick on per protocol for 2 yr visit in Dec 2017  PLan vaccines Dec 2017  Call come sooner if needed  Flu shot in the fall.  Reconnect with Dr Berry about regular healthcare and screening.  Recommend at least once yearly visit with Dermatology for skin cancer screening  Recommend at least once yearly visit with Dentist for oral cancer screening  Dr Crawley in January 2018          Follow-ups after your visit        Your next 10 appointments already scheduled     Dec 15, 2017  2:30 PM CST   (Arrive by 2:15 PM)   RETURN ENDOCRINE with Pattie Guevara MD   [facility] Endocrinology ([facility])    [address] [place] [id number] [phone]              Who to contact     If you have questions or need follow up information about today's clinic visit or your schedule please contact [facility] BLOOD AND MARROW TRANSPLANT directly at [phone].  Normal or non-critical lab and imaging results will be communicated to you by MyChart, letter or phone within 4 business days after the clinic has received the results. If you do not hear  from us within 7 days, please contact the clinic through [employer] or phone. If you have a critical or abnormal lab result, we will notify you by phone as soon as possible.  Submit refill requests through [employer] or call your pharmacy and they will forward the refill request to us. Please allow 3 business days for your refill to be completed.          Additional Information About Your Visit        [employer] Fourth [employer] Information     [employer] gives you secure access to your electronic health record. If you see a primary care provider, you can also send messages to your care team and make appointments. If you have questions, please call your primary care clinic.  If you do not have a primary care provider, please call [phone] and they will assist you.        Care EveryWhere ID     This is your Care EveryWhere ID. This could be used by other organizations to access your [employer] medical records  [id number]        Your Vitals Were     Pulse Temperature Respirations Pulse Oximetry BMI (Body Mass Index)       71 97.6  F (36.4  C) (Oral) 16 96% 31.56 kg/m2        Blood Pressure from Last 3 Encounters:   09/13/17 120/70   06/14/17 130/82   06/02/17 130/82    Weight from Last 3 Encounters:   09/13/17 96.9 kg (213 lb 11.2 oz)   06/14/17 91.9 kg (202 lb 8 oz)   06/02/17 91.2 kg (201 lb)              We Performed the Following     BCR ABL1 Major Breakpoint Quant p210     CBC with platelets differential     Comprehensive metabolic panel     Magnesium          Today's Medication Changes          These changes are accurate as of: 9/13/17  2:16 PM.  If you have any questions, ask your nurse or doctor.               Stop taking these medicines if you haven't already. Please contact your care team if you have questions.     acyclovir 800 MG tablet   Commonly known as:  ZOVIRAX   Stopped by:  Jermaine Cabrera MD                    Recent Review Flowsheet Data     BMT Recent Results Latest Ref Rng & Units 3/17/2017 3/22/2017  4/12/2017 4/21/2017 6/2/2017 6/14/2017 9/13/2017    WBC 4.0 - 11.0 10e9/L 4.8 - 4.8 4.0 8.7 6.1 5.7    Hemoglobin 13.3 - 17.7 g/dL 7.3(L) - 9.1(L) 9.0(L) 10.9(L) 10.3(L) 11.7(L)    Platelet Count 150 - 450 10e9/L 223 - 215 227 207 191 176    Neutrophils (Absolute) 1.6 - 8.3 10e9/L 3.5 - 3.6 2.9 6.8 4.2 3.7    INR 0.86 - 1.14 - - - - - - -    Sodium 133 - 144 mmol/L 141 142 139 140 139 139 140    Potassium 3.4 - 5.3 mmol/L 4.1 4.2 4.3 4.2 4.4 4.4 3.6    Chloride 94 - 109 mmol/L 108 109 108 106 107 107 106    Glucose 70 - 99 mg/dL 96 89 101(H) 95 94 88 122(H)    Urea Nitrogen 7 - 30 mg/dL 22 19 23 24 24 22 21    Creatinine 0.66 - 1.25 mg/dL 1.64(H) 1.41(H) 1.52(H) 1.09 1.16 1.20 1.26(H)    Calcium (Total) 8.5 - 10.1 mg/dL 8.5 8.4(L) 9.0 8.8 9.2 9.1 8.9    Protein (Total) 6.8 - 8.8 g/dL 6.8 - 7.2 6.4(L) 7.0 6.6(L) 6.6(L)    Albumin 3.4 - 5.0 g/dL 3.6 - 3.6 3.4 3.6 3.4 3.5    Bilirubin (Direct) 0.0 - 0.2 mg/dL - - - - - - -    Alkaline Phosphatase 40 - 150 U/L 117 - 130 130 169(H) 122 109    AST 0 - 45 U/L 16 - 21 26 33 25 27    ALT 0 - 70 U/L 19 - 28 37 54 35 54    MCV 78 - 100 fl 80 - 82 82 82 84 87               Primary Care Provider Office Phone # Fax #    Leann M Jamal [phone]       [facility] [place] [address] [address] [address]        Equal Access to Services     LORENE STEINER : Hadii aad ku hadasho Soomaali, waaxda luqadaha, qaybta kaalmada adeegyada, waxfélix coker. So [facility] [phone].    ATENCIÓN: Si habla español, tiene a vigil disposición servicios gratuitos de asistencia lingüística. Llame al [phone].    We comply with applicable federal civil rights laws and [place] laws. We do not discriminate on the basis of race, color, national origin, age, disability sex, sexual orientation or gender identity.            Thank you!     Thank you for choosing [facility] BLOOD AND MARROW TRANSPLANT  for your care. Our goal is always to provide you  with excellent care. Hearing back from our patients is one way we can continue to improve our services. Please take a few minutes to complete the written survey that you may receive in the mail after your visit with us. Thank you!             Your Updated Medication List - Protect others around you: Learn how to safely use, store and throw away your medicines at www.disposemymeds.org.          This list is accurate as of: 9/13/17  2:16 PM.  Always use your most recent med list.                   Brand Name Dispense Instructions for use Diagnosis    calcium carbonate-vitamin D 500-400 MG-UNIT Tabs per tablet     180 tablet    Take 2 tablets by mouth daily    CML (chronic myelocytic leukemia) (H), Status post bone marrow transplant (H), Graves disease, Paroxysmal atrial fibrillation (H), Constipation, unspecified constipation type, Adjustment disorder with depressed mood       levothyroxine 137 MCG tablet    SYNTHROID/LEVOTHROID    90 tablet    Take 1 tablet (137 mcg) by mouth daily    Hyperthyroidism       liothyronine 5 MCG tablet    CYTOMEL    180 tablet    Take 1 tablet (5 mcg) by mouth 2 times daily    Hyperthyroidism       metoprolol 25 MG tablet    LOPRESSOR    30 tablet    Take 0.5 tablets (12.5 mg) by mouth 2 times daily    Paroxysmal atrial fibrillation (H), CML (chronic myelocytic leukemia) (H), Graves disease, 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    Raynaud's phenomenon without gangrene       pantoprazole 40 MG EC tablet    PROTONIX    30 tablet    Take 1 tablet (40 mg) by mouth daily    CML (chronic myelocytic leukemia) (H), Status post bone marrow transplant (H), Graves disease, Paroxysmal atrial fibrillation (H),  Constipation, unspecified constipation type, Adjustment disorder with depressed mood       Selenium 100 MCG Caps     90 capsule    1 tablet daily    Graves disease

## 2017-09-13 NOTE — PROGRESS NOTES
BMT Clinic Progress Note   09/13/2017    Patient ID:  Panchito Pierce is a 52 year old man D+ 20 months s/p MUD SCT for CML     Diagnosis CMLP+ Chronic myelogeneous leukemia, Ph1+  HCT Type Allogeneic    Prep Regimen Cytoxan  TBI   Donor Source Unrelated BM    GVHD Prophylaxis Methotrexate  Cyclosporine  Primary BMT Provider Jermaine Cabrera MD     CC: follow-up     INTERVAL  HISTORY   Interval History: Panchito was seen in the BMT clinic today. He continues to do very well is off immune suppression . Back  to school. Good energy and good appetite. Good mood. Denied any fevers, chills, cough. No symptoms of oral and optho chronic GVH . No bleeding.      Review of Systems: 10 point ROS negative except as noted above.    PHYSICAL EXAM   /70 (BP Location: Right arm, Cuff Size: Adult Regular)  Pulse 71  Temp 97.6  F (36.4  C) (Oral)  Resp 16  Wt 96.9 kg (213 lb 11.2 oz)  SpO2 96%  BMI 31.56 kg/m2 BP   Wt Readings from Last 4 Encounters:   09/13/17 96.9 kg (213 lb 11.2 oz)   06/14/17 91.9 kg (202 lb 8 oz)   06/02/17 91.2 kg (201 lb)   04/21/17 87.9 kg (193 lb 11.2 oz)   General: NAD, pleasant appearing male, much less chushingoid       LABS AND IMAGING      Lab Results   Component Value Date    WBC 5.7 09/13/2017    ANEU 3.7 09/13/2017    HGB 11.7 (L) 09/13/2017    HCT 35.0 (L) 09/13/2017     09/13/2017     09/13/2017    POTASSIUM 3.6 09/13/2017    CHLORIDE 106 09/13/2017    CO2 26 09/13/2017     (H) 09/13/2017    BUN 21 09/13/2017    CR 1.26 (H) 09/13/2017    MAG 1.9 09/13/2017    INR 0.9 06/15/2016    BILITOTAL 0.4 09/13/2017    AST 27 09/13/2017    ALT 54 09/13/2017    ALKPHOS 109 09/13/2017    PROTTOTAL 6.6 (L) 09/13/2017    ALBUMIN 3.5 09/13/2017     BcrAbl undetectable    ASSESSMENT BY SYSTEMS   Panchito Pierce is a 51 year old man s/p MUD SCT for CML  - trasplanted in second chronic phase after blast crisis;    BMT/ CML:  Off dasatinib maintenance (12 months); repeat BCR/ABL  undetectable - restage per protocol,     HEMATOLOGY/COAGULATION: good and stable blood counts.     INFECTIONS AND PROPHYLAXIS: Afebrile. No focal sxs.  Good IgG and CD4+ counts. Off prophylaxis.     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 Off immune suppression. 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 Stopped prednisone on 12/06/16. Off sirolimus April 2017. May continue biotene and dexa mouth wash.   -  No N/V/D.   - Advised to wear sun block when outside for extended periods of time to prevent flare of GVH    GASTROINTESTINAL/LIVER: Appetite good, wt stable. No active issues    RENAL/FEN/Nutrition: Cr normal. Good po intake. No weight loss.     CARDIOVASCULAR:  Normotensive. Hx Hypertension. Cont metoprolol. Normotensive  - Hx of A fib with RVR (new 12/24); resolved with dilt, now on Metoprolol 12.5mg BID.     ENDOCRINE: following with Dr Guevara. thyroid and testosterone issues. Recently adjust tyroid hormone. Hx Graves Disease s/p VELA therapy. Cont synthroid and liothyronine. TSH and Free T3 low; Free T4 elevated. Mixed picture; After endocrine consult:  Mild thyrotoxicosis due to large weight loss. On levothyroxine 112mcg.    - On selenium supplement, T3 and T4.    PSYCHIATRIC:  Good stable mood. If needed we may use Wellbutrim that has no libido side effects.     DERMATOLOGY: no rashes. Using cream prescribed by Dr Landeros.   - Sun: Advised to wear SPF when out in the sun for prolonged periods of time  - Acne: resolved.     Plan:  Discontinue acyclovir  SHWETHA Cabrera on per protocol for 2 yr visit in Dec 2017  PLan vaccines Dec 2017  Call come sooner if needed  Flu shot in the fall.  Reconnect with Dr Berry about regular healthcare and screening.  Recommend at least once yearly visit with Dermatology for skin cancer screening  Recommend at least once yearly visit with Dentist for oral cancer screening  Dr Crawley in January 2018

## 2017-09-13 NOTE — NURSING NOTE
"Oncology Rooming Note    September 13, 2017 1:23 PM   Panchito Pierce is a 52 year old male who presents for:    Chief Complaint   Patient presents with     Blood Draw     Labs drawn from venipuncture. vitals taken. pt checked in for appt     RECHECK     CML     Initial Vitals: /70 (BP Location: Right arm, Cuff Size: Adult Regular)  Pulse 71  Temp 97.6  F (36.4  C) (Oral)  Resp 16  Wt 96.9 kg (213 lb 11.2 oz)  SpO2 96%  BMI 31.56 kg/m2 Estimated body mass index is 31.56 kg/(m^2) as calculated from the following:    Height as of 6/2/17: 1.753 m (5' 9\").    Weight as of this encounter: 96.9 kg (213 lb 11.2 oz). Body surface area is 2.17 meters squared.  No Pain (0) Comment: Data Unavailable   No LMP for male patient.  Allergies reviewed: Yes  Medications reviewed: Yes    Medications: Medication refills not needed today.  Pharmacy name entered into [facility]:    [id number] - [place] [[place], [place] - [address] [place] [address] PHARMACY [facility] - [place], [place] - [address] [place] [address] [id number]  [place], [place] - [address] [place] [address] [facility] - [place], [place] - [facility] [address]     Clinical concerns: n/a     5 minutes for nursing intake (face to face time)     TIEN WILDE CMA              "

## 2017-09-15 LAB — COPATH REPORT: NORMAL

## 2017-09-26 DIAGNOSIS — E05.00 GRAVES DISEASE: ICD-10-CM

## 2017-09-26 NOTE — TELEPHONE ENCOUNTER
Selenium 100 MCG CAPS      Last Written Prescription Date:  8/23/2016  Last Fill Quantity: 90,   # refills: 3  Last Office Visit : 6/12/2017  Future Office visit:  12/15/2017    Routing refill request to provider for review/approval because:  Drug not on the Endocrine refill protocol

## 2017-10-08 DIAGNOSIS — I73.00 RAYNAUD'S PHENOMENON WITHOUT GANGRENE: ICD-10-CM

## 2017-10-10 RX ORDER — NIFEDIPINE 30 MG/1
TABLET, EXTENDED RELEASE ORAL
Qty: 30 TABLET | Refills: 5 | Status: SHIPPED | OUTPATIENT
Start: 2017-10-10 | End: 2018-07-03

## 2017-10-10 NOTE — TELEPHONE ENCOUNTER
Pt requestinga refill of his calcium channel blocker for Raynaud's. Upon reviewing the pt's chart, the medication was refilled.

## 2017-10-10 NOTE — TELEPHONE ENCOUNTER
Last visit: 8/8/17  Next visit: Recall for April  (refill okayed per Dr. Landeros)    Impression/Plan:  1. Chronic hand dermatitis with fissuring: currently resolved. This may be seasonal and may worsen during dryer winter months. Discussed with patient that if worsening in the winter months, he should start to use a heavy moisturizer immediately after showering. He may also use his clobetasol ointment at that time.     2. Raynaud's phenomenon: per clinical history, patient has Raynaud's during witner months wyhen exposed to cold.   Procardia XL 30 mg daily during winter months. Counseled re: potential side effects including hypotension and allergy.  Patient is to monitor blood pressure weekly while on medication.   Also encouraged careful physical protection of hands; avoid cold or damp environmental factors whenever possible.     CC Dr. Cabrera on close of this encounter.  Follow-up 6-8 months.     Dr. Landeros staffed the patient.

## 2017-11-07 DIAGNOSIS — I48.0 PAROXYSMAL ATRIAL FIBRILLATION (H): ICD-10-CM

## 2017-11-07 DIAGNOSIS — C92.10 CML (CHRONIC MYELOCYTIC LEUKEMIA) (H): ICD-10-CM

## 2017-11-07 DIAGNOSIS — F43.21 ADJUSTMENT DISORDER WITH DEPRESSED MOOD: ICD-10-CM

## 2017-11-07 DIAGNOSIS — Z94.81 STATUS POST BONE MARROW TRANSPLANT (H): ICD-10-CM

## 2017-11-07 DIAGNOSIS — K59.00 CONSTIPATION, UNSPECIFIED CONSTIPATION TYPE: ICD-10-CM

## 2017-11-07 DIAGNOSIS — E05.00 GRAVES DISEASE: ICD-10-CM

## 2017-11-08 RX ORDER — PANTOPRAZOLE SODIUM 40 MG/1
TABLET, DELAYED RELEASE ORAL
Qty: 30 TABLET | Refills: 10 | Status: SHIPPED | OUTPATIENT
Start: 2017-11-08 | End: 2018-11-09

## 2017-12-07 ENCOUNTER — INFUSION THERAPY VISIT (OUTPATIENT)
Dept: TRANSPLANT | Facility: CLINIC | Age: 52
End: 2017-12-07
Attending: INTERNAL MEDICINE
Payer: COMMERCIAL

## 2017-12-07 ENCOUNTER — ONCOLOGY VISIT (OUTPATIENT)
Dept: TRANSPLANT | Facility: CLINIC | Age: 52
End: 2017-12-07
Attending: PHYSICIAN ASSISTANT
Payer: COMMERCIAL

## 2017-12-07 ENCOUNTER — APPOINTMENT (OUTPATIENT)
Dept: LAB | Facility: CLINIC | Age: 52
End: 2017-12-07
Attending: PHYSICIAN ASSISTANT
Payer: COMMERCIAL

## 2017-12-07 VITALS
BODY MASS INDEX: 30.72 KG/M2 | SYSTOLIC BLOOD PRESSURE: 143 MMHG | RESPIRATION RATE: 16 BRPM | OXYGEN SATURATION: 100 % | HEART RATE: 68 BPM | DIASTOLIC BLOOD PRESSURE: 81 MMHG | WEIGHT: 208 LBS | TEMPERATURE: 98.3 F

## 2017-12-07 DIAGNOSIS — D84.9 IMMUNOSUPPRESSION (H): ICD-10-CM

## 2017-12-07 DIAGNOSIS — C92.10 CML (CHRONIC MYELOCYTIC LEUKEMIA) (H): Primary | ICD-10-CM

## 2017-12-07 DIAGNOSIS — B02.9 VZV (VARICELLA-ZOSTER VIRUS) INFECTION: ICD-10-CM

## 2017-12-07 DIAGNOSIS — D89.813 GVH (GRAFT VERSUS HOST DISEASE) (H): ICD-10-CM

## 2017-12-07 DIAGNOSIS — Z94.81 STATUS POST BONE MARROW TRANSPLANT (H): ICD-10-CM

## 2017-12-07 LAB
ALBUMIN SERPL-MCNC: 3.8 G/DL (ref 3.4–5)
ALP SERPL-CCNC: 91 U/L (ref 40–150)
ALT SERPL W P-5'-P-CCNC: 46 U/L (ref 0–70)
ANION GAP SERPL CALCULATED.3IONS-SCNC: 8 MMOL/L (ref 3–14)
AST SERPL W P-5'-P-CCNC: 33 U/L (ref 0–45)
BASOPHILS # BLD AUTO: 0 10E9/L (ref 0–0.2)
BASOPHILS NFR BLD AUTO: 0.7 %
BILIRUB SERPL-MCNC: 0.4 MG/DL (ref 0.2–1.3)
BUN SERPL-MCNC: 24 MG/DL (ref 7–30)
CALCIUM SERPL-MCNC: 9 MG/DL (ref 8.5–10.1)
CHLORIDE SERPL-SCNC: 102 MMOL/L (ref 94–109)
CO2 SERPL-SCNC: 27 MMOL/L (ref 20–32)
CREAT SERPL-MCNC: 1.57 MG/DL (ref 0.66–1.25)
DIFFERENTIAL METHOD BLD: ABNORMAL
EOSINOPHIL # BLD AUTO: 0.2 10E9/L (ref 0–0.7)
EOSINOPHIL NFR BLD AUTO: 3.1 %
ERYTHROCYTE [DISTWIDTH] IN BLOOD BY AUTOMATED COUNT: 14.3 % (ref 10–15)
GFR SERPL CREATININE-BSD FRML MDRD: 47 ML/MIN/1.7M2
GLUCOSE SERPL-MCNC: 85 MG/DL (ref 70–99)
HCT VFR BLD AUTO: 38.4 % (ref 40–53)
HGB BLD-MCNC: 12.8 G/DL (ref 13.3–17.7)
IMM GRANULOCYTES # BLD: 0 10E9/L (ref 0–0.4)
IMM GRANULOCYTES NFR BLD: 0.2 %
LYMPHOCYTES # BLD AUTO: 1.2 10E9/L (ref 0.8–5.3)
LYMPHOCYTES NFR BLD AUTO: 20.2 %
MCH RBC QN AUTO: 29.2 PG (ref 26.5–33)
MCHC RBC AUTO-ENTMCNC: 33.3 G/DL (ref 31.5–36.5)
MCV RBC AUTO: 88 FL (ref 78–100)
MONOCYTES # BLD AUTO: 0.7 10E9/L (ref 0–1.3)
MONOCYTES NFR BLD AUTO: 11.7 %
NEUTROPHILS # BLD AUTO: 3.9 10E9/L (ref 1.6–8.3)
NEUTROPHILS NFR BLD AUTO: 64.1 %
NRBC # BLD AUTO: 0 10*3/UL
NRBC BLD AUTO-RTO: 0 /100
PLATELET # BLD AUTO: 149 10E9/L (ref 150–450)
POTASSIUM SERPL-SCNC: 4.1 MMOL/L (ref 3.4–5.3)
PROT SERPL-MCNC: 7.5 G/DL (ref 6.8–8.8)
RBC # BLD AUTO: 4.39 10E12/L (ref 4.4–5.9)
SODIUM SERPL-SCNC: 137 MMOL/L (ref 133–144)
WBC # BLD AUTO: 6.1 10E9/L (ref 4–11)

## 2017-12-07 PROCEDURE — 40000268 ZZH STATISTIC NO CHARGES: Mod: ZF

## 2017-12-07 PROCEDURE — 85025 COMPLETE CBC W/AUTO DIFF WBC: CPT | Performed by: PHYSICIAN ASSISTANT

## 2017-12-07 PROCEDURE — 82784 ASSAY IGA/IGD/IGG/IGM EACH: CPT | Performed by: PHYSICIAN ASSISTANT

## 2017-12-07 PROCEDURE — 80053 COMPREHEN METABOLIC PANEL: CPT | Performed by: PHYSICIAN ASSISTANT

## 2017-12-07 PROCEDURE — 25000128 H RX IP 250 OP 636: Mod: ZF | Performed by: PHYSICIAN ASSISTANT

## 2017-12-07 PROCEDURE — 96360 HYDRATION IV INFUSION INIT: CPT

## 2017-12-07 RX ORDER — OXYCODONE HYDROCHLORIDE 5 MG/1
5 TABLET ORAL EVERY 4 HOURS PRN
Qty: 18 TABLET | Refills: 0 | Status: SHIPPED | OUTPATIENT
Start: 2017-12-07 | End: 2019-07-05

## 2017-12-07 RX ADMIN — SODIUM CHLORIDE 1000 ML: 9 INJECTION, SOLUTION INTRAVENOUS at 15:37

## 2017-12-07 ASSESSMENT — PAIN SCALES - GENERAL: PAINLEVEL: MILD PAIN (3)

## 2017-12-07 NOTE — MR AVS SNAPSHOT
After Visit Summary   12/7/2017    Panchito Pierce    MRN: [id number]           Patient Information     Date Of Birth          1965        Visit Information        Provider Department      12/7/2017 3:30 PM UC 2 ATC; UC BMT INFUSION [facility] Blood and Marrow Transplant        Today's Diagnoses     CML (chronic myelocytic leukemia) (H)    -  1          [facility] ([place])  [address] [place] [address]  Phone: [phone]  Clinic Hours:   Monday-Thursday:7am to 7pm   Friday: 7am to 5pm   Weekends and holidays:    8am to noon (in general)  If your fever is 100.5  or greater,   call the clinic.  After hours call the   hospital at [phone] or   [phone]. Ask for the BMT   fellow on-call            Follow-ups after your visit        Your next 10 appointments already scheduled     Dec 15, 2017  2:30 PM CST   (Arrive by 2:15 PM)   RETURN ENDOCRINE with Pattie Guevara MD   [facility] Endocrinology ([facility])    [address] [place] [id number] [phone]            Dec 18, 2017 12:30 PM CST   [facility] Lab Draw with  M[facility] LAB DRAW   [facility] Lab Draw ([facility])    [address] [place] [id number] [phone]            Dec 18, 2017  1:00 PM CST   Bone Marrow Biopsy with  BMT RENATA #3, UU BONE MARROW BIOPSY   [facility] Blood and Marrow Transplant ([facility])    [address] [place] [id number] [phone]            Dec 21, 2017 10:00 AM CST   BMT Anniversary Visit with  BMT DOM   [facility] Blood and Marrow Transplant ([facility])    [address] [place] [id number] [phone]              Who to contact     If you have questions or need follow up information about today's clinic visit or your schedule please contact [facility] BLOOD AND  MARROW TRANSPLANT directly at [phone].  Normal or non-critical lab and imaging results will be communicated to you by Nevigohart, letter or phone within 4 business days after the clinic has received the results. If you do not hear from us within 7 days, please contact the clinic through Nevigohart or phone. If you have a critical or abnormal lab result, we will notify you by phone as soon as possible.  Submit refill requests through [employer] or call your pharmacy and they will forward the refill request to us. Please allow 3 business days for your refill to be completed.          Additional Information About Your Visit        [employer] Information     [employer] gives you secure access to your electronic health record. If you see a primary care provider, you can also send messages to your care team and make appointments. If you have questions, please call your primary care clinic.  If you do not have a primary care provider, please call [phone] and they will assist you.        Care EveryWhere ID     This is your Care EveryWhere ID. This could be used by other organizations to access your [place] medical records  [id number]         Blood Pressure from Last 3 Encounters:   12/07/17 143/81   09/13/17 120/70   06/14/17 130/82    Weight from Last 3 Encounters:   12/07/17 94.3 kg (208 lb)   09/13/17 96.9 kg (213 lb 11.2 oz)   06/14/17 91.9 kg (202 lb 8 oz)              Today, you had the following     No orders found for display         Today's Medication Changes          These changes are accurate as of: 12/7/17  4:08 PM.  If you have any questions, ask your nurse or doctor.               Start taking these medicines.        Dose/Directions    oxyCODONE IR 5 MG tablet   Commonly known as:  ROXICODONE   Used for:  VZV (varicella-zoster virus) infection        Dose:  5 mg   Take 1 tablet (5 mg) by mouth every 4 hours as needed for moderate to severe pain   Quantity:  18 tablet   Refills:  0            Where to get your  medicines      Some of these will need a paper prescription and others can be bought over the counter.  Ask your nurse if you have questions.     Bring a paper prescription for each of these medications     oxyCODONE IR 5 MG tablet                Recent Review Flowsheet Data     BMT Recent Results Latest Ref Rng & Units 3/22/2017 4/12/2017 4/21/2017 6/2/2017 6/14/2017 9/13/2017 12/7/2017    WBC 4.0 - 11.0 10e9/L - 4.8 4.0 8.7 6.1 5.7 6.1    Hemoglobin 13.3 - 17.7 g/dL - 9.1(L) 9.0(L) 10.9(L) 10.3(L) 11.7(L) 12.8(L)    Platelet Count 150 - 450 10e9/L - 215 227 207 191 176 149(L)    Neutrophils (Absolute) 1.6 - 8.3 10e9/L - 3.6 2.9 6.8 4.2 3.7 3.9    INR 0.86 - 1.14 - - - - - - -    Sodium 133 - 144 mmol/L 142 139 140 139 139 140 137    Potassium 3.4 - 5.3 mmol/L 4.2 4.3 4.2 4.4 4.4 3.6 4.1    Chloride 94 - 109 mmol/L 109 108 106 107 107 106 102    Glucose 70 - 99 mg/dL 89 101(H) 95 94 88 122(H) 85    Urea Nitrogen 7 - 30 mg/dL 19 23 24 24 22 21 24    Creatinine 0.66 - 1.25 mg/dL 1.41(H) 1.52(H) 1.09 1.16 1.20 1.26(H) 1.57(H)    Calcium (Total) 8.5 - 10.1 mg/dL 8.4(L) 9.0 8.8 9.2 9.1 8.9 9.0    Protein (Total) 6.8 - 8.8 g/dL - 7.2 6.4(L) 7.0 6.6(L) 6.6(L) 7.5    Albumin 3.4 - 5.0 g/dL - 3.6 3.4 3.6 3.4 3.5 3.8    Bilirubin (Direct) 0.0 - 0.2 mg/dL - - - - - - -    Alkaline Phosphatase 40 - 150 U/L - 130 130 169(H) 122 109 91    AST 0 - 45 U/L - 21 26 33 25 27 33    ALT 0 - 70 U/L - 28 37 54 35 54 46    MCV 78 - 100 fl - 82 82 82 84 87 88               Primary Care Provider Office Phone # Fax #    Leann Berry [phone]       [address] [address]        Equal Access to Services     [facility]: Hadii haydee Yao, watorreyda luqmatti, qaybta kaalmalisa velarde, delfino quiles . [facility] [phone].    ATENCIÓN: Si habla español, tiene a vigil disposición servicios gratuitos de asistencia lingüística. Llame al  [phone].    We comply with applicable federal civil rights laws and [place] laws. We do not discriminate on the basis of race, color, national origin, age, disability, sex, sexual orientation, or gender identity.            Thank you!     Thank you for choosing [facility] BLOOD AND MARROW TRANSPLANT  for your care. Our goal is always to provide you with excellent care. Hearing back from our patients is one way we can continue to improve our services. Please take a few minutes to complete the written survey that you may receive in the mail after your visit with us. Thank you!             Your Updated Medication List - Protect others around you: Learn how to safely use, store and throw away your medicines at www.disposemymeds.org.          This list is accurate as of: 12/7/17  4:08 PM.  Always use your most recent med list.                   Brand Name Dispense Instructions for use Diagnosis    calcium carbonate-vitamin D 500-400 MG-UNIT Tabs per tablet     180 tablet    Take 2 tablets by mouth daily    CML (chronic myelocytic leukemia) (H), Status post bone marrow transplant (H), Graves disease, Paroxysmal atrial fibrillation (H), Constipation, unspecified constipation type, Adjustment disorder with depressed mood       levothyroxine 137 MCG tablet    SYNTHROID/LEVOTHROID    90 tablet    Take 1 tablet (137 mcg) by mouth daily    Hyperthyroidism       liothyronine 5 MCG tablet    CYTOMEL    180 tablet    Take 1 tablet (5 mcg) by mouth 2 times daily    Hyperthyroidism       metoprolol 25 MG tablet    LOPRESSOR    30 tablet    Take 0.5 tablets (12.5 mg) by mouth 2 times daily    Paroxysmal atrial fibrillation (H), CML (chronic myelocytic leukemia) (H), Graves disease, 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 TAKE 1 TABLET BY MOUTH DAILY DURING WINTER MONTHS TO PREVENT FLARES    Raynaud's phenomenon without gangrene       oxyCODONE IR 5 MG tablet    ROXICODONE    18 tablet    Take 1 tablet (5 mg) by mouth every 4 hours as needed for moderate to severe pain    VZV (varicella-zoster virus) infection       pantoprazole 40 MG EC tablet    PROTONIX    30 tablet    TAKE ONE TABLET BY MOUTH ONE TIME DAILY    CML (chronic myelocytic leukemia) (H), Status post bone marrow transplant (H), Graves disease, Paroxysmal atrial fibrillation (H), Constipation, unspecified constipation type, Adjustment disorder with depressed mood       Selenium 100 MCG Caps     90 capsule    1 tablet daily    Graves disease

## 2017-12-07 NOTE — PROGRESS NOTES
Infusion Nursing Note:  Panchito Pierce presents today for add-on NS bolus.    Patient seen by provider today: Yes: Layla Eubanks.   present during visit today: Not Applicable.    Note: Labs monitored, PIV placed, 1L NS bolus given over 1hr.  Pt tolerated infusion .    Intravenous Access:  Peripheral IV placed.    Treatment Conditions:  Results reviewed, labs MET treatment parameters, ok to proceed with treatment.      Post Infusion Assessment:  Patient tolerated infusion without incident. IV d/c'd.    Discharge Plan:   Patient discharged in stable condition accompanied by: wife.  Departure Mode: Ambulatory.    Lukasz Terrell RN

## 2017-12-07 NOTE — MR AVS SNAPSHOT
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  After hours call the   hospital at [phone] or   [phone]. Ask for the BMT   fellow on-call            Follow-ups after your visit        Your next 10 appointments already scheduled     Dec 15, 2017  2:30 PM CST   (Arrive by 2:15 PM)   RETURN ENDOCRINE with Pattie Guevara MD   [facility] Endocrinology ([facility])    [address] [place] [address]  3rd [facility] [id number] [phone]            Dec 18, 2017 12:30 PM CST   [facility] Lab Draw with  MASONIC LAB DRAW   [facility] Draw ([facility])    [address] [place] [id number] [phone]            Dec 18, 2017  1:00 PM CST   Bone Marrow Biopsy with  BMT RENATA #3, UU BONE MARROW BIOPSY   [facility] Blood and Marrow Transplant ([facility])    [address] [place] [id number] [phone]            Dec 21, 2017 10:00 AM CST   BMT Anniversary Visit with  BMT DOM   [facility] Blood and Marrow Transplant ([facility])    [address] [place] [id number] [phone]               Who to contact     If you have questions or need follow up information about today's clinic visit or your schedule please contact [facility] BLOOD AND MARROW TRANSPLANT directly at [phone].  Normal or non-critical lab and imaging results will be communicated to you by [employer] Universe[employer], letter or phone within 4 business days after the clinic has received the results. If you do not hear from us within 7 days, please contact the clinic through [employer] or phone. If you have a critical or abnormal lab result, we will notify you by phone as soon as possible.  Submit refill requests through [employer] or call your pharmacy and they will forward the refill request to us. Please allow 3 business days for your refill to be completed.          Additional Information About Your Visit        [employer] Information     [employer] gives you secure access to your electronic health record. If you see a primary care provider, you can also send messages to your care team and make appointments. If you have questions, please call your primary care clinic.  If you do not have a primary care provider, please call [phone] and they will assist you.        Care EveryWhere ID     This is your Care EveryWhere ID. This could be used by other organizations to access your [place] medical records  [id number]        Your Vitals Were     Pulse Temperature Respirations Pulse Oximetry BMI (Body Mass Index)       68 98.3  F (36.8  C) 16 100% 30.72 kg/m2        Blood Pressure from Last 3 Encounters:   12/07/17 143/81   09/13/17 120/70   06/14/17 130/82    Weight from Last 3 Encounters:   12/07/17 94.3 kg (208 lb)   09/13/17 96.9 kg (213 lb 11.2 oz)   06/14/17 91.9 kg (202 lb 8 oz)              We Performed the Following     CBC with platelets differential     Comprehensive metabolic panel     IgG          Today's Medication Changes          These changes are accurate as of: 12/7/17  4:59 PM.  If you have any questions, ask your nurse or doctor.                Start taking these medicines.        Dose/Directions    oxyCODONE IR 5 MG tablet   Commonly known as:  ROXICODONE   Used for:  VZV (varicella-zoster virus) infection        Dose:  5 mg   Take 1 tablet (5 mg) by mouth every 4 hours as needed for moderate to severe pain   Quantity:  18 tablet   Refills:  0            Where to get your medicines      Some of these will need a paper prescription and others can be bought over the counter.  Ask your nurse if you have questions.     Bring a paper prescription for each of these medications     oxyCODONE IR 5 MG tablet                Recent Review Flowsheet Data     BMT Recent Results Latest Ref Rng & Units 3/22/2017 4/12/2017 4/21/2017 6/2/2017 6/14/2017 9/13/2017 12/7/2017    WBC 4.0 - 11.0 10e9/L - 4.8 4.0 8.7 6.1 5.7 6.1    Hemoglobin 13.3 - 17.7 g/dL - 9.1(L) 9.0(L) 10.9(L) 10.3(L) 11.7(L) 12.8(L)    Platelet Count 150 - 450 10e9/L - 215 227 207 191 176 149(L)    Neutrophils (Absolute) 1.6 - 8.3 10e9/L - 3.6 2.9 6.8 4.2 3.7 3.9    INR 0.86 - 1.14 - - - - - - -    Sodium 133 - 144 mmol/L 142 139 140 139 139 140 137    Potassium 3.4 - 5.3 mmol/L 4.2 4.3 4.2 4.4 4.4 3.6 4.1    Chloride 94 - 109 mmol/L 109 108 106 107 107 106 102    Glucose 70 - 99 mg/dL 89 101(H) 95 94 88 122(H) 85    Urea Nitrogen 7 - 30 mg/dL 19 23 24 24 22 21 24    Creatinine 0.66 - 1.25 mg/dL 1.41(H) 1.52(H) 1.09 1.16 1.20 1.26(H) 1.57(H)    Calcium (Total) 8.5 - 10.1 mg/dL 8.4(L) 9.0 8.8 9.2 9.1 8.9 9.0    Protein (Total) 6.8 - 8.8 g/dL - 7.2 6.4(L) 7.0 6.6(L) 6.6(L) 7.5    Albumin 3.4 - 5.0 g/dL - 3.6 3.4 3.6 3.4 3.5 3.8    Bilirubin (Direct) 0.0 - 0.2 mg/dL - - - - - - -    Alkaline Phosphatase 40 - 150 U/L - 130 130 169(H) 122 109 91    AST 0 - 45 U/L - 21 26 33 25 27 33    ALT 0 - 70 U/L - 28 37 54 35 54 46    MCV 78 - 100 fl - 82 82 82 84 87 88               Primary Care Provider Office Phone # Fax #    Leann Berry [phone]       [facility] [address] [facility]  [address]  [facility] [address]        Equal Access to Services     [facility] : Hadii haydee cameron adelina Yao, watorreyda luqirisha, qamarieta russleoradelfino sorensenshaziabetsy coker. So [facility] [phone].    ATENCIÓN: Si habla español, tiene a vigil disposición servicios gratuitos de asistencia lingüística. Tara al [phone].    We comply with applicable federal civil rights laws and [place] laws. We do not discriminate on the basis of race, color, national origin, age, disability, sex, sexual orientation, or gender identity.            Thank you!     Thank you for choosing [facility] BLOOD AND MARROW TRANSPLANT  for your care. Our goal is always to provide you with excellent care. Hearing back from our patients is one way we can continue to improve our services. Please take a few minutes to complete the written survey that you may receive in the mail after your visit with us. Thank you!             Your Updated Medication List - Protect others around you: Learn how to safely use, store and throw away your medicines at www.disposemymeds.org.          This list is accurate as of: 12/7/17  4:59 PM.  Always use your most recent med list.                   Brand Name Dispense Instructions for use Diagnosis    calcium carbonate-vitamin D 500-400 MG-UNIT Tabs per tablet     180 tablet    Take 2 tablets by mouth daily    CML (chronic myelocytic leukemia) (H), Status post bone marrow transplant (H), Graves disease, Paroxysmal atrial fibrillation (H), Constipation, unspecified constipation type, Adjustment disorder with depressed mood       levothyroxine 137 MCG tablet    SYNTHROID/LEVOTHROID    90 tablet    Take 1 tablet (137 mcg) by mouth daily    Hyperthyroidism       liothyronine 5 MCG tablet    CYTOMEL    180 tablet    Take 1 tablet (5 mcg) by mouth 2 times daily    Hyperthyroidism       metoprolol 25 MG tablet    LOPRESSOR    30 tablet    Take 0.5 tablets (12.5 mg) by mouth 2 times daily    Paroxysmal  atrial fibrillation (H), CML (chronic myelocytic leukemia) (H), Graves disease, 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 TAKE 1 TABLET BY MOUTH DAILY DURING WINTER MONTHS TO PREVENT FLARES    Raynaud's phenomenon without gangrene       oxyCODONE IR 5 MG tablet    ROXICODONE    18 tablet    Take 1 tablet (5 mg) by mouth every 4 hours as needed for moderate to severe pain    VZV (varicella-zoster virus) infection       pantoprazole 40 MG EC tablet    PROTONIX    30 tablet    TAKE ONE TABLET BY MOUTH ONE TIME DAILY    CML (chronic myelocytic leukemia) (H), Status post bone marrow transplant (H), Graves disease, Paroxysmal atrial fibrillation (H), Constipation, unspecified constipation type, Adjustment disorder with depressed mood       Selenium 100 MCG Caps     90 capsule    1 tablet daily    Graves disease

## 2017-12-07 NOTE — PROGRESS NOTES
"BMT Clinic Progress Note   12/07/2017    Patient ID:  Panchito Pierce is a 52 year old man D+ 23 months s/p MUD SCT for CML     Diagnosis CMLP+ Chronic myelogeneous leukemia, Ph1+  HCT Type Allogeneic    Prep Regimen Cytoxan  TBI   Donor Source Unrelated BM    GVHD Prophylaxis Methotrexate  Cyclosporine  Primary BMT Provider Jermaine Cabrera MD     CC: follow-up     INTERVAL  HISTORY   Interval History:   Panchito called [place] today concerning continued rash on the right side of his face.  He had respiratory infection around Thanksgiving and right when that was done he started having right ear pain.  He was prescribed antibiotics for an ear infection. ( he thinks azithromycin and cipro ear drops maybe with steroids)  Then he started having tingling and pain on the right side of his face and mouth with a rash.  He went to his primary care physician and was given valtrex and oxycodone for the pain. He has blisters in his mouth and it paz it difficult to eat and sometime breathing through his mouth was painful.  No change in his vision on right side.  No hearling changes including ringing in his ears.  Rash is better and today for the first time the pain is better.  He will take a 14 day course of valtrex. Denies any fevers but a lot of pain, hasn't been drinking as well.  No headache but his right is \"tired\".    Review of Systems: 10 point ROS negative except as noted above.    PHYSICAL EXAM   /81  Pulse 68  Temp 98.3  F (36.8  C)  Resp 16  Wt 94.3 kg (208 lb)  SpO2 100%  BMI 30.72 kg/m2 BP   Wt Readings from Last 4 Encounters:   12/07/17 94.3 kg (208 lb)   09/13/17 96.9 kg (213 lb 11.2 oz)   06/14/17 91.9 kg (202 lb 8 oz)   06/02/17 91.2 kg (201 lb)   General: NAD, pleasant appearing male, much less chushingoid   Has red erythematous rash on right side of face starting at forehead down to mouth, right side of his lip is swollen and has 3 areas in his mouth where there were blisters/and erythematous skin.  Right " ear, tragus, pinna are slt swollen and erythematous.  Heart is regular, lungs are clear  Abdomen is soft.  Examined all skin and no other sign of zooster is seen.  NO sign of GVH  No central line      LABS AND IMAGING      Lab Results   Component Value Date    WBC 6.1 12/07/2017    ANEU 3.9 12/07/2017    HGB 12.8 (L) 12/07/2017    HCT 38.4 (L) 12/07/2017     (L) 12/07/2017     12/07/2017    POTASSIUM 4.1 12/07/2017    CHLORIDE 102 12/07/2017    CO2 27 12/07/2017    GLC 85 12/07/2017    BUN 24 12/07/2017    CR 1.57 (H) 12/07/2017    MAG 1.9 09/13/2017    INR 0.9 06/15/2016    BILITOTAL 0.4 12/07/2017    AST 33 12/07/2017    ALT 46 12/07/2017    ALKPHOS 91 12/07/2017    PROTTOTAL 7.5 12/07/2017    ALBUMIN 3.8 12/07/2017     BcrAbl undetectable    ASSESSMENT BY SYSTEMS   Panchito REICH Kari is a 51 year old man s/p MUD SCT for CML  - trasplanted in second chronic phase after blast crisis;    BMT/ CML:  Off dasatinib maintenance (12 months); repeat BCR/ABL undetectable - restage per protocol.  Has bone marrow biopsy scheduled for 12/18/2017.    HEMATOLOGY/COAGULATION: Platelets decreased likely from viral infection.     INFECTIONS AND PROPHYLAXIS: Afebrile. Off prophylaxis, acyclovir off as 9/2017.   -VZV on right cranial nerve V, trigeminal. No eye symptoms, mouth and ear improving. No new blisters. 14 days of valtrex prescribed by internal medicine physician.    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  Mouth is erythematous but only on the right side.  No other skin, muscle, joint changes. Appetite not good with infection and mouth blisters. No swollen joints/mucle pain.  Denies dry eyes.  no active chronic GVHD. Off immune suppression. 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 Stopped prednisone on 12/06/16. Off sirolimus April 2017. May continue biotene and dexa mouth wash.   -  Some nausea, maybe valtrex, has some anti nausea a chauncey   - Advised to wear sun  block when outside for extended periods of time to prevent flare of GVH    GASTROINTESTINAL/LIVER: LFT okay today  RENAL/FEN/Nutrition: Creat=1.57, no need to renally dose valtrex per pharm D.  Give 1 liter of NS today and call again if can't drink but mouth feeling better.     CARDIOVASCULAR:  Normotensive. Hx Hypertension. Cont metoprolol. Normotensive  - Hx of A fib with RVR (new 12/24/16); resolved with dilt, now on Metoprolol 12.5mg BID.     ENDOCRINE: following with Dr Guevara. thyroid and testosterone issues.  Hx Graves Disease s/p VELA therapy. Cont synthroid and liothyronine. TSH and Free T3 low; Free T4 elevated. Mixed picture; After endocrine consult:  Mild thyrotoxicosis due to large weight loss. On levothyroxine 137mcg.    - On selenium supplement, T3 and T4. See Dr. Guevara on 12/165/2017    Pain:  Wife says he needed some more pain medicine.  In the last 2 days he was prescribed oxycodone 48 tabs [place]  Wrote today.  Hold RX and can fill later  if taking 4 tablets daily.  Call if has chronic pain and consider gabapentin.  Plan:  Valtrex-continue,   Continue oxycodone for pain, no new pills   Give 1 liter of NS for bump in creat.  Encourage fluid intake.  Call with fevers, any change in vision or hearing, zooster appears to be getting better  SHWETHA Cabrera on 12/21/2017 per protocol for 2 yr visit  PLan vaccines Dec 2017, no vaccines today  Call come sooner if needed    Layla Eubanks PA-C  [id number]

## 2017-12-07 NOTE — NURSING NOTE
Chief Complaint   Patient presents with     Labs Only     venipuncture, vitals  checked     Pt has shingles on right side of face

## 2017-12-08 LAB — IGG SERPL-MCNC: 548 MG/DL (ref 695–1620)

## 2017-12-11 NOTE — PROGRESS NOTES
HPI      ROS      Physical Exam    12/7 IgG level=548, no need for replacement  Layla Eubanks PA-C  [id number]

## 2017-12-18 ENCOUNTER — OFFICE VISIT (OUTPATIENT)
Dept: TRANSPLANT | Facility: CLINIC | Age: 52
End: 2017-12-18
Attending: PHYSICIAN ASSISTANT
Payer: COMMERCIAL

## 2017-12-18 VITALS
DIASTOLIC BLOOD PRESSURE: 82 MMHG | HEART RATE: 78 BPM | TEMPERATURE: 97.1 F | WEIGHT: 208.8 LBS | SYSTOLIC BLOOD PRESSURE: 130 MMHG | OXYGEN SATURATION: 100 % | RESPIRATION RATE: 18 BRPM | BODY MASS INDEX: 30.83 KG/M2

## 2017-12-18 DIAGNOSIS — C92.10 CML (CHRONIC MYELOCYTIC LEUKEMIA) (H): ICD-10-CM

## 2017-12-18 DIAGNOSIS — Z94.81 STATUS POST BONE MARROW TRANSPLANT (H): Primary | ICD-10-CM

## 2017-12-18 LAB
ALBUMIN SERPL-MCNC: 4 G/DL (ref 3.4–5)
ALP SERPL-CCNC: 89 U/L (ref 40–150)
ALT SERPL W P-5'-P-CCNC: 32 U/L (ref 0–70)
ANION GAP SERPL CALCULATED.3IONS-SCNC: 6 MMOL/L (ref 3–14)
AST SERPL W P-5'-P-CCNC: 21 U/L (ref 0–45)
BASOPHILS # BLD AUTO: 0 10E9/L (ref 0–0.2)
BASOPHILS NFR BLD AUTO: 0.6 %
BILIRUB SERPL-MCNC: 0.4 MG/DL (ref 0.2–1.3)
BUN SERPL-MCNC: 27 MG/DL (ref 7–30)
CALCIUM SERPL-MCNC: 9.3 MG/DL (ref 8.5–10.1)
CHLORIDE SERPL-SCNC: 102 MMOL/L (ref 94–109)
CO2 SERPL-SCNC: 26 MMOL/L (ref 20–32)
CREAT SERPL-MCNC: 1.53 MG/DL (ref 0.66–1.25)
DIFFERENTIAL METHOD BLD: ABNORMAL
EOSINOPHIL # BLD AUTO: 0.3 10E9/L (ref 0–0.7)
EOSINOPHIL NFR BLD AUTO: 4 %
ERYTHROCYTE [DISTWIDTH] IN BLOOD BY AUTOMATED COUNT: 15 % (ref 10–15)
GFR SERPL CREATININE-BSD FRML MDRD: 48 ML/MIN/1.7M2
GLUCOSE SERPL-MCNC: 91 MG/DL (ref 70–99)
HCT VFR BLD AUTO: 36.5 % (ref 40–53)
HGB BLD-MCNC: 11.9 G/DL (ref 13.3–17.7)
IMM GRANULOCYTES # BLD: 0 10E9/L (ref 0–0.4)
IMM GRANULOCYTES NFR BLD: 0.4 %
LYMPHOCYTES # BLD AUTO: 2 10E9/L (ref 0.8–5.3)
LYMPHOCYTES NFR BLD AUTO: 30.3 %
MCH RBC QN AUTO: 28.5 PG (ref 26.5–33)
MCHC RBC AUTO-ENTMCNC: 32.6 G/DL (ref 31.5–36.5)
MCV RBC AUTO: 88 FL (ref 78–100)
MONOCYTES # BLD AUTO: 0.5 10E9/L (ref 0–1.3)
MONOCYTES NFR BLD AUTO: 7.9 %
NEUTROPHILS # BLD AUTO: 3.8 10E9/L (ref 1.6–8.3)
NEUTROPHILS NFR BLD AUTO: 56.8 %
NRBC # BLD AUTO: 0 10*3/UL
NRBC BLD AUTO-RTO: 0 /100
PLATELET # BLD AUTO: 211 10E9/L (ref 150–450)
POTASSIUM SERPL-SCNC: 4.3 MMOL/L (ref 3.4–5.3)
PROT SERPL-MCNC: 7.4 G/DL (ref 6.8–8.8)
RBC # BLD AUTO: 4.17 10E12/L (ref 4.4–5.9)
SODIUM SERPL-SCNC: 134 MMOL/L (ref 133–144)
WBC # BLD AUTO: 6.7 10E9/L (ref 4–11)

## 2017-12-18 PROCEDURE — 40000611 ZZHCL STATISTIC MORPHOLOGY W/INTERP HEMEPATH TC 85060: Performed by: PHYSICIAN ASSISTANT

## 2017-12-18 PROCEDURE — 25000128 H RX IP 250 OP 636: Mod: ZF | Performed by: PHYSICIAN ASSISTANT

## 2017-12-18 PROCEDURE — 88185 FLOWCYTOMETRY/TC ADD-ON: CPT | Performed by: PHYSICIAN ASSISTANT

## 2017-12-18 PROCEDURE — 38221 DX BONE MARROW BIOPSIES: CPT | Mod: ZF

## 2017-12-18 PROCEDURE — 88237 TISSUE CULTURE BONE MARROW: CPT | Performed by: PHYSICIAN ASSISTANT

## 2017-12-18 PROCEDURE — 00000161 ZZHCL STATISTIC H-SPHEME PROCESS B/S: Performed by: PHYSICIAN ASSISTANT

## 2017-12-18 PROCEDURE — 81267 CHIMERISM ANAL NO CELL SELEC: CPT | Performed by: PHYSICIAN ASSISTANT

## 2017-12-18 PROCEDURE — 00000058 ZZHCL STATISTIC BONE MARROW ASP PERF TC 38220: Performed by: PHYSICIAN ASSISTANT

## 2017-12-18 PROCEDURE — 88264 CHROMOSOME ANALYSIS 20-25: CPT | Performed by: PHYSICIAN ASSISTANT

## 2017-12-18 PROCEDURE — 88305 TISSUE EXAM BY PATHOLOGIST: CPT | Performed by: PHYSICIAN ASSISTANT

## 2017-12-18 PROCEDURE — 88280 CHROMOSOME KARYOTYPE STUDY: CPT | Performed by: PHYSICIAN ASSISTANT

## 2017-12-18 PROCEDURE — 88184 FLOWCYTOMETRY/ TC 1 MARKER: CPT | Performed by: PHYSICIAN ASSISTANT

## 2017-12-18 PROCEDURE — 40001005 ZZHCL STATISTIC FLOW >15 ABY TC 88189: Performed by: PHYSICIAN ASSISTANT

## 2017-12-18 PROCEDURE — 40000424 ZZHCL STATISTIC BONE MARROW CORE PERF TC 38221: Performed by: PHYSICIAN ASSISTANT

## 2017-12-18 PROCEDURE — 81268 CHIMERISM ANAL W/CELL SELECT: CPT | Mod: 91 | Performed by: INTERNAL MEDICINE

## 2017-12-18 PROCEDURE — 88275 CYTOGENETICS 100-300: CPT | Performed by: PHYSICIAN ASSISTANT

## 2017-12-18 PROCEDURE — 88271 CYTOGENETICS DNA PROBE: CPT | Performed by: PHYSICIAN ASSISTANT

## 2017-12-18 PROCEDURE — 85025 COMPLETE CBC W/AUTO DIFF WBC: CPT | Performed by: INTERNAL MEDICINE

## 2017-12-18 PROCEDURE — 80053 COMPREHEN METABOLIC PANEL: CPT | Performed by: INTERNAL MEDICINE

## 2017-12-18 PROCEDURE — 88161 CYTOPATH SMEAR OTHER SOURCE: CPT | Performed by: PHYSICIAN ASSISTANT

## 2017-12-18 PROCEDURE — 81206 BCR/ABL1 GENE MAJOR BP: CPT | Performed by: INTERNAL MEDICINE

## 2017-12-18 PROCEDURE — G0364 BONE MARROW ASPIRATE &BIOPSY: HCPCS | Mod: ZF

## 2017-12-18 PROCEDURE — 40000951 ZZHCL STATISTIC BONE MARROW INTERP TC 85097: Performed by: PHYSICIAN ASSISTANT

## 2017-12-18 PROCEDURE — 88311 DECALCIFY TISSUE: CPT | Performed by: PHYSICIAN ASSISTANT

## 2017-12-18 PROCEDURE — 81268 CHIMERISM ANAL W/CELL SELECT: CPT | Performed by: FAMILY MEDICINE

## 2017-12-18 RX ORDER — GABAPENTIN 100 MG/1
300 CAPSULE ORAL 3 TIMES DAILY
COMMUNITY
Start: 2017-12-12 | End: 2021-07-16

## 2017-12-18 RX ORDER — VALACYCLOVIR HYDROCHLORIDE 1 G/1
1 TABLET, FILM COATED ORAL 3 TIMES DAILY
COMMUNITY
Start: 2017-12-06 | End: 2017-12-21

## 2017-12-18 RX ADMIN — MIDAZOLAM 2 MG: 1 INJECTION INTRAMUSCULAR; INTRAVENOUS at 13:34

## 2017-12-18 ASSESSMENT — PAIN SCALES - GENERAL: PAINLEVEL: NO PAIN (0)

## 2017-12-18 NOTE — PROGRESS NOTES
BMT ONC Adult Bone Marrow Biopsy Procedure Note  December 18, 2017  There were no vitals taken for this visit.     Learning needs assessment complete within 12 months? YES    DIAGNOSIS: 2 years s/p MUD for CML     PROCEDURE: Unilateral Bone Marrow Biopsy and Unilateral Aspirate    LOCATION: [facility] 2nd Floor  Patient s identification was positively verified by verbal identification and invasive procedure safety checklist was completed. Informed consent was obtained. Following the administration of Midazalom 2mg x 1 as pre-medication, patient was placed in the prone position and prepped and draped in a sterile manner. Approximately 10 cc of 1% Lidocaine was used over the left posterior iliac spine. Following this a 3 mm incision was made. Trephine bone marrow core(s) was (were) obtained from the [facility]. Bone marrow aspirates were obtained from the IC. Aspirates were sent for morphology, immunophenotyping, cytogenetics and molecular diagnostics RFLP. A total of approximately 15 ml of marrow was aspirated. Following this procedure a sterile dressing was applied to the bone marrow biopsy site(s). The patient was placed in the supine position to maintain pressure on the biopsy site. Post-procedure wound care instructions were given.     Complications: NO    Pre-procedural pain: 0 out of 10 on the numeric pain rating scale.     Procedural pain: 4 out of 10 on the numeric pain rating scale.     Post-procedural pain assessment: 0 out of 10 on the numeric pain rating scale.     Interventions: NO    Length of procedure:20 minutes or less      Procedure performed by: Anabelle Bishop PA-C

## 2017-12-18 NOTE — MR AVS SNAPSHOT
After Visit Summary   12/18/2017    Panchito Pierce    MRN: [id number]           Patient Information     Date Of Birth          1965        Visit Information        Provider Department      12/18/2017 1:00 PM [facility] BONE MARROW BIOPSY;  BMT RENATA #3 [facility]        Today's Diagnoses     Status post bone marrow transplant (H)    -  1    CML (chronic myelocytic leukemia) (H)              [facility] [place]  [address] [place] [id number]  Phone: [phone]  Clinic Hours:   Monday-Thursday:7am to 7pm   Friday: 7am to 5pm   Weekends and holidays:    8am to noon (in general)  If your fever is 100.5  or greater,   call the clinic.  After hours call the   hospital at [phone] or   [phone]. Ask for the BMT   fellow on-call            Follow-ups after your visit        Your next 10 appointments already scheduled     Dec 21, 2017 10:00 AM [facility]   BMT Anniversary Visit with  BMT DOM   [facility] ([facility])    [address] [place] [id number] [phone]              Who to contact     If you have questions or need follow up information about today's clinic visit or your schedule please contact [facility] B[facility]OD AND MARROW TRANSPLANT directly at [phone].  Normal or non-critical lab and imaging results will be communicated to you by MyChart, letter or phone within 4 business days after the clinic has received the results. If you do not hear from us within 7 days, please contact the clinic through [employer] or phone. If you have a critical or abnormal lab result, we will notify you by phone as soon as possible.  Submit refill requests through [employer] or call your pharmacy and they will forward the refill request to us. Please allow 3 business days for your refill to be completed.          Additional Information About Your Visit        MyChart Information     [employer]  gives you secure access to your electronic health record. If you see a primary care provider, you can also send messages to your care team and make appointments. If you have questions, please call your primary care clinic.  If you do not have a primary care provider, please call [phone] and they will assist you.        Care EveryWhere ID     This is your Care EveryWhere ID. This could be used by other organizations to access your [place] medical records  [id number]        Your Vitals Were     Pulse Temperature Respirations Pulse Oximetry BMI (Body Mass Index)       61 97  F (36.1  C) 16 96% 30.83 kg/m2        Blood Pressure from Last 3 Encounters:   12/18/17 113/71   12/07/17 143/81   09/13/17 120/70    Weight from Last 3 Encounters:   12/18/17 94.7 kg (208 lb 12.8 oz)   12/07/17 94.3 kg (208 lb)   09/13/17 96.9 kg (213 lb 11.2 oz)              We Performed the Following     Bone Marrow Aspirate (Charge)     Bone Marrow Biopsy (Charge)     CBC with platelets differential     Comprehensive metabolic panel     Dna Marker Post Bmt Engraftment  Bld        Recent Review Flowsheet Data     BMT Recent Results Latest Ref Rng & Units 4/12/2017 4/21/2017 6/2/2017 6/14/2017 9/13/2017 12/7/2017 12/18/2017    WBC 4.0 - 11.0 10e9/L 4.8 4.0 8.7 6.1 5.7 6.1 6.7    Hemoglobin 13.3 - 17.7 g/dL 9.1(L) 9.0(L) 10.9(L) 10.3(L) 11.7(L) 12.8(L) 11.9(L)    Platelet Count 150 - 450 10e9/L 215 227 207 191 176 149(L) 211    Neutrophils (Absolute) 1.6 - 8.3 10e9/L 3.6 2.9 6.8 4.2 3.7 3.9 3.8    INR 0.86 - 1.14 - - - - - - -    Sodium 133 - 144 mmol/L 139 140 139 139 140 137 134    Potassium 3.4 - 5.3 mmol/L 4.3 4.2 4.4 4.4 3.6 4.1 4.3    Chloride 94 - 109 mmol/L 108 106 107 107 106 102 102    Glucose 70 - 99 mg/dL 101(H) 95 94 88 122(H) 85 91    Urea Nitrogen 7 - 30 mg/dL 23 24 24 22 21 24 27    Creatinine 0.66 - 1.25 mg/dL 1.52(H) 1.09 1.16 1.20 1.26(H) 1.57(H) 1.53(H)    Calcium (Total) 8.5 - 10.1 mg/dL 9.0 8.8 9.2 9.1 8.9 9.0 9.3     Protein (Total) 6.8 - 8.8 g/dL 7.2 6.4(L) 7.0 6.6(L) 6.6(L) 7.5 7.4    Albumin 3.4 - 5.0 g/dL 3.6 3.4 3.6 3.4 3.5 3.8 4.0    Bilirubin (Direct) 0.0 - 0.2 mg/dL - - - - - - -    Alkaline Phosphatase 40 - 150 U/L 130 130 169(H) 122 109 91 89    AST 0 - 45 U/L 21 26 33 25 27 33 21    ALT 0 - 70 U/L 28 37 54 35 54 46 32    MCV 78 - 100 fl 82 82 82 84 87 88 88               Primary Care Provider Office Phone # Fax #    Leann Berry [phone]       [facility] [address] [address] [address]        Equal Access to Services     [facility]: Hadii haydee cameron hadasho Somolly, waaxda luqadaha, qaybta kaalmada adeegyada, delfino quiles . So [facility] [phone].    ATENCIÓN: Si habla español, tiene a vigil disposición servicios gratuitos de asistencia lingüística. Tara al [phone].    We comply with applicable federal civil rights laws and [place] laws. We do not discriminate on the basis of race, color, national origin, age, disability, sex, sexual orientation, or gender identity.            Thank you!     Thank you for choosing [facility] BLOOD AND MARROW TRANSPLANT  for your care. Our goal is always to provide you with excellent care. Hearing back from our patients is one way we can continue to improve our services. Please take a few minutes to complete the written survey that you may receive in the mail after your visit with us. Thank you!             Your Updated Medication List - Protect others around you: Learn how to safely use, store and throw away your medicines at www.disposemymeds.org.          This list is accurate as of: 12/18/17  2:16 PM.  Always use your most recent med list.                   Brand Name Dispense Instructions for use Diagnosis    calcium carbonate-vitamin D 500-400 MG-UNIT Tabs per tablet     180 tablet    Take 2 tablets by mouth daily    CML (chronic myelocytic leukemia) (H), Status post bone marrow transplant (H), Graves  disease, Paroxysmal atrial fibrillation (H), Constipation, unspecified constipation type, Adjustment disorder with depressed mood       gabapentin 100 MG capsule    NEURONTIN     Take 300 mg by mouth 3 times daily        levothyroxine 137 MCG tablet    SYNTHROID/LEVOTHROID    90 tablet    Take 1 tablet (137 mcg) by mouth daily    Hyperthyroidism       lidocaine visc 2% 2.5mL/5mL & maalox/mylanta w/ simeth 2.5mL/5mL & diphenhydrAMINE 5mg/5mL Susp suspension    [facility]     Take 5 mLs by mouth        liothyronine 5 MCG tablet    CYTOMEL    180 tablet    Take 1 tablet (5 mcg) by mouth 2 times daily    Hyperthyroidism       metoprolol 25 MG tablet    LOPRESSOR    30 tablet    Take 0.5 tablets (12.5 mg) by mouth 2 times daily    Paroxysmal atrial fibrillation (H), CML (chronic myelocytic leukemia) (H), Graves disease, 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 TAKE 1 TABLET BY MOUTH DAILY DURING WINTER MONTHS TO PREVENT FLARES    Raynaud's phenomenon without gangrene       oxyCODONE IR 5 MG tablet    ROXICODONE    18 tablet    Take 1 tablet (5 mg) by mouth every 4 hours as needed for moderate to severe pain    VZV (varicella-zoster virus) infection       pantoprazole 40 MG EC tablet    PROTONIX    30 tablet    TAKE ONE TABLET BY MOUTH ONE TIME DAILY    CML (chronic myelocytic leukemia) (H), Status post bone marrow transplant (H), Graves disease, Paroxysmal atrial fibrillation (H), Constipation, unspecified constipation type, Adjustment disorder with depressed mood       Selenium 100 MCG Caps     90 capsule    1 tablet daily    Graves disease       valACYclovir 1000 mg tablet    VALTREX     Take 1 g by mouth 3 times daily

## 2017-12-18 NOTE — NURSING NOTE
"Oncology Rooming Note    December 18, 2017 12:48 PM   Panchito Pierce is a 52 year old male who presents for:    Chief Complaint   Patient presents with     Bone Marrow Biopsy     Patient here for BMBX r/t CML post transplant.      Initial Vitals: /71  Pulse 61  Temp 97  F (36.1  C)  Resp 16  Wt 94.7 kg (208 lb 12.8 oz)  SpO2 96%  BMI 30.83 kg/m2 Estimated body mass index is 30.83 kg/(m^2) as calculated from the following:    Height as of 6/2/17: 1.753 m (5' 9\").    Weight as of this encounter: 94.7 kg (208 lb 12.8 oz). Body surface area is 2.15 meters squared.  No Pain (0) Comment: Data Unavailable   No LMP for male patient.  Allergies reviewed: Yes  Medications reviewed: Yes    Medications: Medication refills not needed today.  Pharmacy name entered into [employer]:    [id number] - [place] [[address] PHARMACY [facility] [place] - [address] [place] [address] [address]  [place], [place] - [address] [place] [address] [facility] - [place], [place] - [address] [place] [address] [facility]    Clinical concerns: No     0 minutes for nursing intake (face to face time)     Yu Muñoz RN              "

## 2017-12-18 NOTE — NURSING NOTE
BMT Teaching Flowsheet    Panchito Pierce is a 52 year old male  The primary encounter diagnosis was Status post bone marrow transplant (H). A diagnosis of CML (chronic myelocytic leukemia) (H) was also pertinent to this visit.    Teaching Topic: bone marrow biopsy    Person(s) involved in teaching: Patient  Motivation Level:  High  Asks Questions: Yes  Eager to Learn: Yes  Cooperative: Yes  Receptive (willing/able to accept information): Yes  Any cultural factors/Mormonism beliefs that may influence understanding or compliance? No    Patient demonstrates understanding of the following:  - Reason for the appointment, diagnosis and treatment plan: Yes  - Knowledge of proper use of medications and conditions for which they are ordered (with special attention to potential side effects or drug interactions): Yes  - Which situations necessitate calling provider and whom to contact: Yes    Teaching concerns addressed: after bone marrow biopsy cares      Pain management techniques: Yes      Infection Control:  Patient demonstrates understanding of the following:  Bone marrow biopsyl procedure site care taught Yes  Signs and symptoms of infection taught Yes      Instructional Materials Used/Given:  Your Bone Marrow Biopsy    Time spent with patient: 5 minutes.

## 2017-12-18 NOTE — NURSING NOTE
Patient remained supine for 30 minutes post bone marrow biopsy.  His vital signs were within normal limits and stable, dressing remained clean/dry/intact and he denied pain/discomfort at biopsy site.  He was alert and steady on his feet.  Patient was discharged in the care of his friend.

## 2017-12-19 LAB
COPATH REPORT: NORMAL
COPATH REPORT: NORMAL

## 2017-12-19 NOTE — NURSING NOTE
"Oncology Rooming Note    June 2, 2017 10:34 AM   Panchito Pierce is a 52 year old male who presents for:    Chief Complaint   Patient presents with     RECHECK     here for provider visit HX: CML s/p BMT     Initial Vitals: There were no vitals taken for this visit. Estimated body mass index is 29.68 kg/(m^2) as calculated from the following:    Height as of an earlier encounter on 6/2/17: 1.753 m (5' 9\").    Weight as of an earlier encounter on 6/2/17: 91.2 kg (201 lb). There is no height or weight on file to calculate BSA.  Data Unavailable Comment: Data Unavailable   No LMP for male patient.  Allergies reviewed: Yes  Medications reviewed: Yes    Medications: Medication refills not needed today.  Pharmacy name entered into [facility]:    [id number] - [place] [[email or web address] [place] - [address] [place] [address] PHARMACY [facility] [place] - [address] [place] [address] [phone]  [facility] [place], [place] - [address] [place] [address] [facility] - [place], [place] - [address] [place] [address] AVE     Clinical concerns: none     3 minutes for nursing intake (face to face time)     Karla Suarez RN              " no numbness/no tingling

## 2017-12-20 LAB — COPATH REPORT: NORMAL

## 2017-12-21 ENCOUNTER — OFFICE VISIT (OUTPATIENT)
Dept: ENDOCRINOLOGY | Facility: CLINIC | Age: 52
End: 2017-12-21
Payer: COMMERCIAL

## 2017-12-21 ENCOUNTER — OFFICE VISIT (OUTPATIENT)
Dept: TRANSPLANT | Facility: CLINIC | Age: 52
End: 2017-12-21
Attending: INTERNAL MEDICINE
Payer: COMMERCIAL

## 2017-12-21 ENCOUNTER — TELEPHONE (OUTPATIENT)
Dept: ENDOCRINOLOGY | Facility: CLINIC | Age: 52
End: 2017-12-21

## 2017-12-21 VITALS
DIASTOLIC BLOOD PRESSURE: 80 MMHG | SYSTOLIC BLOOD PRESSURE: 124 MMHG | WEIGHT: 208.11 LBS | BODY MASS INDEX: 30.73 KG/M2 | OXYGEN SATURATION: 97 % | RESPIRATION RATE: 16 BRPM | HEART RATE: 61 BPM | TEMPERATURE: 98.1 F

## 2017-12-21 VITALS
HEART RATE: 61 BPM | DIASTOLIC BLOOD PRESSURE: 80 MMHG | BODY MASS INDEX: 30.72 KG/M2 | SYSTOLIC BLOOD PRESSURE: 124 MMHG | WEIGHT: 208 LBS

## 2017-12-21 DIAGNOSIS — E05.90 HYPERTHYROIDISM: Primary | ICD-10-CM

## 2017-12-21 DIAGNOSIS — E03.9 HYPOTHYROIDISM, UNSPECIFIED TYPE: ICD-10-CM

## 2017-12-21 DIAGNOSIS — C92.10 CML (CHRONIC MYELOCYTIC LEUKEMIA) (H): ICD-10-CM

## 2017-12-21 DIAGNOSIS — C92.10 CML (CHRONIC MYELOCYTIC LEUKEMIA) (H): Primary | ICD-10-CM

## 2017-12-21 DIAGNOSIS — E03.9 HYPOTHYROIDISM, UNSPECIFIED TYPE: Primary | ICD-10-CM

## 2017-12-21 LAB
ALBUMIN SERPL-MCNC: 4 G/DL (ref 3.4–5)
ALP SERPL-CCNC: 81 U/L (ref 40–150)
ALT SERPL W P-5'-P-CCNC: 28 U/L (ref 0–70)
ANION GAP SERPL CALCULATED.3IONS-SCNC: 8 MMOL/L (ref 3–14)
AST SERPL W P-5'-P-CCNC: 12 U/L (ref 0–45)
BASOPHILS # BLD AUTO: 0 10E9/L (ref 0–0.2)
BASOPHILS NFR BLD AUTO: 0.5 %
BILIRUB SERPL-MCNC: 0.4 MG/DL (ref 0.2–1.3)
BUN SERPL-MCNC: 22 MG/DL (ref 7–30)
CALCIUM SERPL-MCNC: 9.1 MG/DL (ref 8.5–10.1)
CHLORIDE SERPL-SCNC: 101 MMOL/L (ref 94–109)
CO2 SERPL-SCNC: 28 MMOL/L (ref 20–32)
COPATH REPORT: NORMAL
COPATH REPORT: NORMAL
CREAT SERPL-MCNC: 1.49 MG/DL (ref 0.66–1.25)
DIFFERENTIAL METHOD BLD: ABNORMAL
EOSINOPHIL # BLD AUTO: 0.2 10E9/L (ref 0–0.7)
EOSINOPHIL NFR BLD AUTO: 3.1 %
ERYTHROCYTE [DISTWIDTH] IN BLOOD BY AUTOMATED COUNT: 15.3 % (ref 10–15)
GFR SERPL CREATININE-BSD FRML MDRD: 50 ML/MIN/1.7M2
GLUCOSE SERPL-MCNC: 96 MG/DL (ref 70–99)
HBA1C MFR BLD: 5.8 % (ref 4.3–6)
HCT VFR BLD AUTO: 37.4 % (ref 40–53)
HGB BLD-MCNC: 12.1 G/DL (ref 13.3–17.7)
IMM GRANULOCYTES # BLD: 0 10E9/L (ref 0–0.4)
IMM GRANULOCYTES NFR BLD: 0.1 %
LYMPHOCYTES # BLD AUTO: 1.9 10E9/L (ref 0.8–5.3)
LYMPHOCYTES NFR BLD AUTO: 24.9 %
MCH RBC QN AUTO: 28.9 PG (ref 26.5–33)
MCHC RBC AUTO-ENTMCNC: 32.4 G/DL (ref 31.5–36.5)
MCV RBC AUTO: 90 FL (ref 78–100)
MONOCYTES # BLD AUTO: 0.5 10E9/L (ref 0–1.3)
MONOCYTES NFR BLD AUTO: 7 %
NEUTROPHILS # BLD AUTO: 5 10E9/L (ref 1.6–8.3)
NEUTROPHILS NFR BLD AUTO: 64.4 %
NRBC # BLD AUTO: 0 10*3/UL
NRBC BLD AUTO-RTO: 0 /100
PLATELET # BLD AUTO: 196 10E9/L (ref 150–450)
POTASSIUM SERPL-SCNC: 4.5 MMOL/L (ref 3.4–5.3)
PROT SERPL-MCNC: 7.4 G/DL (ref 6.8–8.8)
RBC # BLD AUTO: 4.18 10E12/L (ref 4.4–5.9)
SODIUM SERPL-SCNC: 136 MMOL/L (ref 133–144)
TSH SERPL DL<=0.005 MIU/L-ACNC: 8.89 MU/L (ref 0.4–4)
WBC # BLD AUTO: 7.8 10E9/L (ref 4–11)

## 2017-12-21 PROCEDURE — 36415 COLL VENOUS BLD VENIPUNCTURE: CPT

## 2017-12-21 PROCEDURE — 99213 OFFICE O/P EST LOW 20 MIN: CPT | Mod: ZF

## 2017-12-21 PROCEDURE — 85025 COMPLETE CBC W/AUTO DIFF WBC: CPT

## 2017-12-21 PROCEDURE — 81206 BCR/ABL1 GENE MAJOR BP: CPT

## 2017-12-21 PROCEDURE — 80053 COMPREHEN METABOLIC PANEL: CPT

## 2017-12-21 RX ORDER — ACYCLOVIR 400 MG/1
800 TABLET ORAL 2 TIMES DAILY
Qty: 120 TABLET | Refills: 3 | Status: SHIPPED | OUTPATIENT
Start: 2017-12-21 | End: 2018-03-26

## 2017-12-21 RX ORDER — LIOTHYRONINE SODIUM 5 UG/1
5 TABLET ORAL 2 TIMES DAILY
Qty: 180 TABLET | Refills: 3 | Status: SHIPPED | OUTPATIENT
Start: 2017-12-21 | End: 2018-12-03

## 2017-12-21 RX ORDER — LEVOTHYROXINE SODIUM 150 UG/1
150 TABLET ORAL DAILY
Qty: 90 TABLET | Refills: 3 | Status: SHIPPED | OUTPATIENT
Start: 2017-12-21 | End: 2018-12-03

## 2017-12-21 ASSESSMENT — PAIN SCALES - GENERAL
PAINLEVEL: NO PAIN (0)
PAINLEVEL: NO PAIN (0)

## 2017-12-21 NOTE — LETTER
12/21/2017       RE: Panchito Pierce  [address]  [place] [address]     Dear Colleague,    Thank you for referring your patient, Panchito Pierce, to the [facility] ENDOCRINOLOGY at [facility]. Please see a copy of my visit note below.    Panchito presents today for RECHECK (GRAVES DISEASE F/U )  .    HPI  #1 hypothyroidism due to Graves' disease status post radioactive iodide  Patient report, was diagnosed with Graves' disease in 2002.  He underwent radioactive therapy of the thyroid and was treated with levothyroxine.  Per patient report, he does best on a T3 and T4 combination.  Patient does have a history of Graves eye disease and is currently on selenium and most recently, and steroids (for mzeli-elakhg-gkrn disease since April 2016) Pt  noted to be mildly thyrotoxic with weight loss during his inpatient hospitalization.  April 2016 TSH was undetectable, free T4 was high at 1.51 and free T3 was low at 1.9.  Levothyroxine was adjusted and he continued with Cytomel 5  g twice daily. November 2016 TSH is 1.11.    This a family history of hypothyroidism.    Interval history: Patient continues on selenium.  He is on Cytomel 5 mcg twice daily as well as levothyroxine 137 mcg daily.  June 2017 TSH 6.94.  Although I increased his levothyroxine, his December 2017 TSH is now 8.89.  Of note, the patient does take his levothyroxine with his other medications including calcium, first thing in the morning.  He does feel fatigued but it is difficult for him to separate this from his current zoster infection.    #2 steroid use  He reports that he's been put on prednisone daily since April 2016 for sydyf-udhzaj-lnxk disease.  He is currently on a taper.     Interval history: Patient stopped his prednisone as of December 2016.  December 2017 hemoglobin A1c was normal.    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  He also  reports a rare morning erections.  He reports he shakes every few weeks and has also noted loss of his axillary hair, chest here, as well as decrease in testicular size.  September 2016 total testosterone of 141, PSA of 0.5.  Repeat check in October 2016 showed a testosterone 186, FSH of 25.2, and elevated to 7.7.     He reports a history of prostate cancer in his father.  He does report extremely low libido which concerns his wife.      Interval history: June 2017 testosterone level was normal at 333    #4 sleep apnea  This is noted at his previous visit.  He has seen sleep medicine in 2017 with a sleep study.  Evaluation is suggestive of sleep apnea (sat at 81%, 96 hypoapneic episodes over 4 hrs) but he has not yet started C Pap.    Interval history: Not discussed the current visit    #5 slightly impaired vision  Patient reports frequently needing to use the magnifying glass.  He would like to see ophthalmology    Interval history: I have scheduled the patient for ophthalmology.  This is not yet done.    #6 herpes zoster  Just diagnosed approximately 1 week ago.  Patient currently on acyclovir and gabapentin.  He does report intermittent pain ranging from his right ear to his face which lasted about 15 minutes.    Past Medical History  Past Medical History:   Diagnosis Date     Graves disease 1/1/2003    treated with radioiodine, meds T3 and T4     Murmur, heart     has not been fully evaluated       Allergies  Allergies   Allergen Reactions     Allopurinol Rash     Unclear if it was from allopurinol, but went away when it was stopped.  But also had steroid cream at the same time.     Medications  Current Outpatient Prescriptions   Medication Sig Dispense Refill     acyclovir (ZOVIRAX) 400 MG tablet Take 2 tablets (800 mg) by mouth 2 times daily 120 tablet 3     gabapentin (NEURONTIN) 100 MG capsule Take 300 mg by mouth 3 times daily       magic mouthwash suspension (diphenhydrAMINE, lidocaine, aluminum-magnesium  & simethicone) Take 5 mLs by mouth       oxyCODONE IR (ROXICODONE) 5 MG tablet Take 1 tablet (5 mg) by mouth every 4 hours as needed for moderate to severe pain 18 tablet 0     pantoprazole (PROTONIX) 40 MG EC tablet TAKE ONE TABLET BY MOUTH ONE TIME DAILY  30 tablet 10     NIFEdipine ER osmotic (PROCARDIA XL) 30 MG 24 hr tablet TAKE 1 TABLET BY MOUTH DAILY. TAKE 1 TABLET BY MOUTH DAILY DURING WINTER MONTHS TO PREVENT FLARES 30 tablet 5     Selenium 100 MCG CAPS 1 tablet daily 90 capsule 2     liothyronine (CYTOMEL) 5 MCG tablet Take 1 tablet (5 mcg) by mouth 2 times daily 180 tablet 3     levothyroxine (SYNTHROID/LEVOTHROID) 137 MCG tablet Take 1 tablet (137 mcg) by mouth daily 90 tablet 3     metoprolol (LOPRESSOR) 25 MG tablet Take 0.5 tablets (12.5 mg) by mouth 2 times daily 30 tablet 10     calcium carbonate-vitamin D 500-400 MG-UNIT TABS per tablet Take 2 tablets by mouth daily 180 tablet 11     Family History  family history includes Asthma in his son; Dementia in his father; Heart Murmur in his brother and father; Hepatitis in his brother; Lung Cancer in his mother; Prostate Cancer in his father; Thyroid Disease in his mother, sister, sister, and sister. There is no history of Melanoma or Skin Cancer.  Social History  Social History     Social History     Marital status:      Spouse name: Nu     Number of children: 4     Years of education: N/A     Occupational History     Not on file.     Social History Main Topics     Smoking status: Former Smoker     Packs/day: 0.50     Years: 30.00     Start date: 1/1/1985     Quit date: 6/28/2015     Smokeless tobacco: Never Used     Alcohol use Yes      Comment: once a week     Drug use: No     Sexual activity: Not on file      Comment:      Other Topics Concern     Not on file     Social History Narrative     to wife Nu.    4 children: 1 step daughter Ro age 27, 3 biological sons, 17 yo Jose L, 15 yo Sergio ([place]ay[place]), 11 yo Elvis     Lives in [place]    Used to work in clerical work, now in school for electrical engineering    11 brothers and sisters, 3 live in the [facility], 1 brother incarcerated, 1 brother hepatitis C           ROS  PerHPI    Physical Exam  /80  Pulse 61  Wt 94.3 kg (208 lb)  BMI 30.72 kg/m2  Body mass index is 30.72 kg/(m^2).    Exam:  GENERAL APPEARANCE: Alert and no distress, noted healing blisters on the right side of his face  NECK: No lymphadenopathy appreciated  Thyroid: No obvious nodules palpated   CV: RRR   Lungs: CTA bilaterally  Abdomen: Soft, Nontender, non distended, positive bowel sounds      Results:  Orders Only on 12/21/2017   Component Date Value Ref Range Status     Sodium 12/21/2017 136  133 - 144 mmol/L Final     Potassium 12/21/2017 4.5  3.4 - 5.3 mmol/L Final     Chloride 12/21/2017 101  94 - 109 mmol/L Final     Carbon Dioxide 12/21/2017 28  20 - 32 mmol/L Final     Anion Gap 12/21/2017 8  3 - 14 mmol/L Final     Glucose 12/21/2017 96  70 - 99 mg/dL Final     Urea Nitrogen 12/21/2017 22  7 - 30 mg/dL Final     Creatinine 12/21/2017 1.49* 0.66 - 1.25 mg/dL Final     GFR Estimate 12/21/2017 50* >60 mL/min/1.7m2 Final    Non  GFR Calc     GFR Estimate If Black 12/21/2017 60* >60 mL/min/1.7m2 Final    African American GFR Calc     Calcium 12/21/2017 9.1  8.5 - 10.1 mg/dL Final     Bilirubin Total 12/21/2017 0.4  0.2 - 1.3 mg/dL Final     Albumin 12/21/2017 4.0  3.4 - 5.0 g/dL Final     Protein Total 12/21/2017 7.4  6.8 - 8.8 g/dL Final     Alkaline Phosphatase 12/21/2017 81  40 - 150 U/L Final     ALT 12/21/2017 28  0 - 70 U/L Final     AST 12/21/2017 12  0 - 45 U/L Final     WBC 12/21/2017 7.8  4.0 - 11.0 10e9/L Final     RBC Count 12/21/2017 4.18* 4.4 - 5.9 10e12/L Final     Hemoglobin 12/21/2017 12.1* 13.3 - 17.7 g/dL Final     Hematocrit 12/21/2017 37.4* 40.0 - 53.0 % Final     MCV 12/21/2017 90  78 - 100 fl Final     MCH 12/21/2017 28.9  26.5 - 33.0 pg Final     MCHC  12/21/2017 32.4  31.5 - 36.5 g/dL Final     RDW 12/21/2017 15.3* 10.0 - 15.0 % Final     Platelet Count 12/21/2017 196  150 - 450 10e9/L Final     Diff Method 12/21/2017 Automated Method   Final     % Neutrophils 12/21/2017 64.4  % Final     % Lymphocytes 12/21/2017 24.9  % Final     % Monocytes 12/21/2017 7.0  % Final     % Eosinophils 12/21/2017 3.1  % Final     % Basophils 12/21/2017 0.5  % Final     % Immature Granulocytes 12/21/2017 0.1  % Final     Nucleated RBCs 12/21/2017 0  0 /100 Final     Absolute Neutrophil 12/21/2017 5.0  1.6 - 8.3 10e9/L Final     Absolute Lymphocytes 12/21/2017 1.9  0.8 - 5.3 10e9/L Final     Absolute Monocytes 12/21/2017 0.5  0.0 - 1.3 10e9/L Final     Absolute Eosinophils 12/21/2017 0.2  0.0 - 0.7 10e9/L Final     Absolute Basophils 12/21/2017 0.0  0.0 - 0.2 10e9/L Final     Abs Immature Granulocytes 12/21/2017 0.0  0 - 0.4 10e9/L Final     Absolute Nucleated RBC 12/21/2017 0.0   Final     TSH 12/21/2017 8.89* 0.40 - 4.00 mU/L Final     Hemoglobin A1C 12/21/2017 5.8  4.3 - 6.0 % Final   Office Visit on 12/18/2017   Component Date Value Ref Range Status     WBC 12/18/2017 6.7  4.0 - 11.0 10e9/L Final     RBC Count 12/18/2017 4.17* 4.4 - 5.9 10e12/L Final     Hemoglobin 12/18/2017 11.9* 13.3 - 17.7 g/dL Final     Hematocrit 12/18/2017 36.5* 40.0 - 53.0 % Final     MCV 12/18/2017 88  78 - 100 fl Final     MCH 12/18/2017 28.5  26.5 - 33.0 pg Final     MCHC 12/18/2017 32.6  31.5 - 36.5 g/dL Final     RDW 12/18/2017 15.0  10.0 - 15.0 % Final     Platelet Count 12/18/2017 211  150 - 450 10e9/L Final     Diff Method 12/18/2017 Automated Method   Final     % Neutrophils 12/18/2017 56.8  % Final     % Lymphocytes 12/18/2017 30.3  % Final     % Monocytes 12/18/2017 7.9  % Final     % Eosinophils 12/18/2017 4.0  % Final     % Basophils 12/18/2017 0.6  % Final     % Immature Granulocytes 12/18/2017 0.4  % Final     Nucleated RBCs 12/18/2017 0  0 /100 Final     Absolute Neutrophil 12/18/2017 3.8   1.6 - 8.3 10e9/L Final     Absolute Lymphocytes 12/18/2017 2.0  0.8 - 5.3 10e9/L Final     Absolute Monocytes 12/18/2017 0.5  0.0 - 1.3 10e9/L Final     Absolute Eosinophils 12/18/2017 0.3  0.0 - 0.7 10e9/L Final     Absolute Basophils 12/18/2017 0.0  0.0 - 0.2 10e9/L Final     Abs Immature Granulocytes 12/18/2017 0.0  0 - 0.4 10e9/L Final     Absolute Nucleated RBC 12/18/2017 0.0   Final     Sodium 12/18/2017 134  133 - 144 mmol/L Final     Potassium 12/18/2017 4.3  3.4 - 5.3 mmol/L Final     Chloride 12/18/2017 102  94 - 109 mmol/L Final     Carbon Dioxide 12/18/2017 26  20 - 32 mmol/L Final     Anion Gap 12/18/2017 6  3 - 14 mmol/L Final     Glucose 12/18/2017 91  70 - 99 mg/dL Final     Urea Nitrogen 12/18/2017 27  7 - 30 mg/dL Final     Creatinine 12/18/2017 1.53* 0.66 - 1.25 mg/dL Final     GFR Estimate 12/18/2017 48* >60 mL/min/1.7m2 Final    Non  GFR Calc     GFR Estimate If Black 12/18/2017 58* >60 mL/min/1.7m2 Final    African American GFR Calc     Calcium 12/18/2017 9.3  8.5 - 10.1 mg/dL Final     Bilirubin Total 12/18/2017 0.4  0.2 - 1.3 mg/dL Final     Albumin 12/18/2017 4.0  3.4 - 5.0 g/dL Final     Protein Total 12/18/2017 7.4  6.8 - 8.8 g/dL Final     Alkaline Phosphatase 12/18/2017 89  40 - 150 U/L Final     ALT 12/18/2017 32  0 - 70 U/L Final     AST 12/18/2017 21  0 - 45 U/L Final     Copath Report 12/18/2017    Final         ASSESSMENT:    #1 hypothyroidism due to Graves' disease status post radioactive iodide  Pt needs continue on Cytomel in addition to his levothyroxine.  We will also the patient continue with the selenium at 100  g daily for now.      TSH is still elevated.  However the patient is taking his levothyroxine and Cytomel along with calcium and his other medications.  We will increase his levothyroxine to 150 mcg daily and reminded patient to separate his thyroid hormone medication from his other pills and supplements, particularly calcium.  We will plan on  rechecking labs in about 6 weeks.  In the meantime, the patient can use up his current levothyroxine  supply of 137 mcg tablets by taking levothyroxine 137 mcg daily 6 days a week and 274 mcg daily one day.  After he is done using his current levothyroxine supply, he will start levothyroxine at 150 mcg daily.  Prescription sent to pharmacy.    #2 steroid use  Patient has stopped his steroids in December 2016 and currently feels quite well.  December 2017 hemoglobin A1c is normal.    #3 history of low testosterone  Testosterone from June 2017 was normal.  Repeat testosterone pending from today.  No specific complaints.      TSH 12/21/2017 8.89* 0.40 - 4.00 mU/L Final     Testosterone Total 12/21/2017 94* 240 - 950 ng/dL Final    Comment: This test was developed and its performance characteristics determined by the   [facility],  Special Chemistry Laboratory. It has   not been cleared or approved by the FDA. The laboratory is regulated under   CLIA as qualified to perform high-complexity testing. This test is used for   clinical purposes. It should not be regarded as investigational or for   research.       Sex Hormone Binding Globulin 12/21/2017 16  11 - 80 nmol/L Final     Free Testosterone Calculated 12/21/2017 2.46* 4.7 - 24.4 ng/dL Final     Hemoglobin A1C 12/21/2017 5.8  4.3 - 6.0 % Final     ADDENDUM: Testosterone is lower.  We will plan on rechecking testosterone, FSH, LH, prolactin in roughly 1 month.    #4 possible sleep apnea  Not discussed at current visit.    #5 decreased libido  This has improved    #6 slightly impaired vision  Did not discuss the current visit    #7 herpes zoster  Patient currently has herpes zoster.  Recommended patient increase his Neurontin to 600 mg at night and add 300-600 mg during the day for pain.  Discussed with Dr. Cabrera and will defer on  flu shot for now.    Return visit planned in 6 months    25 minutes spent with patient of which 20 minutes  was spent in face-to-face discussion coordination of care    Again, thank you for allowing me to participate in the care of your patient.      Sincerely,    Pattie Guevara MD

## 2017-12-21 NOTE — PATIENT INSTRUCTIONS
SHWETHA Cabrera in 1 year with blood counts  Blood BCR-Abl  Continue acyclovir 800 mg BID for 3 months  Dermatologist once year for skin cancer screen  Primary care for cardiovascular and cholesterol screen and management, diabetes, prostate screen, colon cancer screen,  And usual health care such as vit D....   Dentist visit at least once a year to screen for oral cancer  Hearing checking once recovered from zoster      12/19/2018 10AM LABS AND VISIT

## 2017-12-21 NOTE — PROGRESS NOTES
BMT Clinic Progress Note   12/21/2017    Patient ID:  Panchito Pierce is a 52 year old man D+ 2 years s/p MUD SCT for CML     Diagnosis CMLP+ Chronic myelogeneous leukemia, Ph1+  HCT Type Allogeneic    Prep Regimen Cytoxan  TBI   Donor Source Unrelated BM    GVHD Prophylaxis Methotrexate  Cyclosporine  Primary BMT Provider Jermaine Cabrera MD     CC: follow-up     INTERVAL  HISTORY   Interval History:   Panchito comes to review the results of his 2 year post transplant evaluation. Recently he was seen in our clinic because of herpes zoster in his right face and ear. While the blisters have healed he still has residual pain with episodes of shooting pain that last a few seconds, and numbness on the right side half of his tongue.  While the pain has interfered with his quality of life he is overall doing well. He has no rashes, no dry mouth, no dry eyes, no respiratory, and no gastrointestinal symptoms. He noticed no areas of skin thickening or limitations to the range of motion of his arms.    Review of Systems: 10 point ROS negative except as noted above.    PHYSICAL EXAM   /80  Pulse 61  Temp 98.1  F (36.7  C) (Oral)  Resp 16  Wt 94.4 kg (208 lb 1.8 oz)  SpO2 97%  BMI 30.73 kg/m2 BP   Wt Readings from Last 4 Encounters:   12/21/17 94.3 kg (208 lb)   12/21/17 94.4 kg (208 lb 1.8 oz)   12/18/17 94.7 kg (208 lb 12.8 oz)   12/07/17 94.3 kg (208 lb)   General: NAD, pleasant appearing male, no longer chushingoid   Has red erythematous rash on right side of face starting at forehead down to mouth, right side of his lip is swollen and has 3 areas in his mouth where there were blisters/and erythematous skin.  Right ear, tragus, pinna are slt swollen and erythematous.  Heart is regular, lungs are clear  Abdomen is soft.  Examined all skin and no other sign of zooster is seen.  NO sign of GVH  No central line    KPS 90  LABS AND IMAGING      Lab Results   Component Value Date    WBC 7.8 12/21/2017    ANEU 5.0  12/21/2017    HGB 12.1 (L) 12/21/2017    HCT 37.4 (L) 12/21/2017     12/21/2017     12/21/2017    POTASSIUM 4.5 12/21/2017    CHLORIDE 101 12/21/2017    CO2 28 12/21/2017    GLC 96 12/21/2017    BUN 22 12/21/2017    CR 1.49 (H) 12/21/2017    MAG 1.9 09/13/2017    INR 0.9 06/15/2016    BILITOTAL 0.4 12/21/2017    AST 12 12/21/2017    ALT 28 12/21/2017    ALKPHOS 81 12/21/2017    PROTTOTAL 7.4 12/21/2017    ALBUMIN 4.0 12/21/2017     BcrAbl from BM pending  Morphology and flow negative for leukemia    ASSESSMENT BY SYSTEMS   Panchito REICH Kari is a 51 year old man s/p MUD SCT for CML  - trasplanted in second chronic phase after blast crisis;    BMT/ CML:  Off dasatinib maintenance after 12 months; repeat BCR/ABL from marrow this week pending. 100% donor in blood marrow chimerism pending.      HEMATOLOGY/COAGULATION: stable good counts    INFECTIONS AND PROPHYLAXIS: shingles R side of face. Rash resolved. Stay on acyclovir 800 mg BID for 3 months. Delay MMR for 2-3 months. No need for zoster vaccine. Flu shot in 2-3 week after feeling better.     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   - Advised to wear sun block when outside for extended periods of time to prevent flare of GVH    GASTROINTESTINAL/LIVER: no new issues    RENAL/FEN/Nutrition: Creat=1.4, encouraged PO fluids while on acyclovir.     CARDIOVASCULAR:  Normotensive. Hx Hypertension. Cont metoprolol. Normotensive  - Hx of A fib with RVR (new 12/24/16); resolved with dilt, now on Metoprolol 12.5mg BID.     ENDOCRINE: following with Dr Guevara today. She will adjust his thyroid medications. Hx Graves Disease s/p VELA therapy. Cont synthroid and liothyronine. TSH and Free T3 low; Free T4 elevated. Mixed picture; After endocrine consult:  Mild thyrotoxicosis due to large weight loss. On levothyroxine 137mcg.    - On selenium supplement, T3 and T4. See Dr. Guevara on 12/165/2017    Pain:  The patient is having this acute shooting pain that  lasts for a few seconds and then goes away. He's been using a combination of gabapentin and oxycodone for that. I suggested that he may be more appropriate to increase the dose of the gabapentin. If once reaching the Maximum dose of gabapentin he continues to have the pain he may require consultation with the pain clinic for further management.     Plan:  RTC Rick in 1 year with blood counts  Blood BCR-Abl  Continue acyclovir 800 mg BID for 3 months  Dermatologist once year for skin cancer screen  Primary care for cardiovascular and cholesterol screen and management, diabetes, prostate screen, colon cancer screen,  And usual health care such as vit D....   Dentist visit at least once a year to screen for oral cancer  Hearing checking once recovered from zoster  Closer follow-up with Dr Crawley

## 2017-12-21 NOTE — LETTER
Patient:  Panchito Pierce  :   1965  MRN:     [id number]        Mr.Paul RACHANA Pierce  [address] [facility] [address]        2017    Dear Panchito    Here are your results.  Your testosterone is a little lower.  I think this is related to you being recently sick.  We will plan on rechecking your testosterone levels when you have your next blood draw.    If you have any questions, please feel free to contact my nurse at [phone] select option #3 for triage nurse  or  option #1 for scheduling related questions.    Regards    Pattie Guevara MD        Resulted Orders   TSH   Result Value Ref Range    TSH 8.89 (H) 0.40 - 4.00 mU/L   Testosterone Free and Total   Result Value Ref Range    Testosterone Total 94 (L) 240 - 950 ng/dL      Comment:      This test was developed and its performance characteristics determined by the   [facility],  Special Chemistry Laboratory. It has   not been cleared or approved by the FDA. The laboratory is regulated under   CLIA as qualified to perform high-complexity testing. This test is used for   clinical purposes. It should not be regarded as investigational or for   research.      Sex Hormone Binding Globulin 16 11 - 80 nmol/L    Free Testosterone Calculated 2.46 (L) 4.7 - 24.4 ng/dL   Hemoglobin A1c   Result Value Ref Range    Hemoglobin A1C 5.8 4.3 - 6.0 %       Lab

## 2017-12-21 NOTE — MR AVS SNAPSHOT
After Visit Summary   12/21/2017    Panchito Pierce    MRN: [id number]           Patient Information     Date Of Birth          1965        Visit Information        Provider Department      12/21/2017 10:00 AM  BMT DOM [facility] Blood and Marrow Transplant        Today's Diagnoses     CML (chronic myelocytic leukemia) (H)    -  1          [facility] ([facility]  [address] [place] [address]  Phone: [phone]  Clinic Hours:   Monday-Thursday:7am to 7pm   Friday: 7am to 5pm   Weekends and holidays:    8am to noon (in general)  If your fever is 100.5  or greater,   call the clinic.  After hours call the   hospital at [phone] or   [phone]. Ask for the BMT   fellow on-call           Care Instructions    SHWETHA Cabrera in 1 year with blood counts  Blood BCR-Abl  Continue acyclovir 800 mg BID for 3 months  Dermatologist once year for skin cancer screen  Primary care for cardiovascular and cholesterol screen and management, diabetes, prostate screen, colon cancer screen,  And usual health care such as vit D....   Dentist visit at least once a year to screen for oral cancer  Hearing checking once recovered from zoster      12/19/2018 10AM LABS AND VISIT            Follow-ups after your visit        Your next 10 appointments already scheduled     Jun 01, 2018  9:00 AM CDT   (Arrive by 8:45 AM)   RETURN ENDOCRINE with Pattie Guevara MD   [facility] Endocrinology ([facility])    [address] [place] [address]  3rd [facility] [id number] [phone]            Dec 19, 2018 10:00 AM CST   [facility] Draw with  [facility] LAB DRAW   [facility] Draw ([facility])    [address] [place] [address]  2nd [facility] [address]   [phone]            Dec 19, 2018 10:30 AM CST   Return with Jermaine Cabrera MD   [facility] Blood and Marrow Transplant ([facility])    [address] [place] [address]  Se  2nd Floor  [facility] [address] [phone]              Future tests that were ordered for you today     Open Standing Orders        Priority Remaining Interval Expires Ordered    TSH Routine 6/6 monthly 7/21/2018 12/21/2017    T4 free Routine 6/6 monthly 7/21/2018 12/21/2017    T3 total Routine 6/6 monthly 7/21/2018 12/21/2017            Who to contact     If you have questions or need follow up information about today's clinic visit or your schedule please contact [facility]ARROW [facility]PL[facility] directly at [phone].  Normal or non-critical lab and imaging results will be communicated to you by Studentboxhart, letter or phone within 4 business days after the clinic has received the results. If you do not hear from us within 7 days, please contact the clinic through Aveillantt or phone. If you have a critical or abnormal lab result, we will notify you by phone as soon as possible.  Submit refill requests through [employer] or call your pharmacy and they will forward the refill request to us. Please allow 3 business days for your refill to be completed.          Additional Information About Your Visit        MyChart Information     [employer] gives you secure access to your electronic health record. If you see a primary care provider, you can also send messages to your care team and make appointments. If you have questions, please call your primary care clinic.  If you do not have a primary care provider, please call [phone] and they will assist you.        Care EveryWhere ID     This is your Care EveryWhere ID. This could be used by other organizations to access your [place] medical records  [id number]        Your Vitals Were     Pulse Temperature Respirations Pulse Oximetry BMI (Body Mass Index)       61 98.1  F (36.7  C) (Oral) 16 97% 30.73 kg/m2        Blood Pressure from Last 3 Encounters:   12/21/17 124/80   12/21/17 124/80   12/18/17 130/82    Weight from Last 3 Encounters:   12/21/17 94.3 kg  (208 lb)   12/21/17 94.4 kg (208 lb 1.8 oz)   12/18/17 94.7 kg (208 lb 12.8 oz)              We Performed the Following     BCR ABL1 Major Breakpoint Quant p210          Today's Medication Changes          These changes are accurate as of: 12/21/17  4:25 PM.  If you have any questions, ask your nurse or doctor.               Start taking these medicines.        Dose/Directions    acyclovir 400 MG tablet   Commonly known as:  ZOVIRAX   Used for:  CML (chronic myelocytic leukemia) (H)        Dose:  800 mg   Take 2 tablets (800 mg) by mouth 2 times daily   Quantity:  120 tablet   Refills:  3         These medicines have changed or have updated prescriptions.        Dose/Directions    levothyroxine 150 MCG tablet   Commonly known as:  SYNTHROID/LEVOTHROID   This may have changed:    - medication strength  - how much to take   Changed by:  Pattie Guevara MD        Dose:  150 mcg   Take 1 tablet (150 mcg) by mouth daily   Quantity:  90 tablet   Refills:  3            Where to get your medicines      These medications were sent to [id number] - [place] [[facility] [place]  [facility] [address] [place]  [place] [address]     Phone:  [phone]     levothyroxine 150 MCG tablet    liothyronine 5 MCG tablet         Some of these will need a paper prescription and others can be bought over the counter.  Ask your nurse if you have questions.     Bring a paper prescription for each of these medications     acyclovir 400 MG tablet                Recent Review Flowsheet Data     BMT Recent Results Latest Ref Rng & Units 4/21/2017 6/2/2017 6/14/2017 9/13/2017 12/7/2017 12/18/2017 12/21/2017    WBC 4.0 - 11.0 10e9/L 4.0 8.7 6.1 5.7 6.1 6.7 7.8    Hemoglobin 13.3 - 17.7 g/dL 9.0(L) 10.9(L) 10.3(L) 11.7(L) 12.8(L) 11.9(L) 12.1(L)    Platelet Count 150 - 450 10e9/L 227 207 191 176 149(L) 211 196    Neutrophils (Absolute) 1.6 - 8.3 10e9/L 2.9 6.8 4.2 3.7 3.9 3.8 5.0    INR 0.86 - 1.14 - - - - - - -    Sodium 133  - 144 mmol/L 140 139 139 140 137 134 136    Potassium 3.4 - 5.3 mmol/L 4.2 4.4 4.4 3.6 4.1 4.3 4.5    Chloride 94 - 109 mmol/L 106 107 107 106 102 102 101    Glucose 70 - 99 mg/dL 95 94 88 122(H) 85 91 96    Urea Nitrogen 7 - 30 mg/dL 24 24 22 21 24 27 22    Creatinine 0.66 - 1.25 mg/dL 1.09 1.16 1.20 1.26(H) 1.57(H) 1.53(H) 1.49(H)    Calcium (Total) 8.5 - 10.1 mg/dL 8.8 9.2 9.1 8.9 9.0 9.3 9.1    Protein (Total) 6.8 - 8.8 g/dL 6.4(L) 7.0 6.6(L) 6.6(L) 7.5 7.4 7.4    Albumin 3.4 - 5.0 g/dL 3.4 3.6 3.4 3.5 3.8 4.0 4.0    Bilirubin (Direct) 0.0 - 0.2 mg/dL - - - - - - -    Alkaline Phosphatase 40 - 150 U/L 130 169(H) 122 109 91 89 81    AST 0 - 45 U/L 26 33 25 27 33 21 12    ALT 0 - 70 U/L 37 54 35 54 46 32 28    MCV 78 - 100 fl 82 82 84 87 88 88 90               Primary Care Provider Office Phone # Fax #    Leann Berry [phone]       [facility] [address] [address] [address]        Equal Access to Services     [facility]: Hadii aad ku hadasho Soomaali, waaxda luqadaha, qaybta kaalmada adeegyada, waxay idiin hayaan adeerma quiles . So [facility] [phone].    ATENCIÓN: Si habla español, tiene a vigil disposición servicios gratuitos de asistencia lingüística. Llame al [phone].    We comply with applicable federal civil rights laws and [place] laws. We do not discriminate on the basis of race, color, national origin, age, disability, sex, sexual orientation, or gender identity.            Thank you!     Thank you for choosing [facility] BLOOD AND MARROW TRANSPLANT  for your care. Our goal is always to provide you with excellent care. Hearing back from our patients is one way we can continue to improve our services. Please take a few minutes to complete the written survey that you may receive in the mail after your visit with us. Thank you!             Your Updated Medication List - Protect others around you: Learn how to safely use, store and throw away your  medicines at www.disposemymeds.org.          This list is accurate as of: 12/21/17  4:25 PM.  Always use your most recent med list.                   Brand Name Dispense Instructions for use Diagnosis    acyclovir 400 MG tablet    ZOVIRAX    120 tablet    Take 2 tablets (800 mg) by mouth 2 times daily    CML (chronic myelocytic leukemia) (H)       calcium carbonate-vitamin D 500-400 MG-UNIT Tabs per tablet     180 tablet    Take 2 tablets by mouth daily    CML (chronic myelocytic leukemia) (H), Status post bone marrow transplant (H), Graves disease, Paroxysmal atrial fibrillation (H), Constipation, unspecified constipation type, Adjustment disorder with depressed mood       gabapentin 100 MG capsule    NEURONTIN     Take 300 mg by mouth 3 times daily        levothyroxine 150 MCG tablet    SYNTHROID/LEVOTHROID    90 tablet    Take 1 tablet (150 mcg) by mouth daily        lidocaine visc 2% 2.5mL/5mL & maalox/mylanta w/ simeth 2.5mL/5mL & diphenhydrAMINE 5mg/5mL Susp suspension    [facility]     Take 5 mLs by mouth        liothyronine 5 MCG tablet    CYTOMEL    180 tablet    Take 1 tablet (5 mcg) by mouth 2 times daily    Hyperthyroidism       metoprolol 25 MG tablet    LOPRESSOR    30 tablet    Take 0.5 tablets (12.5 mg) by mouth 2 times daily    Paroxysmal atrial fibrillation (H), CML (chronic myelocytic leukemia) (H), Graves disease, 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 TAKE 1 TABLET BY MOUTH DAILY DURING WINTER MONTHS TO PREVENT FLARES    Raynaud's phenomenon without gangrene       oxyCODONE IR 5 MG tablet    ROXICODONE    18 tablet    Take 1 tablet (5 mg) by mouth every 4 hours as needed for moderate to severe pain    VZV (varicella-zoster virus) infection       pantoprazole 40 MG EC  tablet    PROTONIX    30 tablet    TAKE ONE TABLET BY MOUTH ONE TIME DAILY    CML (chronic myelocytic leukemia) (H), Status post bone marrow transplant (H), Graves disease, Paroxysmal atrial fibrillation (H), Constipation, unspecified constipation type, Adjustment disorder with depressed mood       Selenium 100 MCG Caps     90 capsule    1 tablet daily    Graves disease

## 2017-12-21 NOTE — MR AVS SNAPSHOT
After Visit Summary   12/21/2017    Panchito Pierce    MRN: [id number]           Patient Information     Date Of Birth          1965        Visit Information        Provider Department      12/21/2017 12:00 PM Pattie Guevara MD [facility] Endocr[facility]        Today's Diagnoses     Hyperthyroidism    -  1      Care Instructions    Pt take 600 mg of gabapentin at night to help with sleep, if needed during the day, can take 300 mg in the AM and 300 mg at lunch - please let someone now if you are increasing the dose    For now, use up your levothyroxine 137 mcg daily, 6 days per week and on Saturday - take 2 pills (274 mcg) for 1 day - once used up -then start levothyroxine at 150 mcg daily    Take levothyroxine by itself, separate from calcium - 1/2 hr before or 4 hrs after eating    Do thyroid labs in about 1 month (can coordinate with other blood draw)    No flu shot for about 1 month or so          Follow-ups after your visit        Follow-up notes from your care team     Return in about 6 months (around 6/21/2018).      Your next 10 appointments already scheduled     Jun 01, 2018  9:00 AM CDT   (Arrive by 8:45 AM)   RETURN ENDOCRINE with Pattie Guevara MD   [facility] Endocrinology ([facility])    [address] [place] [address]   [phone]            Dec 19, 2018 10:00 AM CST   [facility] Lab Draw with  [facility] LAB DRAW   [facility] Lab Draw ([facility])    [address] [place] [address]   [phone]            Dec 19, 2018 10:30 AM CST   Return with Jermaine Cabrera MD   [facility] Blood and Marrow Transplant ([facility])    [address] [place] [address]   [phone]              Future tests that were ordered for you today     Open Standing Orders        Priority Remaining Interval Expires Ordered    TSH Routine 6/6  monthly 7/21/2018 12/21/2017    T4 free Routine 6/6 monthly 7/21/2018 12/21/2017    T3 total Routine 6/6 monthly 7/21/2018 12/21/2017            Who to contact     Please call your clinic at [phone] to:    Ask questions about your health    Make or cancel appointments    Discuss your medicines    Learn about your test results    Speak to your doctor   If you have compliments or concerns about an experience at your clinic, or if you wish to file a complaint, please contact [facility] Patient Relations at [phone] or email us at [email or web address]         Additional Information About Your Visit        SupersolidharUserApp Information     [employer] gives you secure access to your electronic health record. If you see a primary care provider, you can also send messages to your care team and make appointments. If you have questions, please call your primary care clinic.  If you do not have a primary care provider, please call [phone] and they will assist you.      [employer] is an electronic gateway that provides easy, online access to your medical records. With [employer], you can request a clinic appointment, read your test results, renew a prescription or communicate with your care team.     To access your existing account, please contact your [facility] Clinic or call [phone] for assistance.        Care EveryWhere ID     This is your Care EveryWhere ID. This could be used by other organizations to access your [place] medical records  [id number]        Your Vitals Were     Pulse BMI (Body Mass Index)                61 30.72 kg/m2           Blood Pressure from Last 3 Encounters:   12/21/17 124/80   12/21/17 124/80   12/18/17 130/82    Weight from Last 3 Encounters:   12/21/17 94.3 kg (208 lb)   12/21/17 94.4 kg (208 lb 1.8 oz)   12/18/17 94.7 kg (208 lb 12.8 oz)                 Today's Medication Changes          These changes are accurate as of: 12/21/17  12:31 PM.  If you have any questions, ask your nurse or doctor.               Start taking these medicines.        Dose/Directions    acyclovir 400 MG tablet   Commonly known as:  ZOVIRAX   Used for:  CML (chronic myelocytic leukemia) (H)        Dose:  800 mg   Take 2 tablets (800 mg) by mouth 2 times daily   Quantity:  120 tablet   Refills:  3         These medicines have changed or have updated prescriptions.        Dose/Directions    levothyroxine 150 MCG tablet   Commonly known as:  SYNTHROID/LEVOTHROID   This may have changed:    - medication strength  - how much to take   Changed by:  Pattie Guevara MD        Dose:  150 mcg   Take 1 tablet (150 mcg) by mouth daily   Quantity:  90 tablet   Refills:  3            Where to get your medicines      These medications were sent to [id number] - [place] [[facility] [place] - [address] [facility]  [address] [facility] [place]  [place] [address]     Phone:  [phone]     levothyroxine 150 MCG tablet    liothyronine 5 MCG tablet         Some of these will need a paper prescription and others can be bought over the counter.  Ask your nurse if you have questions.     Bring a paper prescription for each of these medications     acyclovir 400 MG tablet                Primary Care Provider Office Phone # Fax #    Leann Berry [phone]       [facility] [address] [facility] [address]  [facility] [address]        Equal Access to Services     [facility]: Hadii haydee cameron hadasho Soomaali, waaxda luqadaha, qaybta kaalmada adeegyada, delfino geronimo hayheather quiles . So [facility] [phone].    ATENCIÓN: Si habla español, tiene a vigil disposición servicios gratuitos de asistencia lingüística. Llame al [phone].    We comply with applicable federal civil rights laws and [place] laws. We do not discriminate on the basis of race, color, national origin, age, disability, sex, sexual orientation, or gender identity.            Thank you!     Thank  you for choosing [facility] ENDOCRINOLOGY  for your care. Our goal is always to provide you with excellent care. Hearing back from our patients is one way we can continue to improve our services. Please take a few minutes to complete the written survey that you may receive in the mail after your visit with us. Thank you!             Your Updated Medication List - Protect others around you: Learn how to safely use, store and throw away your medicines at www.disposemymeds.org.          This list is accurate as of: 12/21/17 12:31 PM.  Always use your most recent med list.                   Brand Name Dispense Instructions for use Diagnosis    acyclovir 400 MG tablet    ZOVIRAX    120 tablet    Take 2 tablets (800 mg) by mouth 2 times daily    CML (chronic myelocytic leukemia) (H)       calcium carbonate-vitamin D 500-400 MG-UNIT Tabs per tablet     180 tablet    Take 2 tablets by mouth daily    CML (chronic myelocytic leukemia) (H), Status post bone marrow transplant (H), Graves disease, Paroxysmal atrial fibrillation (H), Constipation, unspecified constipation type, Adjustment disorder with depressed mood       gabapentin 100 MG capsule    NEURONTIN     Take 300 mg by mouth 3 times daily        levothyroxine 150 MCG tablet    SYNTHROID/LEVOTHROID    90 tablet    Take 1 tablet (150 mcg) by mouth daily        lidocaine visc 2% 2.5mL/5mL & maalox/mylanta w/ simeth 2.5mL/5mL & diphenhydrAMINE 5mg/5mL Susp suspension    [facility]     Take 5 mLs by mouth        liothyronine 5 MCG tablet    CYTOMEL    180 tablet    Take 1 tablet (5 mcg) by mouth 2 times daily    Hyperthyroidism       metoprolol 25 MG tablet    LOPRESSOR    30 tablet    Take 0.5 tablets (12.5 mg) by mouth 2 times daily    Paroxysmal atrial fibrillation (H), CML (chronic myelocytic leukemia) (H), Graves disease, 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 TAKE 1 TABLET BY MOUTH DAILY DURING WINTER MONTHS TO PREVENT FLARES    Raynaud's phenomenon without gangrene       oxyCODONE IR 5 MG tablet    ROXICODONE    18 tablet    Take 1 tablet (5 mg) by mouth every 4 hours as needed for moderate to severe pain    VZV (varicella-zoster virus) infection       pantoprazole 40 MG EC tablet    PROTONIX    30 tablet    TAKE ONE TABLET BY MOUTH ONE TIME DAILY    CML (chronic myelocytic leukemia) (H), Status post bone marrow transplant (H), Graves disease, Paroxysmal atrial fibrillation (H), Constipation, unspecified constipation type, Adjustment disorder with depressed mood       Selenium 100 MCG Caps     90 capsule    1 tablet daily    Graves disease

## 2017-12-21 NOTE — PROGRESS NOTES
Panchito presents today for RECHECK (GRAVES DISEASE F/U )  .    HPI  #1 hypothyroidism due to Graves' disease status post radioactive iodide  Patient report, was diagnosed with Graves' disease in 2002.  He underwent radioactive therapy of the thyroid and was treated with levothyroxine.  Per patient report, he does best on a T3 and T4 combination.  Patient does have a history of Graves eye disease and is currently on selenium and most recently, and steroids (for shjcq-elcgcn-kklv disease since April 2016) Pt  noted to be mildly thyrotoxic with weight loss during his inpatient hospitalization.  April 2016 TSH was undetectable, free T4 was high at 1.51 and free T3 was low at 1.9.  Levothyroxine was adjusted and he continued with Cytomel 5  g twice daily. November 2016 TSH is 1.11.    This a family history of hypothyroidism.    Interval history: Patient continues on selenium.  He is on Cytomel 5 mcg twice daily as well as levothyroxine 137 mcg daily.  June 2017 TSH 6.94.  Although I increased his levothyroxine, his December 2017 TSH is now 8.89.  Of note, the patient does take his levothyroxine with his other medications including calcium, first thing in the morning.  He does feel fatigued but it is difficult for him to separate this from his current zoster infection.    #2 steroid use  He reports that he's been put on prednisone daily since April 2016 for lprda-uknutp-iesb disease.  He is currently on a taper.     Interval history: Patient stopped his prednisone as of December 2016.  December 2017 hemoglobin A1c was normal.    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  He also reports a rare morning erections.  He reports he shakes every few weeks and has also noted loss of his axillary hair, chest here, as well as decrease in testicular size.  September 2016 total testosterone of 141, PSA of 0.5.  Repeat check in October 2016 showed a testosterone 186, FSH  of 25.2, and elevated to 7.7.     He reports a history of prostate cancer in his father.  He does report extremely low libido which concerns his wife.      Interval history: June 2017 testosterone level was normal at 333    #4 sleep apnea  This is noted at his previous visit.  He has seen sleep medicine in 2017 with a sleep study.  Evaluation is suggestive of sleep apnea (sat at 81%, 96 hypoapneic episodes over 4 hrs) but he has not yet started C Pap.    Interval history: Not discussed the current visit    #5 slightly impaired vision  Patient reports frequently needing to use the magnifying glass.  He would like to see ophthalmology    Interval history: I have scheduled the patient for ophthalmology.  This is not yet done.    #6 herpes zoster  Just diagnosed approximately 1 week ago.  Patient currently on acyclovir and gabapentin.  He does report intermittent pain ranging from his right ear to his face which lasted about 15 minutes.    Past Medical History  Past Medical History:   Diagnosis Date     Graves disease 1/1/2003    treated with radioiodine, meds T3 and T4     Murmur, heart     has not been fully evaluated       Allergies  Allergies   Allergen Reactions     Allopurinol Rash     Unclear if it was from allopurinol, but went away when it was stopped.  But also had steroid cream at the same time.     Medications  Current Outpatient Prescriptions   Medication Sig Dispense Refill     acyclovir (ZOVIRAX) 400 MG tablet Take 2 tablets (800 mg) by mouth 2 times daily 120 tablet 3     gabapentin (NEURONTIN) 100 MG capsule Take 300 mg by mouth 3 times daily       magic mouthwash suspension (diphenhydrAMINE, lidocaine, aluminum-magnesium & simethicone) Take 5 mLs by mouth       oxyCODONE IR (ROXICODONE) 5 MG tablet Take 1 tablet (5 mg) by mouth every 4 hours as needed for moderate to severe pain 18 tablet 0     pantoprazole (PROTONIX) 40 MG EC tablet TAKE ONE TABLET BY MOUTH ONE TIME DAILY  30 tablet 10     NIFEdipine  ER osmotic (PROCARDIA XL) 30 MG 24 hr tablet TAKE 1 TABLET BY MOUTH DAILY. TAKE 1 TABLET BY MOUTH DAILY DURING WINTER MONTHS TO PREVENT FLARES 30 tablet 5     Selenium 100 MCG CAPS 1 tablet daily 90 capsule 2     liothyronine (CYTOMEL) 5 MCG tablet Take 1 tablet (5 mcg) by mouth 2 times daily 180 tablet 3     levothyroxine (SYNTHROID/LEVOTHROID) 137 MCG tablet Take 1 tablet (137 mcg) by mouth daily 90 tablet 3     metoprolol (LOPRESSOR) 25 MG tablet Take 0.5 tablets (12.5 mg) by mouth 2 times daily 30 tablet 10     calcium carbonate-vitamin D 500-400 MG-UNIT TABS per tablet Take 2 tablets by mouth daily 180 tablet 11     Family History  family history includes Asthma in his son; Dementia in his father; Heart Murmur in his brother and father; Hepatitis in his brother; Lung Cancer in his mother; Prostate Cancer in his father; Thyroid Disease in his mother, sister, sister, and sister. There is no history of Melanoma or Skin Cancer.  Social History  Social History     Social History     Marital status:      Spouse name: Nu     Number of children: 4     Years of education: N/A     Occupational History     Not on file.     Social History Main Topics     Smoking status: Former Smoker     Packs/day: 0.50     Years: 30.00     Start date: 1/1/1985     Quit date: 6/28/2015     Smokeless tobacco: Never Used     Alcohol use Yes      Comment: once a week     Drug use: No     Sexual activity: Not on file      Comment:      Other Topics Concern     Not on file     Social History Narrative     to wife Nu.    4 children: 1 step daughter Ro age 27, 3 biological sons, 19 yo Jose L, 15 yo Sergio (Asbironayala), 13 yo Elvis    Lives in [place]    Used to work in clerical work, now in school for electrical engineering    11 brothers and sisters, 3 live in the [facility], 1 brother incarcerated, 1 brother hepatitis C           ROS  PerHPI    Physical Exam  /80  Pulse 61  Wt 94.3 kg (208 lb)  BMI 30.72  kg/m2  Body mass index is 30.72 kg/(m^2).    Exam:  GENERAL APPEARANCE: Alert and no distress, noted healing blisters on the right side of his face  NECK: No lymphadenopathy appreciated  Thyroid: No obvious nodules palpated   CV: RRR   Lungs: CTA bilaterally  Abdomen: Soft, Nontender, non distended, positive bowel sounds      Results:  Orders Only on 12/21/2017   Component Date Value Ref Range Status     Sodium 12/21/2017 136  133 - 144 mmol/L Final     Potassium 12/21/2017 4.5  3.4 - 5.3 mmol/L Final     Chloride 12/21/2017 101  94 - 109 mmol/L Final     Carbon Dioxide 12/21/2017 28  20 - 32 mmol/L Final     Anion Gap 12/21/2017 8  3 - 14 mmol/L Final     Glucose 12/21/2017 96  70 - 99 mg/dL Final     Urea Nitrogen 12/21/2017 22  7 - 30 mg/dL Final     Creatinine 12/21/2017 1.49* 0.66 - 1.25 mg/dL Final     GFR Estimate 12/21/2017 50* >60 mL/min/1.7m2 Final    Non  GFR Calc     GFR Estimate If Black 12/21/2017 60* >60 mL/min/1.7m2 Final    African American GFR Calc     Calcium 12/21/2017 9.1  8.5 - 10.1 mg/dL Final     Bilirubin Total 12/21/2017 0.4  0.2 - 1.3 mg/dL Final     Albumin 12/21/2017 4.0  3.4 - 5.0 g/dL Final     Protein Total 12/21/2017 7.4  6.8 - 8.8 g/dL Final     Alkaline Phosphatase 12/21/2017 81  40 - 150 U/L Final     ALT 12/21/2017 28  0 - 70 U/L Final     AST 12/21/2017 12  0 - 45 U/L Final     WBC 12/21/2017 7.8  4.0 - 11.0 10e9/L Final     RBC Count 12/21/2017 4.18* 4.4 - 5.9 10e12/L Final     Hemoglobin 12/21/2017 12.1* 13.3 - 17.7 g/dL Final     Hematocrit 12/21/2017 37.4* 40.0 - 53.0 % Final     MCV 12/21/2017 90  78 - 100 fl Final     MCH 12/21/2017 28.9  26.5 - 33.0 pg Final     MCHC 12/21/2017 32.4  31.5 - 36.5 g/dL Final     RDW 12/21/2017 15.3* 10.0 - 15.0 % Final     Platelet Count 12/21/2017 196  150 - 450 10e9/L Final     Diff Method 12/21/2017 Automated Method   Final     % Neutrophils 12/21/2017 64.4  % Final     % Lymphocytes 12/21/2017 24.9  % Final     %  Monocytes 12/21/2017 7.0  % Final     % Eosinophils 12/21/2017 3.1  % Final     % Basophils 12/21/2017 0.5  % Final     % Immature Granulocytes 12/21/2017 0.1  % Final     Nucleated RBCs 12/21/2017 0  0 /100 Final     Absolute Neutrophil 12/21/2017 5.0  1.6 - 8.3 10e9/L Final     Absolute Lymphocytes 12/21/2017 1.9  0.8 - 5.3 10e9/L Final     Absolute Monocytes 12/21/2017 0.5  0.0 - 1.3 10e9/L Final     Absolute Eosinophils 12/21/2017 0.2  0.0 - 0.7 10e9/L Final     Absolute Basophils 12/21/2017 0.0  0.0 - 0.2 10e9/L Final     Abs Immature Granulocytes 12/21/2017 0.0  0 - 0.4 10e9/L Final     Absolute Nucleated RBC 12/21/2017 0.0   Final     TSH 12/21/2017 8.89* 0.40 - 4.00 mU/L Final     Hemoglobin A1C 12/21/2017 5.8  4.3 - 6.0 % Final   Office Visit on 12/18/2017   Component Date Value Ref Range Status     WBC 12/18/2017 6.7  4.0 - 11.0 10e9/L Final     RBC Count 12/18/2017 4.17* 4.4 - 5.9 10e12/L Final     Hemoglobin 12/18/2017 11.9* 13.3 - 17.7 g/dL Final     Hematocrit 12/18/2017 36.5* 40.0 - 53.0 % Final     MCV 12/18/2017 88  78 - 100 fl Final     MCH 12/18/2017 28.5  26.5 - 33.0 pg Final     MCHC 12/18/2017 32.6  31.5 - 36.5 g/dL Final     RDW 12/18/2017 15.0  10.0 - 15.0 % Final     Platelet Count 12/18/2017 211  150 - 450 10e9/L Final     Diff Method 12/18/2017 Automated Method   Final     % Neutrophils 12/18/2017 56.8  % Final     % Lymphocytes 12/18/2017 30.3  % Final     % Monocytes 12/18/2017 7.9  % Final     % Eosinophils 12/18/2017 4.0  % Final     % Basophils 12/18/2017 0.6  % Final     % Immature Granulocytes 12/18/2017 0.4  % Final     Nucleated RBCs 12/18/2017 0  0 /100 Final     Absolute Neutrophil 12/18/2017 3.8  1.6 - 8.3 10e9/L Final     Absolute Lymphocytes 12/18/2017 2.0  0.8 - 5.3 10e9/L Final     Absolute Monocytes 12/18/2017 0.5  0.0 - 1.3 10e9/L Final     Absolute Eosinophils 12/18/2017 0.3  0.0 - 0.7 10e9/L Final     Absolute Basophils 12/18/2017 0.0  0.0 - 0.2 10e9/L Final     Abs  Immature Granulocytes 12/18/2017 0.0  0 - 0.4 10e9/L Final     Absolute Nucleated RBC 12/18/2017 0.0   Final     Sodium 12/18/2017 134  133 - 144 mmol/L Final     Potassium 12/18/2017 4.3  3.4 - 5.3 mmol/L Final     Chloride 12/18/2017 102  94 - 109 mmol/L Final     Carbon Dioxide 12/18/2017 26  20 - 32 mmol/L Final     Anion Gap 12/18/2017 6  3 - 14 mmol/L Final     Glucose 12/18/2017 91  70 - 99 mg/dL Final     Urea Nitrogen 12/18/2017 27  7 - 30 mg/dL Final     Creatinine 12/18/2017 1.53* 0.66 - 1.25 mg/dL Final     GFR Estimate 12/18/2017 48* >60 mL/min/1.7m2 Final    Non  GFR Calc     GFR Estimate If Black 12/18/2017 58* >60 mL/min/1.7m2 Final    African American GFR Calc     Calcium 12/18/2017 9.3  8.5 - 10.1 mg/dL Final     Bilirubin Total 12/18/2017 0.4  0.2 - 1.3 mg/dL Final     Albumin 12/18/2017 4.0  3.4 - 5.0 g/dL Final     Protein Total 12/18/2017 7.4  6.8 - 8.8 g/dL Final     Alkaline Phosphatase 12/18/2017 89  40 - 150 U/L Final     ALT 12/18/2017 32  0 - 70 U/L Final     AST 12/18/2017 21  0 - 45 U/L Final     Copath Report 12/18/2017    Final         ASSESSMENT:    #1 hypothyroidism due to Graves' disease status post radioactive iodide  Pt needs continue on Cytomel in addition to his levothyroxine.  We will also the patient continue with the selenium at 100  g daily for now.      TSH is still elevated.  However the patient is taking his levothyroxine and Cytomel along with calcium and his other medications.  We will increase his levothyroxine to 150 mcg daily and reminded patient to separate his thyroid hormone medication from his other pills and supplements, particularly calcium.  We will plan on rechecking labs in about 6 weeks.  In the meantime, the patient can use up his current levothyroxine  supply of 137 mcg tablets by taking levothyroxine 137 mcg daily 6 days a week and 274 mcg daily one day.  After he is done using his current levothyroxine supply, he will start  levothyroxine at 150 mcg daily.  Prescription sent to pharmacy.    #2 steroid use  Patient has stopped his steroids in December 2016 and currently feels quite well.  December 2017 hemoglobin A1c is normal.    #3 history of low testosterone  Testosterone from June 2017 was normal.  Repeat testosterone pending from today.  No specific complaints.      TSH 12/21/2017 8.89* 0.40 - 4.00 mU/L Final     Testosterone Total 12/21/2017 94* 240 - 950 ng/dL Final    Comment: This test was developed and its performance characteristics determined by the   [facility],  Special Chemistry Laboratory. It has   not been cleared or approved by the FDA. The laboratory is regulated under   CLIA as qualified to perform high-complexity testing. This test is used for   clinical purposes. It should not be regarded as investigational or for   research.       Sex Hormone Binding Globulin 12/21/2017 16  11 - 80 nmol/L Final     Free Testosterone Calculated 12/21/2017 2.46* 4.7 - 24.4 ng/dL Final     Hemoglobin A1C 12/21/2017 5.8  4.3 - 6.0 % Final     ADDENDUM: Testosterone is lower.  We will plan on rechecking testosterone, FSH, LH, prolactin in roughly 1 month.    #4 possible sleep apnea  Not discussed at current visit.    #5 decreased libido  This has improved    #6 slightly impaired vision  Did not discuss the current visit    #7 herpes zoster  Patient currently has herpes zoster.  Recommended patient increase his Neurontin to 600 mg at night and add 300-600 mg during the day for pain.  Discussed with Dr. Cabrera and will defer on  flu shot for now.    Return visit planned in 6 months    25 minutes spent with patient of which 20 minutes was spent in face-to-face discussion coordination of care

## 2017-12-21 NOTE — NURSING NOTE
"Oncology Rooming Note    December 21, 2017 10:17 AM   Panchito Pierce is a 52 year old male who presents for:    Chief Complaint   Patient presents with     RECHECK     Pt here for 2 Year Anniversary Visit with MD.  No labs drawn at time of rooming. Pt is s/p BMT for CML.      Initial Vitals: /80  Pulse 61  Temp 98.1  F (36.7  C) (Oral)  Resp 16  Wt 94.4 kg (208 lb 1.8 oz)  SpO2 97%  BMI 30.73 kg/m2 Estimated body mass index is 30.73 kg/(m^2) as calculated from the following:    Height as of 6/2/17: 1.753 m (5' 9\").    Weight as of this encounter: 94.4 kg (208 lb 1.8 oz). Body surface area is 2.14 meters squared.  No Pain (0) Comment: Data Unavailable   No LMP for male patient.  Allergies reviewed: Yes  Medications reviewed: Yes    Medications: Medication refills not needed today.  Pharmacy name entered into [facility]:    [id number] [[place], [place] - [address] [place] [address] PHARMACY [facility] - [place], [place] - [address] [facility] [id number]  [place], [place] - [address] [place] [address] [facility] - [place], [place] - [address] [place] [address]    Clinical concerns: Pt continues to complain of Nerve Pain flare ups in his Right Ear/Face related to Shingles.  Dr. Cabrera was notified.    8 minutes for nursing intake (face to face time)     Mojgan Almonte RN              "

## 2017-12-21 NOTE — PATIENT INSTRUCTIONS
Pt take 600 mg of gabapentin at night to help with sleep, if needed during the day, can take 300 mg in the AM and 300 mg at lunch - please let someone now if you are increasing the dose    For now, use up your levothyroxine 137 mcg daily, 6 days per week and on Saturday - take 2 pills (274 mcg) for 1 day - once used up -then start levothyroxine at 150 mcg daily    Take levothyroxine by itself, separate from calcium - 1/2 hr before or 4 hrs after eating    Do thyroid labs in about 1 month (can coordinate with other blood draw)    No flu shot for about 1 month or so

## 2017-12-21 NOTE — LETTER
12/21/2017      RE: Panchito Pierce  [address]  [place] [address]       BMT Clinic Progress Note   12/21/2017    Patient ID:  Panchito Pierce is a 52 year old man D+ 2 years s/p MUD SCT for CML     Diagnosis CMLP+ Chronic myelogeneous leukemia, Ph1+  HCT Type Allogeneic    Prep Regimen Cytoxan  TBI   Donor Source Unrelated BM    GVHD Prophylaxis Methotrexate  Cyclosporine  Primary BMT Provider Jermaine Cabrera MD     CC: follow-up     INTERVAL  HISTORY   Interval History:   Panchito comes to review the results of his 2 year post transplant evaluation. Recently he was seen in our clinic because of herpes zoster in his right face and ear. While the blisters have healed he still has residual pain with episodes of shooting pain that last a few seconds, and numbness on the right side half of his tongue.  While the pain has interfered with his quality of life he is overall doing well. He has no rashes, no dry mouth, no dry eyes, no respiratory, and no gastrointestinal symptoms. He noticed no areas of skin thickening or limitations to the range of motion of his arms.    Review of Systems: 10 point ROS negative except as noted above.    PHYSICAL EXAM   /80  Pulse 61  Temp 98.1  F (36.7  C) (Oral)  Resp 16  Wt 94.4 kg (208 lb 1.8 oz)  SpO2 97%  BMI 30.73 kg/m2 BP   Wt Readings from Last 4 Encounters:   12/21/17 94.3 kg (208 lb)   12/21/17 94.4 kg (208 lb 1.8 oz)   12/18/17 94.7 kg (208 lb 12.8 oz)   12/07/17 94.3 kg (208 lb)   General: NAD, pleasant appearing male, no longer chushingoid   Has red erythematous rash on right side of face starting at forehead down to mouth, right side of his lip is swollen and has 3 areas in his mouth where there were blisters/and erythematous skin.  Right ear, tragus, pinna are slt swollen and erythematous.  Heart is regular, lungs are clear  Abdomen is soft.  Examined all skin and no other sign of zooster is seen.  NO sign of GVH  No central line    KPS 90  LABS AND IMAGING       Lab Results   Component Value Date    WBC 7.8 12/21/2017    ANEU 5.0 12/21/2017    HGB 12.1 (L) 12/21/2017    HCT 37.4 (L) 12/21/2017     12/21/2017     12/21/2017    POTASSIUM 4.5 12/21/2017    CHLORIDE 101 12/21/2017    CO2 28 12/21/2017    GLC 96 12/21/2017    BUN 22 12/21/2017    CR 1.49 (H) 12/21/2017    MAG 1.9 09/13/2017    INR 0.9 06/15/2016    BILITOTAL 0.4 12/21/2017    AST 12 12/21/2017    ALT 28 12/21/2017    ALKPHOS 81 12/21/2017    PROTTOTAL 7.4 12/21/2017    ALBUMIN 4.0 12/21/2017     BcrAbl from BM pending  Morphology and flow negative for leukemia    ASSESSMENT BY SYSTEMS   Panchito Pierce is a 51 year old man s/p MUD SCT for CML  - trasplanted in second chronic phase after blast crisis;    BMT/ CML:  Off dasatinib maintenance after 12 months; repeat BCR/ABL from marrow this week pending. 100% donor in blood marrow chimerism pending.      HEMATOLOGY/COAGULATION: stable good counts    INFECTIONS AND PROPHYLAXIS: shingles R side of face. Rash resolved. Stay on acyclovir 800 mg BID for 3 months. Delay MMR for 2-3 months. No need for zoster vaccine. Flu shot in 2-3 week after feeling better.     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   - Advised to wear sun block when outside for extended periods of time to prevent flare of GVH    GASTROINTESTINAL/LIVER: no new issues    RENAL/FEN/Nutrition: Creat=1.4, encouraged PO fluids while on acyclovir.     CARDIOVASCULAR:  Normotensive. Hx Hypertension. Cont metoprolol. Normotensive  - Hx of A fib with RVR (new 12/24/16); resolved with dilt, now on Metoprolol 12.5mg BID.     ENDOCRINE: following with Dr Guevara today. She will adjust his thyroid medications. Hx Graves Disease s/p VELA therapy. Cont synthroid and liothyronine. TSH and Free T3 low; Free T4 elevated. Mixed picture; After endocrine consult:  Mild thyrotoxicosis due to large weight loss. On levothyroxine 137mcg.    - On selenium supplement, T3 and T4. See Dr. Guevara on  12/165/2017    Pain:  The patient is having this acute shooting pain that lasts for a few seconds and then goes away. He's been using a combination of gabapentin and oxycodone for that. I suggested that he may be more appropriate to increase the dose of the gabapentin. If once reaching the Maximum dose of gabapentin he continues to have the pain he may require consultation with the pain clinic for further management.     Plan:  RTC Rick in 1 year with blood counts  Blood BCR-Abl  Continue acyclovir 800 mg BID for 3 months  Dermatologist once year for skin cancer screen  Primary care for cardiovascular and cholesterol screen and management, diabetes, prostate screen, colon cancer screen,  And usual health care such as vit D....   Dentist visit at least once a year to screen for oral cancer  Hearing checking once recovered from zoster  Closer follow-up with Dr Denisa OREILLY DOM

## 2017-12-22 LAB
COPATH REPORT: NORMAL
COPATH REPORT: NORMAL
SHBG SERPL-SCNC: 16 NMOL/L (ref 11–80)
TESTOST FREE SERPL-MCNC: 2.46 NG/DL (ref 4.7–24.4)
TESTOST SERPL-MCNC: 94 NG/DL (ref 240–950)

## 2017-12-22 NOTE — PROGRESS NOTES
Dear Panchito    Here are your results.  Your testosterone is a little lower.  I think this is related to you being recently sick.  We will plan on rechecking your testosterone levels when you have your next blood draw.    If you have any questions, please feel free to contact my nurse at [phone] select option #3 for triage nurse  or  option #1 for scheduling related questions.    Regards    Pattie Guevara MD

## 2017-12-26 ENCOUNTER — TELEPHONE (OUTPATIENT)
Dept: ENDOCRINOLOGY | Facility: CLINIC | Age: 52
End: 2017-12-26

## 2017-12-26 NOTE — TELEPHONE ENCOUNTER
Spoke w/ Pt: read Letter of 12/22/2017 from Dr Guevara; he stated he had no questions or concerns. Also informed him I would forward her summary via [employer].

## 2018-01-03 LAB — COPATH REPORT: NORMAL

## 2018-01-04 LAB — COPATH REPORT: NORMAL

## 2018-02-02 ENCOUNTER — MYC MEDICAL ADVICE (OUTPATIENT)
Dept: ENDOCRINOLOGY | Facility: CLINIC | Age: 53
End: 2018-02-02

## 2018-03-01 NOTE — TELEPHONE ENCOUNTER
----- Message from Pattie Guevara MD sent at 3/1/2018  9:50 AM CST -----  Pt has blood drawn scheduled for April - please have pt do the albs that I have ordered for then - thanks!

## 2018-03-26 DIAGNOSIS — C92.10 CML (CHRONIC MYELOCYTIC LEUKEMIA) (H): ICD-10-CM

## 2018-03-26 DIAGNOSIS — Z94.81 STATUS POST BONE MARROW TRANSPLANT (H): Primary | ICD-10-CM

## 2018-03-26 RX ORDER — ACYCLOVIR 400 MG/1
800 TABLET ORAL 2 TIMES DAILY
Qty: 120 TABLET | Refills: 3 | Status: SHIPPED | OUTPATIENT
Start: 2018-03-26 | End: 2019-01-15

## 2018-04-17 ENCOUNTER — TRANSFERRED RECORDS (OUTPATIENT)
Dept: HEALTH INFORMATION MANAGEMENT | Facility: CLINIC | Age: 53
End: 2018-04-17

## 2018-04-17 DIAGNOSIS — C92.10 CML (CHRONIC MYELOCYTIC LEUKEMIA) (H): ICD-10-CM

## 2018-04-17 DIAGNOSIS — Z94.81 STATUS POST BONE MARROW TRANSPLANT (H): ICD-10-CM

## 2018-04-17 DIAGNOSIS — K59.00 CONSTIPATION, UNSPECIFIED CONSTIPATION TYPE: ICD-10-CM

## 2018-04-17 DIAGNOSIS — I48.0 PAROXYSMAL ATRIAL FIBRILLATION (H): ICD-10-CM

## 2018-04-17 DIAGNOSIS — E05.00 GRAVES DISEASE: ICD-10-CM

## 2018-04-17 DIAGNOSIS — F43.21 ADJUSTMENT DISORDER WITH DEPRESSED MOOD: ICD-10-CM

## 2018-04-24 ENCOUNTER — OFFICE VISIT (OUTPATIENT)
Dept: DERMATOLOGY | Facility: CLINIC | Age: 53
End: 2018-04-24
Payer: COMMERCIAL

## 2018-04-24 DIAGNOSIS — I73.00 RAYNAUD'S PHENOMENON WITHOUT GANGRENE: Primary | ICD-10-CM

## 2018-04-24 DIAGNOSIS — E05.90 HYPERTHYROIDISM: ICD-10-CM

## 2018-04-24 DIAGNOSIS — C92.10 CML (CHRONIC MYELOCYTIC LEUKEMIA) (H): Primary | ICD-10-CM

## 2018-04-24 DIAGNOSIS — L30.9 CHRONIC DERMATITIS OF HANDS: ICD-10-CM

## 2018-04-24 DIAGNOSIS — L21.9 DERMATITIS, SEBORRHEIC: ICD-10-CM

## 2018-04-24 DIAGNOSIS — Z94.81 STATUS POST BONE MARROW TRANSPLANT (H): ICD-10-CM

## 2018-04-24 LAB
FSH SERPL-ACNC: 19.3 IU/L (ref 0.7–10.8)
IGG SERPL-MCNC: 875 MG/DL (ref 695–1620)
LH SERPL-ACNC: 6.7 IU/L (ref 1.5–9.3)
PROLACTIN SERPL-MCNC: 3 UG/L (ref 2–18)
T3 SERPL-MCNC: 99 NG/DL (ref 60–181)
T4 FREE SERPL-MCNC: 0.87 NG/DL (ref 0.76–1.46)
TSH SERPL DL<=0.005 MIU/L-ACNC: 1 MU/L (ref 0.4–4)

## 2018-04-24 PROCEDURE — 86355 B CELLS TOTAL COUNT: CPT | Performed by: INTERNAL MEDICINE

## 2018-04-24 PROCEDURE — 86357 NK CELLS TOTAL COUNT: CPT | Performed by: INTERNAL MEDICINE

## 2018-04-24 PROCEDURE — 84403 ASSAY OF TOTAL TESTOSTERONE: CPT | Performed by: INTERNAL MEDICINE

## 2018-04-24 PROCEDURE — 84439 ASSAY OF FREE THYROXINE: CPT | Performed by: INTERNAL MEDICINE

## 2018-04-24 PROCEDURE — 84443 ASSAY THYROID STIM HORMONE: CPT | Performed by: INTERNAL MEDICINE

## 2018-04-24 PROCEDURE — 82784 ASSAY IGA/IGD/IGG/IGM EACH: CPT | Performed by: INTERNAL MEDICINE

## 2018-04-24 PROCEDURE — 86360 T CELL ABSOLUTE COUNT/RATIO: CPT | Performed by: INTERNAL MEDICINE

## 2018-04-24 PROCEDURE — 83001 ASSAY OF GONADOTROPIN (FSH): CPT | Performed by: INTERNAL MEDICINE

## 2018-04-24 PROCEDURE — 84270 ASSAY OF SEX HORMONE GLOBUL: CPT | Performed by: INTERNAL MEDICINE

## 2018-04-24 PROCEDURE — 84146 ASSAY OF PROLACTIN: CPT | Performed by: INTERNAL MEDICINE

## 2018-04-24 PROCEDURE — 86359 T CELLS TOTAL COUNT: CPT | Performed by: INTERNAL MEDICINE

## 2018-04-24 PROCEDURE — 84480 ASSAY TRIIODOTHYRONINE (T3): CPT | Performed by: INTERNAL MEDICINE

## 2018-04-24 PROCEDURE — 83002 ASSAY OF GONADOTROPIN (LH): CPT | Performed by: INTERNAL MEDICINE

## 2018-04-24 RX ORDER — KETOCONAZOLE 20 MG/ML
SHAMPOO TOPICAL
Qty: 120 ML | Refills: 11 | Status: SHIPPED | OUTPATIENT
Start: 2018-04-24 | End: 2019-07-05

## 2018-04-24 ASSESSMENT — PAIN SCALES - GENERAL: PAINLEVEL: NO PAIN (0)

## 2018-04-24 NOTE — LETTER
Patient:  Panchito Pierce  :   1965  MRN:     [id number]        Mr.Paul RACHANA Pierce  [address] [facility] [address]        2018    Dear Panchito    Here are your results.  I am glad to say that your thyroid function and your testosterone levels are all normal.  Previously in 2017, your testosterone was low at 94.  Now it is very normal at 288.  I think your FSH is a little higher because this is helping to raise your testosterone.    If you have any questions, please feel free to contact my nurse at [phone] select option #3 for triage nurse  or  option #1 for scheduling related questions.    Regards    Pattie Guevara MD        Resulted Orders   Testosterone Free and Total   Result Value Ref Range    Testosterone Total 288 240 - 950 ng/dL      Comment:      This test was developed and its performance characteristics determined by the   [facility],  Special Chemistry Laboratory. It has   not been cleared or approved by the FDA. The laboratory is regulated under   CLIA as qualified to perform high-complexity testing. This test is used for   clinical purposes. It should not be regarded as investigational or for   research.      Sex Hormone Binding Globulin 23 11 - 80 nmol/L    Free Testosterone Calculated 6.85 4.7 - 24.4 ng/dL   Prolactin   Result Value Ref Range    Prolactin 3 2 - 18 ug/L   Lutropin   Result Value Ref Range    Lutropin 6.7 1.5 - 9.3 IU/L   Follicle stimulating hormone   Result Value Ref Range    FSH 19.3 (H) 0.7 - 10.8 IU/L   T3 total   Result Value Ref Range    Triiodothyronine (T3) 99 60 - 181 ng/dL   T4 free   Result Value Ref Range    T4 Free 0.87 0.76 - 1.46 ng/dL   TSH   Result Value Ref Range    TSH 1.00 0.40 - 4.00 mU/L       [facility] Draw

## 2018-04-24 NOTE — MR AVS SNAPSHOT
After Visit Summary   4/24/2018    Panchito Pierce    MRN: [id number]           Patient Information     Date Of Birth          1965        Visit Information        Provider Department      4/24/2018 9:15 AM Thompson Landeros MD [facility] Dermatology        Today's Diagnoses     Raynaud's phenomenon without gangrene    -  1    Chronic dermatitis of hands        Dermatitis, seborrheic          Care Instructions    Try taking a break from the nifedipine. Raynaud's is generally triggered by the cold. You can restart it when the weather gets cold again.    Use the ketoconazole shampoo to wash your scalp and face. Foam it up and leave it on for a minute or two before rinsing off.     You can start using the creams on your hands again if needed.                 Preventive Care:    Colorectal Cancer Screening: During our visit today, we discussed that it is recommended you receive colorectal cancer screening. Please call or make an appointment with your primary care provider to discuss this. You may also call the [facility] scheduling line ([phone]) to set up a colonoscopy appointment.              Follow-ups after your visit        Follow-up notes from your care team     Return in about 6 months (around 10/24/2018).      Your next 10 appointments already scheduled     Jun 01, 2018  9:00 AM CDT   (Arrive by 8:45 AM)   RETURN ENDOCRINE with Pattie Guevara MD   [facility] Endocrinology ([facility])    [address] [place] [id number] [phone]            Dec 19, 2018 10:00 AM CST   [facility] with  [facility]   [facility] Draw ([facility])    [address] [place] [id number] [phone]            Dec 19, 2018 10:30 AM CST   Return with Jermaine Cabrera MD   [facility] Blood and Marrow Transplant ([facility])    [address] [place] [address]  [address]  [facility] [id number] [phone]              Future tests that were ordered for you today     Open Future Orders        Priority Expected Expires Ordered    IgG Routine 4/24/2018 4/20/2019 4/20/2018    T cell subset extended profile Routine 4/24/2018 4/20/2019 4/20/2018            Who to contact     Please call your clinic at [phone] to:    Ask questions about your health    Make or cancel appointments    Discuss your medicines    Learn about your test results    Speak to your doctor            Additional Information About Your Visit        Veotag Information     [employer]otag gives you secure access to your electronic health record. If you see a primary care provider, you can also send messages to your care team and make appointments. If you have questions, please call your primary care clinic.  If you do not have a primary care provider, please call [phone] and they will assist you.      Veot[employer] is an electronic gateway that provides easy, online access to your medical records. With [employer], you can request a clinic appointment, read your test results, renew a prescription or communicate with your care team.     To access your existing account, please contact your [facility] or call [phone] for assistance.        Care EveryWhere ID     This is your Care EveryWhere ID. This could be used by other organizations to access your [place] medical records  [id number]         Blood Pressure from Last 3 Encounters:   12/21/17 124/80   12/21/17 124/80   12/18/17 130/82    Weight from Last 3 Encounters:   12/21/17 94.3 kg (208 lb)   12/21/17 94.4 kg (208 lb 1.8 oz)   12/18/17 94.7 kg (208 lb 12.8 oz)              Today, you had the following     No orders found for display         Today's Medication Changes          These changes are accurate as of 4/24/18 10:20 AM.  If you have any questions, ask your nurse or doctor.               Start taking these medicines.         Dose/Directions    ketoconazole 2 % shampoo   Commonly known as:  NIZORAL   Used for:  Dermatitis, seborrheic   Started by:  Thompson Landeros MD        Apply and lather, leave on for a few minutes, and rinse well. Use on scalp and face as needed.   Quantity:  120 mL   Refills:  11            Where to get your medicines      These medications were sent to [id number] - [place] [[facility] [place] - [address]  [address] [facility] [address]     Phone:  [phone]     ketoconazole 2 % shampoo                Primary Care Provider Office Phone # Fax #    Leann Berry [phone]       [facility] [address] [facility] [address]  [place] [address]        Equal Access to Services     LAVINIA STEINER : Hadii haydee cameron hadasho Soomaali, waaxda luqadaha, qaybta kaalmada adeegyada, waxay grazyna hayheather quiles . So [facility] [phone].    ATENCIÓN: Si habla español, tiene a viigl disposición servicios gratuitos de asistencia lingüística. Llame al [phone].    We comply with applicable federal civil rights laws and [place] laws. We do not discriminate on the basis of race, color, national origin, age, disability, sex, sexual orientation, or gender identity.            Thank you!     Thank you for choosing [facility] DERMATOLOGY  for your care. Our goal is always to provide you with excellent care. Hearing back from our patients is one way we can continue to improve our services. Please take a few minutes to complete the written survey that you may receive in the mail after your visit with us. Thank you!             Your Updated Medication List - Protect others around you: Learn how to safely use, store and throw away your medicines at www.disposemymeds.org.          This list is accurate as of 4/24/18 10:20 AM.  Always use your most recent med list.                   Brand Name Dispense Instructions for use Diagnosis    acyclovir 400 MG tablet    ZOVIRAX    120 tablet     Take 2 tablets (800 mg) by mouth 2 times daily    CML (chronic myelocytic leukemia) (H), Status post bone marrow transplant (H)       calcium carbonate-vitamin D 500-400 MG-UNIT Tabs per tablet     180 tablet    Take 2 tablets by mouth daily    CML (chronic myelocytic leukemia) (H), Status post bone marrow transplant (H), Graves disease, Paroxysmal atrial fibrillation (H), Constipation, unspecified constipation type, Adjustment disorder with depressed mood       gabapentin 100 MG capsule    NEURONTIN     Take 300 mg by mouth 3 times daily        ketoconazole 2 % shampoo    NIZORAL    120 mL    Apply and lather, leave on for a few minutes, and rinse well. Use on scalp and face as needed.    Dermatitis, seborrheic       levothyroxine 150 MCG tablet    SYNTHROID/LEVOTHROID    90 tablet    Take 1 tablet (150 mcg) by mouth daily        lidocaine visc 2% 2.5mL/5mL & maalox/mylanta w/ simeth 2.5mL/5mL & diphenhydrAMINE 5mg/5mL Susp suspension    [facility]     Take 5 mLs by mouth        liothyronine 5 MCG tablet    CYTOMEL    180 tablet    Take 1 tablet (5 mcg) by mouth 2 times daily    Hyperthyroidism       metoprolol tartrate 25 MG tablet    LOPRESSOR    30 tablet    Take 0.5 tablets (12.5 mg) by mouth 2 times daily    Paroxysmal atrial fibrillation (H), CML (chronic myelocytic leukemia) (H), Graves disease, 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 TAKE 1 TABLET BY MOUTH DAILY DURING WINTER MONTHS TO PREVENT FLARES    Raynaud's phenomenon without gangrene       oxyCODONE IR 5 MG tablet    ROXICODONE    18 tablet    Take 1 tablet (5 mg) by mouth every 4 hours as needed for moderate to severe pain    VZV (varicella-zoster virus) infection       pantoprazole 40 MG EC tablet    PROTONIX    30 tablet     TAKE ONE TABLET BY MOUTH ONE TIME DAILY    CML (chronic myelocytic leukemia) (H), Status post bone marrow transplant (H), Graves disease, Paroxysmal atrial fibrillation (H), Constipation, unspecified constipation type, Adjustment disorder with depressed mood       Selenium 100 MCG Caps     90 capsule    1 tablet daily    Graves disease

## 2018-04-24 NOTE — LETTER
"4/24/2018       RE: Panchito Pierce  [address]  [place] [address]     Dear Colleague,    Thank you for referring your patient, Panchito Pierce, to the [facility] DERMATOLOGY at [facility]. Please see a copy of my visit note below.    [facility] Dermatology Note      Dermatology Problem List:  1. Scalp folliculitis: improved  2. Chronic hand dermatitis: improved  3. Stasis dermatitis of legs: improved  4. Seborrheic dermatitis  5. Raynaud's phenomenon  6. H/o VZV, R trigeminal distribution, late 2017    Encounter Date: Apr 24, 2018    CC:   Chief Complaint   Patient presents with     Derm Problem     Panchito is returning to discuss Raynaud's. He says he developed shingles around december and still dealing with effects of that.       History of Present Illness:  Mr. Panchito Pierce is a 53 year old male with history of hypothyroidism and s/p BMT for CML (in 2015; off immune suppression), who presents for follow-up of issues noted above. He was last seen in Dermatology Clinic 8/8/2017 for chronic hand dermatitis and Raynaud's phenomenon. At that time he was started on Porcardia XL 30mg, which he has continued to take.     Today he reports the Procardia has been helpful. He thinks this improved his symptoms throughout the winter, but he has questions today about if he can stop taking it. He has not been using anything on his hands. They are much better than previously. Some recent peeling of fingertips in setting of increased \"remodeling\" work. Does pick at areas on thumbs.     Since his last appointment he developed shingles on his right face/scalp/ear/mouth. He continues to have sequelae, especially in his mouth. Is working with his pcp on this issue. Currently on acyclovir and gabapentin, which has been helpful. He also notes increased dryness of the skin of his medial eyebrows and central face. This is slightly itchy. He uses hand soap or body wash to wash " his face. Is willing to try creams, but admits to intermittent compliance previously.     He is otherwise feeling healthy today and denies fever, chills, unexplained weight loss, or swollen/tender lymph nodes.     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)     Hyperthyroidism     Hypogonadism male     Fatigue, unspecified type     Seborrheic keratoses, inflamed     Chronic dermatitis of hands     Raynaud's phenomenon without gangrene     Past Medical History:   Diagnosis Date     Graves disease 1/1/2003    treated with radioiodine, meds T3 and T4     Murmur, heart     has not been fully evaluated     Past Surgical History:   Procedure Laterality Date     ------------OTHER------------- Left 1/1/1985    left hand graft flap- work accident     ------------OTHER------------- Right 1/1/1985    right foot graft- work accident     ESOPHAGOSCOPY, GASTROSCOPY, DUODENOSCOPY (EGD), COMBINED N/A 3/23/2016    Procedure: COMBINED ESOPHAGOSCOPY, GASTROSCOPY, DUODENOSCOPY (EGD), BIOPSY SINGLE OR MULTIPLE;  Surgeon: Jay Tracy MD;  Location:  GI         Medications:  Current Outpatient Prescriptions   Medication Sig Dispense Refill     acyclovir (ZOVIRAX) 400 MG tablet Take 2 tablets (800 mg) by mouth 2 times daily 120 tablet 3     calcium carbonate-vitamin D 500-400 MG-UNIT TABS per tablet Take 2 tablets by mouth daily 180 tablet 11     gabapentin (NEURONTIN) 100 MG capsule Take 300 mg by mouth 3 times daily       levothyroxine (SYNTHROID/LEVOTHROID) 150 MCG tablet Take 1 tablet (150 mcg) by mouth daily 90 tablet 3     liothyronine (CYTOMEL) 5 MCG tablet Take 1 tablet (5 mcg) by mouth 2 times daily 180 tablet 3     metoprolol (LOPRESSOR) 25 MG tablet Take 0.5 tablets (12.5 mg) by mouth 2 times daily 30  tablet 10     NIFEdipine ER osmotic (PROCARDIA XL) 30 MG 24 hr tablet TAKE 1 TABLET BY MOUTH DAILY. TAKE 1 TABLET BY MOUTH DAILY DURING WINTER MONTHS TO PREVENT FLARES 30 tablet 5     pantoprazole (PROTONIX) 40 MG EC tablet TAKE ONE TABLET BY MOUTH ONE TIME DAILY  30 tablet 10     Selenium 100 MCG CAPS 1 tablet daily 90 capsule 2     magic mouthwash suspension (diphenhydrAMINE, lidocaine, aluminum-magnesium & simethicone) Take 5 mLs by mouth       oxyCODONE IR (ROXICODONE) 5 MG tablet Take 1 tablet (5 mg) by mouth every 4 hours as needed for moderate to severe pain (Patient not taking: Reported on 4/24/2018) 18 tablet 0        Allergies   Allergen Reactions     Allopurinol Rash     Unclear if it was from allopurinol, but went away when it was stopped.  But also had steroid cream at the same time.         Review of Systems:  -As per HPI  -Constitutional: The patient denies fatigue, fevers, chills, unintended weight loss, and night sweats.  -HEENT: Patient denies nonhealing oral sores.  -Skin: As above in HPI. No additional skin concerns.    Physical exam:  Vitals: There were no vitals taken for this visit.  GEN: This is a well-developed, well-nourished male in no acute distress, in a pleasant mood.    HEENT: oropharynx appears normal without erosions or vesicles  SKIN: Focused examination of the scalp, face, neck, and upper extremities including nails was performed and notable for:  -Ramon II-III  -Scattered homogenous brown macules on sun exposed areas.  -Poorly defined pink patches with loose scale around medial eyebrows, to a lesser extent around melolabial folds  -Multiple regular brown pigmented macules and papules are identified on the upper extremities.   -No erythema, scarring, or alopecia of scalp  -Hands are mildly xerotic with sheet-like scale of distal fingerpads throughout; increased xerosis with excoriations and some very small fissures on right thumb > left  -No other lesions of concern on areas  examined.     Impression/Plan:  1. Chronic hand dermatitis with fissuring. Previously completely resolved, but now with mild flare, likely in setting of winter months and medication non-compliance. Currently not using any treatment or moisturizer.    Recommend gentle skin care with good thick moisturizer at least daily.    If flares, may restart clobetasol ointment at that time. (With current state, could consider short course of BID clobetasol.)    2. Seborrheic dermatitis. Admits to not applying creams regularly, so will attempt to use ketoconazole shampoo first.    Start ketoconazole shampoo to scalp and face as face wash.     3. Raynaud's phenomenon. Per clinical history, patient has Raynaud's during witner months when exposed to cold. Very good response to Procardia XL 30 mg daily during winter months.     Stop Procardia/nifedipine now for duration of the warmer months. Okay to restart in the fall/cooler weather. Counseled again re: potential side effects including hypotension, allergy, etc.     Also encouraged careful physical protection of hands; avoid cold or damp environmental factors whenever possible.      CC Dr. Cabrera on close of this encounter.  Follow-up 6 months.    Dr. Landeros staffed the patient.    Staff Involved:  Resident(Miguel Angel Florence)/Staff(as above)    I have seen and examined this patient and agree with the assessment and plan as documented in the resident's note.    Thompson Landeros MD  Dermatology Attending

## 2018-04-24 NOTE — NURSING NOTE
Dermatology Rooming Note    Panchito Pierce's goals for this visit include:   Chief Complaint   Patient presents with     Derm Problem     Panchito is returning to discuss Raynaud's. He says he developed shingles around december and still dealing with effects of that.     Emerita Watson LPN

## 2018-04-24 NOTE — PATIENT INSTRUCTIONS
Try taking a break from the nifedipine. Raynaud's is generally triggered by the cold. You can restart it when the weather gets cold again.    Use the ketoconazole shampoo to wash your scalp and face. Foam it up and leave it on for a minute or two before rinsing off.     You can start using the creams on your hands again if needed.                 Preventive Care:    Colorectal Cancer Screening: During our visit today, we discussed that it is recommended you receive colorectal cancer screening. Please call or make an appointment with your primary care provider to discuss this. You may also call the  [employer] scheduling line ([phone]) to set up a colonoscopy appointment.

## 2018-04-24 NOTE — NURSING NOTE
Chief Complaint   Patient presents with     Blood Draw     Labs drawn from vpt by RN.     Labs collected from venipuncture by RN.    Rachel Matthews RN

## 2018-04-24 NOTE — PROGRESS NOTES
"[facility] Dermatology Note      Dermatology Problem List:  1. Scalp folliculitis: improved  2. Chronic hand dermatitis: improved  3. Stasis dermatitis of legs: improved  4. Seborrheic dermatitis  5. Raynaud's phenomenon  6. H/o VZV, R trigeminal distribution, late 2017    Encounter Date: Apr 24, 2018    CC:   Chief Complaint   Patient presents with     Derm Problem     Panchito is returning to discuss Raynaud's. He says he developed shingles around december and still dealing with effects of that.       History of Present Illness:  Mr. Panchito Pierce is a 53 year old male with history of hypothyroidism and s/p BMT for CML (in 2015; off immune suppression), who presents for follow-up of issues noted above. He was last seen in Dermatology Clinic 8/8/2017 for chronic hand dermatitis and Raynaud's phenomenon. At that time he was started on Porcardia XL 30mg, which he has continued to take.     Today he reports the Procardia has been helpful. He thinks this improved his symptoms throughout the winter, but he has questions today about if he can stop taking it. He has not been using anything on his hands. They are much better than previously. Some recent peeling of fingertips in setting of increased \"remodeling\" work. Does pick at areas on thumbs.     Since his last appointment he developed shingles on his right face/scalp/ear/mouth. He continues to have sequelae, especially in his mouth. Is working with his pcp on this issue. Currently on acyclovir and gabapentin, which has been helpful. He also notes increased dryness of the skin of his medial eyebrows and central face. This is slightly itchy. He uses hand soap or body wash to wash his face. Is willing to try creams, but admits to intermittent compliance previously.     He is otherwise feeling healthy today and denies fever, chills, unexplained weight loss, or swollen/tender lymph nodes.     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)     Hyperthyroidism     Hypogonadism male     Fatigue, unspecified type     Seborrheic keratoses, inflamed     Chronic dermatitis of hands     Raynaud's phenomenon without gangrene     Past Medical History:   Diagnosis Date     Graves disease 1/1/2003    treated with radioiodine, meds T3 and T4     Murmur, heart     has not been fully evaluated     Past Surgical History:   Procedure Laterality Date     ------------OTHER------------- Left 1/1/1985    left hand graft flap- work accident     ------------OTHER------------- Right 1/1/1985    right foot graft- work accident     ESOPHAGOSCOPY, GASTROSCOPY, DUODENOSCOPY (EGD), COMBINED N/A 3/23/2016    Procedure: COMBINED ESOPHAGOSCOPY, GASTROSCOPY, DUODENOSCOPY (EGD), BIOPSY SINGLE OR MULTIPLE;  Surgeon: Jay Tracy MD;  Location:  GI         Medications:  Current Outpatient Prescriptions   Medication Sig Dispense Refill     acyclovir (ZOVIRAX) 400 MG tablet Take 2 tablets (800 mg) by mouth 2 times daily 120 tablet 3     calcium carbonate-vitamin D 500-400 MG-UNIT TABS per tablet Take 2 tablets by mouth daily 180 tablet 11     gabapentin (NEURONTIN) 100 MG capsule Take 300 mg by mouth 3 times daily       levothyroxine (SYNTHROID/LEVOTHROID) 150 MCG tablet Take 1 tablet (150 mcg) by mouth daily 90 tablet 3     liothyronine (CYTOMEL) 5 MCG tablet Take 1 tablet (5 mcg) by mouth 2 times daily 180 tablet 3     metoprolol (LOPRESSOR) 25 MG tablet Take 0.5 tablets (12.5 mg) by mouth 2 times daily 30 tablet 10     NIFEdipine ER osmotic (PROCARDIA XL) 30 MG 24 hr tablet TAKE 1 TABLET BY MOUTH DAILY. TAKE 1 TABLET BY MOUTH DAILY DURING WINTER MONTHS TO PREVENT FLARES 30 tablet 5     pantoprazole (PROTONIX) 40 MG EC tablet TAKE ONE TABLET BY MOUTH ONE TIME DAILY  30 tablet 10      Selenium 100 MCG CAPS 1 tablet daily 90 capsule 2     magic mouthwash suspension (diphenhydrAMINE, lidocaine, aluminum-magnesium & simethicone) Take 5 mLs by mouth       oxyCODONE IR (ROXICODONE) 5 MG tablet Take 1 tablet (5 mg) by mouth every 4 hours as needed for moderate to severe pain (Patient not taking: Reported on 4/24/2018) 18 tablet 0        Allergies   Allergen Reactions     Allopurinol Rash     Unclear if it was from allopurinol, but went away when it was stopped.  But also had steroid cream at the same time.         Review of Systems:  -As per HPI  -Constitutional: The patient denies fatigue, fevers, chills, unintended weight loss, and night sweats.  -HEENT: Patient denies nonhealing oral sores.  -Skin: As above in HPI. No additional skin concerns.    Physical exam:  Vitals: There were no vitals taken for this visit.  GEN: This is a well-developed, well-nourished male in no acute distress, in a pleasant mood.    HEENT: oropharynx appears normal without erosions or vesicles  SKIN: Focused examination of the scalp, face, neck, and upper extremities including nails was performed and notable for:  -Ramon II-III  -Scattered homogenous brown macules on sun exposed areas.  -Poorly defined pink patches with loose scale around medial eyebrows, to a lesser extent around melolabial folds  -Multiple regular brown pigmented macules and papules are identified on the upper extremities.   -No erythema, scarring, or alopecia of scalp  -Hands are mildly xerotic with sheet-like scale of distal fingerpads throughout; increased xerosis with excoriations and some very small fissures on right thumb > left  -No other lesions of concern on areas examined.     Impression/Plan:  1. Chronic hand dermatitis with fissuring. Previously completely resolved, but now with mild flare, likely in setting of winter months and medication non-compliance. Currently not using any treatment or moisturizer.    Recommend gentle skin care with  good thick moisturizer at least daily.    If flares, may restart clobetasol ointment at that time. (With current state, could consider short course of BID clobetasol.)    2. Seborrheic dermatitis. Admits to not applying creams regularly, so will attempt to use ketoconazole shampoo first.    Start ketoconazole shampoo to scalp and face as face wash.     3. Raynaud's phenomenon. Per clinical history, patient has Raynaud's during witner months when exposed to cold. Very good response to Procardia XL 30 mg daily during winter months.     Stop Procardia/nifedipine now for duration of the warmer months. Okay to restart in the fall/cooler weather. Counseled again re: potential side effects including hypotension, allergy, etc.     Also encouraged careful physical protection of hands; avoid cold or damp environmental factors whenever possible.      CC Dr. Cabrera on close of this encounter.  Follow-up 6 months.    Dr. Landeros staffed the patient.    Staff Involved:  Resident(Miguel Angel Florence)/Staff(as above)    I have seen and examined this patient and agree with the assessment and plan as documented in the resident's note.    Thompson Landeros MD  Dermatology Attending

## 2018-04-25 LAB
CD19 CELLS # BLD: 509 CELLS/UL (ref 107–698)
CD19 CELLS NFR BLD: 34 % (ref 6–27)
CD3 CELLS # BLD: 812 CELLS/UL (ref 603–2990)
CD3 CELLS NFR BLD: 55 % (ref 49–84)
CD3+CD4+ CELLS # BLD: 452 CELLS/UL (ref 441–2156)
CD3+CD4+ CELLS NFR BLD: 30 % (ref 28–63)
CD3+CD4+ CELLS/CD3+CD8+ CLL BLD: 1.36 % (ref 1.4–2.6)
CD3+CD8+ CELLS # BLD: 328 CELLS/UL (ref 125–1312)
CD3+CD8+ CELLS NFR BLD: 22 % (ref 10–40)
CD3-CD16+CD56+ CELLS # BLD: 154 CELLS/UL (ref 95–640)
CD3-CD16+CD56+ CELLS NFR BLD: 10 % (ref 4–25)
IFC SPECIMEN: ABNORMAL
SHBG SERPL-SCNC: 23 NMOL/L (ref 11–80)
TESTOST FREE SERPL-MCNC: 6.85 NG/DL (ref 4.7–24.4)
TESTOST SERPL-MCNC: 288 NG/DL (ref 240–950)

## 2018-04-25 NOTE — PROGRESS NOTES
Dear Panchito    Here are your results.  I am glad to say that your thyroid function and your testosterone levels are all normal.  Previously in December 2017, your testosterone was low at 94.  Now it is very normal at 288.  I think your FSH is a little higher because this is helping to raise your testosterone.    If you have any questions, please feel free to contact my nurse at [phone] select option #3 for triage nurse  or  option #1 for scheduling related questions.    Regards    Pattie Guevara MD

## 2018-05-09 ENCOUNTER — TRANSFERRED RECORDS (OUTPATIENT)
Dept: HEALTH INFORMATION MANAGEMENT | Facility: CLINIC | Age: 53
End: 2018-05-09

## 2018-05-17 DIAGNOSIS — Z94.81 STATUS POST BONE MARROW TRANSPLANT (H): ICD-10-CM

## 2018-05-17 DIAGNOSIS — I48.0 PAROXYSMAL ATRIAL FIBRILLATION (H): ICD-10-CM

## 2018-05-17 DIAGNOSIS — C92.10 CML (CHRONIC MYELOCYTIC LEUKEMIA) (H): ICD-10-CM

## 2018-05-17 DIAGNOSIS — D84.9 IMMUNOSUPPRESSION (H): ICD-10-CM

## 2018-05-17 DIAGNOSIS — E05.00 GRAVES DISEASE: ICD-10-CM

## 2018-05-17 DIAGNOSIS — D89.813 GVH (GRAFT VERSUS HOST DISEASE) (H): ICD-10-CM

## 2018-05-17 DIAGNOSIS — K59.00 CONSTIPATION, UNSPECIFIED CONSTIPATION TYPE: ICD-10-CM

## 2018-05-17 RX ORDER — METOPROLOL TARTRATE 25 MG/1
TABLET, FILM COATED ORAL
Qty: 30 TABLET | Refills: 9 | Status: SHIPPED | OUTPATIENT
Start: 2018-05-17 | End: 2019-06-24

## 2018-06-01 ENCOUNTER — OFFICE VISIT (OUTPATIENT)
Dept: ENDOCRINOLOGY | Facility: CLINIC | Age: 53
End: 2018-06-01
Payer: COMMERCIAL

## 2018-06-01 VITALS
BODY MASS INDEX: 32.22 KG/M2 | WEIGHT: 217.5 LBS | HEIGHT: 69 IN | SYSTOLIC BLOOD PRESSURE: 105 MMHG | HEART RATE: 95 BPM | DIASTOLIC BLOOD PRESSURE: 71 MMHG

## 2018-06-01 DIAGNOSIS — E05.90 HYPERTHYROIDISM: Primary | ICD-10-CM

## 2018-06-01 ASSESSMENT — PAIN SCALES - GENERAL: PAINLEVEL: NO PAIN (0)

## 2018-06-01 ASSESSMENT — ENCOUNTER SYMPTOMS
POOR WOUND HEALING: 0
NAIL CHANGES: 0
SKIN CHANGES: 0

## 2018-06-01 NOTE — MR AVS SNAPSHOT
After Visit Summary   6/1/2018    Panchito Pierce    MRN: [id number]           Patient Information     Date Of Birth          1965        Visit Information        Provider Department      6/1/2018 9:00 AM Pattie Guevara MD [facility] Endocrinology        Today's Diagnoses     Hyperthyroidism    -  1      Care Instructions    Pt to consider sleep apnea treatment    Can cut down selenium to MWF and if doing ok after 1 month, then stop and take a MVI    Don't eat after 8pm     Water , skim milk, tea from bag, coffee (minimial additive)     More protein, less carbs    Weight watcher,           Follow-ups after your visit        Follow-up notes from your care team     Return in about 1 year (around 6/1/2019).      Your next 10 appointments already scheduled     Dec 19, 2018 10:00 AM CST   [facility] with  [facility]   [facility] ([facility])    [address] [place] [address]  [address]  [facility] [address] [phone]            Dec 19, 2018 10:30 AM CST   Return with Jermaine Cabrera MD   [facility] Blood and Marrow Transplant ([facility])    [address] [place] [address]  [address]  [facility] [address] [phone]              Future tests that were ordered for you today     Open Future Orders        Priority Expected Expires Ordered    TSH Routine 7/1/2018 8/1/2018 6/1/2018    T4 free Routine 7/1/2018 8/1/2018 6/1/2018    T3 total Routine 7/1/2018 8/1/2018 6/1/2018    Testosterone Free and Total Routine 7/1/2018 8/1/2018 6/1/2018            Who to contact     Please call your clinic at [phone] to:    Ask questions about your health    Make or cancel appointments    Discuss your medicines    Learn about your test results    Speak to your doctor            Additional Information About Your Visit        [employer] Checkhart Information     "[employer]t gives you secure access to your electronic health record. If you see a primary  "care provider, you can also send messages to your care team and make appointments. If you have questions, please call your primary care clinic.  If you do not have a primary care provider, please call [phone] and they will assist you.      [employer] is an electronic gateway that provides easy, online access to your medical records. With [employer], you can request a clinic appointment, read your test results, renew a prescription or communicate with your care team.     To access your existing account, please contact your [facility] or call [phone] for assistance.        Care EveryWhere ID     This is your Care EveryWhere ID. This could be used by other organizations to access your [facility] medical records  [id number]        Your Vitals Were     Pulse Height BMI (Body Mass Index)             95 1.753 m (5' 9\") 32.12 kg/m2          Blood Pressure from Last 3 Encounters:   06/01/18 105/71   12/21/17 124/80   12/21/17 124/80    Weight from Last 3 Encounters:   06/01/18 98.7 kg (217 lb 8 oz)   12/21/17 94.3 kg (208 lb)   12/21/17 94.4 kg (208 lb 1.8 oz)               Primary Care Provider Office Phone # Fax #    Leann Berry [phone]       [facility] [address] [place] [id number]        Equal Access to Services     LORENE STEINER AH: Hadii aad ku hadasho Soomaali, waaxda luqadaha, qaybta kaalmada vereniceegyada, delfino quiles . So [facility] [phone].    ATENCIÓN: Si habla español, tiene a vigil disposición servicios gratuitos de asistencia lingüística. Tara al [phone].    We comply with applicable federal civil rights laws and [place] laws. We do not discriminate on the basis of race, color, national origin, age, disability, sex, sexual orientation, or gender identity.            Thank you!     Thank you for choosing [facility]  for your care. Our goal is always to provide you with excellent care. " Hearing back from our patients is one way we can continue to improve our services. Please take a few minutes to complete the written survey that you may receive in the mail after your visit with us. Thank you!             Your Updated Medication List - Protect others around you: Learn how to safely use, store and throw away your medicines at www.disposemymeds.org.          This list is accurate as of 6/1/18  9:41 AM.  Always use your most recent med list.                   Brand Name Dispense Instructions for use Diagnosis    acyclovir 400 MG tablet    ZOVIRAX    120 tablet    Take 2 tablets (800 mg) by mouth 2 times daily    CML (chronic myelocytic leukemia) (H), Status post bone marrow transplant (H)       calcium carbonate-vitamin D 500-400 MG-UNIT Tabs per tablet     180 tablet    Take 2 tablets by mouth daily    CML (chronic myelocytic leukemia) (H), Status post bone marrow transplant (H), Graves disease, Paroxysmal atrial fibrillation (H), Constipation, unspecified constipation type, Adjustment disorder with depressed mood       gabapentin 100 MG capsule    NEURONTIN     Take 300 mg by mouth 3 times daily        ketoconazole 2 % shampoo    NIZORAL    120 mL    Apply and lather, leave on for a few minutes, and rinse well. Use on scalp and face as needed.    Dermatitis, seborrheic       levothyroxine 150 MCG tablet    SYNTHROID/LEVOTHROID    90 tablet    Take 1 tablet (150 mcg) by mouth daily        lidocaine visc 2% 2.5mL/5mL & maalox/mylanta w/ simeth 2.5mL/5mL & diphenhydrAMINE 5mg/5mL Susp suspension    [facility]     Take 5 mLs by mouth        liothyronine 5 MCG tablet    CYTOMEL    180 tablet    Take 1 tablet (5 mcg) by mouth 2 times daily    Hyperthyroidism       metoprolol tartrate 25 MG tablet    LOPRESSOR    30 tablet    Take 1/2 tablet by mouth two times daily    Paroxysmal atrial fibrillation (H), CML (chronic myelocytic leukemia) (H), Graves disease, 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 TAKE 1 TABLET BY MOUTH DAILY DURING WINTER MONTHS TO PREVENT FLARES    Raynaud's phenomenon without gangrene       oxyCODONE IR 5 MG tablet    ROXICODONE    18 tablet    Take 1 tablet (5 mg) by mouth every 4 hours as needed for moderate to severe pain    VZV (varicella-zoster virus) infection       pantoprazole 40 MG EC tablet    PROTONIX    30 tablet    TAKE ONE TABLET BY MOUTH ONE TIME DAILY    CML (chronic myelocytic leukemia) (H), Status post bone marrow transplant (H), Graves disease, Paroxysmal atrial fibrillation (H), Constipation, unspecified constipation type, Adjustment disorder with depressed mood       Selenium 100 MCG Caps     90 capsule    1 tablet daily    Graves disease

## 2018-06-01 NOTE — PATIENT INSTRUCTIONS
Pt to consider sleep apnea treatment    Can cut down selenium to MWF and if doing ok after 1 month, then stop and take a MVI    Don't eat after 8pm     Water , skim milk, tea from bag, coffee (minimial additive)     More protein, less carbs    Weight watcher,

## 2018-06-01 NOTE — LETTER
6/1/2018       RE: Panchito Pierce  [address]  [place] [address]     Dear Colleague,    Thank you for referring your patient, Panchito Pierce, to the [facility] ENDOCRINOLOGY at [facility]. Please see a copy of my visit note below.    [facility]  Endocrinology  Pattie Guevara MD  06/01/2018      Chief Complaint:   RECHECK     History of Present Illness:   Panchito Pierce is a 53 year old male with a history of Graves disease, suborrheic keratoses, chronic myelocytic leukemia status post bone marrow transplant who presents for evaluation of Grave's disease.    #1 Graves disease status-post VELA  Per patient report, he was diagnosed with Grave's disease in 2002. He underwent radioactive iodine therapy in 2002 at [facility] and now takes Levothyroxine 150 mcg daily and Cytomel 5 mcg twice daily. He also takes Selenium 100 mcg daily for Graves eye disease. The patient's most recently had thyroid testing on 4/24/2018, where his T3 was 99 ng/dL, T4 free was 0.87 ng/dL, and his TSH was 1.00 mU/L. These thyroid tests have normalized since he had a TSH of 8.89 on 12/21/2017. The patient denies having any weight loss of visual difficulties.  He reports good tolerance of his levothyroxine and Cytomel program.    #2 Shingles  The patient reports that he has ongoing numbness and itchiness on his tongue and jaw since he was diagnosed with shingles on 12/4/2017. He states that the symptoms are manageable by taking Gabapentin 300 mg three times daily and Acyclovir 400 mg twice daily.     #3 Low libido  The patient reports that his libido has improved since 12/2017 when he had shingles. His testosterone level on 12/21/2017 was low at 94 ng/dL, but this increased to 288 mg/dL on 4/24/2018.     #4 Sleep apnea  The patient had a sleep study completed on 1/11/2017 showing that he has sleep apnea. He has not yet ordered his CPAP machine.     #5 Weight gain  The patient reports that he has  noticed some weight gain recently. He reports that he often eats later than 8 pm. He does drink coffee with cream and occasionally beer, but avoids sodas and energy drinks. Mr. Pierce also notes that he often drink [place] tea during the summer.     Review of Systems:   Pertinent items are noted in HPI, remainder of complete ROS is negative.   Answers for HPI/ROS submitted by the patient on 6/1/2018   General Symptoms: No  Skin Symptoms: Yes  HENT Symptoms: No  EYE SYMPTOMS: No  HEART SYMPTOMS: No  LUNG SYMPTOMS: No  INTESTINAL SYMPTOMS: No  URINARY SYMPTOMS: No  REPRODUCTIVE SYMPTOMS: No  SKELETAL SYMPTOMS: No  BLOOD SYMPTOMS: No  NERVOUS SYSTEM SYMPTOMS: No  MENTAL HEALTH SYMPTOMS: No  Changes in hair: No  Changes in moles/birth marks: No  Itching: Yes  Rashes: No  Changes in nails: No  Acne: No  Change in facial hair: No  Warts: No  Non-healing sores: No  Scarring: No  Flaking of skin: Yes  Color changes of hands/feet in cold : No  Sun sensitivity: Yes  Skin thickening: No      Active Medications:      acyclovir (ZOVIRAX) 400 MG tablet, Take 2 tablets (800 mg) by mouth 2 times daily, Disp: 120 tablet, Rfl: 3     calcium carbonate-vitamin D 500-400 MG-UNIT TABS per tablet, Take 2 tablets by mouth daily, Disp: 180 tablet, Rfl: 11     gabapentin (NEURONTIN) 100 MG capsule, Take 300 mg by mouth 3 times daily, Disp: , Rfl:      ketoconazole (NIZORAL) 2 % shampoo, Apply and lather, leave on for a few minutes, and rinse well. Use on scalp and face as needed., Disp: 120 mL, Rfl: 11     levothyroxine (SYNTHROID/LEVOTHROID) 150 MCG tablet, Take 1 tablet (150 mcg) by mouth daily, Disp: 90 tablet, Rfl: 3     liothyronine (CYTOMEL) 5 MCG tablet, Take 1 tablet (5 mcg) by mouth 2 times daily, Disp: 180 tablet, Rfl: 3     magic mouthwash suspension (diphenhydrAMINE, lidocaine, aluminum-magnesium & simethicone), Take 5 mLs by mouth, Disp: , Rfl:      metoprolol tartrate (LOPRESSOR) 25 MG tablet, Take 1/2 tablet by mouth two  "times daily, Disp: 30 tablet, Rfl: 9     NIFEdipine ER osmotic (PROCARDIA XL) 30 MG 24 hr tablet, TAKE 1 TABLET BY MOUTH DAILY. TAKE 1 TABLET BY MOUTH DAILY DURING WINTER MONTHS TO PREVENT FLARES, Disp: 30 tablet, Rfl: 5     oxyCODONE IR (ROXICODONE) 5 MG tablet, Take 1 tablet (5 mg) by mouth every 4 hours as needed for moderate to severe pain (Patient not taking: Reported on 4/24/2018), Disp: 18 tablet, Rfl: 0     pantoprazole (PROTONIX) 40 MG EC tablet, TAKE ONE TABLET BY MOUTH ONE TIME DAILY , Disp: 30 tablet, Rfl: 10     Selenium 100 MCG CAPS, 1 tablet daily, Disp: 90 capsule, Rfl: 2      Allergies:   Allopurinol      Past Medical History:  Graves' disease  Male hypogonadism   Chronic myelocytic leukemia   Graft versus host disease  Status post bone marrow transplant  Raynaud's phenomenon  Chronic dermatitis  Seborrheic keratoses     Past Surgical History:  Hand graft flap, left 1/1/85  Foot graft flap, right 1/1/85    Family History:   Thyroid disease - mother, sister x 3  Lung cancer - mother  Prostate cancer - father  Dementia - father  Heart murmur - father, brother  Hepatitis - brother  Asthma - son     Social History:   Marital Status:   Presents to clinic alone.   Tobacco Use: Former smoker, 15 pack year history, quit 1985.   Alcohol Use: Occasional alcohol use.   PCP: CARMELO RAGSDALE      Physical Exam:   /71  Pulse 95  Ht 1.753 m (5' 9\")  Wt 98.7 kg (217 lb 8 oz)  BMI 32.12 kg/m2   GENERAL APPEARANCE: Alert and no distress  NECK: No lymphadenopathy appreciated  Eyes: No obvious retinopathy noted  Thyroid: No obvious nodules palpated   CV: RRR without M/R/G  Lungs: CTA bilaterally  Abdomen: Soft, Nontender, non distended, positive bowel sounds   Neuro: normal reflexes, no focal deficits  Skin: No infection in feet   Mood: Normal   Lymph: neg in neck and supraclavicular area    Assessment and Plan:  #1 Graves disease status-post VELA  Cut selenium to three times per week. If he feels better " with lower dose, discontinue selenium after one month and take selenium-containing multivitamins. Follow-up in one month for thyroid testing. Follow up with me in one year for recheck.   - TSH; Future  - T4 free; Future  - T3 total; Future    #2 Shingles   Patient reports doing well. Continue current doses of Gabapentin and acyclovir.     #3 Low libido  Testosterone has increased to being within normal range.  Symptomatically, the patient reports reduced erectile dysfunction and improve libido. Discussed with patient that managing sleep apnea will help elevate testosterone levels.   - Testosterone Free and Total; Future (approximately 1 month)    #4 Sleep apnea  Discussed the importance of getting CPAP machine.     #5 Weight gain  Discussed dietary methods of weight loss with patient. Recommended cutting [place] green tea and only drinking black coffee, skim milk, water, and tea in a bag. Reduce carbohydrates in diet, increase vegetable-based proteins, and avoid eating after 8 pm.        Patient Instructions   Pt to consider sleep apnea treatment    Can cut down selenium to MWF and if doing ok after 1 month, then stop and take a MVI      Follow-up: Return in about 1 year (around 6/1/2019).      Total time spent 25 minutes.  More than 50% of the time spent with Mr. Pierce on counseling / coordinating his care    Scribe Disclosure:   We, Layla Navarro and Kaleb Vegas, are serving as scribes to document services personally performed by Pattie Guevara MD at this visit, based upon the provider's statements to us. All documentation has been reviewed by the aforementioned provider prior to being entered into the official medical record.     Portions of this medical record were completed by a scribe. UPON MY REVIEW AND AUTHENTICATION BY ELECTRONIC SIGNATURE, this confirms (a) I performed the applicable clinical services, and (b) the record is accurate.       Again, thank you for allowing me to participate in the care  of your patient.      Sincerely,    Pattie Guevara MD

## 2018-06-01 NOTE — PROGRESS NOTES
[facility]  Endocrinology  Pattie Guevara MD  06/01/2018      Chief Complaint:   RECHECK     History of Present Illness:   Panchito Pierce is a 53 year old male with a history of Graves disease, suborrheic keratoses, chronic myelocytic leukemia status post bone marrow transplant who presents for evaluation of Grave's disease.    #1 Graves disease status-post VELA  Per patient report, he was diagnosed with Grave's disease in 2002. He underwent radioactive iodine therapy in 2002 at [facility] and now takes Levothyroxine 150 mcg daily and Cytomel 5 mcg twice daily. He also takes Selenium 100 mcg daily for Graves eye disease. The patient's most recently had thyroid testing on 4/24/2018, where his T3 was 99 ng/dL, T4 free was 0.87 ng/dL, and his TSH was 1.00 mU/L. These thyroid tests have normalized since he had a TSH of 8.89 on 12/21/2017. The patient denies having any weight loss of visual difficulties.  He reports good tolerance of his levothyroxine and Cytomel program.    #2 Shingles  The patient reports that he has ongoing numbness and itchiness on his tongue and jaw since he was diagnosed with shingles on 12/4/2017. He states that the symptoms are manageable by taking Gabapentin 300 mg three times daily and Acyclovir 400 mg twice daily.     #3 Low libido  The patient reports that his libido has improved since 12/2017 when he had shingles. His testosterone level on 12/21/2017 was low at 94 ng/dL, but this increased to 288 mg/dL on 4/24/2018.     #4 Sleep apnea  The patient had a sleep study completed on 1/11/2017 showing that he has sleep apnea. He has not yet ordered his CPAP machine.     #5 Weight gain  The patient reports that he has noticed some weight gain recently. He reports that he often eats later than 8 pm. He does drink coffee with cream and occasionally beer, but avoids sodas and energy drinks. Mr. Pierce also notes that he often drink [place] tea during the summer.     Review of Systems:    Pertinent items are noted in HPI, remainder of complete ROS is negative.   Answers for HPI/ROS submitted by the patient on 6/1/2018   General Symptoms: No  Skin Symptoms: Yes  HENT Symptoms: No  EYE SYMPTOMS: No  HEART SYMPTOMS: No  LUNG SYMPTOMS: No  INTESTINAL SYMPTOMS: No  URINARY SYMPTOMS: No  REPRODUCTIVE SYMPTOMS: No  SKELETAL SYMPTOMS: No  BLOOD SYMPTOMS: No  NERVOUS SYSTEM SYMPTOMS: No  MENTAL HEALTH SYMPTOMS: No  Changes in hair: No  Changes in moles/birth marks: No  Itching: Yes  Rashes: No  Changes in nails: No  Acne: No  Change in facial hair: No  Warts: No  Non-healing sores: No  Scarring: No  Flaking of skin: Yes  Color changes of hands/feet in cold : No  Sun sensitivity: Yes  Skin thickening: No      Active Medications:      acyclovir (ZOVIRAX) 400 MG tablet, Take 2 tablets (800 mg) by mouth 2 times daily, Disp: 120 tablet, Rfl: 3     calcium carbonate-vitamin D 500-400 MG-UNIT TABS per tablet, Take 2 tablets by mouth daily, Disp: 180 tablet, Rfl: 11     gabapentin (NEURONTIN) 100 MG capsule, Take 300 mg by mouth 3 times daily, Disp: , Rfl:      ketoconazole (NIZORAL) 2 % shampoo, Apply and lather, leave on for a few minutes, and rinse well. Use on scalp and face as needed., Disp: 120 mL, Rfl: 11     levothyroxine (SYNTHROID/LEVOTHROID) 150 MCG tablet, Take 1 tablet (150 mcg) by mouth daily, Disp: 90 tablet, Rfl: 3     liothyronine (CYTOMEL) 5 MCG tablet, Take 1 tablet (5 mcg) by mouth 2 times daily, Disp: 180 tablet, Rfl: 3     magic mouthwash suspension (diphenhydrAMINE, lidocaine, aluminum-magnesium & simethicone), Take 5 mLs by mouth, Disp: , Rfl:      metoprolol tartrate (LOPRESSOR) 25 MG tablet, Take 1/2 tablet by mouth two times daily, Disp: 30 tablet, Rfl: 9     NIFEdipine ER osmotic (PROCARDIA XL) 30 MG 24 hr tablet, TAKE 1 TABLET BY MOUTH DAILY. TAKE 1 TABLET BY MOUTH DAILY DURING WINTER MONTHS TO PREVENT FLARES, Disp: 30 tablet, Rfl: 5     oxyCODONE IR (ROXICODONE) 5 MG tablet, Take 1  "tablet (5 mg) by mouth every 4 hours as needed for moderate to severe pain (Patient not taking: Reported on 4/24/2018), Disp: 18 tablet, Rfl: 0     pantoprazole (PROTONIX) 40 MG EC tablet, TAKE ONE TABLET BY MOUTH ONE TIME DAILY , Disp: 30 tablet, Rfl: 10     Selenium 100 MCG CAPS, 1 tablet daily, Disp: 90 capsule, Rfl: 2      Allergies:   Allopurinol      Past Medical History:  Graves' disease  Male hypogonadism   Chronic myelocytic leukemia   Graft versus host disease  Status post bone marrow transplant  Raynaud's phenomenon  Chronic dermatitis  Seborrheic keratoses     Past Surgical History:  Hand graft flap, left 1/1/85  Foot graft flap, right 1/1/85    Family History:   Thyroid disease - mother, sister x 3  Lung cancer - mother  Prostate cancer - father  Dementia - father  Heart murmur - father, brother  Hepatitis - brother  Asthma - son     Social History:   Marital Status:   Presents to clinic alone.   Tobacco Use: Former smoker, 15 pack year history, quit 1985.   Alcohol Use: Occasional alcohol use.   PCP: CARMELO RAGSDALE      Physical Exam:   /71  Pulse 95  Ht 1.753 m (5' 9\")  Wt 98.7 kg (217 lb 8 oz)  BMI 32.12 kg/m2   GENERAL APPEARANCE: Alert and no distress  NECK: No lymphadenopathy appreciated  Eyes: No obvious retinopathy noted  Thyroid: No obvious nodules palpated   CV: RRR without M/R/G  Lungs: CTA bilaterally  Abdomen: Soft, Nontender, non distended, positive bowel sounds   Neuro: normal reflexes, no focal deficits  Skin: No infection in feet   Mood: Normal   Lymph: neg in neck and supraclavicular area    Assessment and Plan:  #1 Graves disease status-post VELA  Cut selenium to three times per week. If he feels better with lower dose, discontinue selenium after one month and take selenium-containing multivitamins. Follow-up in one month for thyroid testing. Follow up with me in one year for recheck.   - TSH; Future  - T4 free; Future  - T3 total; Future    #2 Shingles   Patient " reports doing well. Continue current doses of Gabapentin and acyclovir.     #3 Low libido  Testosterone has increased to being within normal range.  Symptomatically, the patient reports reduced erectile dysfunction and improve libido. Discussed with patient that managing sleep apnea will help elevate testosterone levels.   - Testosterone Free and Total; Future (approximately 1 month)    #4 Sleep apnea  Discussed the importance of getting CPAP machine.     #5 Weight gain  Discussed dietary methods of weight loss with patient. Recommended cutting [place] green tea and only drinking black coffee, skim milk, water, and tea in a bag. Reduce carbohydrates in diet, increase vegetable-based proteins, and avoid eating after 8 pm.        Patient Instructions   Pt to consider sleep apnea treatment    Can cut down selenium to MWF and if doing ok after 1 month, then stop and take a MVI      Follow-up: Return in about 1 year (around 6/1/2019).      Total time spent 25 minutes.  More than 50% of the time spent with Mr. Pierce on counseling / coordinating his care    Scribe Disclosure:   We, Layla Navarro and Kaleb Vegas, are serving as scribes to document services personally performed by Pattie Guevara MD at this visit, based upon the provider's statements to us. All documentation has been reviewed by the aforementioned provider prior to being entered into the official medical record.     Portions of this medical record were completed by a scribe. UPON MY REVIEW AND AUTHENTICATION BY ELECTRONIC SIGNATURE, this confirms (a) I performed the applicable clinical services, and (b) the record is accurate.

## 2018-07-03 DIAGNOSIS — I73.00 RAYNAUD'S PHENOMENON WITHOUT GANGRENE: ICD-10-CM

## 2018-07-03 NOTE — TELEPHONE ENCOUNTER
NIFEdipine ER osmotic (PROCARDIA XL) 30 MG 24 hr tablet      Last Written Prescription Date:  10/10/17  Last Fill Quantity: 30,   # refills: 5  Last Office Visit : 4/24/18  Future Office visit:  Recall list    Routing refill request to provider for review/approval because:  1. Drug not on the Dermatology refill protocol.   2. Received refill request now but instructions-take during fall/winter months.

## 2018-07-05 RX ORDER — NIFEDIPINE 30 MG
30 TABLET, EXTENDED RELEASE ORAL DAILY
Qty: 30 TABLET | Refills: 1 | Status: SHIPPED | OUTPATIENT
Start: 2018-07-05 | End: 2019-01-15

## 2018-08-07 ENCOUNTER — TELEPHONE (OUTPATIENT)
Dept: ENDOCRINOLOGY | Facility: CLINIC | Age: 53
End: 2018-08-07

## 2018-08-07 NOTE — TELEPHONE ENCOUNTER
----- Message from Pattie Guevara MD sent at 8/6/2018 10:53 PM CDT -----  Did pt recheck labs?   ----- Message -----     From: Pattie Guevara MD     Sent: 7/11/2018       To: Pattie Guevara MD    How are labs doing?

## 2018-09-07 ENCOUNTER — TELEPHONE (OUTPATIENT)
Dept: ENDOCRINOLOGY | Facility: CLINIC | Age: 53
End: 2018-09-07

## 2018-09-07 NOTE — TELEPHONE ENCOUNTER
----- Message from Pattie Guevara MD sent at 9/4/2018  5:28 PM CDT -----  Pt recheck labs?     ----- Message -----     From: Pattie Guevara MD     Sent: 8/29/2018       To: Pattie Guevara MD        ----- Message -----     From: Pattie Guevara MD     Sent: 7/11/2018       To: Pattie Guevara MD    How are labs doing?

## 2018-10-09 ENCOUNTER — TRANSFERRED RECORDS (OUTPATIENT)
Dept: HEALTH INFORMATION MANAGEMENT | Facility: CLINIC | Age: 53
End: 2018-10-09

## 2018-11-09 DIAGNOSIS — C92.10 CML (CHRONIC MYELOCYTIC LEUKEMIA) (H): ICD-10-CM

## 2018-11-09 DIAGNOSIS — K59.00 CONSTIPATION, UNSPECIFIED CONSTIPATION TYPE: ICD-10-CM

## 2018-11-09 DIAGNOSIS — E05.00 GRAVES DISEASE: ICD-10-CM

## 2018-11-09 DIAGNOSIS — I48.0 PAROXYSMAL ATRIAL FIBRILLATION (H): ICD-10-CM

## 2018-11-09 DIAGNOSIS — F43.21 ADJUSTMENT DISORDER WITH DEPRESSED MOOD: ICD-10-CM

## 2018-11-09 DIAGNOSIS — Z94.81 STATUS POST BONE MARROW TRANSPLANT (H): ICD-10-CM

## 2018-11-09 RX ORDER — PANTOPRAZOLE SODIUM 40 MG/1
TABLET, DELAYED RELEASE ORAL
Qty: 30 TABLET | Refills: 11 | Status: SHIPPED | OUTPATIENT
Start: 2018-11-09 | End: 2019-12-01

## 2018-12-03 ENCOUNTER — MYC REFILL (OUTPATIENT)
Dept: ENDOCRINOLOGY | Facility: CLINIC | Age: 53
End: 2018-12-03

## 2018-12-03 DIAGNOSIS — E05.90 HYPERTHYROIDISM: ICD-10-CM

## 2018-12-04 RX ORDER — LEVOTHYROXINE SODIUM 150 UG/1
150 TABLET ORAL DAILY
Qty: 90 TABLET | Refills: 3 | Status: SHIPPED | OUTPATIENT
Start: 2018-12-04 | End: 2019-07-05

## 2018-12-04 RX ORDER — LIOTHYRONINE SODIUM 5 UG/1
5 TABLET ORAL 2 TIMES DAILY
Qty: 180 TABLET | Refills: 3 | Status: SHIPPED | OUTPATIENT
Start: 2018-12-04 | End: 2019-07-05

## 2018-12-19 ENCOUNTER — APPOINTMENT (OUTPATIENT)
Dept: LAB | Facility: CLINIC | Age: 53
End: 2018-12-19
Attending: INTERNAL MEDICINE
Payer: COMMERCIAL

## 2018-12-19 ENCOUNTER — ONCOLOGY VISIT (OUTPATIENT)
Dept: TRANSPLANT | Facility: CLINIC | Age: 53
End: 2018-12-19
Attending: INTERNAL MEDICINE
Payer: COMMERCIAL

## 2018-12-19 VITALS
HEART RATE: 134 BPM | SYSTOLIC BLOOD PRESSURE: 107 MMHG | WEIGHT: 234.1 LBS | DIASTOLIC BLOOD PRESSURE: 71 MMHG | TEMPERATURE: 97.8 F | OXYGEN SATURATION: 98 % | BODY MASS INDEX: 34.57 KG/M2

## 2018-12-19 DIAGNOSIS — Z94.81 STATUS POST BONE MARROW TRANSPLANT (H): ICD-10-CM

## 2018-12-19 DIAGNOSIS — C92.10 CML (CHRONIC MYELOCYTIC LEUKEMIA) (H): Primary | ICD-10-CM

## 2018-12-19 LAB
ALBUMIN SERPL-MCNC: 3.6 G/DL (ref 3.4–5)
ALP SERPL-CCNC: 86 U/L (ref 40–150)
ALT SERPL W P-5'-P-CCNC: 52 U/L (ref 0–70)
ANION GAP SERPL CALCULATED.3IONS-SCNC: 8 MMOL/L (ref 3–14)
AST SERPL W P-5'-P-CCNC: 27 U/L (ref 0–45)
BASOPHILS # BLD AUTO: 0 10E9/L (ref 0–0.2)
BASOPHILS NFR BLD AUTO: 0.4 %
BILIRUB SERPL-MCNC: 0.4 MG/DL (ref 0.2–1.3)
BUN SERPL-MCNC: 24 MG/DL (ref 7–30)
CALCIUM SERPL-MCNC: 9.3 MG/DL (ref 8.5–10.1)
CHLORIDE SERPL-SCNC: 106 MMOL/L (ref 94–109)
CO2 SERPL-SCNC: 25 MMOL/L (ref 20–32)
CREAT SERPL-MCNC: 1.29 MG/DL (ref 0.66–1.25)
DIFFERENTIAL METHOD BLD: NORMAL
EOSINOPHIL # BLD AUTO: 0.2 10E9/L (ref 0–0.7)
EOSINOPHIL NFR BLD AUTO: 2.4 %
ERYTHROCYTE [DISTWIDTH] IN BLOOD BY AUTOMATED COUNT: 14.1 % (ref 10–15)
GFR SERPL CREATININE-BSD FRML MDRD: 63 ML/MIN/{1.73_M2}
GLUCOSE SERPL-MCNC: 114 MG/DL (ref 70–99)
HCT VFR BLD AUTO: 40.2 % (ref 40–53)
HGB BLD-MCNC: 13.5 G/DL (ref 13.3–17.7)
IMM GRANULOCYTES # BLD: 0 10E9/L (ref 0–0.4)
IMM GRANULOCYTES NFR BLD: 0.3 %
LYMPHOCYTES # BLD AUTO: 1.6 10E9/L (ref 0.8–5.3)
LYMPHOCYTES NFR BLD AUTO: 21 %
MCH RBC QN AUTO: 29.5 PG (ref 26.5–33)
MCHC RBC AUTO-ENTMCNC: 33.6 G/DL (ref 31.5–36.5)
MCV RBC AUTO: 88 FL (ref 78–100)
MONOCYTES # BLD AUTO: 0.7 10E9/L (ref 0–1.3)
MONOCYTES NFR BLD AUTO: 8.8 %
NEUTROPHILS # BLD AUTO: 5.1 10E9/L (ref 1.6–8.3)
NEUTROPHILS NFR BLD AUTO: 67.1 %
NRBC # BLD AUTO: 0 10*3/UL
NRBC BLD AUTO-RTO: 0 /100
PLATELET # BLD AUTO: 182 10E9/L (ref 150–450)
POTASSIUM SERPL-SCNC: 3.7 MMOL/L (ref 3.4–5.3)
PROT SERPL-MCNC: 7.3 G/DL (ref 6.8–8.8)
RBC # BLD AUTO: 4.57 10E12/L (ref 4.4–5.9)
SODIUM SERPL-SCNC: 139 MMOL/L (ref 133–144)
T3 SERPL-MCNC: 96 NG/DL (ref 60–181)
TSH SERPL DL<=0.005 MIU/L-ACNC: 0.6 MU/L (ref 0.4–4)
WBC # BLD AUTO: 7.6 10E9/L (ref 4–11)

## 2018-12-19 PROCEDURE — 84403 ASSAY OF TOTAL TESTOSTERONE: CPT | Performed by: INTERNAL MEDICINE

## 2018-12-19 PROCEDURE — 81206 BCR/ABL1 GENE MAJOR BP: CPT | Performed by: INTERNAL MEDICINE

## 2018-12-19 PROCEDURE — 84270 ASSAY OF SEX HORMONE GLOBUL: CPT | Performed by: INTERNAL MEDICINE

## 2018-12-19 PROCEDURE — G0463 HOSPITAL OUTPT CLINIC VISIT: HCPCS

## 2018-12-19 PROCEDURE — 80053 COMPREHEN METABOLIC PANEL: CPT | Performed by: INTERNAL MEDICINE

## 2018-12-19 PROCEDURE — 36415 COLL VENOUS BLD VENIPUNCTURE: CPT

## 2018-12-19 PROCEDURE — 84480 ASSAY TRIIODOTHYRONINE (T3): CPT | Performed by: INTERNAL MEDICINE

## 2018-12-19 PROCEDURE — 85025 COMPLETE CBC W/AUTO DIFF WBC: CPT | Performed by: INTERNAL MEDICINE

## 2018-12-19 PROCEDURE — 84443 ASSAY THYROID STIM HORMONE: CPT | Performed by: INTERNAL MEDICINE

## 2018-12-19 PROCEDURE — 86787 VARICELLA-ZOSTER ANTIBODY: CPT | Performed by: INTERNAL MEDICINE

## 2018-12-19 ASSESSMENT — PAIN SCALES - GENERAL: PAINLEVEL: NO PAIN (0)

## 2018-12-19 NOTE — LETTER
12/19/2018      RE: Panchito Pierce  [address]  [place] [address]       BMT Clinic Progress Note   12/19/2018    Patient ID:  Panchito iPerce is a 53 year old man D+ 3 years s/p MUD SCT for CML     Diagnosis CMLP+ Chronic myelogeneous leukemia, Ph1+  HCT Type Allogeneic    Prep Regimen Cytoxan  TBI   Donor Source Unrelated BM    GVHD Prophylaxis Methotrexate  Cyclosporine  Primary BMT Provider Jermaine Cabrera MD     CC: follow-up     INTERVAL  HISTORY   Interval History:   Panchito comes 3 year post transplant for a clinical evaluation.  He has been doing very well since he had herpes zoster in December 2017.  He still has a numb right side of his face and the right anterior side of his tongue.  He does not have pain.  He remains on gabapentin 900 mg 3 times a day and was wondering if he could taper it.  He has been working with his brother and driving a lot to consolidate some family needs.  His wife has been doing nursing school and is doing a internship here at the [employer].  Otherwise, he has had no other major medical issues.  In fact, he was very helpful during the marrow on the move event during the summer.  He has no skin changes.  He denies dry eyes and dry mouth.  There is no reduction in the range of motion of arms or legs.  He put on some weight and is not very physically active.      Review of Systems: 10 point ROS negative except as noted above.    PHYSICAL EXAM   /71 (BP Location: Left arm, Patient Position: Chair, Cuff Size: Adult Large)   Pulse 134   Temp 97.8  F (36.6  C) (Oral)   Wt 106.2 kg (234 lb 1.6 oz)   SpO2 98%   BMI 34.57 kg/m    BP   Wt Readings from Last 4 Encounters:   12/19/18 106.2 kg (234 lb 1.6 oz)   06/01/18 98.7 kg (217 lb 8 oz)   12/21/17 94.3 kg (208 lb)   12/21/17 94.4 kg (208 lb 1.8 oz)   General: NAD, pleasant appearing male  Heart is regular, lungs are clear  Abdomen is soft.  Examined all skin no sign sclerosis or fibrosis.  No central line     He has no palpable adenopathy.  The extremes are warm and well perfused with no edema.  KPS 90  LABS AND IMAGING      Lab Results   Component Value Date    WBC 7.6 12/19/2018    ANEU 5.1 12/19/2018    HGB 13.5 12/19/2018    HCT 40.2 12/19/2018     12/19/2018     12/21/2017    POTASSIUM 4.5 12/21/2017    CHLORIDE 101 12/21/2017    CO2 28 12/21/2017    GLC 96 12/21/2017    BUN 22 12/21/2017    CR 1.49 (H) 12/21/2017    MAG 1.9 09/13/2017    INR 0.9 06/15/2016    BILITOTAL 0.4 12/21/2017    AST 12 12/21/2017    ALT 28 12/21/2017    ALKPHOS 81 12/21/2017    PROTTOTAL 7.4 12/21/2017    ALBUMIN 4.0 12/21/2017     BcrAbl from BM pending  Morphology and flow negative for leukemia    ASSESSMENT BY SYSTEMS   Panchito RACHANA Pierce is a 51 year old man s/p MUD SCT for CML  - trasplanted in second chronic phase after blast crisis;    BMT/ CML:  Off dasatinib maintenance after 12 months; repeat BCR/ABL from marrow this week pending.     HEMATOLOGY/COAGULATION: stable good counts    INFECTIONS AND PROPHYLAXIS: The patient had shingles on the right side of his face last year.  We will measure a VZV IgG today to determine whether or not he needs shingles vaccine.  In that case I would recommend the new recombinant shingles vaccine.  He also needs to receive the 2-year vaccinations that were delayed because of the shingles last, last year.  We provided him with a calendar so that those can be administered in his primary care clinic.  Again, would only give him the recombinant shingles vaccine if his VZV titer is negative.  Otherwise, Howie had shingles is probably the best immunization in terms of immune response.  If she has a good VZV titer I would also discontinue his acyclovir.    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   - Advised to wear sun block when outside for extended periods of time to prevent flare of GVH    GASTROINTESTINAL/LIVER: no new issues    RENAL/FEN/Nutrition: His kidney function is  doing well and he was just encouraged to remain hydrated.    CARDIOVASCULAR:  Normotensive. Hx Hypertension. Cont metoprolol. Normotensive  - Hx of A fib with RVR (new 12/24/16); resolved with dilt, now on Metoprolol 12.5mg BID.     ENDOCRINE: following with Dr Guevara for thyroid dysfunction with Graves' disease.  His glucose level from today came back high.  I encouraged him to lose weight and to be more physically active so that he does not develop type 2 diabetes.  - Hx Graves Disease s/p VELA therapy. Cont synthroid and liothyronine. TSH and Free T3 low; Free T4 elevated. Mixed picture; After endocrine consult:  Mild thyrotoxicosis due to large weight loss. On levothyroxine 137mcg.    - On selenium supplement, T3 and T4. See Dr. Guevara on 12/165/2017    Pain: The pain from the shingles is largely resolved but he has residual numbness in that side.  He is interested in stopping the gabapentin.  I recommended a slow taper over 4-5 weeks in which she would reduce the gabapentin from 900 3 times daily, to 600 3 times daily, to 300 3 times daily, to 300 twice daily, to 300 once daily, and then stop.  Each 1 of this doses should be used for about a week.  If she develops pain or local symptoms he should go up on the dose level.  He is no longer taking oxycodone.      Plan:  SHWETHA Cabrera in 1 year with blood counts  Blood BCR-Abl  Continue acyclovir 800 mg BID   Following with primary care now.  Dentist visit at least once a year to screen for oral cancer  Dermatologist once year for skin cancer screen  Check VZV IgG today  Closer follow-up with Dr Crawley  Complete 2 year vaccination (MMR, boosters) with primary care  Taper gabapentin over 4-6 weeks; monitor for pain  Loose weight and watch diet      Jermaine Cabrera MD

## 2018-12-19 NOTE — PROGRESS NOTES
BMT Clinic Progress Note   12/19/2018    Patient ID:  Panchito Pierce is a 53 year old man D+ 3 years s/p MUD SCT for CML     Diagnosis CMLP+ Chronic myelogeneous leukemia, Ph1+  HCT Type Allogeneic    Prep Regimen Cytoxan  TBI   Donor Source Unrelated BM    GVHD Prophylaxis Methotrexate  Cyclosporine  Primary BMT Provider Jermaine Cabrera MD     CC: follow-up     INTERVAL  HISTORY   Interval History:   Panchito comes 3 year post transplant for a clinical evaluation.  He has been doing very well since he had herpes zoster in December 2017.  He still has a numb right side of his face and the right anterior side of his tongue.  He does not have pain.  He remains on gabapentin 900 mg 3 times a day and was wondering if he could taper it.  He has been working with his brother and driving a lot to consolidate some family needs.  His wife has been doing nursing school and is doing a internship here at the [employer] [facility].  Otherwise, he has had no other major medical issues.  In fact, he was very helpful during the marrow on the move event during the summer.  He has no skin changes.  He denies dry eyes and dry mouth.  There is no reduction in the range of motion of arms or legs.  He put on some weight and is not very physically active.      Review of Systems: 10 point ROS negative except as noted above.    PHYSICAL EXAM   /71 (BP Location: Left arm, Patient Position: Chair, Cuff Size: Adult Large)   Pulse 134   Temp 97.8  F (36.6  C) (Oral)   Wt 106.2 kg (234 lb 1.6 oz)   SpO2 98%   BMI 34.57 kg/m   BP   Wt Readings from Last 4 Encounters:   12/19/18 106.2 kg (234 lb 1.6 oz)   06/01/18 98.7 kg (217 lb 8 oz)   12/21/17 94.3 kg (208 lb)   12/21/17 94.4 kg (208 lb 1.8 oz)   General: NAD, pleasant appearing male  Heart is regular, lungs are clear  Abdomen is soft.  Examined all skin no sign sclerosis or fibrosis.  No central line    He has no palpable adenopathy.  The extremes are warm and well perfused with no  edema.  KPS 90  LABS AND IMAGING      Lab Results   Component Value Date    WBC 7.6 12/19/2018    ANEU 5.1 12/19/2018    HGB 13.5 12/19/2018    HCT 40.2 12/19/2018     12/19/2018     12/21/2017    POTASSIUM 4.5 12/21/2017    CHLORIDE 101 12/21/2017    CO2 28 12/21/2017    GLC 96 12/21/2017    BUN 22 12/21/2017    CR 1.49 (H) 12/21/2017    MAG 1.9 09/13/2017    INR 0.9 06/15/2016    BILITOTAL 0.4 12/21/2017    AST 12 12/21/2017    ALT 28 12/21/2017    ALKPHOS 81 12/21/2017    PROTTOTAL 7.4 12/21/2017    ALBUMIN 4.0 12/21/2017     BcrAbl from BM pending  Morphology and flow negative for leukemia    ASSESSMENT BY SYSTEMS   Panchito Pierce is a 51 year old man s/p MUD SCT for CML  - trasplanted in second chronic phase after blast crisis;    BMT/ CML:  Off dasatinib maintenance after 12 months; repeat BCR/ABL from marrow this week pending.     HEMATOLOGY/COAGULATION: stable good counts    INFECTIONS AND PROPHYLAXIS: The patient had shingles on the right side of his face last year.  We will measure a VZV IgG today to determine whether or not he needs shingles vaccine.  In that case I would recommend the new recombinant shingles vaccine.  He also needs to receive the 2-year vaccinations that were delayed because of the shingles last, last year.  We provided him with a calendar so that those can be administered in his primary care clinic.  Again, would only give him the recombinant shingles vaccine if his VZV titer is negative.  Otherwise, Howie had shingles is probably the best immunization in terms of immune response.  If she has a good VZV titer I would also discontinue his acyclovir.    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   - Advised to wear sun block when outside for extended periods of time to prevent flare of GVH    GASTROINTESTINAL/LIVER: no new issues    RENAL/FEN/Nutrition: His kidney function is doing well and he was just encouraged to remain hydrated.    CARDIOVASCULAR:   Normotensive. Hx Hypertension. Cont metoprolol. Normotensive  - Hx of A fib with RVR (new 12/24/16); resolved with dilt, now on Metoprolol 12.5mg BID.     ENDOCRINE: following with Dr Guevara for thyroid dysfunction with Graves' disease.  His glucose level from today came back high.  I encouraged him to lose weight and to be more physically active so that he does not develop type 2 diabetes.  - Hx Graves Disease s/p VELA therapy. Cont synthroid and liothyronine. TSH and Free T3 low; Free T4 elevated. Mixed picture; After endocrine consult:  Mild thyrotoxicosis due to large weight loss. On levothyroxine 137mcg.    - On selenium supplement, T3 and T4. See Dr. Guevara on 12/165/2017    Pain: The pain from the shingles is largely resolved but he has residual numbness in that side.  He is interested in stopping the gabapentin.  I recommended a slow taper over 4-5 weeks in which she would reduce the gabapentin from 900 3 times daily, to 600 3 times daily, to 300 3 times daily, to 300 twice daily, to 300 once daily, and then stop.  Each 1 of this doses should be used for about a week.  If she develops pain or local symptoms he should go up on the dose level.  He is no longer taking oxycodone.      Plan:  SHWETHA Cabrera in 1 year with blood counts  Blood BCR-Abl  Continue acyclovir 800 mg BID   Following with primary care now.  Dentist visit at least once a year to screen for oral cancer  Dermatologist once year for skin cancer screen  Check VZV IgG today  Closer follow-up with Dr Crawley  Complete 2 year vaccination (MMR, boosters) with primary care  Taper gabapentin over 4-6 weeks; monitor for pain  Loose weight and watch diet

## 2018-12-19 NOTE — NURSING NOTE
Chief Complaint   Patient presents with     Blood Draw     labs drawn via venipuncture by RN     /71 (BP Location: Left arm, Patient Position: Chair, Cuff Size: Adult Large)   Pulse 134   Temp 97.8  F (36.6  C) (Oral)   Wt 106.2 kg (234 lb 1.6 oz)   SpO2 98%   BMI 34.57 kg/m      Vitals taken.  Labs & JIC tubes collected and sent from right antecubital venipuncture in lab by RN. Pt tolerated well. Pt checked in for next appointment.    Tatianna Lance RN

## 2018-12-20 LAB — VZV IGG SER QL IA: 3.3 AI (ref 0–0.8)

## 2018-12-21 LAB
COPATH REPORT: NORMAL
SHBG SERPL-SCNC: 16 NMOL/L (ref 11–80)
TESTOST FREE SERPL-MCNC: 5.04 NG/DL (ref 4.7–24.4)
TESTOST SERPL-MCNC: 187 NG/DL (ref 240–950)

## 2019-01-15 DIAGNOSIS — Z94.81 STATUS POST BONE MARROW TRANSPLANT (H): ICD-10-CM

## 2019-01-15 DIAGNOSIS — I73.00 RAYNAUD'S PHENOMENON WITHOUT GANGRENE: ICD-10-CM

## 2019-01-15 DIAGNOSIS — C92.10 CML (CHRONIC MYELOCYTIC LEUKEMIA) (H): ICD-10-CM

## 2019-01-15 RX ORDER — ACYCLOVIR 400 MG/1
800 TABLET ORAL 2 TIMES DAILY
Qty: 120 TABLET | Refills: 11 | Status: SHIPPED | OUTPATIENT
Start: 2019-01-15

## 2019-01-17 RX ORDER — NIFEDIPINE 30 MG
30 TABLET, EXTENDED RELEASE ORAL DAILY
Qty: 30 TABLET | Refills: 1 | Status: SHIPPED | OUTPATIENT
Start: 2019-01-17 | End: 2019-10-22

## 2019-01-17 NOTE — TELEPHONE ENCOUNTER
"Received refill request for nifedipine as the resident on call. Reviewed patient's chart and attached communication. Patient last seen 4/24/18 for hand dermatitis an Raynaud's. RTC not yet scheduled but was instructed to make an appointment for October/November 2018. After reviewing the medication list and assessment and plan from last visit, the refill request was accepted. Per Dr. Landeros's last clinic note, \"Stop Procardia/nifedipine now for duration of the warmer months. Okay to restart in the fall/cooler weather.\"    The patient's information will be forwarded to the scheduling pool for return visit as planned at prior visit.    Routed as ORION Moraes M.D.  Dermatology, PGY-3  [facility]  Pager [phone]    "

## 2019-01-17 NOTE — TELEPHONE ENCOUNTER
" NIFEdipine ER Oral Tablet Extended Release 24 Hour 30 MG  Take 1 tablet (30 mg) by mouth daily *DURING WINTER MONTHS TO PREVENT FLARES - Oral  Last Written Prescription Date:  7/5/2018  Last Fill Quantity: 30,   # refills: 1  Last Office Visit : 4/24/2018  Future Office visit:  None, recommended 6 months.    LVD 4/24/2018:  \"Raynaud's phenomenon. Per clinical history, patient has Raynaud's during witner months when exposed to cold. Very good response to Procardia XL 30 mg daily during winter months.     Stop Procardia/nifedipine now for duration of the warmer months. Okay to restart in the fall/cooler weather. Counseled again re: potential side effects including hypotension, allergy, etc.     Also encouraged careful physical protection of hands; avoid cold or damp environmental factors whenever possible.\"          Routing refill request to provider for review/approval because:  Not on refill protocol.  Scheduling has been notified to contact the pt for appointment.          "

## 2019-03-19 DIAGNOSIS — I73.00 RAYNAUD'S PHENOMENON WITHOUT GANGRENE: ICD-10-CM

## 2019-03-20 RX ORDER — NIFEDIPINE 30 MG
30 TABLET, EXTENDED RELEASE ORAL DAILY
Qty: 30 TABLET | Refills: 0 | OUTPATIENT
Start: 2019-03-20

## 2019-03-20 NOTE — TELEPHONE ENCOUNTER
Last Clinic Visit:  4/24/18    NV: NONE  RTC 6 MOS  * REMINDER @ 1/17/19 RF  30:1   Stop Procardia/nifedipine now for duration of the warmer months

## 2019-05-20 DIAGNOSIS — F43.21 ADJUSTMENT DISORDER WITH DEPRESSED MOOD: ICD-10-CM

## 2019-05-20 DIAGNOSIS — C92.10 CML (CHRONIC MYELOCYTIC LEUKEMIA) (H): ICD-10-CM

## 2019-05-20 DIAGNOSIS — Z94.81 STATUS POST BONE MARROW TRANSPLANT (H): ICD-10-CM

## 2019-05-20 DIAGNOSIS — K59.00 CONSTIPATION, UNSPECIFIED CONSTIPATION TYPE: ICD-10-CM

## 2019-05-20 DIAGNOSIS — I48.0 PAROXYSMAL ATRIAL FIBRILLATION (H): ICD-10-CM

## 2019-05-20 DIAGNOSIS — E05.00 GRAVES DISEASE: ICD-10-CM

## 2019-06-24 DIAGNOSIS — E05.00 GRAVES DISEASE: ICD-10-CM

## 2019-06-24 DIAGNOSIS — D84.9 IMMUNOSUPPRESSION (H): ICD-10-CM

## 2019-06-24 DIAGNOSIS — Z94.81 STATUS POST BONE MARROW TRANSPLANT (H): ICD-10-CM

## 2019-06-24 DIAGNOSIS — C92.10 CML (CHRONIC MYELOCYTIC LEUKEMIA) (H): ICD-10-CM

## 2019-06-24 DIAGNOSIS — I48.0 PAROXYSMAL ATRIAL FIBRILLATION (H): ICD-10-CM

## 2019-06-24 DIAGNOSIS — D89.813 GVH (GRAFT VERSUS HOST DISEASE) (H): ICD-10-CM

## 2019-06-24 DIAGNOSIS — K59.00 CONSTIPATION, UNSPECIFIED CONSTIPATION TYPE: ICD-10-CM

## 2019-06-24 RX ORDER — METOPROLOL TARTRATE 25 MG/1
TABLET, FILM COATED ORAL
Qty: 30 TABLET | Refills: 11 | Status: SHIPPED | OUTPATIENT
Start: 2019-06-24 | End: 2019-07-05

## 2019-07-03 ENCOUNTER — TELEPHONE (OUTPATIENT)
Dept: ENDOCRINOLOGY | Facility: CLINIC | Age: 54
End: 2019-07-03

## 2019-07-03 DIAGNOSIS — E03.9 HYPOTHYROIDISM, UNSPECIFIED TYPE: Primary | ICD-10-CM

## 2019-07-05 ENCOUNTER — OFFICE VISIT (OUTPATIENT)
Dept: ENDOCRINOLOGY | Facility: CLINIC | Age: 54
End: 2019-07-05
Payer: COMMERCIAL

## 2019-07-05 VITALS
HEART RATE: 74 BPM | WEIGHT: 226.8 LBS | SYSTOLIC BLOOD PRESSURE: 123 MMHG | HEIGHT: 69 IN | BODY MASS INDEX: 33.59 KG/M2 | DIASTOLIC BLOOD PRESSURE: 79 MMHG

## 2019-07-05 DIAGNOSIS — D84.9 IMMUNOSUPPRESSION (H): ICD-10-CM

## 2019-07-05 DIAGNOSIS — Z94.81 STATUS POST BONE MARROW TRANSPLANT (H): ICD-10-CM

## 2019-07-05 DIAGNOSIS — K59.00 CONSTIPATION, UNSPECIFIED CONSTIPATION TYPE: ICD-10-CM

## 2019-07-05 DIAGNOSIS — D89.813 GVH (GRAFT VERSUS HOST DISEASE) (H): ICD-10-CM

## 2019-07-05 DIAGNOSIS — E05.00 GRAVES DISEASE: ICD-10-CM

## 2019-07-05 DIAGNOSIS — C92.10 CML (CHRONIC MYELOCYTIC LEUKEMIA) (H): ICD-10-CM

## 2019-07-05 DIAGNOSIS — E05.90 HYPERTHYROIDISM: ICD-10-CM

## 2019-07-05 DIAGNOSIS — I48.0 PAROXYSMAL ATRIAL FIBRILLATION (H): ICD-10-CM

## 2019-07-05 RX ORDER — LIOTHYRONINE SODIUM 5 UG/1
5 TABLET ORAL 2 TIMES DAILY
Qty: 180 TABLET | Refills: 3 | Status: SHIPPED | OUTPATIENT
Start: 2019-07-05 | End: 2020-07-22

## 2019-07-05 RX ORDER — METOPROLOL TARTRATE 25 MG/1
TABLET, FILM COATED ORAL
Qty: 90 TABLET | Refills: 11 | Status: SHIPPED | OUTPATIENT
Start: 2019-07-05 | End: 2020-07-08

## 2019-07-05 RX ORDER — LEVOTHYROXINE SODIUM 150 UG/1
150 TABLET ORAL DAILY
Qty: 90 TABLET | Refills: 3 | Status: SHIPPED | OUTPATIENT
Start: 2019-07-05 | End: 2020-07-22

## 2019-07-05 ASSESSMENT — PAIN SCALES - GENERAL: PAINLEVEL: NO PAIN (0)

## 2019-07-05 ASSESSMENT — PATIENT HEALTH QUESTIONNAIRE - PHQ9: SUM OF ALL RESPONSES TO PHQ QUESTIONS 1-9: 2

## 2019-07-05 ASSESSMENT — MIFFLIN-ST. JEOR: SCORE: 1864.14

## 2019-07-05 NOTE — LETTER
"7/5/2019       RE: Panchito Pierce  [address]  [place] [address]     Dear Colleague,    Thank you for referring your patient, Panchito Pierce, to the [facility] ENDOCRINOLOGY at [facility]. Please see a copy of my visit note below.    [facility]  Endocrinology  Pattie Guevara MD  07/05/2019      Chief Complaint:   RECHECK     History of Present Illness:   Panchito Pierce is a 54 year old male with a history of grave's disease, chronic myelocytotic leukemia s/p bone marrow transplant and raynaud's disease who presents for evaluation for medication recheck.     #1 Graves disease status-post VELA  Per patient report, he was diagnosed with Grave's disease in 2002. He underwent radioactive iodine therapy in 2002 at [facility]. He had thyroid testing on 4/24/2018, where his T3 was 99 ng/dL, T4 free was 0.87 ng/dL, and his TSH was 1.00 mU/L. These thyroid tests have normalized since he had a TSH of 8.89 on 12/21/2017.     Interval history: Today, he reports he feels energetic and overall healthy. He was suppose to return in Jun 2019  but was unable due to insurance issues. He was kicked of [facility] (not due to missing payments) and is unsure of the reason. His medications got \"out of wack\" while the insurance issues were occurring.  He thinks he has been taking Levothyroxine 150 mcg daily but that he did not get his Cytomel 5 mcg twice daily until 07/01/2019. Denies that he took his Cytomel this morning. He also takes Selenium 100 mcg 3 times per week.     #2 Shingles  He had ongoing numbness and itchiness on his tongue and jaw since he was diagnosed with shingles on 12/4/2017. Symptoms are manageable with Gabapentin 300 mg three times daily and Acyclovir 400 mg twice daily.     Interval history: The patient reports that he has ongoing numbness and itchiness on his tongue and jaw. He is not currently taking the Acyclovir for this summer. He has an oncology appointment " "scheduled for August 2019.     #3 Low libido  His low libido has improved since 12/2017 when he had shingles. His testosterone level on 12/21/2017 was low at 94 ng/dL, but this increased to 288 mg/dL on 4/24/2018.     Interval history: He feels better today.     #4 Sleep apnea  The patient had a sleep study completed on 1/11/2017 showing that he has sleep apnea.     Interval history: He feels good in terms of energy. He has not had received a CPAP.     #5 Weight gain  He often eats later than 8 pm and drinks coffee with cream and occasionally beer, but avoids sodas and energy drinks.     Interval history: In the spring he felt like he gained weight. He put some of his \"tools\" back to work and felt better by the beginning of April. He has lost about 8 pounds since the beginning December 2018. He will have some milk in his coffee in the beginning of the day and is still drinking [place] Tea. Denies bowel or bladder issues.     #6 Paroxysmal atrial fibrillation   He is still taking his Metoprolol but believes he is only taking 1/2 tab daily instead of 1/2 tab twice daily as prescribed.    #7 Bilateral calf muscle cramps:   He was having bilateral calf muscle cramps over a period of 3 weeks this last spring; no known etiology and resolved spontaneously.       Review of Systems:   Pertinent items are noted in HPI, remainder of complete ROS is negative.      Active Medications:      gabapentin (NEURONTIN) 100 MG capsule, Take 300 mg by mouth 3 times daily, Disp: , Rfl:      levothyroxine (SYNTHROID/LEVOTHROID) 150 MCG tablet, Take 1 tablet (150 mcg) by mouth daily, Disp: 90 tablet, Rfl: 3     liothyronine (CYTOMEL) 5 MCG tablet, Take 1 tablet (5 mcg) by mouth 2 times daily, Disp: 180 tablet, Rfl: 3     metoprolol tartrate (LOPRESSOR) 25 MG tablet, Take 1/2 tablet by mouth two times daily, Disp: 30 tablet, Rfl: 11     OYSTER SHELL CALCIUM + D3 500-400 MG-UNIT TABS, TAKE TWO TABLETS BY MOUTH DAILY , Disp: 60 tablet, Rfl: " "11     Selenium 100 MCG CAPS, 1 tablet daily, Disp: 90 capsule, Rfl: 2     acyclovir (ZOVIRAX) 400 MG tablet, Take 2 tablets (800 mg) by mouth 2 times daily (Patient not taking: Reported on 7/5/2019), Disp: 120 tablet, Rfl: 11     NIFEdipine ER (ADALAT CC) 30 MG 24 hr tablet, Take 1 tablet (30 mg) by mouth daily *DURING WINTER MONTHS TO PREVENT FLARES (Patient not taking: Reported on 7/5/2019), Disp: 30 tablet, Rfl: 1     pantoprazole (PROTONIX) 40 MG EC tablet, TAKE ONE TABLET BY MOUTH ONE TIME DAILY  (Patient not taking: Reported on 7/5/2019), Disp: 30 tablet, Rfl: 11      Allergies:   Allopurinol      Past Medical History:  Graves disease; treated with radioiodine  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   Immunosuppressed status    Raynaud's disease   Heart murmur   Hypogonadism   Chronic dermatitis      Past Surgical History:  Left hand graft flap and right foot graft- 01/1985     Family History:   Mother: thyroid disease, lung cancer  Father: prostate cancer, dementia, heart murmur   Brother: hepatitis, heart murmur    Sister: thyroid disease   Son: asthma       Social History:   The patient was alone   Smoking Status: former; 1/2 pack per day for 30 years   Smokeless Tobacco: never   Alcohol Use: yes; once per week       Physical Exam:   /79   Pulse 74   Ht 1.753 m (5' 9\")   Wt 102.9 kg (226 lb 12.8 oz)   BMI 33.49 kg/m      GENERAL APPEARANCE: Alert and no distress  NECK: No lymphadenopathy appreciated  Eyes: No obvious retinopathy noted  Thyroid: No obvious nodules palpated   CV: RRR without M/R/G  Lungs: CTA bilaterally  Abdomen: Soft, Nontender, non distended, positive bowel sounds   Neuro: normal reflexes, no focal deficits  Skin: No infection in feet  Mood: Normal   Lymph: neg in neck and supraclavicular area     Data:  Component      Latest Ref Rng & Units 12/21/2017 4/24/2018 12/19/2018   Testosterone Total      240 - 950 ng/dL 94 (L) 288 187 (L)   Sex " Hormone Binding Globulin      11 - 80 nmol/L 16 23 16   Free Testosterone Calculated      4.7 - 24.4 ng/dL 2.46 (L) 6.85 5.04       Component      Latest Ref Rng & Units 4/24/2018 12/19/2018   Triiodothyronine (T3)      60 - 181 ng/dL 99 96   T4 Free      0.76 - 1.46 ng/dL 0.87    TSH      0.40 - 4.00 mU/L 1.00 0.60     Component      Latest Ref Rng & Units 12/19/2018   WBC      4.0 - 11.0 10e9/L 7.6   RBC Count      4.4 - 5.9 10e12/L 4.57   Hemoglobin      13.3 - 17.7 g/dL 13.5   Hematocrit      40.0 - 53.0 % 40.2   MCV      78 - 100 fl 88   MCH      26.5 - 33.0 pg 29.5   MCHC      31.5 - 36.5 g/dL 33.6   RDW      10.0 - 15.0 % 14.1   Platelet Count      150 - 450 10e9/L 182   Diff Method       Automated Method   % Neutrophils      % 67.1   % Lymphocytes      % 21.0   % Monocytes      % 8.8   % Eosinophils      % 2.4   % Basophils      % 0.4   % Immature Granulocytes      % 0.3   Nucleated RBCs      0 /100 0   Absolute Neutrophil      1.6 - 8.3 10e9/L 5.1   Absolute Lymphocytes      0.8 - 5.3 10e9/L 1.6   Absolute Monocytes      0.0 - 1.3 10e9/L 0.7   Absolute Eosinophils      0.0 - 0.7 10e9/L 0.2   Absolute Basophils      0.0 - 0.2 10e9/L 0.0   Abs Immature Granulocytes      0 - 0.4 10e9/L 0.0   Absolute Nucleated RBC       0.0   Sodium      133 - 144 mmol/L 139   Potassium      3.4 - 5.3 mmol/L 3.7   Chloride      94 - 109 mmol/L 106   Carbon Dioxide      20 - 32 mmol/L 25   Anion Gap      3 - 14 mmol/L 8   Glucose      70 - 99 mg/dL 114 (H)   Urea Nitrogen      7 - 30 mg/dL 24   Creatinine      0.66 - 1.25 mg/dL 1.29 (H)   GFR Estimate      >60 mL/min/1.73:m2 63   GFR Estimate If Black      >60 mL/min/1.73:m2 73   Calcium      8.5 - 10.1 mg/dL 9.3   Bilirubin Total      0.2 - 1.3 mg/dL 0.4   Albumin      3.4 - 5.0 g/dL 3.6   Protein Total      6.8 - 8.8 g/dL 7.3   Alkaline Phosphatase      40 - 150 U/L 86   ALT      0 - 70 U/L 52   AST      0 - 45 U/L 27     Assessment and Plan:  #1 Graves disease status-post  VELA  He had a lapse in insurance resulting in issues with his medication. He did not restart his Cytomel until July 1. He will hold off on labs and complete in 2 months at a local [facility] clinic.  Pt to continue with his levothyroxine at 150 mcg daily and cytomel 5 mcg twice daily for now    - levothyroxine (SYNTHROID/LEVOTHROID) 150 MCG tablet  Dispense: 90 tablet; Refill: 3  - liothyronine (CYTOMEL) 5 MCG tablet  Dispense: 180 tablet; Refill: 3  - Selenium 100 MCG CAPS  Dispense: 90 capsule; Refill: 2  - TSH  - T4 free  - T3 total  - Basic metabolic panel    #2 Shingles  Doing well and will visit oncology next month. Not currently taking Acyclovir or ADALAT CC.     #3 Low libido  Reports improvement in libido. Not on replacement. He will have labs completed in 2 months.   - Testosterone Free and Total    #4 Sleep apnea  His energy has improved. Pt has lost weight    #5 Weight gain  Encouraged him to cut out [place] tea and avoid eating after 8 pm. He will have A1c checked in 2 months. His last A1c was in 2017 and 5.8%.   - Hemoglobin A1c    #6 Paroxysmal atrial fibrillation   He is unsure of his current dosage of Metoprolol. He is prescribed 1/2 tablet twice daily but believes he is only taking once daily. He will go home and check his dose and contact us If we need to change in my chart.   - metoprolol tartrate (LOPRESSOR) 25 MG tablet  Dispense: 90 tablet; Refill: 11    #7 Bilateral calf muscle cramps:   Discussed that his cramps could have been muscle spasms or caused by low potassium. Encouraged him to stay hydrated and eat bananas. If he does have more episodes, I taught him stretches to help resolve. June 2018 potassium levels were normal. Will recheck BMP in 2 months.    Follow-up: Return in about 1 year (around 7/5/2020).      Scribe Disclosure:  I, Kailee Noriega, am serving as a scribe to document services personally performed by Pattie Guevara MD at this visit, based upon the provider's  statements to me. All documentation has been reviewed by the aforementioned provider prior to being entered into the official medical record.     Portions of this medical record were completed by a scribe. UPON MY REVIEW AND AUTHENTICATION BY ELECTRONIC SIGNATURE, this confirms (a) I performed the applicable clinical services, and (b) the record is accurate.        Again, thank you for allowing me to participate in the care of your patient.      Sincerely,    Pattie Guevara MD

## 2019-07-05 NOTE — PATIENT INSTRUCTIONS
Do labs in Sept 2019 sometime at local [facility]    Don't eat after 8pm    NO ARIZ[place] TEA

## 2019-07-05 NOTE — PROGRESS NOTES
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     #1 Graves disease status-post VELA  Per patient report, he was diagnosed with Grave's disease in 2002. He underwent radioactive iodine therapy in 2002 at [facility]. He had thyroid testing on 4/24/2018, where his T3 was 99 ng/dL, T4 free was 0.87 ng/dL, and his TSH was 1.00 mU/L. These thyroid tests have normalized since he had a TSH of 8.89 on 12/21/2017.     Interval history: Today, he reports he feels energetic and overall healthy. He was suppose to return in Jun 2019  but was unable due to insurance issues. He was kicked of [facility] (not due to missing payments) and is unsure of the reason. His medications got \"out of wack\" while the insurance issues were occurring.  He thinks he has been taking Levothyroxine 150 mcg daily but that he did not get his Cytomel 5 mcg twice daily until 07/01/2019. Denies that he took his Cytomel this morning. He also takes Selenium 100 mcg 3 times per week.     #2 Shingles  He had ongoing numbness and itchiness on his tongue and jaw since he was diagnosed with shingles on 12/4/2017. Symptoms are manageable with Gabapentin 300 mg three times daily and Acyclovir 400 mg twice daily.     Interval history: The patient reports that he has ongoing numbness and itchiness on his tongue and jaw. He is not currently taking the Acyclovir for this summer. He has an oncology appointment scheduled for August 2019.     #3 Low libido  His low libido has improved since 12/2017 when he had shingles. His testosterone level on 12/21/2017 was low at 94 ng/dL, but this increased to 288 mg/dL on 4/24/2018.     Interval history: He feels better today.     #4 Sleep apnea  The " "patient had a sleep study completed on 1/11/2017 showing that he has sleep apnea.     Interval history: He feels good in terms of energy. He has not had received a CPAP.     #5 Weight gain  He often eats later than 8 pm and drinks coffee with cream and occasionally beer, but avoids sodas and energy drinks.     Interval history: In the spring he felt like he gained weight. He put some of his \"tools\" back to work and felt better by the beginning of April. He has lost about 8 pounds since the beginning December 2018. He will have some milk in his coffee in the beginning of the day and is still drinking [place] Tea. Denies bowel or bladder issues.     #6 Paroxysmal atrial fibrillation   He is still taking his Metoprolol but believes he is only taking 1/2 tab daily instead of 1/2 tab twice daily as prescribed.    #7 Bilateral calf muscle cramps:   He was having bilateral calf muscle cramps over a period of 3 weeks this last spring; no known etiology and resolved spontaneously.       Review of Systems:   Pertinent items are noted in HPI, remainder of complete ROS is negative.      Active Medications:      gabapentin (NEURONTIN) 100 MG capsule, Take 300 mg by mouth 3 times daily, Disp: , Rfl:      levothyroxine (SYNTHROID/LEVOTHROID) 150 MCG tablet, Take 1 tablet (150 mcg) by mouth daily, Disp: 90 tablet, Rfl: 3     liothyronine (CYTOMEL) 5 MCG tablet, Take 1 tablet (5 mcg) by mouth 2 times daily, Disp: 180 tablet, Rfl: 3     metoprolol tartrate (LOPRESSOR) 25 MG tablet, Take 1/2 tablet by mouth two times daily, Disp: 30 tablet, Rfl: 11     OYSTER SHELL CALCIUM + D3 500-400 MG-UNIT TABS, TAKE TWO TABLETS BY MOUTH DAILY , Disp: 60 tablet, Rfl: 11     Selenium 100 MCG CAPS, 1 tablet daily, Disp: 90 capsule, Rfl: 2     acyclovir (ZOVIRAX) 400 MG tablet, Take 2 tablets (800 mg) by mouth 2 times daily (Patient not taking: Reported on 7/5/2019), Disp: 120 tablet, Rfl: 11     NIFEdipine ER (ADALAT CC) 30 MG 24 hr tablet, Take 1 " "tablet (30 mg) by mouth daily *DURING WINTER MONTHS TO PREVENT FLARES (Patient not taking: Reported on 7/5/2019), Disp: 30 tablet, Rfl: 1     pantoprazole (PROTONIX) 40 MG EC tablet, TAKE ONE TABLET BY MOUTH ONE TIME DAILY  (Patient not taking: Reported on 7/5/2019), Disp: 30 tablet, Rfl: 11      Allergies:   Allopurinol      Past Medical History:  Graves disease; treated with radioiodine  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   Immunosuppressed status    Raynaud's disease   Heart murmur   Hypogonadism   Chronic dermatitis      Past Surgical History:  Left hand graft flap and right foot graft- 01/1985     Family History:   Mother: thyroid disease, lung cancer  Father: prostate cancer, dementia, heart murmur   Brother: hepatitis, heart murmur    Sister: thyroid disease   Son: asthma       Social History:   The patient was alone   Smoking Status: former; 1/2 pack per day for 30 years   Smokeless Tobacco: never   Alcohol Use: yes; once per week       Physical Exam:   /79   Pulse 74   Ht 1.753 m (5' 9\")   Wt 102.9 kg (226 lb 12.8 oz)   BMI 33.49 kg/m     GENERAL APPEARANCE: Alert and no distress  NECK: No lymphadenopathy appreciated  Eyes: No obvious retinopathy noted  Thyroid: No obvious nodules palpated   CV: RRR without M/R/G  Lungs: CTA bilaterally  Abdomen: Soft, Nontender, non distended, positive bowel sounds   Neuro: normal reflexes, no focal deficits  Skin: No infection in feet  Mood: Normal   Lymph: neg in neck and supraclavicular area     Data:  Component      Latest Ref Rng & Units 12/21/2017 4/24/2018 12/19/2018   Testosterone Total      240 - 950 ng/dL 94 (L) 288 187 (L)   Sex Hormone Binding Globulin      11 - 80 nmol/L 16 23 16   Free Testosterone Calculated      4.7 - 24.4 ng/dL 2.46 (L) 6.85 5.04       Component      Latest Ref Rng & Units 4/24/2018 12/19/2018   Triiodothyronine (T3)      60 - 181 ng/dL 99 96   T4 Free      0.76 - 1.46 ng/dL 0.87  "   TSH      0.40 - 4.00 mU/L 1.00 0.60     Component      Latest Ref Rng & Units 12/19/2018   WBC      4.0 - 11.0 10e9/L 7.6   RBC Count      4.4 - 5.9 10e12/L 4.57   Hemoglobin      13.3 - 17.7 g/dL 13.5   Hematocrit      40.0 - 53.0 % 40.2   MCV      78 - 100 fl 88   MCH      26.5 - 33.0 pg 29.5   MCHC      31.5 - 36.5 g/dL 33.6   RDW      10.0 - 15.0 % 14.1   Platelet Count      150 - 450 10e9/L 182   Diff Method       Automated Method   % Neutrophils      % 67.1   % Lymphocytes      % 21.0   % Monocytes      % 8.8   % Eosinophils      % 2.4   % Basophils      % 0.4   % Immature Granulocytes      % 0.3   Nucleated RBCs      0 /100 0   Absolute Neutrophil      1.6 - 8.3 10e9/L 5.1   Absolute Lymphocytes      0.8 - 5.3 10e9/L 1.6   Absolute Monocytes      0.0 - 1.3 10e9/L 0.7   Absolute Eosinophils      0.0 - 0.7 10e9/L 0.2   Absolute Basophils      0.0 - 0.2 10e9/L 0.0   Abs Immature Granulocytes      0 - 0.4 10e9/L 0.0   Absolute Nucleated RBC       0.0   Sodium      133 - 144 mmol/L 139   Potassium      3.4 - 5.3 mmol/L 3.7   Chloride      94 - 109 mmol/L 106   Carbon Dioxide      20 - 32 mmol/L 25   Anion Gap      3 - 14 mmol/L 8   Glucose      70 - 99 mg/dL 114 (H)   Urea Nitrogen      7 - 30 mg/dL 24   Creatinine      0.66 - 1.25 mg/dL 1.29 (H)   GFR Estimate      >60 mL/min/1.73:m2 63   GFR Estimate If Black      >60 mL/min/1.73:m2 73   Calcium      8.5 - 10.1 mg/dL 9.3   Bilirubin Total      0.2 - 1.3 mg/dL 0.4   Albumin      3.4 - 5.0 g/dL 3.6   Protein Total      6.8 - 8.8 g/dL 7.3   Alkaline Phosphatase      40 - 150 U/L 86   ALT      0 - 70 U/L 52   AST      0 - 45 U/L 27     Assessment and Plan:  #1 Graves disease status-post VELA  He had a lapse in insurance resulting in issues with his medication. He did not restart his Cytomel until July 1. He will hold off on labs and complete in 2 months at a local [place] clinic.  Pt to continue with his levothyroxine at 150 mcg daily and cytomel 5 mcg twice daily  for now    - levothyroxine (SYNTHROID/LEVOTHROID) 150 MCG tablet  Dispense: 90 tablet; Refill: 3  - liothyronine (CYTOMEL) 5 MCG tablet  Dispense: 180 tablet; Refill: 3  - Selenium 100 MCG CAPS  Dispense: 90 capsule; Refill: 2  - TSH  - T4 free  - T3 total  - Basic metabolic panel    #2 Shingles  Doing well and will visit oncology next month. Not currently taking Acyclovir or ADALAT CC.     #3 Low libido  Reports improvement in libido. Not on replacement. He will have labs completed in 2 months.   - Testosterone Free and Total    #4 Sleep apnea  His energy has improved. Pt has lost weight    #5 Weight gain  Encouraged him to cut out [place] tea and avoid eating after 8 pm. He will have A1c checked in 2 months. His last A1c was in 2017 and 5.8%.   - Hemoglobin A1c    #6 Paroxysmal atrial fibrillation   He is unsure of his current dosage of Metoprolol. He is prescribed 1/2 tablet twice daily but believes he is only taking once daily. He will go home and check his dose and contact us If we need to change in my chart.   - metoprolol tartrate (LOPRESSOR) 25 MG tablet  Dispense: 90 tablet; Refill: 11    #7 Bilateral calf muscle cramps:   Discussed that his cramps could have been muscle spasms or caused by low potassium. Encouraged him to stay hydrated and eat bananas. If he does have more episodes, I taught him stretches to help resolve. June 2018 potassium levels were normal. Will recheck BMP in 2 months.    Follow-up: Return in about 1 year (around 7/5/2020).      Scribe Disclosure:  I, Kailee Noriega, am serving as a scribe to document services personally performed by Pattie Guevara MD at this visit, based upon the provider's statements to me. All documentation has been reviewed by the aforementioned provider prior to being entered into the official medical record.     Portions of this medical record were completed by a scribe. UPON MY REVIEW AND AUTHENTICATION BY ELECTRONIC SIGNATURE, this confirms (a) I  performed the applicable clinical services, and (b) the record is accurate.

## 2019-08-15 ENCOUNTER — TRANSFERRED RECORDS (OUTPATIENT)
Dept: HEALTH INFORMATION MANAGEMENT | Facility: CLINIC | Age: 54
End: 2019-08-15

## 2019-10-22 ENCOUNTER — OFFICE VISIT (OUTPATIENT)
Dept: DERMATOLOGY | Facility: CLINIC | Age: 54
End: 2019-10-22
Payer: COMMERCIAL

## 2019-10-22 ENCOUNTER — TELEPHONE (OUTPATIENT)
Dept: DERMATOLOGY | Facility: CLINIC | Age: 54
End: 2019-10-22

## 2019-10-22 DIAGNOSIS — D84.9 IMMUNOSUPPRESSION (H): ICD-10-CM

## 2019-10-22 DIAGNOSIS — E05.00 GRAVES DISEASE: ICD-10-CM

## 2019-10-22 DIAGNOSIS — D89.813 GVH (GRAFT VERSUS HOST DISEASE) (H): ICD-10-CM

## 2019-10-22 DIAGNOSIS — L73.8 BACTERIAL FOLLICULITIS: ICD-10-CM

## 2019-10-22 DIAGNOSIS — Z94.81 STATUS POST BONE MARROW TRANSPLANT (H): ICD-10-CM

## 2019-10-22 DIAGNOSIS — C92.10 CML (CHRONIC MYELOCYTIC LEUKEMIA) (H): ICD-10-CM

## 2019-10-22 DIAGNOSIS — E05.90 HYPERTHYROIDISM: ICD-10-CM

## 2019-10-22 DIAGNOSIS — I48.0 PAROXYSMAL ATRIAL FIBRILLATION (H): ICD-10-CM

## 2019-10-22 DIAGNOSIS — K59.00 CONSTIPATION, UNSPECIFIED CONSTIPATION TYPE: ICD-10-CM

## 2019-10-22 DIAGNOSIS — I73.00 RAYNAUD'S PHENOMENON WITHOUT GANGRENE: ICD-10-CM
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
## 2019-10-22 LAB
ANION GAP SERPL CALCULATED.3IONS-SCNC: 5 MMOL/L (ref 3–14)
BUN SERPL-MCNC: 19 MG/DL (ref 7–30)
CALCIUM SERPL-MCNC: 9 MG/DL (ref 8.5–10.1)
CHLORIDE SERPL-SCNC: 106 MMOL/L (ref 94–109)
CO2 SERPL-SCNC: 26 MMOL/L (ref 20–32)
CREAT SERPL-MCNC: 1.14 MG/DL (ref 0.66–1.25)
GFR SERPL CREATININE-BSD FRML MDRD: 73 ML/MIN/{1.73_M2}
GLUCOSE SERPL-MCNC: 99 MG/DL (ref 70–99)
HBA1C MFR BLD: 5.9 % (ref 0–5.6)
POTASSIUM SERPL-SCNC: 4.5 MMOL/L (ref 3.4–5.3)
SODIUM SERPL-SCNC: 138 MMOL/L (ref 133–144)
T3 SERPL-MCNC: 112 NG/DL (ref 60–181)
T4 FREE SERPL-MCNC: 0.93 NG/DL (ref 0.76–1.46)
TSH SERPL DL<=0.005 MIU/L-ACNC: 0.81 MU/L (ref 0.4–4)

## 2019-10-22 PROCEDURE — 84439 ASSAY OF FREE THYROXINE: CPT | Performed by: INTERNAL MEDICINE

## 2019-10-22 PROCEDURE — 80048 BASIC METABOLIC PNL TOTAL CA: CPT | Performed by: INTERNAL MEDICINE

## 2019-10-22 PROCEDURE — 84443 ASSAY THYROID STIM HORMONE: CPT | Performed by: INTERNAL MEDICINE

## 2019-10-22 PROCEDURE — 84403 ASSAY OF TOTAL TESTOSTERONE: CPT | Performed by: INTERNAL MEDICINE

## 2019-10-22 PROCEDURE — 84480 ASSAY TRIIODOTHYRONINE (T3): CPT | Performed by: INTERNAL MEDICINE

## 2019-10-22 PROCEDURE — 84270 ASSAY OF SEX HORMONE GLOBUL: CPT | Performed by: INTERNAL MEDICINE

## 2019-10-22 PROCEDURE — 83036 HEMOGLOBIN GLYCOSYLATED A1C: CPT | Performed by: INTERNAL MEDICINE

## 2019-10-22 RX ORDER — BETAMETHASONE DIPROPIONATE 0.05 %
OINTMENT (GRAM) TOPICAL
Qty: 50 G | Refills: 3 | Status: SHIPPED | OUTPATIENT
Start: 2019-10-22

## 2019-10-22 RX ORDER — MUPIROCIN 20 MG/G
OINTMENT TOPICAL
Qty: 22 G | Refills: 3 | Status: SHIPPED | OUTPATIENT
Start: 2019-10-22 | End: 2021-12-24 | Stop reason: DRUGHIGH

## 2019-10-22 RX ORDER — NIFEDIPINE 30 MG
30 TABLET, EXTENDED RELEASE ORAL DAILY
Qty: 30 TABLET | Refills: 3 | Status: SHIPPED | OUTPATIENT
Start: 2019-10-22 | End: 2020-03-19

## 2019-10-22 ASSESSMENT — PAIN SCALES - GENERAL: PAINLEVEL: NO PAIN (0)

## 2019-10-22 NOTE — NURSING NOTE
Chief Complaint   Patient presents with     Blood Draw     Labs drawn via VPT by RN in lab.      Labs collected from venipuncture by RN.     Gladys JUNG RN PHN BSN  BMT/Oncology Lab

## 2019-10-22 NOTE — PATIENT INSTRUCTIONS
Bleach baths:    Once or twice a week  Fill tub with lukewarm water - enough to soak from neck down  Add one-half cup of generic household bleach  Soak for 15 minutes  Then use a moisturizing cream afterwards    Also I'll send a prescription for a topical antibiotic ointment to use for small spots like the one on your face    Bug bite reactions:  1. Sending a prescription for a topical steroid ointment when it flares  2. Also it's helpful if you take one Allegra 180mg and one zyrtec 10mg each day during the months it tends to flare

## 2019-10-22 NOTE — TELEPHONE ENCOUNTER
[facility] Call Center    Phone Message    May a detailed message be left on voicemail: yes    Reason for Call: Medication Question or concern regarding medication   Prescription Clarification  Name of Medication: betamethasone dipropionate (DIPROSONE) 0.05 % external ointment    Prescribing Provider: Thompson Landeros MD   Pharmacy: [facility] [facility], [place] - [address]   What on the order needs clarification? With the changes in Rx requirements the pharmacy needs to ask us to supply them with the size of  area to be applied to and how often it will be applied so they may estimate required supply.  Please get back to pharmacy with info.           Action Taken: Message routed to:  [facility] dermatology

## 2019-10-22 NOTE — LETTER
"10/22/2019       RE: Panchito Pierce  [address]  [place] [address]     Dear Colleague,    Thank you for referring your patient, Panchito Pierce, to the [facility] DERMATOLOGY at [facility]. Please see a copy of my visit note below.    CHIEF COMPLAINT:  Followup on Raynaud phenomenon.      SUBJECTIVE:  Panchito is a very pleasant 54-year-old male who is status post bone marrow transplant for CML in 2015 (currently off immunosuppression and doing well) who is here for followup on several issues, primarily recurrent Raynaud phenomenon.  He was last seen in our clinic on 04/24/2019 at which time he was doing well with nifedipine 30 mg extended release once daily during winter months.  He reports that this has continued to work extremely well.  He only takes this medication during winter months and he did so last winter with excellent control.  He views this medication as \"a lifesaver\".  He denies any side effects such as lightheadedness, dizziness or hypotension.  His wife is a nurse and she checks his blood pressure occasionally.  He would like to continue on this if possible.  His other concerns today are first recurrent irritated bumps on his arms, legs and one on his right sideburn area.  He thinks this may relate to an episode of shingles he had more than 1 year ago.  These areas are not significantly painful but are annoying and he would like to get rid of them if possible.  They tend to start as deep-seated bumps and form small crusts.  Lastly, he has had recurrent issues with flaring bug bite reactions during summer months on his arms.  These are highly itchy and annoying and seem to have been worse ever since he had his bone marrow transplant.  They are not currently active today.      REVIEW OF SYSTEMS:  No recent fevers or chills.  No nonhealing oral sores.      PHYSICAL EXAMINATION:   GENERAL:  This is a well-appearing, well-nourished male with a normal mood and affect " who is oriented x3.   SKIN:  A cutaneous exam of the head, neck, bilateral upper and lower extremities was performed.  Within the right sideburn area, there is a crusted 1-2 mm erosion centered around a follicular opening.  Scattered on the dorsal forearms and lateral calves, there are few 1-2 mm crusted follicular papules.  There is no evidence of acrocyanosis on examination of his hands,      ASSESSMENT AND PLAN:   1.  Raynaud's phenomenon.  He continues to do well with Procardia 30 mg once daily during winter months.  We refilled this prescription today.  We encouraged him to have his blood pressure checked at least once monthly while he is taking this medication, though he has had no signs or symptoms of hypotension to date.  We also discussed the importance of avoiding triggers including cold and damp weather.   2.  Recurrent folliculitis of the beard area, arms and legs.  We discussed that this is typically staphylococcal in origin.  I gave him printed instructions today for dilute bleach baths to be performed once to twice weekly for preventive effects.  We also gave him a prescription for mupirocin ointment to be applied for flareups to areas such as the beard once to twice daily.   3.  Recurrent arthropod bite reactions during summer months.  We discussed today that this may relate to immune dysregulation after his transplant.  Mosquito bite hypersensitivity reactions are a known phenomenon among patients with recurrent low-grade EBV viremia.  Today I gave him a prescription for betamethasone ointment to be applied once to twice daily for flareups during summer months.  We also discussed that taking oral nonsedating antihistamine such as Allegra and Zyrtec at over-the-counter doses is also helpful in limiting the severity of flares.   4.  He will follow up in our clinic in approximately 6 months' time.       Thompson Landeros MD  Dermatology Attending

## 2019-10-22 NOTE — PROGRESS NOTES
"CHIEF COMPLAINT:  Followup on Raynaud phenomenon.      SUBJECTIVE:  Panchito is a very pleasant 54-year-old male who is status post bone marrow transplant for CML in 2015 (currently off immunosuppression and doing well) who is here for followup on several issues, primarily recurrent Raynaud phenomenon.  He was last seen in our clinic on 04/24/2019 at which time he was doing well with nifedipine 30 mg extended release once daily during winter months.  He reports that this has continued to work extremely well.  He only takes this medication during winter months and he did so last winter with excellent control.  He views this medication as \"a lifesaver\".  He denies any side effects such as lightheadedness, dizziness or hypotension.  His wife is a nurse and she checks his blood pressure occasionally.  He would like to continue on this if possible.  His other concerns today are first recurrent irritated bumps on his arms, legs and one on his right sideburn area.  He thinks this may relate to an episode of shingles he had more than 1 year ago.  These areas are not significantly painful but are annoying and he would like to get rid of them if possible.  They tend to start as deep-seated bumps and form small crusts.  Lastly, he has had recurrent issues with flaring bug bite reactions during summer months on his arms.  These are highly itchy and annoying and seem to have been worse ever since he had his bone marrow transplant.  They are not currently active today.      REVIEW OF SYSTEMS:  No recent fevers or chills.  No nonhealing oral sores.      PHYSICAL EXAMINATION:   GENERAL:  This is a well-appearing, well-nourished male with a normal mood and affect who is oriented x3.   SKIN:  A cutaneous exam of the head, neck, bilateral upper and lower extremities was performed.  Within the right sideburn area, there is a crusted 1-2 mm erosion centered around a follicular opening.  Scattered on the dorsal forearms and lateral calves, " there are few 1-2 mm crusted follicular papules.  There is no evidence of acrocyanosis on examination of his hands,      ASSESSMENT AND PLAN:   1.  Raynaud's phenomenon.  He continues to do well with Procardia 30 mg once daily during winter months.  We refilled this prescription today.  We encouraged him to have his blood pressure checked at least once monthly while he is taking this medication, though he has had no signs or symptoms of hypotension to date.  We also discussed the importance of avoiding triggers including cold and damp weather.   2.  Recurrent folliculitis of the beard area, arms and legs.  We discussed that this is typically staphylococcal in origin.  I gave him printed instructions today for dilute bleach baths to be performed once to twice weekly for preventive effects.  We also gave him a prescription for mupirocin ointment to be applied for flareups to areas such as the beard once to twice daily.   3.  Recurrent arthropod bite reactions during summer months.  We discussed today that this may relate to immune dysregulation after his transplant.  Mosquito bite hypersensitivity reactions are a known phenomenon among patients with recurrent low-grade EBV viremia.  Today I gave him a prescription for betamethasone ointment to be applied once to twice daily for flareups during summer months.  We also discussed that taking oral nonsedating antihistamine such as Allegra and Zyrtec at over-the-counter doses is also helpful in limiting the severity of flares.   4.  He will follow up in our clinic in approximately 6 months' time.       Thompson Landeros MD  Dermatology Attending

## 2019-10-22 NOTE — LETTER
Patient:  Panchito Pierce  :   1965  MRN:     [id number]        Mr.Paul RACHANA Pierce  [address] [facility] [address]        2019    Dear Panchito    Here are your results which show the testosterone continues to improve. Please reduce the carbs in your diet and increase your exercise - you are headed in the right direction!    If you have any questions, please feel free to contact my nurse at [phone] select option #3 for triage nurse  or  option #1 for scheduling related questions.    Regards    Pattie Guevara MD      Resulted Orders   Basic metabolic panel   Result Value Ref Range    Sodium 138 133 - 144 mmol/L    Potassium 4.5 3.4 - 5.3 mmol/L    Chloride 106 94 - 109 mmol/L    Carbon Dioxide 26 20 - 32 mmol/L    Anion Gap 5 3 - 14 mmol/L    Glucose 99 70 - 99 mg/dL    Urea Nitrogen 19 7 - 30 mg/dL    Creatinine 1.14 0.66 - 1.25 mg/dL    GFR Estimate 73 >60 mL/min/[1.73_m2]      Comment:      Non  GFR Calc  Starting 2018, serum creatinine based estimated GFR (eGFR) will be   calculated using the Chronic Kidney Disease Epidemiology Collaboration   (CKD-EPI) equation.      GFR Estimate If Black 84 >60 mL/min/[1.73_m2]      Comment:       GFR Calc  Starting 2018, serum creatinine based estimated GFR (eGFR) will be   calculated using the Chronic Kidney Disease Epidemiology Collaboration   (CKD-EPI) equation.      Calcium 9.0 8.5 - 10.1 mg/dL   Hemoglobin A1c   Result Value Ref Range    Hemoglobin A1C 5.9 (H) 0 - 5.6 %      Comment:      Normal <5.7% Prediabetes 5.7-6.4%  Diabetes 6.5% or higher - adopted from ADA   consensus guidelines.     Testosterone Free and Total   Result Value Ref Range    Testosterone Total 191 (L) 240 - 950 ng/dL      Comment:      This test was developed and its performance characteristics determined by the   [facility],  Special Chemistry Laboratory. It has   not been cleared or approved by the FDA.  The laboratory is regulated under   CLIA as qualified to perform high-complexity testing. This test is used for   clinical purposes. It should not be regarded as investigational or for   research.      Sex Hormone Binding Globulin 17 11 - 80 nmol/L    Free Testosterone Calculated 5.03 4.7 - 24.4 ng/dL   T3 total   Result Value Ref Range    Triiodothyronine (T3) 112 60 - 181 ng/dL   T4 free   Result Value Ref Range    T4 Free 0.93 0.76 - 1.46 ng/dL   TSH   Result Value Ref Range    TSH 0.81 0.40 - 4.00 mU/L       [facility] Lab Draw

## 2019-10-22 NOTE — NURSING NOTE
Dermatology Rooming Note    Panchito Pierce's goals for this visit include:   Chief Complaint   Patient presents with     Derm Problem     Panchito, is being seen today for a follow up Raynaud's Phenomenon without gangrene, overall feeling very good, has a sore right side by tmj and on the arms at times, as reported by patient.     Jenna Castro LPN

## 2019-10-24 ENCOUNTER — TELEPHONE (OUTPATIENT)
Dept: ENDOCRINOLOGY | Facility: CLINIC | Age: 54
End: 2019-10-24

## 2019-10-24 LAB
SHBG SERPL-SCNC: 17 NMOL/L (ref 11–80)
TESTOST FREE SERPL-MCNC: 5.03 NG/DL (ref 4.7–24.4)
TESTOST SERPL-MCNC: 191 NG/DL (ref 240–950)

## 2019-10-24 NOTE — TELEPHONE ENCOUNTER
----- Message from Pattie Guevara MD sent at 10/24/2019 11:43 AM CDT -----  Please call pt and review letter - I tried but pt was not in - I wll see him in July 2020 for follow up - he can follow up his Hba1c with his primary provider in 6 months (or I can check it at that time)

## 2019-10-24 NOTE — TELEPHONE ENCOUNTER
-  Dear Panchito    Here are your results which show the testosterone continues to improve. Please reduce the carbs in your diet and increase your exercise - you are headed in the right direction!    If you have any questions, please feel free to contact my nurse at [phone] select option #3 for triage nurse  or  option #1 for scheduling related questions.    Regards    Pattie Guevara MD

## 2019-11-06 ENCOUNTER — HEALTH MAINTENANCE LETTER (OUTPATIENT)
Age: 54
End: 2019-11-06
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
## 2019-12-01 DIAGNOSIS — E05.00 GRAVES DISEASE: ICD-10-CM

## 2019-12-01 DIAGNOSIS — K59.00 CONSTIPATION, UNSPECIFIED CONSTIPATION TYPE: ICD-10-CM

## 2019-12-01 DIAGNOSIS — C92.10 CML (CHRONIC MYELOCYTIC LEUKEMIA) (H): ICD-10-CM

## 2019-12-01 DIAGNOSIS — I48.0 PAROXYSMAL ATRIAL FIBRILLATION (H): ICD-10-CM

## 2019-12-01 DIAGNOSIS — F43.21 ADJUSTMENT DISORDER WITH DEPRESSED MOOD: ICD-10-CM

## 2019-12-01 DIAGNOSIS — Z94.81 STATUS POST BONE MARROW TRANSPLANT (H): ICD-10-CM

## 2019-12-02 RX ORDER — PANTOPRAZOLE SODIUM 40 MG/1
TABLET, DELAYED RELEASE ORAL
Qty: 30 TABLET | Refills: 11 | Status: SHIPPED | OUTPATIENT
Start: 2019-12-02 | End: 2020-12-28

## 2019-12-18 ENCOUNTER — APPOINTMENT (OUTPATIENT)
Dept: LAB | Facility: CLINIC | Age: 54
End: 2019-12-18
Attending: INTERNAL MEDICINE
Payer: COMMERCIAL

## 2019-12-18 ENCOUNTER — ONCOLOGY VISIT (OUTPATIENT)
Dept: TRANSPLANT | Facility: CLINIC | Age: 54
End: 2019-12-18
Attending: INTERNAL MEDICINE
Payer: COMMERCIAL

## 2019-12-18 VITALS
WEIGHT: 218 LBS | HEART RATE: 70 BPM | BODY MASS INDEX: 32.29 KG/M2 | DIASTOLIC BLOOD PRESSURE: 79 MMHG | OXYGEN SATURATION: 98 % | TEMPERATURE: 97.5 F | HEIGHT: 69 IN | RESPIRATION RATE: 16 BRPM | SYSTOLIC BLOOD PRESSURE: 126 MMHG

## 2019-12-18 DIAGNOSIS — E03.9 HYPOTHYROIDISM, UNSPECIFIED TYPE: ICD-10-CM

## 2019-12-18 DIAGNOSIS — Z94.81 STATUS POST BONE MARROW TRANSPLANT (H): ICD-10-CM

## 2019-12-18 LAB
ALBUMIN SERPL-MCNC: 3.9 G/DL (ref 3.4–5)
ALP SERPL-CCNC: 87 U/L (ref 40–150)
ALT SERPL W P-5'-P-CCNC: 39 U/L (ref 0–70)
ANION GAP SERPL CALCULATED.3IONS-SCNC: 5 MMOL/L (ref 3–14)
AST SERPL W P-5'-P-CCNC: 20 U/L (ref 0–45)
BASOPHILS # BLD AUTO: 0 10E9/L (ref 0–0.2)
BASOPHILS NFR BLD AUTO: 0.5 %
BILIRUB SERPL-MCNC: 0.3 MG/DL (ref 0.2–1.3)
BUN SERPL-MCNC: 17 MG/DL (ref 7–30)
CALCIUM SERPL-MCNC: 9.2 MG/DL (ref 8.5–10.1)
CHLORIDE SERPL-SCNC: 108 MMOL/L (ref 94–109)
CO2 SERPL-SCNC: 25 MMOL/L (ref 20–32)
CREAT SERPL-MCNC: 1.23 MG/DL (ref 0.66–1.25)
DIFFERENTIAL METHOD BLD: NORMAL
EOSINOPHIL # BLD AUTO: 0.1 10E9/L (ref 0–0.7)
EOSINOPHIL NFR BLD AUTO: 2.2 %
ERYTHROCYTE [DISTWIDTH] IN BLOOD BY AUTOMATED COUNT: 13.3 % (ref 10–15)
GFR SERPL CREATININE-BSD FRML MDRD: 66 ML/MIN/{1.73_M2}
GLUCOSE SERPL-MCNC: 96 MG/DL (ref 70–99)
HCT VFR BLD AUTO: 40.7 % (ref 40–53)
HGB BLD-MCNC: 13.4 G/DL (ref 13.3–17.7)
IMM GRANULOCYTES # BLD: 0 10E9/L (ref 0–0.4)
IMM GRANULOCYTES NFR BLD: 0.3 %
LYMPHOCYTES # BLD AUTO: 1.7 10E9/L (ref 0.8–5.3)
LYMPHOCYTES NFR BLD AUTO: 28.1 %
MCH RBC QN AUTO: 29.6 PG (ref 26.5–33)
MCHC RBC AUTO-ENTMCNC: 32.9 G/DL (ref 31.5–36.5)
MCV RBC AUTO: 90 FL (ref 78–100)
MONOCYTES # BLD AUTO: 0.5 10E9/L (ref 0–1.3)
MONOCYTES NFR BLD AUTO: 8.6 %
NEUTROPHILS # BLD AUTO: 3.6 10E9/L (ref 1.6–8.3)
NEUTROPHILS NFR BLD AUTO: 60.3 %
NRBC # BLD AUTO: 0 10*3/UL
NRBC BLD AUTO-RTO: 0 /100
PLATELET # BLD AUTO: 182 10E9/L (ref 150–450)
POTASSIUM SERPL-SCNC: 3.9 MMOL/L (ref 3.4–5.3)
PROT SERPL-MCNC: 7.2 G/DL (ref 6.8–8.8)
RBC # BLD AUTO: 4.52 10E12/L (ref 4.4–5.9)
SODIUM SERPL-SCNC: 139 MMOL/L (ref 133–144)
WBC # BLD AUTO: 6 10E9/L (ref 4–11)

## 2019-12-18 PROCEDURE — 81206 BCR/ABL1 GENE MAJOR BP: CPT | Performed by: INTERNAL MEDICINE

## 2019-12-18 PROCEDURE — 85025 COMPLETE CBC W/AUTO DIFF WBC: CPT | Performed by: INTERNAL MEDICINE

## 2019-12-18 PROCEDURE — 84403 ASSAY OF TOTAL TESTOSTERONE: CPT | Performed by: INTERNAL MEDICINE

## 2019-12-18 PROCEDURE — 80053 COMPREHEN METABOLIC PANEL: CPT | Performed by: INTERNAL MEDICINE

## 2019-12-18 PROCEDURE — 84270 ASSAY OF SEX HORMONE GLOBUL: CPT | Performed by: INTERNAL MEDICINE

## 2019-12-18 PROCEDURE — G0463 HOSPITAL OUTPT CLINIC VISIT: HCPCS | Mod: ZF

## 2019-12-18 PROCEDURE — 36415 COLL VENOUS BLD VENIPUNCTURE: CPT

## 2019-12-18 ASSESSMENT — MIFFLIN-ST. JEOR: SCORE: 1824.22

## 2019-12-18 ASSESSMENT — PAIN SCALES - GENERAL: PAINLEVEL: NO PAIN (0)

## 2019-12-18 NOTE — PATIENT INSTRUCTIONS
SHWETHA Cabrera in 1 year with labs   Primary care physician cholesterol control, glycemia control, diet, exercise, stop smoking, cancer prevention and screening.   Dentist visit to screen oral cancer at least yearly  Eye doctor to assess vision and cataracts

## 2019-12-18 NOTE — LETTER
Patient:  Panchito Pierce  :   1965  MRN:     [id number]        Mr.Paul RACHANA Pierce  [address] [facility] [address]        January 3, 2020    Dear Panchito    I apologize for the delay.  Your labs came when I was away.  Overall, your testosterone levels still look pretty good and similar to previous levels.  Let us recheck again in 2 months and I will see you in 2020 for follow-up.    If you have any questions, please feel free to contact my nurse at [phone] select option #3 for triage nurse  or  option #1 for scheduling related questions.    Regards    Pattie Guevara MD      Resulted Orders   Testosterone Free and Total   Result Value Ref Range    Testosterone Total 176 (L) 240 - 950 ng/dL      Comment:      This test was developed and its performance characteristics determined by the   [facility],  Special Chemistry Laboratory. It has   not been cleared or approved by the FDA. The laboratory is regulated under   CLIA as qualified to perform high-complexity testing. This test is used for   clinical purposes. It should not be regarded as investigational or for   research.      Sex Hormone Binding Globulin 18 11 - 80 nmol/L    Free Testosterone Calculated 4.51 (L) 4.7 - 24.4 ng/dL       Jermaine Cabrera MD

## 2019-12-18 NOTE — PROGRESS NOTES
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  He comes with his wife who is having some health issues.  She is having another surgery in a few weeks.  He tapered off the gabapentin for the postherpetic neuralgia and has no residual pain.  He has been doing quite well and good energy.  He is smoking.  He has no signs or symptoms consistent with chronic mlngt-liwjhn-qvhj disease or leukemia relapse.      Review of Systems: 10 point ROS negative except as noted above.    PHYSICAL EXAM   /79 (BP Location: Left arm, Patient Position: Sitting, Cuff Size: Adult Large)   Pulse 70   Temp 97.5  F (36.4  C) (Oral)   Resp 16   Ht 1.753 m (5' 9\")   Wt 98.9 kg (218 lb)   SpO2 98%   BMI 32.19 kg/m   BP   Wt Readings from Last 4 Encounters:   12/18/19 98.9 kg (218 lb)   07/05/19 102.9 kg (226 lb 12.8 oz)   12/19/18 106.2 kg (234 lb 1.6 oz)   06/01/18 98.7 kg (217 lb 8 oz)   General: NAD, pleasant appearing male  Heart is regular, lungs are clear  Abdomen is soft.  Examined all skin no sign sclerosis or fibrosis.  No central line    He has no palpable adenopathy.  The extremes are warm and well perfused with no edema.  KPS 90  LABS AND IMAGING      Lab Results   Component Value Date    WBC 6.0 12/18/2019    ANEU 3.6 12/18/2019    HGB 13.4 12/18/2019    HCT 40.7 12/18/2019     12/18/2019     12/18/2019    POTASSIUM 3.9 12/18/2019    CHLORIDE 108 12/18/2019    CO2 25 12/18/2019    GLC 96 12/18/2019    BUN 17 12/18/2019    CR 1.23 12/18/2019    MAG 1.9 09/13/2017    INR 0.9 06/15/2016    " BILITOTAL 0.3 12/18/2019    AST 20 12/18/2019    ALT 39 12/18/2019    ALKPHOS 87 12/18/2019    PROTTOTAL 7.2 12/18/2019    ALBUMIN 3.9 12/18/2019     BcrAbl from BM pending      ASSESSMENT BY SYSTEMS   Panchito REICH Kari is a 51 year old man s/p MUD SCT for CML  - trasplanted in second chronic phase after blast crisis;    BMT/ CML:  repeat BCR/ABL from marrow this week pending.   dasatinib maintenance after 12 months;     HEMATOLOGY/COAGULATION: stable good counts    INFECTIONS AND PROPHYLAXIS: He completed his immunizations.  He had a flu shot this fall.  He has no active infections.    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   - Advised to wear sun block when outside for extended periods of time to prevent flare of GVH    GASTROINTESTINAL/LIVER: no new issues    RENAL/FEN/Nutrition: Normal kidney function electrolytes.  Overweight, elevated hemoglobin A1c, no recent measurement of cholesterol.  We had a long discussion about metabolic and modifiable risks for heart disease and death.      CARDIOVASCULAR:  Normotensive. Hx Hypertension. Cont metoprolol. Normotensive  - Hx of A fib with RVR (new 12/24/16); resolved with dilt, now on Metoprolol 12.5mg BID.     ENDOCRINE: following with Dr Guevara for thyroid dysfunction with Graves' disease.  He again has a elevated hemoglobin A1c.  He was already advised to lose weight.  I could not find a recent measurement of his cholesterol.  - Hx Graves Disease s/p VELA therapy. Cont synthroid and liothyronine. TSH and Free T3 low; Free T4 elevated. Mixed picture; After endocrine consult:  Mild thyrotoxicosis due to large weight loss. On levothyroxine 137mcg.    - On selenium supplement, T3 and T4. See Dr. Guevara on 12/165/2017    Pain: stopped  Gabapentin several months back. No pain.     General health care: I had an extended discussion with the patient about cardiovascular risk after bone marrow transplant and trying to control any modifiable factors such as a  smoking, high cholesterol, exercise, and diabetes.  I we also discussed the need to see an eye doctor to determine whether or not he has cataracts.  He needs yearly dental visits to screen for oral cancer.  He should have regular cancer screening with his primary care provider.  I also did not find a recent PSA that should probably be repeated.  The patient is already going to work on a diet with his wife.  He will work with his primary care provider on preventative care as outlined above.    Plan:  SHWETHA Cabrera in 1 year with labs   Primary care physician cholesterol control, glycemia control, diet, exercise, stop smoking, cancer prevention and screening.   Dentist visit to screen oral cancer at least yearly  Eye doctor to assess vision and cataracts                                               Primary care physician cholesterol control, glycemia control, diet, exercise, stop smoking, cancer prevention and screening.   Dentist visit to screen oral cancer at least yearly  Eye doctor to assess vision and cataracts

## 2019-12-18 NOTE — NURSING NOTE
"Oncology Rooming Note    December 18, 2019 3:19 PM   Panchito Pierce is a 54 year old male who presents for:    Chief Complaint   Patient presents with     Blood Draw     Labs drawn via VPT by MA in LAB. VS taken. Pt. checked in for appt.      RECHECK     Return: CML (chronic myelocytic leukemia)     Initial Vitals: /79 (BP Location: Left arm, Patient Position: Sitting, Cuff Size: Adult Large)   Pulse 70   Temp 97.5  F (36.4  C) (Oral)   Resp 16   Ht 1.753 m (5' 9\")   Wt 98.9 kg (218 lb)   SpO2 98%   BMI 32.19 kg/m   Estimated body mass index is 32.19 kg/m  as calculated from the following:    Height as of this encounter: 1.753 m (5' 9\").    Weight as of this encounter: 98.9 kg (218 lb). Body surface area is 2.19 meters squared.  No Pain (0) Comment: Data Unavailable   No LMP for male patient.  Allergies reviewed: Yes  Medications reviewed: Yes    Medications: Medication refills not needed today.  Pharmacy name entered into [facility]:    [facility] [address] [place] [address] [id number] [facility] - [place], [place] - [phone]  [place], [place] - [address] [place] [address] [facility] [place], [place] - [address]     Clinical concerns: N/A       Inez Fritz CMA              "

## 2019-12-18 NOTE — NURSING NOTE
Chief Complaint   Patient presents with     Blood Draw     Labs drawn via VPT by MA in LAB. VS taken. Pt. checked in for appt.        Riri Olmstead, CMA

## 2019-12-20 LAB
SHBG SERPL-SCNC: 18 NMOL/L (ref 11–80)
TESTOST FREE SERPL-MCNC: 4.51 NG/DL (ref 4.7–24.4)
TESTOST SERPL-MCNC: 176 NG/DL (ref 240–950)

## 2019-12-23 LAB — COPATH REPORT: NORMAL

## 2020-01-03 ENCOUNTER — TELEPHONE (OUTPATIENT)
Dept: ENDOCRINOLOGY | Facility: CLINIC | Age: 55
End: 2020-01-03

## 2020-01-03 DIAGNOSIS — E05.00 GRAVES DISEASE: Primary | ICD-10-CM

## 2020-01-03 DIAGNOSIS — E29.1 HYPOGONADISM MALE: ICD-10-CM

## 2020-01-03 NOTE — TELEPHONE ENCOUNTER
Pt to do labs in 2 months and see me in July 2020    --    Called pt - pt not in - message left below    --  Dear Panchito    I apologize for the delay.  Your labs came when I was away.  Overall, your testosterone levels still look pretty good and similar to previous levels.  Let us recheck again in 2 months and I will see you in July 2020 for follow-up.    If you have any questions, please feel free to contact my nurse at [phone] select option #3 for triage nurse  or  option #1 for scheduling related questions.    Regards    Pattie Guevara MD

## 2020-01-06 ENCOUNTER — MYC MEDICAL ADVICE (OUTPATIENT)
Dept: ENDOCRINOLOGY | Facility: CLINIC | Age: 55
End: 2020-01-06

## 2020-01-27 NOTE — TELEPHONE ENCOUNTER
----- Message from Pattie Guevara MD sent at 10/24/2019 11:44 AM CDT -----  Please have pt see me in July 2020 for follow up

## 2020-03-17 DIAGNOSIS — I73.00 RAYNAUD'S PHENOMENON WITHOUT GANGRENE: ICD-10-CM

## 2020-03-19 RX ORDER — NIFEDIPINE 30 MG
30 TABLET, EXTENDED RELEASE ORAL DAILY
Qty: 30 TABLET | Refills: 3 | Status: SHIPPED | OUTPATIENT
Start: 2020-03-19 | End: 2021-03-30

## 2020-03-19 NOTE — TELEPHONE ENCOUNTER
NIFEdipine ER 30 MG    Last Written Prescription Date:  10/22/19  Last Fill Quantity: 30,   # refills: 3  Last Office Visit : 10/22/19  Future Office visit:  NONE  RTC  6 MOS   April 2020    Routing refill request to provider for review/approval because:  Drug not on the refill protocol

## 2020-07-04 DIAGNOSIS — E05.00 GRAVES DISEASE: ICD-10-CM

## 2020-07-04 DIAGNOSIS — D84.9 IMMUNOSUPPRESSION (H): ICD-10-CM

## 2020-07-04 DIAGNOSIS — K59.00 CONSTIPATION, UNSPECIFIED CONSTIPATION TYPE: ICD-10-CM

## 2020-07-04 DIAGNOSIS — D89.813 GVH (GRAFT VERSUS HOST DISEASE) (H): ICD-10-CM

## 2020-07-04 DIAGNOSIS — Z94.81 STATUS POST BONE MARROW TRANSPLANT (H): ICD-10-CM

## 2020-07-04 DIAGNOSIS — C92.10 CML (CHRONIC MYELOCYTIC LEUKEMIA) (H): ICD-10-CM

## 2020-07-04 DIAGNOSIS — I48.0 PAROXYSMAL ATRIAL FIBRILLATION (H): ICD-10-CM

## 2020-07-08 RX ORDER — METOPROLOL TARTRATE 25 MG/1
12.5 TABLET, FILM COATED ORAL 2 TIMES DAILY
Qty: 90 TABLET | Refills: 0 | Status: SHIPPED | OUTPATIENT
Start: 2020-07-08 | End: 2020-07-22

## 2020-07-18 DIAGNOSIS — D84.9 IMMUNOSUPPRESSION (H): ICD-10-CM

## 2020-07-18 DIAGNOSIS — K59.00 CONSTIPATION, UNSPECIFIED CONSTIPATION TYPE: ICD-10-CM

## 2020-07-18 DIAGNOSIS — D89.813 GVH (GRAFT VERSUS HOST DISEASE) (H): ICD-10-CM

## 2020-07-18 DIAGNOSIS — E05.90 HYPERTHYROIDISM: ICD-10-CM

## 2020-07-18 DIAGNOSIS — C92.10 CML (CHRONIC MYELOCYTIC LEUKEMIA) (H): ICD-10-CM

## 2020-07-18 DIAGNOSIS — Z94.81 STATUS POST BONE MARROW TRANSPLANT (H): ICD-10-CM

## 2020-07-18 DIAGNOSIS — I48.0 PAROXYSMAL ATRIAL FIBRILLATION (H): ICD-10-CM

## 2020-07-18 DIAGNOSIS — E05.00 GRAVES DISEASE: ICD-10-CM

## 2020-07-22 RX ORDER — LEVOTHYROXINE SODIUM 150 UG/1
150 TABLET ORAL DAILY
Qty: 90 TABLET | Refills: 3 | Status: SHIPPED | OUTPATIENT
Start: 2020-07-22 | End: 2020-07-24

## 2020-07-22 RX ORDER — METOPROLOL TARTRATE 25 MG/1
12.5 TABLET, FILM COATED ORAL 2 TIMES DAILY
Qty: 90 TABLET | Refills: 0 | Status: SHIPPED | OUTPATIENT
Start: 2020-07-22 | End: 2021-01-26

## 2020-07-22 RX ORDER — LIOTHYRONINE SODIUM 5 UG/1
5 TABLET ORAL 2 TIMES DAILY
Qty: 180 TABLET | Refills: 0 | Status: SHIPPED | OUTPATIENT
Start: 2020-07-22 | End: 2020-07-24

## 2020-07-22 NOTE — TELEPHONE ENCOUNTER
Levothyroxine Sodium Oral Tablet 150 MCG   Last Written Prescription Date:  7/5/2019  Last Fill Quantity: 90,   # refills: 3  Last Office Visit : 7/5/2019  Future Office visit:  7/24/2020  90 Tabs, 0 Refills sent to pharm 7/22/20202    Liothyronine Sodium Oral Tablet 5 MCG   Last Written Prescription Date:  7/5/2019  Last Fill Quantity: 180,   # refills: 3  Last Office Visit : 7/5/2019  Future Office visit:  7/24/2020  180 Tabs, 0 Refills sent to pharm 7/22/2019    Metoprolol Tartrate Oral Tablet 25 MG    Last Written Prescription Date:  7/8/2020  Last Fill Quantity: 90,   # refills: 0  Last Office Visit : 7/5/2019  Future Office visit:  7/24/2020  90 Tabs, 0 Refills sent to pharm 7/22/2020    Emily Campos RN  Central Triage Red Flags/Med Refills

## 2020-07-24 ENCOUNTER — VIRTUAL VISIT (OUTPATIENT)
Dept: ENDOCRINOLOGY | Facility: CLINIC | Age: 55
End: 2020-07-24
Payer: COMMERCIAL

## 2020-07-24 DIAGNOSIS — E29.1 HYPOGONADISM MALE: ICD-10-CM

## 2020-07-24 DIAGNOSIS — E05.90 HYPERTHYROIDISM: Primary | ICD-10-CM

## 2020-07-24 RX ORDER — LIOTHYRONINE SODIUM 5 UG/1
5 TABLET ORAL 2 TIMES DAILY
Qty: 180 TABLET | Refills: 3 | Status: SHIPPED | OUTPATIENT
Start: 2020-07-24 | End: 2021-07-16

## 2020-07-24 RX ORDER — LEVOTHYROXINE SODIUM 150 UG/1
150 TABLET ORAL DAILY
Qty: 90 TABLET | Refills: 3 | Status: SHIPPED | OUTPATIENT
Start: 2020-07-24 | End: 2021-07-16

## 2020-07-24 NOTE — PROGRESS NOTES
"Panchito Pierce is a 55 year old male who is being evaluated via a billable telephone visit.      The patient has been notified of following:     \"This telephone visit will be conducted via a call between you and your physician/provider. We have found that certain health care needs can be provided without the need for a physical exam.  This service lets us provide the care you need with a short phone conversation.  If a prescription is necessary we can send it directly to your pharmacy.  If lab work is needed we can place an order for that and you can then stop by our lab to have the test done at a later time.    Telephone visits are billed at different rates depending on your insurance coverage. During this emergency period, for some insurers they may be billed the same as an in-person visit.  Please reach out to your insurance provider with any questions.    If during the course of the call the physician/provider feels a telephone visit is not appropriate, you will not be charged for this service.\"    Patient has given verbal consent for Telephone visit?  Yes    What phone number would you like to be contacted at? [phone]    How would you like to obtain your AVS? [facility]  Endocrinology  Pattie Guevara MD  July 24, 2020     Chief Complaint:   RECHECK    Called pt at 8:40 by telephone visit.  Phone call ended at 9:00, documentation time 5 minutes, total time 25 minutes.     History of Present Illness:    #1 Graves disease status-post VELA  Per patient report, he was diagnosed with Grave's disease in 2002. He underwent radioactive iodine therapy in 2002 at [facility]. He had thyroid testing on 4/24/2018, where his T3 was 99 ng/dL, T4 free was 0.87 ng/dL, and his TSH was 1.00 mU/L. These thyroid tests have normalized since he had a TSH of 8.89 on 12/21/2017.  Has been on combination of levothyroxine and Cytomel since July 1, 2019.    Interval history:Patient continues on intermittent " selenium.  He continues on levothyroxine at 150 mcg daily and Cytomel 5 mcg twice daily.  He reports that he is doing well.  October 2019 TSH of 0.81.    #2 Shingles  He had ongoing numbness and itchiness on his tongue and jaw since he was diagnosed with shingles on 12/4/2017. Symptoms are manageable with Gabapentin 300 mg three times daily and Acyclovir 400 mg twice daily.     Interval history: Not discussed at current visit.    #3 Low libido  His low libido has improved since 12/2017 when he had shingles. His testosterone level on 12/21/2017 was low at 94 ng/dL, but this increased to 288 mg/dL on 4/24/2018.     Interval history: December 2018 total testosterone 187, October 2019 total testosterone at 191, December 2019 total testosterone of 176    #4 Sleep apnea  The patient had a sleep study completed on 1/11/2017 showing that he has sleep apnea.     Interval history: No progress here.  No CPAP.  Has gained roughly 3 pounds.    #5 Weight gain with elevated hemoglobin A1c of 5.9 noted in October 2019  He often eats later than 8 pm and drinks coffee with cream and occasionally beer, but avoids sodas and energy drinks.     Interval history: The patient reports a 3 pound weight gain which he attributes to COVID.  Of note, his hemoglobin A1c was slightly higher at 5.9 noted in October 2019.    #6 Bilateral calf muscle cramps:   He was having bilateral calf muscle cramps over a period of 3 weeks this last spring; no known etiology and resolved spontaneously.     Interval history: The patient still reports intermittent cramps.  He reports a particularly severe episode roughly 1 week ago.  This is been intermittent for him.      Review of Systems:   Pertinent items are noted in HPI, remainder of complete ROS is negative.      Active Medications:   Current Outpatient Medications   Medication Sig Dispense Refill     acyclovir (ZOVIRAX) 400 MG tablet Take 2 tablets (800 mg) by mouth 2 times daily (Patient not taking:  Reported on 7/5/2019) 120 tablet 11     betamethasone dipropionate (DIPROSONE) 0.05 % external ointment Apply to bug bite reactions, and you can cover with bandaid 50 g 3     gabapentin (NEURONTIN) 100 MG capsule Take 300 mg by mouth 3 times daily       levothyroxine (SYNTHROID/LEVOTHROID) 150 MCG tablet Take 1 tablet (150 mcg) by mouth daily 90 tablet 3     liothyronine (CYTOMEL) 5 MCG tablet Take 1 tablet (5 mcg) by mouth 2 times daily 180 tablet 0     metoprolol tartrate (LOPRESSOR) 25 MG tablet Take 0.5 tablets (12.5 mg) by mouth 2 times daily 90 tablet 0     mupirocin (BACTROBAN) 2 % external ointment Use 1 to 2 times a day to sores on face and arms. 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  However the patient reports feeling well.  Psych: Alert and oriented times 3; coherent speech, normal  rate and volume, able to articulate logical thoughts, able to abstract reason, no tangential thoughts, no hallucinations or delusions      Data:  No visits with results within 4 Month(s) from this visit.   Latest known visit with results is:   Oncology Visit on 12/18/2019   Component Date Value Ref Range Status     Testosterone Total 12/18/2019 176* 240 - 950 ng/dL Final    Comment: This test was developed and its performance characteristics determined by the   [facility],  Special Chemistry Laboratory. It has   not been cleared or approved by the FDA. The laboratory is regulated under   CLIA as qualified to perform high-complexity testing. This test is used for   clinical purposes. It should not be regarded as investigational or for   research.       Sex Hormone Binding Globulin 12/18/2019 18  11 - 80 nmol/L Final     Free Testosterone Calculated 12/18/2019 4.51* 4.7 - 24.4 ng/dL Final     Sodium 12/18/2019 139  133 - 144 mmol/L Final     Potassium 12/18/2019 3.9  3.4 - 5.3 mmol/L Final     Chloride 12/18/2019 108  94 - 109 mmol/L Final     Carbon Dioxide 12/18/2019 25  20 - 32 mmol/L Final     Anion Gap 12/18/2019 5  3 - 14 mmol/L Final     Glucose 12/18/2019 96  70 - 99 mg/dL Final     Urea Nitrogen 12/18/2019 17  7 - 30 mg/dL Final     Creatinine 12/18/2019 1.23  0.66 - 1.25 mg/dL Final     GFR Estimate 12/18/2019 66  >60 mL/min/[1.73_m2] Final    Comment: Non  GFR Calc  Starting 12/18/2018, serum creatinine based estimated GFR (eGFR) will be   calculated using the Chronic Kidney Disease Epidemiology Collaboration   (CKD-EPI) equation.       GFR Estimate If Black 12/18/2019 77  >60 mL/min/[1.73_m2] Final    Comment:  GFR Calc  Starting 12/18/2018, serum creatinine based estimated GFR (eGFR) will be   calculated using the Chronic Kidney Disease Epidemiology Collaboration   (CKD-EPI) equation.        Calcium 12/18/2019 9.2  8.5 - 10.1 mg/dL Final     Bilirubin Total 12/18/2019 0.3  0.2 - 1.3 mg/dL Final     Albumin 12/18/2019 3.9  3.4 - 5.0 g/dL Final     Protein Total 12/18/2019 7.2  6.8 - 8.8 g/dL Final     Alkaline Phosphatase 12/18/2019 87  40 - 150 U/L Final     ALT 12/18/2019 39  0 - 70 U/L Final     AST 12/18/2019 20  0 - 45 U/L Final     WBC 12/18/2019 6.0  4.0 - 11.0 10e9/L Final     RBC Count 12/18/2019 4.52  4.4 - 5.9 10e12/L Final     Hemoglobin 12/18/2019 13.4  13.3 - 17.7 g/dL Final     Hematocrit 12/18/2019 40.7  40.0 - 53.0 % Final     MCV 12/18/2019 90  78 - 100 fl Final     MCH 12/18/2019 29.6  26.5 - 33.0 pg Final     MCHC 12/18/2019 32.9  31.5 - 36.5 g/dL Final     RDW 12/18/2019 13.3  10.0 - 15.0 % Final     Platelet Count 12/18/2019 182  150 - 450 10e9/L Final     Diff Method 12/18/2019 Automated Method   Final     % Neutrophils 12/18/2019 60.3  % Final     % Lymphocytes 12/18/2019 28.1  % Final     % Monocytes 12/18/2019 8.6  % Final     % Eosinophils 12/18/2019 2.2  % Final     % Basophils 12/18/2019 0.5  % Final     % Immature Granulocytes 12/18/2019 0.3  % Final     Nucleated RBCs 12/18/2019 0  0 /100 Final     Absolute Neutrophil 12/18/2019 3.6  1.6 - 8.3 10e9/L Final     Absolute Lymphocytes 12/18/2019 1.7  0.8 - 5.3 10e9/L Final     Absolute Monocytes 12/18/2019 0.5  0.0 - 1.3 10e9/L Final     Absolute Eosinophils 12/18/2019 0.1  0.0 - 0.7 10e9/L Final     Absolute Basophils 12/18/2019 0.0  0.0 - 0.2 10e9/L Final     Abs Immature Granulocytes 12/18/2019 0.0  0 - 0.4 10e9/L Final     Absolute Nucleated RBC 12/18/2019 0.0   Final     PLAN:  #1 Graves disease status-post VELA  Continue with levothyroxine at 150 mcg daily and cytomel 5 mcg program.  Refills provided.  Will have patient draw labs in about 3 months for follow-up.    #2 Shingles  Not discussed at current visit.    #3 Low libido  We will recheck testosterone levels in about 3 months.  We will also check FSH and LH at that  time.    #4 Sleep apnea  Patient will work on dietary lifestyle changes.    #5 Weight gain with elevated hemoglobin A1c of 5.9 noted in October 2019  Recommended dietary lifestyle changes.  Will check hemoglobin A1c in about 3 months.    #6 Bilateral calf muscle cramps:   Stretch the legs and consider vitamin B complex (1 capsule three times daily with each capsule containing 30 mg of vitamin B6) to help with cramps. Will check BMP at future visit.    We will plan for lab draw in 3 months to recheck testosterone, hemoglobin A1c, and thyroid function test.  We will plan for return visit in 6 months.    Due to the COVID 19 pandemic this visit was converted to a telephone visit in order to help prevent spread of infection in this high risk patient and the general population .    Called pt at 8:40 by telephone visit.  Phone call ended at 9:00, documentation time 5 minutes, total time 25 minutes.

## 2020-07-24 NOTE — LETTER
"7/24/2020       RE: Panchito Pierce  [address]  [place] [address]     Dear Colleague,    Thank you for referring your patient, Panchito Pierce, to the [facility] ENDOCRINOLOGY at [facility]. Please see a copy of my visit note below.    Panchito Pierce is a 55 year old male who is being evaluated via a billable telephone visit.      The patient has been notified of following:     \"This telephone visit will be conducted via a call between you and your physician/provider. We have found that certain health care needs can be provided without the need for a physical exam.  This service lets us provide the care you need with a short phone conversation.  If a prescription is necessary we can send it directly to your pharmacy.  If lab work is needed we can place an order for that and you can then stop by our lab to have the test done at a later time.    Telephone visits are billed at different rates depending on your insurance coverage. During this emergency period, for some insurers they may be billed the same as an in-person visit.  Please reach out to your insurance provider with any questions.    If during the course of the call the physician/provider feels a telephone visit is not appropriate, you will not be charged for this service.\"    Patient has given verbal consent for Telephone visit?  Yes    What phone number would you like to be contacted at? [phone]    How would you like to obtain your AVS? [facility]  Endocrinology  Pattie Guevara MD  July 24, 2020     Chief Complaint:   RECHECK    Called pt at 8:40 by telephone visit.  Phone call ended at 9:00, documentation time 5 minutes, total time 25 minutes.     History of Present Illness:    #1 Graves disease status-post VELA  Per patient report, he was diagnosed with Grave's disease in 2002. He underwent radioactive iodine therapy in 2002 at [facility]. He had thyroid testing on 4/24/2018, where his " T3 was 99 ng/dL, T4 free was 0.87 ng/dL, and his TSH was 1.00 mU/L. These thyroid tests have normalized since he had a TSH of 8.89 on 12/21/2017.  Has been on combination of levothyroxine and Cytomel since July 1, 2019.    Interval history:Patient continues on intermittent selenium.  He continues on levothyroxine at 150 mcg daily and Cytomel 5 mcg twice daily.  He reports that he is doing well.  October 2019 TSH of 0.81.    #2 Shingles  He had ongoing numbness and itchiness on his tongue and jaw since he was diagnosed with shingles on 12/4/2017. Symptoms are manageable with Gabapentin 300 mg three times daily and Acyclovir 400 mg twice daily.     Interval history: Not discussed at current visit.    #3 Low libido  His low libido has improved since 12/2017 when he had shingles. His testosterone level on 12/21/2017 was low at 94 ng/dL, but this increased to 288 mg/dL on 4/24/2018.     Interval history: December 2018 total testosterone 187, October 2019 total testosterone at 191, December 2019 total testosterone of 176    #4 Sleep apnea  The patient had a sleep study completed on 1/11/2017 showing that he has sleep apnea.     Interval history: No progress here.  No CPAP.  Has gained roughly 3 pounds.    #5 Weight gain with elevated hemoglobin A1c of 5.9 noted in October 2019  He often eats later than 8 pm and drinks coffee with cream and occasionally beer, but avoids sodas and energy drinks.     Interval history: The patient reports a 3 pound weight gain which he attributes to COVID.  Of note, his hemoglobin A1c was slightly higher at 5.9 noted in October 2019.    #6 Bilateral calf muscle cramps:   He was having bilateral calf muscle cramps over a period of 3 weeks this last spring; no known etiology and resolved spontaneously.     Interval history: The patient still reports intermittent cramps.  He reports a particularly severe episode roughly 1 week ago.  This is been intermittent for him.      Review of Systems:    Pertinent items are noted in HPI, remainder of complete ROS is negative.      Active Medications:   Current Outpatient Medications   Medication Sig Dispense Refill     acyclovir (ZOVIRAX) 400 MG tablet Take 2 tablets (800 mg) by mouth 2 times daily (Patient not taking: Reported on 7/5/2019) 120 tablet 11     betamethasone dipropionate (DIPROSONE) 0.05 % external ointment Apply to bug bite reactions, and you can cover with bandaid 50 g 3     gabapentin (NEURONTIN) 100 MG capsule Take 300 mg by mouth 3 times daily       levothyroxine (SYNTHROID/LEVOTHROID) 150 MCG tablet Take 1 tablet (150 mcg) by mouth daily 90 tablet 3     liothyronine (CYTOMEL) 5 MCG tablet Take 1 tablet (5 mcg) by mouth 2 times daily 180 tablet 0     metoprolol tartrate (LOPRESSOR) 25 MG tablet Take 0.5 tablets (12.5 mg) by mouth 2 times daily 90 tablet 0     mupirocin (BACTROBAN) 2 % external ointment Use 1 to 2 times a day to sores on face and arms. 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  However the patient reports feeling well. Psych: Alert and oriented times 3; coherent speech, normal  rate and volume, able to articulate logical thoughts, able to abstract reason, no tangential thoughts, no hallucinations or delusions      Data:  No visits with results within 4 Month(s) from this visit.   Latest known visit with results is:   Oncology Visit on 12/18/2019   Component Date Value Ref Range Status     Testosterone Total 12/18/2019 176* 240 - 950 ng/dL Final    Comment: This test was developed and its performance characteristics determined by the   [facility],  Special Chemistry Laboratory. It has   not been cleared or approved by the FDA. The laboratory is regulated under   CLIA as qualified to perform high-complexity testing. This test is used for   clinical purposes. It should not be regarded as investigational or for   research.       Sex Hormone Binding Globulin 12/18/2019 18  11 - 80 nmol/L Final     Free Testosterone Calculated 12/18/2019 4.51* 4.7 - 24.4 ng/dL Final     Sodium 12/18/2019 139  133 - 144 mmol/L Final     Potassium 12/18/2019 3.9  3.4 - 5.3 mmol/L Final     Chloride 12/18/2019 108  94 - 109 mmol/L Final     Carbon Dioxide 12/18/2019 25  20 - 32 mmol/L Final     Anion Gap 12/18/2019 5  3 - 14 mmol/L Final     Glucose 12/18/2019 96  70 - 99 mg/dL Final     Urea Nitrogen 12/18/2019 17  7 - 30 mg/dL Final     Creatinine 12/18/2019 1.23  0.66 - 1.25 mg/dL Final     GFR Estimate 12/18/2019 66  >60 mL/min/[1.73_m2] Final    Comment: Non  GFR Calc  Starting 12/18/2018, serum creatinine based estimated GFR (eGFR) will be   calculated using the Chronic Kidney Disease Epidemiology Collaboration   (CKD-EPI) equation.       GFR  Estimate If Black 12/18/2019 77  >60 mL/min/[1.73_m2] Final    Comment:  GFR Calc  Starting 12/18/2018, serum creatinine based estimated GFR (eGFR) will be   calculated using the Chronic Kidney Disease Epidemiology Collaboration   (CKD-EPI) equation.       Calcium 12/18/2019 9.2  8.5 - 10.1 mg/dL Final     Bilirubin Total 12/18/2019 0.3  0.2 - 1.3 mg/dL Final     Albumin 12/18/2019 3.9  3.4 - 5.0 g/dL Final     Protein Total 12/18/2019 7.2  6.8 - 8.8 g/dL Final     Alkaline Phosphatase 12/18/2019 87  40 - 150 U/L Final     ALT 12/18/2019 39  0 - 70 U/L Final     AST 12/18/2019 20  0 - 45 U/L Final     WBC 12/18/2019 6.0  4.0 - 11.0 10e9/L Final     RBC Count 12/18/2019 4.52  4.4 - 5.9 10e12/L Final     Hemoglobin 12/18/2019 13.4  13.3 - 17.7 g/dL Final     Hematocrit 12/18/2019 40.7  40.0 - 53.0 % Final     MCV 12/18/2019 90  78 - 100 fl Final     MCH 12/18/2019 29.6  26.5 - 33.0 pg Final     MCHC 12/18/2019 32.9  31.5 - 36.5 g/dL Final     RDW 12/18/2019 13.3  10.0 - 15.0 % Final     Platelet Count 12/18/2019 182  150 - 450 10e9/L Final     Diff Method 12/18/2019 Automated Method   Final     % Neutrophils 12/18/2019 60.3  % Final     % Lymphocytes 12/18/2019 28.1  % Final     % Monocytes 12/18/2019 8.6  % Final     % Eosinophils 12/18/2019 2.2  % Final     % Basophils 12/18/2019 0.5  % Final     % Immature Granulocytes 12/18/2019 0.3  % Final     Nucleated RBCs 12/18/2019 0  0 /100 Final     Absolute Neutrophil 12/18/2019 3.6  1.6 - 8.3 10e9/L Final     Absolute Lymphocytes 12/18/2019 1.7  0.8 - 5.3 10e9/L Final     Absolute Monocytes 12/18/2019 0.5  0.0 - 1.3 10e9/L Final     Absolute Eosinophils 12/18/2019 0.1  0.0 - 0.7 10e9/L Final     Absolute Basophils 12/18/2019 0.0  0.0 - 0.2 10e9/L Final     Abs Immature Granulocytes 12/18/2019 0.0  0 - 0.4 10e9/L Final     Absolute Nucleated RBC 12/18/2019 0.0   Final     PLAN:  #1 Graves disease status-post VELA  Continue with levothyroxine at 150 mcg  daily and cytomel 5 mcg program.  Refills provided.  Will have patient draw labs in about 3 months for follow-up.    #2 Shingles  Not discussed at current visit.    #3 Low libido  We will recheck testosterone levels in about 3 months.  We will also check FSH and LH at that time.    #4 Sleep apnea  Patient will work on dietary lifestyle changes.    #5 Weight gain with elevated hemoglobin A1c of 5.9 noted in October 2019  Recommended dietary lifestyle changes.  Will check hemoglobin A1c in about 3 months.    #6 Bilateral calf muscle cramps:   Stretch the legs and consider vitamin B complex (1 capsule three times daily with each capsule containing 30 mg of vitamin B6) to help with cramps. Will check BMP at future visit.    We will plan for lab draw in 3 months to recheck testosterone, hemoglobin A1c, and thyroid function test.  We will plan for return visit in 6 months.    Due to the COVID 19 pandemic this visit was converted to a telephone visit in order to help prevent spread of infection in this high risk patient and the general population .    Called pt at 8:40 by telephone visit.  Phone call ended at 9:00, documentation time 5 minutes, total time 25 minutes.    Again, thank you for allowing me to participate in the care of your patient.      Sincerely,    Pattie Guevara MD

## 2020-07-24 NOTE — PATIENT INSTRUCTIONS
Brush teeth at 8 and don't eat after 8    Do labs in 3 months    Stretch the legs and consider vitamin B complex (1 capsule three times daily with each capsule containing 30 mg of vitamin B6) to help with cramps

## 2020-11-29 ENCOUNTER — HEALTH MAINTENANCE LETTER (OUTPATIENT)
Age: 55
End: 2020-11-29

## 2020-12-02 ENCOUNTER — PATIENT OUTREACH (OUTPATIENT)
Dept: ONCOLOGY | Facility: CLINIC | Age: 55
End: 2020-12-02

## 2020-12-02 NOTE — PROGRESS NOTES
Writer placed outgoing fax to [place] [facility] for lab orders per the request of RAYSHAWN Conklin. Screen shot of fax confirmation below.

## 2020-12-15 DIAGNOSIS — Z94.81 STATUS POST BONE MARROW TRANSPLANT (H): ICD-10-CM

## 2020-12-15 LAB
ALBUMIN SERPL-MCNC: 4.1 G/DL (ref 3.4–5)
ALP SERPL-CCNC: 77 U/L (ref 40–150)
ALT SERPL W P-5'-P-CCNC: 39 U/L (ref 0–70)
ANION GAP SERPL CALCULATED.3IONS-SCNC: 4 MMOL/L (ref 3–14)
AST SERPL W P-5'-P-CCNC: 20 U/L (ref 0–45)
BASOPHILS # BLD AUTO: 0 10E9/L (ref 0–0.2)
BASOPHILS NFR BLD AUTO: 0.5 %
BILIRUB SERPL-MCNC: 0.4 MG/DL (ref 0.2–1.3)
BUN SERPL-MCNC: 27 MG/DL (ref 7–30)
CALCIUM SERPL-MCNC: 9.9 MG/DL (ref 8.5–10.1)
CHLORIDE SERPL-SCNC: 105 MMOL/L (ref 94–109)
CO2 SERPL-SCNC: 30 MMOL/L (ref 20–32)
CREAT SERPL-MCNC: 1.31 MG/DL (ref 0.66–1.25)
DIFFERENTIAL METHOD BLD: NORMAL
EOSINOPHIL # BLD AUTO: 0.1 10E9/L (ref 0–0.7)
EOSINOPHIL NFR BLD AUTO: 1.8 %
ERYTHROCYTE [DISTWIDTH] IN BLOOD BY AUTOMATED COUNT: 13.6 % (ref 10–15)
GFR SERPL CREATININE-BSD FRML MDRD: 61 ML/MIN/{1.73_M2}
GLUCOSE SERPL-MCNC: 118 MG/DL (ref 70–99)
HCT VFR BLD AUTO: 47.3 % (ref 40–53)
HGB BLD-MCNC: 15.2 G/DL (ref 13.3–17.7)
IMM GRANULOCYTES # BLD: 0 10E9/L (ref 0–0.4)
IMM GRANULOCYTES NFR BLD: 0.3 %
LYMPHOCYTES # BLD AUTO: 2 10E9/L (ref 0.8–5.3)
LYMPHOCYTES NFR BLD AUTO: 26.1 %
MCH RBC QN AUTO: 29.1 PG (ref 26.5–33)
MCHC RBC AUTO-ENTMCNC: 32.1 G/DL (ref 31.5–36.5)
MCV RBC AUTO: 91 FL (ref 78–100)
MONOCYTES # BLD AUTO: 0.7 10E9/L (ref 0–1.3)
MONOCYTES NFR BLD AUTO: 8.6 %
NEUTROPHILS # BLD AUTO: 4.8 10E9/L (ref 1.6–8.3)
NEUTROPHILS NFR BLD AUTO: 62.7 %
PLATELET # BLD AUTO: 200 10E9/L (ref 150–450)
POTASSIUM SERPL-SCNC: 4.5 MMOL/L (ref 3.4–5.3)
PROT SERPL-MCNC: 7.9 G/DL (ref 6.8–8.8)
RBC # BLD AUTO: 5.22 10E12/L (ref 4.4–5.9)
SODIUM SERPL-SCNC: 139 MMOL/L (ref 133–144)
WBC # BLD AUTO: 7.7 10E9/L (ref 4–11)

## 2020-12-15 PROCEDURE — 80053 COMPREHEN METABOLIC PANEL: CPT | Performed by: INTERNAL MEDICINE

## 2020-12-15 PROCEDURE — 81206 BCR/ABL1 GENE MAJOR BP: CPT | Performed by: INTERNAL MEDICINE

## 2020-12-15 PROCEDURE — G0452 MOLECULAR PATHOLOGY INTERPR: HCPCS | Mod: 26 | Performed by: PATHOLOGY

## 2020-12-15 PROCEDURE — 36415 COLL VENOUS BLD VENIPUNCTURE: CPT | Performed by: INTERNAL MEDICINE

## 2020-12-15 PROCEDURE — 85025 COMPLETE CBC W/AUTO DIFF WBC: CPT | Performed by: INTERNAL MEDICINE

## 2020-12-16 ENCOUNTER — VIRTUAL VISIT (OUTPATIENT)
Dept: TRANSPLANT | Facility: CLINIC | Age: 55
End: 2020-12-16
Attending: INTERNAL MEDICINE
Payer: COMMERCIAL

## 2020-12-16 DIAGNOSIS — Z94.81 STATUS POST BONE MARROW TRANSPLANT (H): Primary | ICD-10-CM

## 2020-12-16 PROCEDURE — 99214 OFFICE O/P EST MOD 30 MIN: CPT | Mod: GC | Performed by: INTERNAL MEDICINE

## 2020-12-16 RX ORDER — FENOFIBRATE 67 MG/1
CAPSULE ORAL
COMMUNITY
Start: 2020-12-09

## 2020-12-16 RX ORDER — SILDENAFIL 25 MG/1
TABLET, FILM COATED ORAL
COMMUNITY
Start: 2020-11-06

## 2020-12-16 NOTE — LETTER
"    12/16/2020         RE: Panchito Pierce  [address]  [place] [address]        Dear Colleague,    Thank you for referring your patient, Panchito Pierce, to the [facility] BLOOD AND MARROW TRANSPLANT PROGRAM [place]. Please see a copy of my visit note below.    Panchito Pierce is a 55 year old male who is being evaluated via a billable video visit.      The patient has been notified of following:     \"This video visit will be conducted via a call between you and your physician/provider. We have found that certain health care needs can be provided without the need for an in-person physical exam.  This service lets us provide the care you need with a video conversation.  If a prescription is necessary we can send it directly to your pharmacy.  If lab work is needed we can place an order for that and you can then stop by our lab to have the test done at a later time.    Video visits are billed at different rates depending on your insurance coverage.  Please reach out to your insurance provider with any questions.    If during the course of the call the physician/provider feels a video visit is not appropriate, you will not be charged for this service.\"    Patient has given verbal consent for Video visit? No  How would you like to obtain your AVS? Mail a copy  If you are dropped from the video visit, the video invite should be resent to: Send to e-mail at: [email or web address]  Will anyone else be joining your video visit? No      Vitals - Patient Reported  Weight (Patient Reported): 98.9 kg (218 lb)  Height (Patient Reported): 175.3 cm (5' 9\")  BMI (Based on Pt Reported Ht/Wt): 32.19  Pain Score: No Pain (0)        I have reviewed and updated patient's allergy and medication list.    Concerns: PSA AND PROSTATE CHECK - WHO SHOULD ORDER THAT? DOES IT NEED TO BE DONE  Refills: NONE      Anai Rodriguez CMA    Video-Visit Details    Type of service:  Video Visit    Video Start Time: 9:07  Video End " "Time: 9:28    Originating Location (pt. 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 Pierce presents today for his 5-year follow up via video.  He reports he has been doing \"great\" for the past year and has no new complaints.  He has been seeing his primary care doctor and was recently started on a statin.  Still smoking, not interested in quitting currently.  Weight is stable.  Denies dry eyes, dry mouth, or any skin rashes/changes.  He also continues to follow intermittently with [facility].  He wonders where he should get his PSA checked.  Continues to work with [employer], able to socially distance at work.  Did get his influenza vaccine this year.    Review of Systems: 10 point ROS negative except as noted above.    PHYSICAL EXAM   There were no vitals taken for this visit. BP   Wt Readings from Last 4 Encounters:   12/18/19 98.9 kg (218 lb)   07/05/19 102.9 kg (226 lb 12.8 oz)   12/19/18 106.2 kg (234 lb 1.6 oz)   06/01/18 98.7 kg (217 lb 8 oz)   Constitutional: No acute distress, appears comfortable sitting up on couch, normal habitus  Eyes: No redness/discharge  Respiratory: Breathing comfortably on room air, speaking easily in full sentences  MSK Normal ROM  Skin: No rashes/lesions appreciated in visualized areas  Neuro: EOMI, no overt focal deficits  Psych: Normal mood/affect  The rest of a comprehensive physical examination is deferred due to PHE " (public health emergency) video visit restrictions  KPS 90  LABS AND IMAGING      Lab Results   Component Value Date    WBC 7.7 12/15/2020    ANEU 4.8 12/15/2020    HGB 15.2 12/15/2020    HCT 47.3 12/15/2020     12/15/2020     12/15/2020    POTASSIUM 4.5 12/15/2020    CHLORIDE 105 12/15/2020    CO2 30 12/15/2020     (H) 12/15/2020    BUN 27 12/15/2020    CR 1.31 (H) 12/15/2020    MAG 1.9 09/13/2017    INR 0.9 06/15/2016    BILITOTAL 0.4 12/15/2020    AST 20 12/15/2020    ALT 39 12/15/2020    ALKPHOS 77 12/15/2020    PROTTOTAL 7.9 12/15/2020    ALBUMIN 4.1 12/15/2020     BcrAbl from BM pending      ASSESSMENT BY SYSTEMS   Panchito REICH Kari is a 51 year old man s/p MUD SCT for CML  - 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  Patient aware, we will reach out to him if the result is unexpectedly concerning.    HEMATOLOGY/COAGULATION: stable good counts    INFECTIONS AND PROPHYLAXIS: He completed his immunizations.  He had a flu shot this fall.  He has no active infections.  Recommended he receive a COVID-19 vaccine when available.    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   - Advised to wear sun block when outside for extended periods of time to prevent flare of GVH    GASTROINTESTINAL/LIVER: no new issues    RENAL/FEN/Nutrition: Normal kidney function electrolytes.  Remains overweight.  Discussed diet/exercise and need for ongoing follow up with PCP to work on modifiable risk factors.    CARDIOVASCULAR:  Hx Hypertension. Cont metoprolol.   - Hx of A fib with RVR (new 12/24/16); resolved with dilt, now on Metoprolol 12.5mg BID.     ENDOCRINE: following with Dr Guevara for thyroid dysfunction with Graves' disease.   - Hx Graves Disease s/p VELA therapy. Cont synthroid and liothyronine. TSH and Free T3 low; Free T4 elevated. Mixed picture; After endocrine consult:  Mild thyrotoxicosis due to large weight  loss. On levothyroxine  - On selenium supplement, T3 and T4.    General health care:   He is now following regularly with his PCP which is appropriate.  Advised him it would be reasonable to recheck his PSA through his PCP which he plans to do within the next few weeks.  He plans to schedule dental/eye appointments but may delay a few months since COVID cases are significantly up right now.  He will follow up with us in approximately one year unless new concerns arise.      Plan:  RTC Rick in 1 year with labs   Check PSA with PCP within the next 1-2 moths  Dentist visit to screen oral cancer at least yearly  Call, come sooner if needed    Patient seen and discussed with attending Dr. Cabrera.    Thompson Menendez MD  Hematology/Oncology/Transplant Fellow      The patient was seen and with the assistance of Dr. Menendez, PGY 5.  The note above reflects our mutual assessment and plan in summary the patient is 5 years out from an allogenic transplant for CML in second chronic phase.  He has done really well with no evidence of clinical relapse.  He has no signs or symptoms of chronic GVHD.  His functional.  His Karnofsky is .  At this point we recommended continued care with his primary care physician for screening and management of metabolic issues as well as cancer screening.  He also will continue to have regular oral care, as above.  After discussing the pros and cons the patient elected to return and see us annually.  Orders to that effect have been entered in the system.  Jermaine Cabrera MD on 12/16/2020 at 5:59 PM                                                Primary care physician cholesterol control, glycemia control, diet, exercise, stop smoking, cancer prevention and screening.   Dentist visit to screen oral cancer at least yearly  Eye doctor to assess vision and cataracts       Again, thank you for allowing me to participate in the care of your patient.        Sincerely,        Jermaine  Mir Cabrera MD

## 2020-12-16 NOTE — PROGRESS NOTES
"Panchito Pierce is a 55 year old male who is being evaluated via a billable video visit.      The patient has been notified of following:     \"This video visit will be conducted via a call between you and your physician/provider. We have found that certain health care needs can be provided without the need for an in-person physical exam.  This service lets us provide the care you need with a video conversation.  If a prescription is necessary we can send it directly to your pharmacy.  If lab work is needed we can place an order for that and you can then stop by our lab to have the test done at a later time.    Video visits are billed at different rates depending on your insurance coverage.  Please reach out to your insurance provider with any questions.    If during the course of the call the physician/provider feels a video visit is not appropriate, you will not be charged for this service.\"    Patient has given verbal consent for Video visit? No  How would you like to obtain your AVS? Mail a copy  If you are dropped from the video visit, the video invite should be resent to: Send to e-mail at: [email or web address]  Will anyone else be joining your video visit? No      Vitals - Patient Reported  Weight (Patient Reported): 98.9 kg (218 lb)  Height (Patient Reported): 175.3 cm (5' 9\")  BMI (Based on Pt Reported Ht/Wt): 32.19  Pain Score: No Pain (0)        I have reviewed and updated patient's allergy and medication list.    Concerns: PSA AND PROSTATE CHECK - WHO SHOULD ORDER THAT? DOES IT NEED TO BE DONE  Refills: NONE      Anai Rodriguez CMA    Video-Visit Details    Type of service:  Video Visit    Video Start Time: 9:07  Video End Time: 9:28    Originating Location (pt. 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 Pierce presents today for his 5-year follow up via video.  He reports he has been doing \"great\" for the past year and has no new complaints.  He has been seeing his primary care doctor and was recently started on a statin.  Still smoking, not interested in quitting currently.  Weight is stable.  Denies dry eyes, dry mouth, or any skin rashes/changes.  He also continues to follow intermittently with [facility].  He wonders where he should get his PSA checked.  Continues to work with [email or web address], able to socially distance at work.  Did get his influenza vaccine this year.    Review of Systems: 10 point ROS negative except as noted above.    PHYSICAL EXAM   There were no vitals taken for this visit. BP   Wt Readings from Last 4 Encounters:   12/18/19 98.9 kg (218 lb)   07/05/19 102.9 kg (226 lb 12.8 oz)   12/19/18 106.2 kg (234 lb 1.6 oz)   06/01/18 98.7 kg (217 lb 8 oz)   Constitutional: No acute distress, appears comfortable sitting up on couch, normal habitus  Eyes: No redness/discharge  Respiratory: Breathing comfortably on room air, speaking easily in full sentences  MSK Normal ROM  Skin: No rashes/lesions appreciated in visualized areas  Neuro: EOMI, no overt focal deficits  Psych: Normal mood/affect  The rest of a comprehensive physical examination is deferred due to PHE (public health emergency) video visit restrictions  KPS 90  LABS AND IMAGING      Lab Results   Component Value Date    WBC 7.7 12/15/2020    ANEU 4.8 12/15/2020    HGB 15.2 12/15/2020    HCT 47.3 12/15/2020     12/15/2020     12/15/2020    POTASSIUM 4.5 12/15/2020    " CHLORIDE 105 12/15/2020    CO2 30 12/15/2020     (H) 12/15/2020    BUN 27 12/15/2020    CR 1.31 (H) 12/15/2020    MAG 1.9 09/13/2017    INR 0.9 06/15/2016    BILITOTAL 0.4 12/15/2020    AST 20 12/15/2020    ALT 39 12/15/2020    ALKPHOS 77 12/15/2020    PROTTOTAL 7.9 12/15/2020    ALBUMIN 4.1 12/15/2020     BcrAbl from BM pending      ASSESSMENT BY SYSTEMS   Panchito REICH Kari is a 51 year old man s/p MUD SCT for CML  - 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  Patient aware, we will reach out to him if the result is unexpectedly concerning.    HEMATOLOGY/COAGULATION: stable good counts    INFECTIONS AND PROPHYLAXIS: He completed his immunizations.  He had a flu shot this fall.  He has no active infections.  Recommended he receive a COVID-19 vaccine when available.    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   - Advised to wear sun block when outside for extended periods of time to prevent flare of GVH    GASTROINTESTINAL/LIVER: no new issues    RENAL/FEN/Nutrition: Normal kidney function electrolytes.  Remains overweight.  Discussed diet/exercise and need for ongoing follow up with PCP to work on modifiable risk factors.    CARDIOVASCULAR:  Hx Hypertension. Cont metoprolol.   - Hx of A fib with RVR (new 12/24/16); resolved with dilt, now on Metoprolol 12.5mg BID.     ENDOCRINE: following with Dr Guevara for thyroid dysfunction with Graves' disease.   - Hx Graves Disease s/p VELA therapy. Cont synthroid and liothyronine. TSH and Free T3 low; Free T4 elevated. Mixed picture; After endocrine consult:  Mild thyrotoxicosis due to large weight loss. On levothyroxine  - On selenium supplement, T3 and T4.    General health care:   He is now following regularly with his PCP which is appropriate.  Advised him it would be reasonable to recheck his PSA through his PCP which he plans to do within the next few weeks.  He plans to  schedule dental/eye appointments but may delay a few months since COVID cases are significantly up right now.  He will follow up with us in approximately one year unless new concerns arise.      Plan:  RTC Rick in 1 year with labs   Check PSA with PCP within the next 1-2 moths  Dentist visit to screen oral cancer at least yearly  Call, come sooner if needed    Patient seen and discussed with attending Dr. Cabrera.    Thompson Menendez MD  Hematology/Oncology/Transplant Fellow      The patient was seen and with the assistance of Dr. Menendez, PGY 5.  The note above reflects our mutual assessment and plan in summary the patient is 5 years out from an allogenic transplant for CML in second chronic phase.  He has done really well with no evidence of clinical relapse.  He has no signs or symptoms of chronic GVHD.  His functional.  His Karnofsky is .  At this point we recommended continued care with his primary care physician for screening and management of metabolic issues as well as cancer screening.  He also will continue to have regular oral care, as above.  After discussing the pros and cons the patient elected to return and see us annually.  Orders to that effect have been entered in the system.  Jermaine Cabrera MD on 12/16/2020 at 5:59 PM                                                Primary care physician cholesterol control, glycemia control, diet, exercise, stop smoking, cancer prevention and screening.   Dentist visit to screen oral cancer at least yearly  Eye doctor to assess vision and cataracts

## 2020-12-21 LAB — COPATH REPORT: NORMAL

## 2020-12-25 DIAGNOSIS — C92.10 CML (CHRONIC MYELOCYTIC LEUKEMIA) (H): ICD-10-CM

## 2020-12-25 DIAGNOSIS — Z94.81 STATUS POST BONE MARROW TRANSPLANT (H): ICD-10-CM

## 2020-12-25 DIAGNOSIS — I48.0 PAROXYSMAL ATRIAL FIBRILLATION (H): ICD-10-CM

## 2020-12-25 DIAGNOSIS — E05.00 GRAVES DISEASE: ICD-10-CM

## 2020-12-25 DIAGNOSIS — F43.21 ADJUSTMENT DISORDER WITH DEPRESSED MOOD: ICD-10-CM

## 2020-12-25 DIAGNOSIS — K59.00 CONSTIPATION, UNSPECIFIED CONSTIPATION TYPE: ICD-10-CM

## 2020-12-28 RX ORDER — PANTOPRAZOLE SODIUM 40 MG/1
TABLET, DELAYED RELEASE ORAL
Qty: 30 TABLET | Refills: 4 | Status: SHIPPED | OUTPATIENT
Start: 2020-12-28 | End: 2021-06-14

## 2021-01-22 DIAGNOSIS — E05.00 GRAVES DISEASE: ICD-10-CM

## 2021-01-22 DIAGNOSIS — D84.9 IMMUNOSUPPRESSION (H): ICD-10-CM

## 2021-01-22 DIAGNOSIS — I48.0 PAROXYSMAL ATRIAL FIBRILLATION (H): ICD-10-CM

## 2021-01-22 DIAGNOSIS — D89.813 GVH (GRAFT VERSUS HOST DISEASE) (H): ICD-10-CM

## 2021-01-22 DIAGNOSIS — C92.10 CML (CHRONIC MYELOCYTIC LEUKEMIA) (H): ICD-10-CM

## 2021-01-22 DIAGNOSIS — K59.00 CONSTIPATION, UNSPECIFIED CONSTIPATION TYPE: ICD-10-CM

## 2021-01-22 DIAGNOSIS — Z94.81 STATUS POST BONE MARROW TRANSPLANT (H): ICD-10-CM

## 2021-01-26 RX ORDER — METOPROLOL TARTRATE 25 MG/1
12.5 TABLET, FILM COATED ORAL 2 TIMES DAILY
Qty: 90 TABLET | Refills: 1 | Status: SHIPPED | OUTPATIENT
Start: 2021-01-26 | End: 2021-09-01

## 2021-01-26 NOTE — TELEPHONE ENCOUNTER
Last Clinic Visit: 7/24/20, no upcoming appointments.  Blood pressure outdated due to virtual visits secondary to pandemic

## 2021-03-25 DIAGNOSIS — I73.00 RAYNAUD'S PHENOMENON WITHOUT GANGRENE: ICD-10-CM

## 2021-03-30 RX ORDER — NIFEDIPINE 30 MG
30 TABLET, EXTENDED RELEASE ORAL DAILY
Qty: 30 TABLET | Refills: 1 | Status: SHIPPED | OUTPATIENT
Start: 2021-03-30 | End: 2021-12-22

## 2021-03-30 NOTE — TELEPHONE ENCOUNTER
NIFEdipine ER (ADALAT CC) 30 MG 24 hr tablet   Take 1 tablet (30 mg) by mouth daily *DURING WINTER MONTHS TO PREVENT FLARES     Last Written Prescription Date:  3/19/20  Last Fill Quantity: 30,   # refills: 3  Last Office Visit : 10/22/19  Future Office visit:  none    Routing refill request to provider for review/approval because:  Drug not on the refill protocol

## 2021-03-30 NOTE — TELEPHONE ENCOUNTER
Received refill request for nifedipine as the resident on call. Reviewed patient's chart and attached communication. Patient last seen 10/22/19 for Raynaud's. RTC not yet scheduled. After reviewing the medication list and assessment and plan from last visit, the refill request was accepted for one month s supply until patient can be scheduled in clinic. No further refills should be granted until patient is scheduled in clinic.    The patient's information will be forwarded to the scheduling pool for return visit as planned at prior visit.    CC'ing Dr. Landeros as a DELIA Chapa  PGY-3 Dermatology Resident  Pager: [phone]

## 2021-04-01 ENCOUNTER — TELEPHONE (OUTPATIENT)
Dept: ENDOCRINOLOGY | Facility: CLINIC | Age: 56
End: 2021-04-01

## 2021-04-01 DIAGNOSIS — E29.1 HYPOGONADISM MALE: Primary | ICD-10-CM

## 2021-04-01 NOTE — TELEPHONE ENCOUNTER
[facility] Call Center    Phone Message    May a detailed message be left on voicemail: yes     Reason for Call: Order(s): Other:   Reason for requested: new lab orders for upcoming appt 7/16/21  Date needed: whenever possible  Provider name: Ismael    Pt requested new lab orders to be placed for upcoming appt.  Please call pt when he can schedule.    Action Taken: Message routed to:  [facility] ([facility] endo    Travel Screening: Not Applicable

## 2021-06-07 ENCOUNTER — TELEPHONE (OUTPATIENT)
Dept: ENDOCRINOLOGY | Facility: CLINIC | Age: 56
End: 2021-06-07

## 2021-06-08 ENCOUNTER — CARE COORDINATION (OUTPATIENT)
Dept: TRANSPLANT | Facility: CLINIC | Age: 56
End: 2021-06-08

## 2021-06-08 ENCOUNTER — OFFICE VISIT (OUTPATIENT)
Dept: DERMATOLOGY | Facility: CLINIC | Age: 56
End: 2021-06-08
Payer: COMMERCIAL

## 2021-06-08 DIAGNOSIS — D48.5 NEOPLASM OF UNCERTAIN BEHAVIOR OF SKIN: Primary | ICD-10-CM

## 2021-06-08 DIAGNOSIS — L85.3 XEROSIS OF SKIN: ICD-10-CM

## 2021-06-08 PROCEDURE — 88305 TISSUE EXAM BY PATHOLOGIST: CPT | Performed by: DERMATOLOGY

## 2021-06-08 PROCEDURE — 11102 TANGNTL BX SKIN SINGLE LES: CPT | Mod: GC | Performed by: DERMATOLOGY

## 2021-06-08 PROCEDURE — 99213 OFFICE O/P EST LOW 20 MIN: CPT | Mod: 25 | Performed by: DERMATOLOGY

## 2021-06-08 ASSESSMENT — PAIN SCALES - GENERAL: PAINLEVEL: NO PAIN (0)

## 2021-06-08 NOTE — PATIENT INSTRUCTIONS

## 2021-06-08 NOTE — PROGRESS NOTES
Dermatology Rooming Note    Panchito Pierce's goals for this visit include:   Chief Complaint   Patient presents with     Derm Problem     Here to follow up on graft vs host/skin yearly check in     Daisy Hill RN

## 2021-06-08 NOTE — LETTER
6/8/2021       RE: Panchito Pierce  [address]  [place] [address]     Dear Colleague,    Thank you for referring your patient, Panchito Pierce, to the [facility]ERMATOLOGY CLIN[facility] [place] at [facility]. Please see a copy of my visit note below.    Dermatology Rooming Note    Panchito Pierce's goals for this visit include:   Chief Complaint   Patient presents with     Derm Problem     Here to follow up on graft vs host/skin yearly check in     Daisy Hill RN      [facility] Dermatology Note  Encounter Date: Jun 8, 2021  Office Visit     Dermatology Problem List:  1. Raynaud's phenomenon.  2. Recurrent folliculitis of beard, arms, legs.  3. Recurrent arthropod bite reactions during summer.  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  Suspect DF, rule out NMSC.  Will perform shave biopsy.     # Dry skin on scalp.   - Occasional patches that itch a little bit.  Typically resolve on their own.   - Recommended emollients and gentle skin care.  Also recommended OTC topical low-potency steroid if not improving.     Procedures Performed:   - Shave biopsy procedure note, location(s): R tricep. After discussion of benefits and risks including but not limited to bleeding, infection, scar, incomplete removal, recurrence, and non-diagnostic biopsy, written consent and photographs were obtained. The area was cleaned with isopropyl alcohol. 0.5mL of 1% lidocaine with epinephrine was injected to obtain adequate anesthesia of lesion(s). Shave biopsy at site(s) performed. Hemostasis was achieved with aluminium chloride. Petrolatum ointment and a sterile dressing were applied. The patient tolerated the procedure and no complications were noted. The patient was provided with verbal and written post care instructions.     Follow-up: 1 year.    Darci Monet MD  [facility] [[email or web address]]  [facility], PGY-2    Precepted patient with Dr. Thompson Landeros.    I have seen and examined this patient and agree with the assessment and plan as documented in the resident's note, and was present for all procedures.    Thompson Landeros MD  Dermatology Attending    ____________________________________________    CC: Derm Problem (Here to follow up on graft vs host/skin yearly check in)    HPI:  Mr. Panchito Pierce is a(n) 56 year old male who presents today as a return patient for follow-up appointment.      No significant areas of concern for the patient today.  Feels like he has been doing well without significant recurrent rash.  Has a small area on the back of his scalp that itches a bit that he would like looked at, but thinks it is probably just dry skin.    Patient is otherwise feeling well, without additional skin concerns.    Labs Reviewed:  N/A    Physical Exam:  Vitals: There were no vitals taken for this visit.  SKIN: Focused examination of back of scalp was performed.  - Small patch of dry skin present on the back of his scalp.   - Small lesion consistent with dermatofibroma on R tricep.  - No other lesions of concern on areas examined.     Medications:  Current Outpatient Medications   Medication     betamethasone dipropionate (DIPROSONE) 0.05 % external ointment     fenofibrate micronized (LOFIBRA) 67 MG capsule     levothyroxine (SYNTHROID/LEVOTHROID) 150 MCG tablet     liothyronine (CYTOMEL) 5 MCG tablet     metoprolol tartrate (LOPRESSOR) 25 MG tablet     mupirocin (BACTROBAN) 2 % external ointment     NIFEdipine ER (ADALAT CC) 30 MG 24 hr tablet     OYSTER SHELL CALCIUM + D3 500-400 MG-UNIT TABS     pantoprazole (PROTONIX) 40 MG EC tablet     potassium 99 MG TABS     Selenium 100 MCG CAPS     sildenafil (VIAGRA) 25 MG tablet     acyclovir (ZOVIRAX) 400 MG tablet     gabapentin (NEURONTIN) 100 MG capsule     No current facility-administered medications for this visit.        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

## 2021-06-08 NOTE — PROGRESS NOTES
[facility] Dermatology Note  Encounter Date: Jun 8, 2021  Office Visit     Dermatology Problem List:  1. Raynaud's phenomenon.  2. Recurrent folliculitis of beard, arms, legs.  3. Recurrent arthropod bite reactions during summer.  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  Suspect DF, rule out NMSC.  Will perform shave biopsy.     # Dry skin on scalp.   - Occasional patches that itch a little bit.  Typically resolve on their own.   - Recommended emollients and gentle skin care.  Also recommended OTC topical low-potency steroid if not improving.     Procedures Performed:   - Shave biopsy procedure note, location(s): R tricep. After discussion of benefits and risks including but not limited to bleeding, infection, scar, incomplete removal, recurrence, and non-diagnostic biopsy, written consent and photographs were obtained. The area was cleaned with isopropyl alcohol. 0.5mL of 1% lidocaine with epinephrine was injected to obtain adequate anesthesia of lesion(s). Shave biopsy at site(s) performed. Hemostasis was achieved with aluminium chloride. Petrolatum ointment and a sterile dressing were applied. The patient tolerated the procedure and no complications were noted. The patient was provided with verbal and written post care instructions.     Follow-up: 1 year.    Darci Monet MD  [facility], PGY-2    Precepted patient with Dr. Thompson Landeros.    I have seen and examined this patient and agree with the assessment and plan as documented in the resident's note, and was present for all procedures.    Thompson Landeros MD  Dermatology Attending    ____________________________________________    CC: Derm Problem (Here to follow up on graft vs host/skin yearly check in)    HPI:  Mr. Panchito Pierce is a(n) 56 year old male who presents today as a return patient for follow-up appointment.       No significant areas of concern for the patient today.  Feels like he has been doing well without significant recurrent rash.  Has a small area on the back of his scalp that itches a bit that he would like looked at, but thinks it is probably just dry skin.    Patient is otherwise feeling well, without additional skin concerns.    Labs Reviewed:  N/A    Physical Exam:  Vitals: There were no vitals taken for this visit.  SKIN: Focused examination of back of scalp was performed.  - Small patch of dry skin present on the back of his scalp.   - Small lesion consistent with dermatofibroma on R tricep.  - No other lesions of concern on areas examined.     Medications:  Current Outpatient Medications   Medication     betamethasone dipropionate (DIPROSONE) 0.05 % external ointment     fenofibrate micronized (LOFIBRA) 67 MG capsule     levothyroxine (SYNTHROID/LEVOTHROID) 150 MCG tablet     liothyronine (CYTOMEL) 5 MCG tablet     metoprolol tartrate (LOPRESSOR) 25 MG tablet     mupirocin (BACTROBAN) 2 % external ointment     NIFEdipine ER (ADALAT CC) 30 MG 24 hr tablet     OYSTER SHELL CALCIUM + D3 500-400 MG-UNIT TABS     pantoprazole (PROTONIX) 40 MG EC tablet     potassium 99 MG TABS     Selenium 100 MCG CAPS     sildenafil (VIAGRA) 25 MG tablet     acyclovir (ZOVIRAX) 400 MG tablet     gabapentin (NEURONTIN) 100 MG capsule     No current facility-administered medications for this visit.       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

## 2021-06-09 LAB — COPATH REPORT: NORMAL

## 2021-06-11 NOTE — PROGRESS NOTES
Received First Aid Shot Therapy message from patient on 6/7 requesting identification of recommended routine health maintenance with primary MD Leann Larson now that pt is 55yo. Returned message to patient on 6/8 stating that from a BMT standpoint, pt is ok to proceed with all standard/routine health maintenance through Dr. Larson.    Marie Lan, RN, BSN  RN Care Coordinator - BMT  Ph [phone]   [phone]

## 2021-06-13 DIAGNOSIS — K59.00 CONSTIPATION, UNSPECIFIED CONSTIPATION TYPE: ICD-10-CM

## 2021-06-13 DIAGNOSIS — Z94.81 STATUS POST BONE MARROW TRANSPLANT (H): ICD-10-CM

## 2021-06-13 DIAGNOSIS — E05.00 GRAVES DISEASE: ICD-10-CM

## 2021-06-13 DIAGNOSIS — C92.10 CML (CHRONIC MYELOCYTIC LEUKEMIA) (H): ICD-10-CM

## 2021-06-13 DIAGNOSIS — F43.21 ADJUSTMENT DISORDER WITH DEPRESSED MOOD: ICD-10-CM

## 2021-06-13 DIAGNOSIS — I48.0 PAROXYSMAL ATRIAL FIBRILLATION (H): ICD-10-CM

## 2021-06-14 RX ORDER — PANTOPRAZOLE SODIUM 40 MG/1
TABLET, DELAYED RELEASE ORAL
Qty: 30 TABLET | Refills: 0 | Status: SHIPPED | OUTPATIENT
Start: 2021-06-14 | End: 2021-07-05

## 2021-07-01 ENCOUNTER — DOCUMENTATION ONLY (OUTPATIENT)
Dept: LAB | Facility: CLINIC | Age: 56
End: 2021-07-01

## 2021-07-01 NOTE — PROGRESS NOTES
Panchito Pierce has an upcoming lab appointment:    Future Appointments   Date Time Provider Department Center   7/6/2021  9:20 AM LAB FIRST FLOOR [facility] [place]   7/16/2021 12:00 PM Pattie Guevara MD [facility]   12/15/2021  8:30 AM  [facility] LAB DRAW ONL [facility]   12/15/2021  9:00 AM Jermaine Cabrera MD [facility]     Patient is scheduled for the following lab(s): updated    Patient either has no future order, or has Health Maintenance labs due. Please review and place either future orders or HMPO (Review of Health Maintenance Protocol Orders), as appropriate.    Health Maintenance Due   Topic     ANNUAL REVIEW OF HM ORDERS      LIPID      Deedee Lepe

## 2021-07-03 DIAGNOSIS — F43.21 ADJUSTMENT DISORDER WITH DEPRESSED MOOD: ICD-10-CM

## 2021-07-03 DIAGNOSIS — E05.00 GRAVES DISEASE: ICD-10-CM

## 2021-07-03 DIAGNOSIS — Z94.81 STATUS POST BONE MARROW TRANSPLANT (H): ICD-10-CM

## 2021-07-03 DIAGNOSIS — C92.10 CML (CHRONIC MYELOCYTIC LEUKEMIA) (H): ICD-10-CM

## 2021-07-03 DIAGNOSIS — I48.0 PAROXYSMAL ATRIAL FIBRILLATION (H): ICD-10-CM

## 2021-07-03 DIAGNOSIS — K59.00 CONSTIPATION, UNSPECIFIED CONSTIPATION TYPE: ICD-10-CM

## 2021-07-03 PROBLEM — L85.3 XEROSIS OF SKIN: Status: ACTIVE | Noted: 2021-07-03

## 2021-07-03 PROBLEM — D48.5 NEOPLASM OF UNCERTAIN BEHAVIOR OF SKIN: Status: ACTIVE | Noted: 2021-07-03

## 2021-07-05 RX ORDER — PANTOPRAZOLE SODIUM 40 MG/1
TABLET, DELAYED RELEASE ORAL
Qty: 30 TABLET | Refills: 0 | Status: SHIPPED | OUTPATIENT
Start: 2021-07-05 | End: 2021-07-16

## 2021-07-06 DIAGNOSIS — E05.00 GRAVES DISEASE: ICD-10-CM

## 2021-07-06 DIAGNOSIS — E29.1 HYPOGONADISM MALE: ICD-10-CM

## 2021-07-06 LAB
ANION GAP SERPL CALCULATED.3IONS-SCNC: 5 MMOL/L (ref 3–14)
BUN SERPL-MCNC: 24 MG/DL (ref 7–30)
CALCIUM SERPL-MCNC: 9.3 MG/DL (ref 8.5–10.1)
CHLORIDE SERPL-SCNC: 108 MMOL/L (ref 94–109)
CO2 SERPL-SCNC: 26 MMOL/L (ref 20–32)
CREAT SERPL-MCNC: 1.28 MG/DL (ref 0.66–1.25)
FSH SERPL-ACNC: 13.3 IU/L (ref 0.7–10.8)
GFR SERPL CREATININE-BSD FRML MDRD: 62 ML/MIN/{1.73_M2}
GLUCOSE SERPL-MCNC: 122 MG/DL (ref 70–99)
HBA1C MFR BLD: 5.9 % (ref 0–5.6)
LH SERPL-ACNC: 7.7 IU/L (ref 1.5–9.3)
POTASSIUM SERPL-SCNC: 4.3 MMOL/L (ref 3.4–5.3)
PSA SERPL-MCNC: 1.26 UG/L (ref 0–4)
SODIUM SERPL-SCNC: 139 MMOL/L (ref 133–144)
T4 FREE SERPL-MCNC: 0.8 NG/DL (ref 0.76–1.46)
TSH SERPL DL<=0.005 MIU/L-ACNC: 0.67 MU/L (ref 0.4–4)

## 2021-07-06 PROCEDURE — 80048 BASIC METABOLIC PNL TOTAL CA: CPT | Performed by: INTERNAL MEDICINE

## 2021-07-06 PROCEDURE — 84270 ASSAY OF SEX HORMONE GLOBUL: CPT | Performed by: INTERNAL MEDICINE

## 2021-07-06 PROCEDURE — 84153 ASSAY OF PSA TOTAL: CPT | Performed by: INTERNAL MEDICINE

## 2021-07-06 PROCEDURE — 84443 ASSAY THYROID STIM HORMONE: CPT | Performed by: INTERNAL MEDICINE

## 2021-07-06 PROCEDURE — 83002 ASSAY OF GONADOTROPIN (LH): CPT | Performed by: INTERNAL MEDICINE

## 2021-07-06 PROCEDURE — 84403 ASSAY OF TOTAL TESTOSTERONE: CPT | Mod: 90 | Performed by: INTERNAL MEDICINE

## 2021-07-06 PROCEDURE — 99000 SPECIMEN HANDLING OFFICE-LAB: CPT | Performed by: INTERNAL MEDICINE

## 2021-07-06 PROCEDURE — 83036 HEMOGLOBIN GLYCOSYLATED A1C: CPT | Performed by: INTERNAL MEDICINE

## 2021-07-06 PROCEDURE — 36415 COLL VENOUS BLD VENIPUNCTURE: CPT | Performed by: INTERNAL MEDICINE

## 2021-07-06 PROCEDURE — 83001 ASSAY OF GONADOTROPIN (FSH): CPT | Performed by: INTERNAL MEDICINE

## 2021-07-06 PROCEDURE — 84439 ASSAY OF FREE THYROXINE: CPT | Performed by: INTERNAL MEDICINE

## 2021-07-06 PROCEDURE — 82306 VITAMIN D 25 HYDROXY: CPT | Performed by: INTERNAL MEDICINE

## 2021-07-06 NOTE — LETTER
Patient:  Panchito Pierce  :   1965  MRN:     [id number]        Mr.Paul RACHANA Pierce  [address] [facility] [address]        2021    Dear Panchito     Here are your labs which look great. We will discuss more at your return visit.     If you have any questions, please feel free to contact my nurse at [phone] select option #3 for triage nurse  or  option #1 for scheduling related questions.     Regards     Pattie Guevara MD       Resulted Orders   PSA tumor marker   Result Value Ref Range    PSA 1.26 0 - 4 ug/L      Comment:      Assay Method:  Chemiluminescence using Siemens Vista analyzer   Testosterone Free and Total   Result Value Ref Range    Testosterone Total 240 240 - 950 ng/dL      Comment:      This test was developed and its performance characteristics determined by the   [facility].   It has not been cleared or approved by the FDA. The laboratory is regulated   under CLIA as qualified to perform high-complexity testing. This test is used   for clinical purposes. It should not be regarded as investigational or for   research.      Sex Hormone Binding Globulin 17 11 - 80 nmol/L    Free Testosterone Calculated 6.42 4.7 - 24.4 ng/dL   Hemoglobin A1c   Result Value Ref Range    Hemoglobin A1C 5.9 (H) 0 - 5.6 %      Comment:      Normal <5.7% Prediabetes 5.7-6.4%  Diabetes 6.5% or higher - adopted from ADA   consensus guidelines.     25 Hydroxyvitamin D2 and D3   Result Value Ref Range    25 OH Vit D2 <5 ug/L    25 OH Vit D3 52 ug/L    25 OH Vit D total <57 20 - 75 ug/L      Comment:      Season, race, dietary intake, and treatment affect the concentration of   25-hydroxy-Vitamin D. Values may decrease during winter months and increase   during summer months. Values 20-29 ug/L may indicate Vitamin D insufficiency   and values <20 ug/L may indicate Vitamin D deficiency.  This test was developed and its performance characteristics  determined by the   [facility].   It has not been cleared or approved by the FDA. The laboratory is regulated   under CLIA as qualified to perform high-complexity testing. This test is used   for clinical purposes. It should not be regarded as investigational or for   research.     Follicle stimulating hormone   Result Value Ref Range    FSH 13.3 (H) 0.7 - 10.8 IU/L   Lutropin   Result Value Ref Range    Lutropin 7.7 1.5 - 9.3 IU/L   Basic metabolic panel   Result Value Ref Range    Sodium 139 133 - 144 mmol/L    Potassium 4.3 3.4 - 5.3 mmol/L    Chloride 108 94 - 109 mmol/L    Carbon Dioxide 26 20 - 32 mmol/L    Anion Gap 5 3 - 14 mmol/L    Glucose 122 (H) 70 - 99 mg/dL    Urea Nitrogen 24 7 - 30 mg/dL    Creatinine 1.28 (H) 0.66 - 1.25 mg/dL    GFR Estimate 62 >60 mL/min/[1.73_m2]      Comment:      Non  GFR Calc  Starting 12/18/2018, serum creatinine based estimated GFR (eGFR) will be   calculated using the Chronic Kidney Disease Epidemiology Collaboration   (CKD-EPI) equation.      GFR Estimate If Black 72 >60 mL/min/[1.73_m2]      Comment:       GFR Calc  Starting 12/18/2018, serum creatinine based estimated GFR (eGFR) will be   calculated using the Chronic Kidney Disease Epidemiology Collaboration   (CKD-EPI) equation.      Calcium 9.3 8.5 - 10.1 mg/dL   T4 free   Result Value Ref Range    T4 Free 0.80 0.76 - 1.46 ng/dL   TSH   Result Value Ref Range    TSH 0.67 0.40 - 4.00 mU/L       First Floor Lab

## 2021-07-08 LAB
SHBG SERPL-SCNC: 17 NMOL/L (ref 11–80)
TESTOST FREE SERPL-MCNC: 6.42 NG/DL (ref 4.7–24.4)
TESTOST SERPL-MCNC: 240 NG/DL (ref 240–950)

## 2021-07-09 LAB
DEPRECATED CALCIDIOL+CALCIFEROL SERPL-MC: <57 UG/L (ref 20–75)
VITAMIN D2 SERPL-MCNC: <5 UG/L
VITAMIN D3 SERPL-MCNC: 52 UG/L

## 2021-07-10 NOTE — TELEPHONE ENCOUNTER
Spoke with patient and rescheduled appt to next week 4/11 at 8:45am.   CLAUS Berumen   complains of pain/discomfort

## 2021-07-13 NOTE — RESULT ENCOUNTER NOTE
Dear Panchito    Here are your labs which look great. We will discuss more at your return visit.    If you have any questions, please feel free to contact my nurse at [phone] select option #3 for triage nurse  or  option #1 for scheduling related questions.    Regards    Pattie Guevara MD

## 2021-07-13 NOTE — PROGRESS NOTES
Labs noted below    -  Dear Panchito     Here are your labs which look great. We will discuss more at your return visit.     If you have any questions, please feel free to contact my nurse at [phone] select option #3 for triage nurse  or  option #1 for scheduling related questions.     Regards     Pattie Guevara MD     Orders Only on 07/06/2021   Component Date Value Ref Range Status     PSA 07/06/2021 1.26  0 - 4 ug/L Final    Assay Method:  Chemiluminescence using Siemens Vista analyzer     Testosterone Total 07/06/2021 240  240 - 950 ng/dL Final    Comment: This test was developed and its performance characteristics determined by the   [facility].   It has not been cleared or approved by the FDA. The laboratory is regulated   under CLIA as qualified to perform high-complexity testing. This test is used   for clinical purposes. It should not be regarded as investigational or for   research.       Sex Hormone Binding Globulin 07/06/2021 17  11 - 80 nmol/L Final     Free Testosterone Calculated 07/06/2021 6.42  4.7 - 24.4 ng/dL Final     Hemoglobin A1C 07/06/2021 5.9* 0 - 5.6 % Final    Comment: Normal <5.7% Prediabetes 5.7-6.4%  Diabetes 6.5% or higher - adopted from ADA   consensus guidelines.       25 OH Vit D2 07/06/2021 <5  ug/L Final     25 OH Vit D3 07/06/2021 52  ug/L Final     25 OH Vit D total 07/06/2021 <57  20 - 75 ug/L Final    Comment: Season, race, dietary intake, and treatment affect the concentration of   25-hydroxy-Vitamin D. Values may decrease during winter months and increase   during summer months. Values 20-29 ug/L may indicate Vitamin D insufficiency   and values <20 ug/L may indicate Vitamin D deficiency.  This test was developed and its performance characteristics determined by the   [facility].   It has not been cleared or approved by the FDA. The laboratory is  regulated   under CLIA as qualified to perform high-complexity testing. This test is used   for clinical purposes. It should not be regarded as investigational or for   research.       FSH 07/06/2021 13.3* 0.7 - 10.8 IU/L Final     Lutropin 07/06/2021 7.7  1.5 - 9.3 IU/L Final     Sodium 07/06/2021 139  133 - 144 mmol/L Final     Potassium 07/06/2021 4.3  3.4 - 5.3 mmol/L Final     Chloride 07/06/2021 108  94 - 109 mmol/L Final     Carbon Dioxide 07/06/2021 26  20 - 32 mmol/L Final     Anion Gap 07/06/2021 5  3 - 14 mmol/L Final     Glucose 07/06/2021 122* 70 - 99 mg/dL Final     Urea Nitrogen 07/06/2021 24  7 - 30 mg/dL Final     Creatinine 07/06/2021 1.28* 0.66 - 1.25 mg/dL Final     GFR Estimate 07/06/2021 62  >60 mL/min/[1.73_m2] Final    Comment: Non  GFR Calc  Starting 12/18/2018, serum creatinine based estimated GFR (eGFR) will be   calculated using the Chronic Kidney Disease Epidemiology Collaboration   (CKD-EPI) equation.       GFR Estimate If Black 07/06/2021 72  >60 mL/min/[1.73_m2] Final    Comment:  GFR Calc  Starting 12/18/2018, serum creatinine based estimated GFR (eGFR) will be   calculated using the Chronic Kidney Disease Epidemiology Collaboration   (CKD-EPI) equation.       Calcium 07/06/2021 9.3  8.5 - 10.1 mg/dL Final     T4 Free 07/06/2021 0.80  0.76 - 1.46 ng/dL Final     TSH 07/06/2021 0.67  0.40 - 4.00 mU/L Final

## 2021-07-15 NOTE — PROGRESS NOTES
Outcome for 07/15/21 1:29 PM :Left Voicemail for patient to call back  Outcome for 07/15/21 2:24 PM :Left Voicemail for patient to call back    Panchito Pierce is a 55 year old male who is being evaluated via a billable telephone visit.        This is virtual visit conducted by teresita.  Start time 1206, stop time 1235, documentation time coordination of care, 11 minutes, total time spent day of encounter 40 minutes    [facility]  Endocrinology  Pattie Guevara MD  7/16/21     Chief Complaint:   RECHECK    History of Present Illness:    #1 Graves disease status-post VELA  Per patient report, he was diagnosed with Grave's disease in 2002. He underwent radioactive iodine therapy in 2002 at [facility]. He had thyroid testing on 4/24/2018, where his T3 was 99 ng/dL, T4 free was 0.87 ng/dL, and his TSH was 1.00 mU/L. These thyroid tests have normalized since he had a TSH of 8.89 on 12/21/2017.  Has been on combination of levothyroxine and Cytomel since July 1, 2019. October 2019 TSH of 0.81.    Interval history: He continues on levothyroxine at 150 mcg daily and Cytomel 5 mcg twice daily.  He reports that he is doing well.  July 2021 TSH 0.67, free T4 0.8.    #2  History of low libido in the setting of low testosterone  His low libido has improved since 12/2017 when he had shingles. His testosterone level on 12/21/2017 was low at 94 ng/dL, but this increased to 288 mg/dL on 4/24/2018. December 2018 total testosterone 187, October 2019 total testosterone at 191, December 2019 total testosterone of 176    Interval history: July 2021 total testosterone was 240 (normal) and free testosterone was normal at 6.42 (normal).  FSH at 13.3.  Patient reports that his libido has improved and overall feels quite well.  July 2021 PSA 1.26.    #3 Sleep apnea  The patient had a sleep study completed on 1/11/2017 showing that he has sleep apnea.     Interval history: Not discussed at current visit.    #4 Weight gain with elevated  hemoglobin A1c of 5.9 noted in October 2019  Patient working on dietary lifestyle changes.  October 2019 hemoglobin A1c 5.9.  March 2021 hemoglobin A1c 6.2.    Interval history: July 2021 hemoglobin A1c 5.9.  Patient reports he is quite active.  However, he does have some dietary indiscretion.      Review of Systems:   Pertinent items are noted in HPI, remainder of complete ROS is negative.      Active Medications:   Current Outpatient Medications   Medication Sig Dispense Refill     acyclovir (ZOVIRAX) 400 MG tablet Take 2 tablets (800 mg) by mouth 2 times daily (Patient not taking: Reported on 7/5/2019) 120 tablet 11     betamethasone dipropionate (DIPROSONE) 0.05 % external ointment Apply to bug bite reactions, and you can cover with bandaid 50 g 3     fenofibrate micronized (LOFIBRA) 67 MG capsule Take 1 capsule by mouth once daily with a meal.       gabapentin (NEURONTIN) 100 MG capsule Take 300 mg by mouth 3 times daily       levothyroxine (SYNTHROID/LEVOTHROID) 150 MCG tablet Take 1 tablet (150 mcg) by mouth daily 90 tablet 3     liothyronine (CYTOMEL) 5 MCG tablet Take 1 tablet (5 mcg) by mouth 2 times daily 180 tablet 3     metoprolol tartrate (LOPRESSOR) 25 MG tablet Take 0.5 tablets (12.5 mg) by mouth 2 times daily 90 tablet 1     mupirocin (BACTROBAN) 2 % external ointment Use 1 to 2 times a day to sores on face and arms. 22 g 3     NIFEdipine ER (ADALAT CC) 30 MG 24 hr tablet Take 1 tablet (30 mg) by mouth daily *DURING WINTER MONTHS TO PREVENT FLARES** PLEASE SCHEDULE DERM APPT 30 tablet 1     OYSTER SHELL CALCIUM + D3 500-400 MG-UNIT TABS TAKE TWO TABLETS BY MOUTH DAILY  60 tablet 11     pantoprazole (PROTONIX) 40 MG EC tablet TAKE ONE TABLET BY MOUTH ONE TIME DAILY 30 tablet 0     potassium 99 MG TABS        Selenium 100 MCG CAPS 1 tablet daily 90 capsule 2     sildenafil (VIAGRA) 25 MG tablet take 1-2 tablets by mouth 1 hour before needed (do not exceed 5 tablets in 24 hours)       Allergies:  "  Allopurinol      Past Medical History:  Graves disease; treated with radioiodine  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   Immunosuppressed status    Raynaud's disease   Heart murmur   Hypogonadism   Chronic dermatitis      Past Surgical History:  Left hand graft flap and right foot graft- 01/1985     Family History:   Mother: thyroid disease, lung cancer  Father: prostate cancer, dementia, heart murmur   Brother: hepatitis, heart murmur    Sister: thyroid disease   Son: asthma       Social History:   The patient was alone   Smoking Status: former; 1/2 pack per day for 30 years   Smokeless Tobacco: never   Alcohol Use: yes; once per week       Physical Exam:   GENERAL: Healthy, alert and no distress\",\"EYES: Eyes grossly normal to inspection.  No discharge or erythema, or obvious scleral/conjunctival abnormalities.\",\"RESP: No audible wheeze, cough, or visible cyanosis.  No visible retractions or increased work of breathing.  \",\"SKIN: Visible skin clear. No significant rash, abnormal pigmentation or lesions.\",\"NEURO: Cranial nerves grossly intact.  Mentation and speech appropriate for age.\",\"PSYCH: Mentation appears normal, affect normal/bright, judgement and insight intact, normal speech and appearance well-groomed.      Data:  No visits with results within 1 Week(s) from this visit.   Latest known visit with results is:   Orders Only on 07/06/2021   Component Date Value Ref Range Status     PSA 07/06/2021 1.26  0 - 4 ug/L Final    Assay Method:  Chemiluminescence using Siemens Vista analyzer     Testosterone Total 07/06/2021 240  240 - 950 ng/dL Final    Comment: This test was developed and its performance characteristics determined by the   [facility].   It has not been cleared or approved by the FDA. The laboratory is regulated   under CLIA as qualified to perform high-complexity testing. This test is used "   for clinical purposes. It should not be regarded as investigational or for   research.       Sex Hormone Binding Globulin 07/06/2021 17  11 - 80 nmol/L Final     Free Testosterone Calculated 07/06/2021 6.42  4.7 - 24.4 ng/dL Final     Hemoglobin A1C 07/06/2021 5.9* 0 - 5.6 % Final    Comment: Normal <5.7% Prediabetes 5.7-6.4%  Diabetes 6.5% or higher - adopted from ADA   consensus guidelines.       25 OH Vit D2 07/06/2021 <5  ug/L Final     25 OH Vit D3 07/06/2021 52  ug/L Final     25 OH Vit D total 07/06/2021 <57  20 - 75 ug/L Final    Comment: Season, race, dietary intake, and treatment affect the concentration of   25-hydroxy-Vitamin D. Values may decrease during winter months and increase   during summer months. Values 20-29 ug/L may indicate Vitamin D insufficiency   and values <20 ug/L may indicate Vitamin D deficiency.  This test was developed and its performance characteristics determined by the   [facility].   It has not been cleared or approved by the FDA. The laboratory is regulated   under CLIA as qualified to perform high-complexity testing. This test is used   for clinical purposes. It should not be regarded as investigational or for   research.       FSH 07/06/2021 13.3* 0.7 - 10.8 IU/L Final     Lutropin 07/06/2021 7.7  1.5 - 9.3 IU/L Final     Sodium 07/06/2021 139  133 - 144 mmol/L Final     Potassium 07/06/2021 4.3  3.4 - 5.3 mmol/L Final     Chloride 07/06/2021 108  94 - 109 mmol/L Final     Carbon Dioxide 07/06/2021 26  20 - 32 mmol/L Final     Anion Gap 07/06/2021 5  3 - 14 mmol/L Final     Glucose 07/06/2021 122* 70 - 99 mg/dL Final     Urea Nitrogen 07/06/2021 24  7 - 30 mg/dL Final     Creatinine 07/06/2021 1.28* 0.66 - 1.25 mg/dL Final     GFR Estimate 07/06/2021 62  >60 mL/min/[1.73_m2] Final    Comment: Non  GFR Calc  Starting 12/18/2018, serum creatinine based estimated GFR (eGFR) will be   calculated  using the Chronic Kidney Disease Epidemiology Collaboration   (CKD-EPI) equation.       GFR Estimate If Black 07/06/2021 72  >60 mL/min/[1.73_m2] Final    Comment:  GFR Calc  Starting 12/18/2018, serum creatinine based estimated GFR (eGFR) will be   calculated using the Chronic Kidney Disease Epidemiology Collaboration   (CKD-EPI) equation.       Calcium 07/06/2021 9.3  8.5 - 10.1 mg/dL Final     T4 Free 07/06/2021 0.80  0.76 - 1.46 ng/dL Final     TSH 07/06/2021 0.67  0.40 - 4.00 mU/L Final       PLAN:  #1 Graves disease status-post VELA  Patient doing well.  Refills given for levothyroxine 150 mcg daily and Cytomel 5 mcg twice daily.  July 2021 TFTs were reasonable.    #2  History of low libido in the setting of low testosterone  July 2021 total testosterone was normal.  I think his FSH is higher to try for testosterone production.  Overall, his testosterone levels have continued to improve over time.    #3 Sleep apnea  Not discussed at current visit.    #4 Weight gain with elevated hemoglobin A1c of 5.9 noted in October 2019  Dietary lifestyle changes discussed, including the possibility of time restricted eating.  I did discuss with the patient the possibility of Metformin 500 mg extended release daily with dinner.  He will think about this and let me know.    We will plan for return visit in 1 year, patient will let me know in the interim if he is interested in Metformin.      This is virtual visit conducted by Luis.  Start time 1206, stop time 1235, documentation time coordination of care, 11 minutes, total time spent day of encounter 40 minutes   Simple: Patient demonstrates quick and easy understanding

## 2021-07-16 ENCOUNTER — VIRTUAL VISIT (OUTPATIENT)
Dept: ENDOCRINOLOGY | Facility: CLINIC | Age: 56
End: 2021-07-16
Payer: COMMERCIAL

## 2021-07-16 DIAGNOSIS — Z94.81 STATUS POST BONE MARROW TRANSPLANT (H): ICD-10-CM

## 2021-07-16 DIAGNOSIS — K59.00 CONSTIPATION, UNSPECIFIED CONSTIPATION TYPE: ICD-10-CM

## 2021-07-16 DIAGNOSIS — E05.00 GRAVES DISEASE: ICD-10-CM

## 2021-07-16 DIAGNOSIS — E05.90 HYPERTHYROIDISM: ICD-10-CM

## 2021-07-16 DIAGNOSIS — C92.10 CML (CHRONIC MYELOCYTIC LEUKEMIA) (H): ICD-10-CM

## 2021-07-16 DIAGNOSIS — I48.0 PAROXYSMAL ATRIAL FIBRILLATION (H): ICD-10-CM

## 2021-07-16 DIAGNOSIS — F43.21 ADJUSTMENT DISORDER WITH DEPRESSED MOOD: ICD-10-CM

## 2021-07-16 PROCEDURE — 99215 OFFICE O/P EST HI 40 MIN: CPT | Mod: 95 | Performed by: INTERNAL MEDICINE

## 2021-07-16 RX ORDER — LEVOTHYROXINE SODIUM 150 UG/1
150 TABLET ORAL DAILY
Qty: 90 TABLET | Refills: 3 | Status: SHIPPED | OUTPATIENT
Start: 2021-07-16 | End: 2021-12-21

## 2021-07-16 RX ORDER — LIOTHYRONINE SODIUM 5 UG/1
5 TABLET ORAL 2 TIMES DAILY
Qty: 180 TABLET | Refills: 3 | Status: SHIPPED | OUTPATIENT
Start: 2021-07-16 | End: 2022-09-20

## 2021-07-16 RX ORDER — PANTOPRAZOLE SODIUM 40 MG/1
40 TABLET, DELAYED RELEASE ORAL DAILY
Qty: 90 TABLET | Refills: 3 | Status: SHIPPED | OUTPATIENT
Start: 2021-07-16 | End: 2022-08-16

## 2021-07-16 NOTE — PATIENT INSTRUCTIONS
"Eat over an 8 hour eating window - 12 to 8, outside of the window, take your medications and drink water,  - TIME RESTRICTED EATING,     Metformin XR at 500 mg daily      ---  For you son:    Frank: Gilmar Bunch or Patricia Gonzales     See me - call and ask to be added on - \"dr simpson said it was ok\"    For scheduling appointments or labs, please request an appointment through [employer] [facility] or call [phone] select option #3 for triage nurse  "

## 2021-07-16 NOTE — LETTER
7/16/2021       RE: Panchito Pierce  [address]  [place] [address]     Dear Colleague,    Thank you for referring your patient, Panchito Pierce, to the [facility] ENDOCRINOLOGY CLINIC [place] at [facility]. Please see a copy of my visit note below.    Outcome for 07/15/21 1:29 PM :Left Voicemail for patient to call back  Outcome for 07/15/21 2:24 PM :Left Voicemail for patient to call back    Panchito Pierce is a 55 year old male who is being evaluated via a billable telephone visit.        This is virtual visit conducted by Luis.  Start time 1206, stop time 1235, documentation time coordination of care, 11 minutes, total time spent day of encounter 40 minutes    [facility]  Endocrinology  Pattie Guevara MD  7/16/21     Chief Complaint:   RECHECK    History of Present Illness:    #1 Graves disease status-post VELA  Per patient report, he was diagnosed with Grave's disease in 2002. He underwent radioactive iodine therapy in 2002 at [facility]. He had thyroid testing on 4/24/2018, where his T3 was 99 ng/dL, T4 free was 0.87 ng/dL, and his TSH was 1.00 mU/L. These thyroid tests have normalized since he had a TSH of 8.89 on 12/21/2017.  Has been on combination of levothyroxine and Cytomel since July 1, 2019. October 2019 TSH of 0.81.    Interval history: He continues on levothyroxine at 150 mcg daily and Cytomel 5 mcg twice daily.  He reports that he is doing well.  July 2021 TSH 0.67, free T4 0.8.    #2  History of low libido in the setting of low testosterone  His low libido has improved since 12/2017 when he had shingles. His testosterone level on 12/21/2017 was low at 94 ng/dL, but this increased to 288 mg/dL on 4/24/2018. December 2018 total testosterone 187, October 2019 total testosterone at 191, December 2019 total testosterone of 176    Interval history: July 2021 total testosterone was 240 (normal) and free testosterone was normal at 6.42  (normal).  FSH at 13.3.  Patient reports that his libido has improved and overall feels quite well.  July 2021 PSA 1.26.    #3 Sleep apnea  The patient had a sleep study completed on 1/11/2017 showing that he has sleep apnea.     Interval history: Not discussed at current visit.    #4 Weight gain with elevated hemoglobin A1c of 5.9 noted in October 2019  Patient working on dietary lifestyle changes.  October 2019 hemoglobin A1c 5.9.  March 2021 hemoglobin A1c 6.2.    Interval history: July 2021 hemoglobin A1c 5.9.  Patient reports he is quite active.  However, he does have some dietary indiscretion.      Review of Systems:   Pertinent items are noted in HPI, remainder of complete ROS is negative.      Active Medications:   Current Outpatient Medications   Medication Sig Dispense Refill     acyclovir (ZOVIRAX) 400 MG tablet Take 2 tablets (800 mg) by mouth 2 times daily (Patient not taking: Reported on 7/5/2019) 120 tablet 11     betamethasone dipropionate (DIPROSONE) 0.05 % external ointment Apply to bug bite reactions, and you can cover with bandaid 50 g 3     fenofibrate micronized (LOFIBRA) 67 MG capsule Take 1 capsule by mouth once daily with a meal.       gabapentin (NEURONTIN) 100 MG capsule Take 300 mg by mouth 3 times daily       levothyroxine (SYNTHROID/LEVOTHROID) 150 MCG tablet Take 1 tablet (150 mcg) by mouth daily 90 tablet 3     liothyronine (CYTOMEL) 5 MCG tablet Take 1 tablet (5 mcg) by mouth 2 times daily 180 tablet 3     metoprolol tartrate (LOPRESSOR) 25 MG tablet Take 0.5 tablets (12.5 mg) by mouth 2 times daily 90 tablet 1     mupirocin (BACTROBAN) 2 % external ointment Use 1 to 2 times a day to sores on face and arms. 22 g 3     NIFEdipine ER (ADALAT CC) 30 MG 24 hr tablet Take 1 tablet (30 mg) by mouth daily *DURING WINTER MONTHS TO PREVENT FLARES** PLEASE SCHEDULE DERM APPT 30 tablet 1     OYSTER SHELL CALCIUM + D3 500-400 MG-UNIT TABS TAKE TWO TABLETS BY MOUTH DAILY  60 tablet 11      "pantoprazole (PROTONIX) 40 MG EC tablet TAKE ONE TABLET BY MOUTH ONE TIME DAILY 30 tablet 0     potassium 99 MG TABS        Selenium 100 MCG CAPS 1 tablet daily 90 capsule 2     sildenafil (VIAGRA) 25 MG tablet take 1-2 tablets by mouth 1 hour before needed (do not exceed 5 tablets in 24 hours)       Allergies:   Allopurinol      Past Medical History:  Graves disease; treated with radioiodine  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   Immunosuppressed status    Raynaud's disease   Heart murmur   Hypogonadism   Chronic dermatitis      Past Surgical History:  Left hand graft flap and right foot graft- 01/1985     Family History:   Mother: thyroid disease, lung cancer  Father: prostate cancer, dementia, heart murmur   Brother: hepatitis, heart murmur    Sister: thyroid disease   Son: asthma       Social History:   The patient was alone   Smoking Status: former; 1/2 pack per day for 30 years   Smokeless Tobacco: never   Alcohol Use: yes; once per week       Physical Exam:   GENERAL: Healthy, alert and no distress\",\"EYES: Eyes grossly normal to inspection.  No discharge or erythema, or obvious scleral/conjunctival abnormalities.\",\"RESP: No audible wheeze, cough, or visible cyanosis.  No visible retractions or increased work of breathing.  \",\"SKIN: Visible skin clear. No significant rash, abnormal pigmentation or lesions.\",\"NEURO: Cranial nerves grossly intact.  Mentation and speech appropriate for age.\",\"PSYCH: Mentation appears normal, affect normal/bright, judgement and insight intact, normal speech and appearance well-groomed.      Data:  No visits with results within 1 Week(s) from this visit.   Latest known visit with results is:   Orders Only on 07/06/2021   Component Date Value Ref Range Status     PSA 07/06/2021 1.26  0 - 4 ug/L Final    Assay Method:  Chemiluminescence using Siemens Vista analyzer     Testosterone Total 07/06/2021 240  240 - 950 ng/dL Final    Comment: " This test was developed and its performance characteristics determined by the   [facility].   It has not been cleared or approved by the FDA. The laboratory is regulated   under CLIA as qualified to perform high-complexity testing. This test is used   for clinical purposes. It should not be regarded as investigational or for   research.       Sex Hormone Binding Globulin 07/06/2021 17  11 - 80 nmol/L Final     Free Testosterone Calculated 07/06/2021 6.42  4.7 - 24.4 ng/dL Final     Hemoglobin A1C 07/06/2021 5.9* 0 - 5.6 % Final    Comment: Normal <5.7% Prediabetes 5.7-6.4%  Diabetes 6.5% or higher - adopted from ADA   consensus guidelines.       25 OH Vit D2 07/06/2021 <5  ug/L Final     25 OH Vit D3 07/06/2021 52  ug/L Final     25 OH Vit D total 07/06/2021 <57  20 - 75 ug/L Final    Comment: Season, race, dietary intake, and treatment affect the concentration of   25-hydroxy-Vitamin D. Values may decrease during winter months and increase   during summer months. Values 20-29 ug/L may indicate Vitamin D insufficiency   and values <20 ug/L may indicate Vitamin D deficiency.  This test was developed and its performance characteristics determined by the   [facility].   It has not been cleared or approved by the FDA. The laboratory is regulated   under CLIA as qualified to perform high-complexity testing. This test is used   for clinical purposes. It should not be regarded as investigational or for   research.       FSH 07/06/2021 13.3* 0.7 - 10.8 IU/L Final     Lutropin 07/06/2021 7.7  1.5 - 9.3 IU/L Final     Sodium 07/06/2021 139  133 - 144 mmol/L Final     Potassium 07/06/2021 4.3  3.4 - 5.3 mmol/L Final     Chloride 07/06/2021 108  94 - 109 mmol/L Final     Carbon Dioxide 07/06/2021 26  20 - 32 mmol/L Final     Anion Gap 07/06/2021 5  3 - 14 mmol/L Final     Glucose 07/06/2021 122*  70 - 99 mg/dL Final     Urea Nitrogen 07/06/2021 24  7 - 30 mg/dL Final     Creatinine 07/06/2021 1.28* 0.66 - 1.25 mg/dL Final     GFR Estimate 07/06/2021 62  >60 mL/min/[1.73_m2] Final    Comment: Non  GFR Calc  Starting 12/18/2018, serum creatinine based estimated GFR (eGFR) will be   calculated using the Chronic Kidney Disease Epidemiology Collaboration   (CKD-EPI) equation.       GFR Estimate If Black 07/06/2021 72  >60 mL/min/[1.73_m2] Final    Comment:  GFR Calc  Starting 12/18/2018, serum creatinine based estimated GFR (eGFR) will be   calculated using the Chronic Kidney Disease Epidemiology Collaboration   (CKD-EPI) equation.       Calcium 07/06/2021 9.3  8.5 - 10.1 mg/dL Final     T4 Free 07/06/2021 0.80  0.76 - 1.46 ng/dL Final     TSH 07/06/2021 0.67  0.40 - 4.00 mU/L Final       PLAN:  #1 Graves disease status-post VELA  Patient doing well.  Refills given for levothyroxine 150 mcg daily and Cytomel 5 mcg twice daily.  July 2021 TFTs were reasonable.    #2  History of low libido in the setting of low testosterone  July 2021 total testosterone was normal.  I think his FSH is higher to try for testosterone production.  Overall, his testosterone levels have continued to improve over time.    #3 Sleep apnea  Not discussed at current visit.    #4 Weight gain with elevated hemoglobin A1c of 5.9 noted in October 2019  Dietary lifestyle changes discussed, including the possibility of time restricted eating.  I did discuss with the patient the possibility of Metformin 500 mg extended release daily with dinner.  He will think about this and let me know.    We will plan for return visit in 1 year, patient will let me know in the interim if he is interested in Metformin.      This is virtual visit conducted by Luis.  Start time 1206, stop time 1235, documentation time coordination of care, 11 minutes, total time spent day of encounter 40 minutes    Again, thank you for allowing me to  participate in the care of your patient.      Sincerely,    Pattie Guevara MD

## 2021-08-10 ENCOUNTER — TELEPHONE (OUTPATIENT)
Dept: ENDOCRINOLOGY | Facility: CLINIC | Age: 56
End: 2021-08-10

## 2021-08-10 NOTE — TELEPHONE ENCOUNTER
----- Message from Pattie Guevara MD sent at 7/16/2021 12:23 PM CDT -----  Ask pt if he might be interested in metformin

## 2021-08-11 ENCOUNTER — TELEPHONE (OUTPATIENT)
Dept: ENDOCRINOLOGY | Facility: CLINIC | Age: 56
End: 2021-08-11

## 2021-08-11 DIAGNOSIS — E05.90 HYPERTHYROIDISM: Primary | ICD-10-CM

## 2021-08-11 NOTE — TELEPHONE ENCOUNTER
Called pt -left message.  Patient okay to continue with time restricted eating.  We will hold off on Metformin.  MyChart sent. Will hba1c to 12/15 lab draw

## 2021-08-28 DIAGNOSIS — I48.0 PAROXYSMAL ATRIAL FIBRILLATION (H): ICD-10-CM

## 2021-08-28 DIAGNOSIS — D89.813 GVH (GRAFT VERSUS HOST DISEASE) (H): ICD-10-CM

## 2021-08-28 DIAGNOSIS — E05.00 GRAVES DISEASE: ICD-10-CM

## 2021-08-28 DIAGNOSIS — K59.00 CONSTIPATION, UNSPECIFIED CONSTIPATION TYPE: ICD-10-CM

## 2021-08-28 DIAGNOSIS — Z94.81 STATUS POST BONE MARROW TRANSPLANT (H): ICD-10-CM

## 2021-08-28 DIAGNOSIS — C92.10 CML (CHRONIC MYELOCYTIC LEUKEMIA) (H): ICD-10-CM

## 2021-08-28 DIAGNOSIS — D84.9 IMMUNOSUPPRESSION (H): ICD-10-CM

## 2021-09-01 RX ORDER — METOPROLOL TARTRATE 25 MG/1
12.5 TABLET, FILM COATED ORAL 2 TIMES DAILY
Qty: 90 TABLET | Refills: 0 | Status: SHIPPED | OUTPATIENT
Start: 2021-09-01 | End: 2021-12-14

## 2021-09-01 NOTE — TELEPHONE ENCOUNTER
metoprolol tartrate (LOPRESSOR) 25 MG tablet   Take 0.5 tablets (12.5 mg) by mouth 2 times daily      Last Written Prescription Date:  1/26/21  Last Fill Quantity: 90,   # refills: 1  Last Office Visit : 7/16/21  Future Office visit:  none    Routing refill request to provider for review/approval because:  Overdue BP.

## 2021-09-19 ENCOUNTER — HEALTH MAINTENANCE LETTER (OUTPATIENT)
Age: 56
End: 2021-09-19

## 2021-11-04 ENCOUNTER — IMMUNIZATION (OUTPATIENT)
Dept: FAMILY MEDICINE | Facility: CLINIC | Age: 56
End: 2021-11-04
Payer: COMMERCIAL

## 2021-11-04 PROCEDURE — 90471 IMMUNIZATION ADMIN: CPT

## 2021-11-04 PROCEDURE — 90682 RIV4 VACC RECOMBINANT DNA IM: CPT

## 2021-11-22 ENCOUNTER — LAB (OUTPATIENT)
Dept: LAB | Facility: CLINIC | Age: 56
End: 2021-11-22
Payer: COMMERCIAL

## 2021-11-22 ENCOUNTER — IMMUNIZATION (OUTPATIENT)
Dept: FAMILY MEDICINE | Facility: CLINIC | Age: 56
End: 2021-11-22
Payer: COMMERCIAL

## 2021-11-22 DIAGNOSIS — E05.90 HYPERTHYROIDISM: ICD-10-CM

## 2021-11-22 LAB — HBA1C MFR BLD: 5.8 % (ref 0–5.6)

## 2021-11-22 PROCEDURE — 83036 HEMOGLOBIN GLYCOSYLATED A1C: CPT

## 2021-11-22 PROCEDURE — 0004A PR COVID VAC PFIZER DIL RECON 30 MCG/0.3 ML IM: CPT

## 2021-11-22 PROCEDURE — 91300 PR COVID VAC PFIZER DIL RECON 30 MCG/0.3 ML IM: CPT

## 2021-11-22 PROCEDURE — 36415 COLL VENOUS BLD VENIPUNCTURE: CPT

## 2021-11-23 ENCOUNTER — TELEPHONE (OUTPATIENT)
Dept: ENDOCRINOLOGY | Facility: CLINIC | Age: 56
End: 2021-11-23
Payer: COMMERCIAL

## 2021-11-23 NOTE — TELEPHONE ENCOUNTER
Hba1c continues to improve    -  Dear Panchito    Here is your hemoglobin A1c, 3-month measure of your blood sugar, which continues to improve at 5.8.  This is great news.  Wishing you and your family the very best during this holiday season.    If you have any questions, please feel free to contact my nurse at [phone] select option #3 for triage nurse  or  option #1 for scheduling related questions.    Regards    Pattie Guevara MD

## 2021-11-23 NOTE — RESULT ENCOUNTER NOTE
Dear Panchito    Here is your hemoglobin A1c, 3-month measure of your blood sugar, which continues to improve at 5.8.  This is great news.  Wishing you and your family the very best during this holiday season.    If you have any questions, please feel free to contact my nurse at [phone] select option #3 for triage nurse  or  option #1 for scheduling related questions.    Regards    Pattie Guevara MD

## 2021-12-12 DIAGNOSIS — Z94.81 STATUS POST BONE MARROW TRANSPLANT (H): ICD-10-CM

## 2021-12-12 DIAGNOSIS — D84.9 IMMUNOSUPPRESSION (H): ICD-10-CM

## 2021-12-12 DIAGNOSIS — K59.00 CONSTIPATION, UNSPECIFIED CONSTIPATION TYPE: ICD-10-CM

## 2021-12-12 DIAGNOSIS — I48.0 PAROXYSMAL ATRIAL FIBRILLATION (H): ICD-10-CM

## 2021-12-12 DIAGNOSIS — E05.00 GRAVES DISEASE: ICD-10-CM

## 2021-12-12 DIAGNOSIS — D89.813 GVH (GRAFT VERSUS HOST DISEASE) (H): ICD-10-CM

## 2021-12-12 DIAGNOSIS — C92.10 CML (CHRONIC MYELOCYTIC LEUKEMIA) (H): ICD-10-CM

## 2021-12-14 RX ORDER — METOPROLOL TARTRATE 25 MG/1
12.5 TABLET, FILM COATED ORAL 2 TIMES DAILY
Qty: 90 TABLET | Refills: 1 | Status: SHIPPED | OUTPATIENT
Start: 2021-12-14 | End: 2021-12-15

## 2021-12-14 NOTE — PROGRESS NOTES
BMT Clinic Progress Note   12/14/2021    Patient ID:  Panchito Pierce is a 56 year old man D+ 5 years s/p MUD SCT for CML     Diagnosis CMLP+ Chronic myelogeneous leukemia, Ph1+  HCT Type Allogeneic    Prep Regimen Cytoxan  TBI   Donor Source Unrelated BM    GVHD Prophylaxis Methotrexate  Cyclosporine  Primary BMT Provider Jermaine Cabrera MD     CC: follow-up     INTERVAL  HISTORY   Interval History: Doing really well.  Working full-time.  No signs or symptoms of GVHD.  Very physically active.  Good appetite.  Overall life is going well.  He noticed no chest pain or pressure.  He never felt his heart beating out of pace.  He denies dizziness or lightheadedness.  He had his Covid shots and also had flu shots already this fall  Review of Systems: 10 point ROS negative except as noted above.    PHYSICAL EXAM   /67   Pulse 51   Temp 98.3  F (36.8  C)   Resp 18   Wt 97.1 kg (214 lb)   SpO2 98%   BMI 31.60 kg/m   BP   Wt Readings from Last 4 Encounters:   12/18/19 98.9 kg (218 lb)   07/05/19 102.9 kg (226 lb 12.8 oz)   12/19/18 106.2 kg (234 lb 1.6 oz)   06/01/18 98.7 kg (217 lb 8 oz)   Constitutional: No acute distress, appears comfortable sitting up on couch, normal habitus  Eyes: No redness/discharge  Heart: I regular rhythm and rate, with no gallop, rub, or murmur  Respiratory: Breathing comfortably on room air, speaking easily in full sentences  MSK Normal ROM  Skin: No rashes/lesions appreciated in visualized areas  Neuro: EOMI, no overt focal deficits  Psych: Normal mood/affect  The rest of a comprehensive physical examination is deferred due to PHE (public health emergency) video visit restrictions      LABS AND IMAGING      Lab Results   Component Value Date    WBC 6.3 12/15/2021    ANEU 4.8 12/15/2020    HGB 15.0 12/15/2021    HCT 45.2 12/15/2021     12/15/2021     12/15/2021    POTASSIUM 4.2 12/15/2021    CHLORIDE 107 12/15/2021    CO2 23 12/15/2021    GLC 98 12/15/2021    BUN  20 12/15/2021    CR 1.22 12/15/2021    MAG 1.9 09/13/2017    INR 0.9 06/15/2016    BILITOTAL 0.4 12/15/2020    AST 20 12/15/2020    ALT 39 12/15/2020    ALKPHOS 77 12/15/2020    PROTTOTAL 7.9 12/15/2020    ALBUMIN 4.1 12/15/2020     BcrAbl from BM pending    EKG 12/15/2021: Atrial fibrillation with rapid ventricular response with a heart rate of 120    ASSESSMENT BY SYSTEMS   Panchito REICH Kari is a 51 year old man s/p MUD SCT for CML  - trasplanted in second chronic phase after blast crisis;    BMT/ CML:  repeat BCR/ABL from 12/15//2021 currently pending.  Patient aware, we will reach out to him if the result is unexpectedly concerning.    HEMATOLOGY/COAGULATION: Start apixaban 5 mg twice daily for A. fib.        INFECTIONS AND PROPHYLAXIS: Had all his vaccinations.  Being cautious about Covid.      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   - Advised to wear sun block when outside for extended periods of time to prevent flare of GVH    GASTROINTESTINAL/LIVER: no new issues    RENAL/FEN/Nutrition: Normal kidney function electrolytes.  Remains overweight.  Discussed diet/exercise and need for ongoing follow up with PCP to work on modifiable risk factors.  Patient's hemoglobin A1c was elevated at 5.8.    CARDIOVASCULAR: The patient has A. fib in today's visit.  That have not manifested since 2016.  Today based on [id number] score is 2.5 we started him on apixaban 5 mg twice daily.  I also increase his metoprolol to 25 mg twice daily.  I order an echocardiogram.  I refer him for a cardiology consult within the next week for further evaluation and management.    - Hx of A fib with RVR (new 12/24/16); resolved with dilt, now on Metoprolol 12.5mg BID.     ENDOCRINE: following with Dr Guevara for thyroid dysfunction with Graves' disease.  He does not seem thyrotoxic today but we cannot rule that out.  So TSH and free T4 will be measured.  - Hx Graves Disease s/p VELA therapy. Cont synthroid and liothyronine.  TSH and Free T3 low; Free T4 elevated. Mixed picture; After endocrine consult:  Mild thyrotoxicosis due to large weight loss. On levothyroxine  - On selenium supplement, T3 and T4.    General health care: He is now following regularly with his PCP which is appropriate.  Advised him it would be reasonable to recheck his PSA through his PCP which he plans to do within the next few weeks.        Plan: No plan follow-up in BMT  Start apixaban 5 mg twice daily  Cardiology consult within a week  Echocardiogram  Repeat TSH with reflex T4    Jermaine Cabrera MD on 12/16/2020 at 5:59 PM    60 minutes spent on the date of the encounter doing chart review, history and exam, documentation and further activities per the note

## 2021-12-14 NOTE — TELEPHONE ENCOUNTER
Last Clinic Visit: 7/16/2021  [facility] Endocrinology Clinic [place]  Recommended 1 year follow up  Most recent blood pressure in care everywhere  Blood Pressure 110/64 03/31/2021 8:51 AM CDT

## 2021-12-15 ENCOUNTER — ONCOLOGY VISIT (OUTPATIENT)
Dept: TRANSPLANT | Facility: CLINIC | Age: 56
End: 2021-12-15
Attending: INTERNAL MEDICINE
Payer: COMMERCIAL

## 2021-12-15 ENCOUNTER — APPOINTMENT (OUTPATIENT)
Dept: LAB | Facility: CLINIC | Age: 56
End: 2021-12-15
Attending: INTERNAL MEDICINE
Payer: COMMERCIAL

## 2021-12-15 ENCOUNTER — TELEPHONE (OUTPATIENT)
Dept: ENDOCRINOLOGY | Facility: CLINIC | Age: 56
End: 2021-12-15

## 2021-12-15 VITALS
HEART RATE: 51 BPM | SYSTOLIC BLOOD PRESSURE: 125 MMHG | BODY MASS INDEX: 31.6 KG/M2 | RESPIRATION RATE: 18 BRPM | OXYGEN SATURATION: 98 % | DIASTOLIC BLOOD PRESSURE: 67 MMHG | TEMPERATURE: 98.3 F | WEIGHT: 214 LBS

## 2021-12-15 DIAGNOSIS — D89.813 GVH (GRAFT VERSUS HOST DISEASE) (H): ICD-10-CM

## 2021-12-15 DIAGNOSIS — K59.00 CONSTIPATION, UNSPECIFIED CONSTIPATION TYPE: ICD-10-CM

## 2021-12-15 DIAGNOSIS — Z94.81 STATUS POST BONE MARROW TRANSPLANT (H): ICD-10-CM

## 2021-12-15 DIAGNOSIS — E05.00 GRAVES DISEASE: ICD-10-CM

## 2021-12-15 DIAGNOSIS — I48.0 PAROXYSMAL ATRIAL FIBRILLATION (H): ICD-10-CM

## 2021-12-15 DIAGNOSIS — C92.10 CML (CHRONIC MYELOCYTIC LEUKEMIA) (H): ICD-10-CM

## 2021-12-15 DIAGNOSIS — D84.9 IMMUNOSUPPRESSION (H): ICD-10-CM

## 2021-12-15 LAB
ANION GAP SERPL CALCULATED.3IONS-SCNC: 11 MMOL/L (ref 3–14)
BASOPHILS # BLD AUTO: 0 10E3/UL (ref 0–0.2)
BASOPHILS NFR BLD AUTO: 1 %
BUN SERPL-MCNC: 20 MG/DL (ref 7–30)
CALCIUM SERPL-MCNC: 9.7 MG/DL (ref 8.5–10.1)
CHLORIDE BLD-SCNC: 107 MMOL/L (ref 94–109)
CO2 SERPL-SCNC: 23 MMOL/L (ref 20–32)
CREAT SERPL-MCNC: 1.22 MG/DL (ref 0.66–1.25)
EOSINOPHIL # BLD AUTO: 0.1 10E3/UL (ref 0–0.7)
EOSINOPHIL NFR BLD AUTO: 2 %
ERYTHROCYTE [DISTWIDTH] IN BLOOD BY AUTOMATED COUNT: 13.6 % (ref 10–15)
GFR SERPL CREATININE-BSD FRML MDRD: 66 ML/MIN/1.73M2
GLUCOSE BLD-MCNC: 98 MG/DL (ref 70–99)
HCT VFR BLD AUTO: 45.2 % (ref 40–53)
HGB BLD-MCNC: 15 G/DL (ref 13.3–17.7)
IMM GRANULOCYTES # BLD: 0 10E3/UL
IMM GRANULOCYTES NFR BLD: 1 %
LAB DIRECTOR COMMENTS: NORMAL
LAB DIRECTOR DISCLAIMER: NORMAL
LAB DIRECTOR INTERPRETATION: NORMAL
LAB DIRECTOR METHODOLOGY: NORMAL
LAB DIRECTOR RESULTS: NORMAL
LYMPHOCYTES # BLD AUTO: 2.2 10E3/UL (ref 0.8–5.3)
LYMPHOCYTES NFR BLD AUTO: 34 %
MCH RBC QN AUTO: 29.5 PG (ref 26.5–33)
MCHC RBC AUTO-ENTMCNC: 33.2 G/DL (ref 31.5–36.5)
MCV RBC AUTO: 89 FL (ref 78–100)
MONOCYTES # BLD AUTO: 0.6 10E3/UL (ref 0–1.3)
MONOCYTES NFR BLD AUTO: 9 %
NEUTROPHILS # BLD AUTO: 3.4 10E3/UL (ref 1.6–8.3)
NEUTROPHILS NFR BLD AUTO: 53 %
NRBC # BLD AUTO: 0 10E3/UL
NRBC BLD AUTO-RTO: 0 /100
PLATELET # BLD AUTO: 194 10E3/UL (ref 150–450)
POTASSIUM BLD-SCNC: 4.2 MMOL/L (ref 3.4–5.3)
RBC # BLD AUTO: 5.08 10E6/UL (ref 4.4–5.9)
SODIUM SERPL-SCNC: 141 MMOL/L (ref 133–144)
SPECIMEN DESCRIPTION: NORMAL
TSH SERPL DL<=0.005 MIU/L-ACNC: 0.61 MU/L (ref 0.4–4)
WBC # BLD AUTO: 6.3 10E3/UL (ref 4–11)

## 2021-12-15 PROCEDURE — 93005 ELECTROCARDIOGRAM TRACING: CPT

## 2021-12-15 PROCEDURE — G0452 MOLECULAR PATHOLOGY INTERPR: HCPCS | Mod: 26 | Performed by: PATHOLOGY

## 2021-12-15 PROCEDURE — 93010 ELECTROCARDIOGRAM REPORT: CPT | Performed by: INTERNAL MEDICINE

## 2021-12-15 PROCEDURE — 81206 BCR/ABL1 GENE MAJOR BP: CPT | Performed by: INTERNAL MEDICINE

## 2021-12-15 PROCEDURE — 85025 COMPLETE CBC W/AUTO DIFF WBC: CPT | Performed by: INTERNAL MEDICINE

## 2021-12-15 PROCEDURE — 99215 OFFICE O/P EST HI 40 MIN: CPT | Performed by: INTERNAL MEDICINE

## 2021-12-15 PROCEDURE — 80048 BASIC METABOLIC PNL TOTAL CA: CPT | Performed by: INTERNAL MEDICINE

## 2021-12-15 PROCEDURE — 84443 ASSAY THYROID STIM HORMONE: CPT | Performed by: INTERNAL MEDICINE

## 2021-12-15 PROCEDURE — 84439 ASSAY OF FREE THYROXINE: CPT | Performed by: INTERNAL MEDICINE

## 2021-12-15 PROCEDURE — G0463 HOSPITAL OUTPT CLINIC VISIT: HCPCS

## 2021-12-15 PROCEDURE — G0463 HOSPITAL OUTPT CLINIC VISIT: HCPCS | Mod: 25

## 2021-12-15 PROCEDURE — 36415 COLL VENOUS BLD VENIPUNCTURE: CPT | Performed by: INTERNAL MEDICINE

## 2021-12-15 RX ORDER — METOPROLOL TARTRATE 25 MG/1
25 TABLET, FILM COATED ORAL 2 TIMES DAILY
Qty: 90 TABLET | Refills: 1 | Status: SHIPPED | OUTPATIENT
Start: 2021-12-15 | End: 2022-01-14

## 2021-12-15 ASSESSMENT — PAIN SCALES - GENERAL: PAINLEVEL: NO PAIN (0)

## 2021-12-15 NOTE — LETTER
12/15/2021         RE: Panchito Pierce  [address]  [place] [address]        Dear Colleague,    Thank you for referring your patient, Panchito Pierce, to the [facility] BLOOD AND MARROW TRANSPLANT PROGRAM [place]. Please see a copy of my visit note below.    BMT Clinic Progress Note   12/14/2021    Patient ID:  Panchito Pierce is a 56 year old man D+ 5 years s/p MUD SCT for CML     Diagnosis CMLP+ Chronic myelogeneous leukemia, Ph1+  HCT Type Allogeneic    Prep Regimen Cytoxan  TBI   Donor Source Unrelated BM    GVHD Prophylaxis Methotrexate  Cyclosporine  Primary BMT Provider Jermaine Cabrera MD     CC: follow-up     INTERVAL  HISTORY   Interval History: Doing really well.  Working full-time.  No signs or symptoms of GVHD.  Very physically active.  Good appetite.  Overall life is going well.  He noticed no chest pain or pressure.  He never felt his heart beating out of pace.  He denies dizziness or lightheadedness.  He had his Covid shots and also had flu shots already this fall  Review of Systems: 10 point ROS negative except as noted above.    PHYSICAL EXAM   /67   Pulse 51   Temp 98.3  F (36.8  C)   Resp 18   Wt 97.1 kg (214 lb)   SpO2 98%   BMI 31.60 kg/m   BP   Wt Readings from Last 4 Encounters:   12/18/19 98.9 kg (218 lb)   07/05/19 102.9 kg (226 lb 12.8 oz)   12/19/18 106.2 kg (234 lb 1.6 oz)   06/01/18 98.7 kg (217 lb 8 oz)   Constitutional: No acute distress, appears comfortable sitting up on couch, normal habitus  Eyes: No redness/discharge  Heart: I regular rhythm and rate, with no gallop, rub, or murmur  Respiratory: Breathing comfortably on room air, speaking easily in full sentences  MSK Normal ROM  Skin: No rashes/lesions appreciated in visualized areas  Neuro: EOMI, no overt focal deficits  Psych: Normal mood/affect  The rest of a comprehensive physical examination is deferred due to PHE (public health emergency) video visit restrictions      LABS AND  IMAGING      Lab Results   Component Value Date    WBC 6.3 12/15/2021    ANEU 4.8 12/15/2020    HGB 15.0 12/15/2021    HCT 45.2 12/15/2021     12/15/2021     12/15/2021    POTASSIUM 4.2 12/15/2021    CHLORIDE 107 12/15/2021    CO2 23 12/15/2021    GLC 98 12/15/2021    BUN 20 12/15/2021    CR 1.22 12/15/2021    MAG 1.9 09/13/2017    INR 0.9 06/15/2016    BILITOTAL 0.4 12/15/2020    AST 20 12/15/2020    ALT 39 12/15/2020    ALKPHOS 77 12/15/2020    PROTTOTAL 7.9 12/15/2020    ALBUMIN 4.1 12/15/2020     BcrAbl from BM pending    EKG 12/15/2021: Atrial fibrillation with rapid ventricular response with a heart rate of 120    ASSESSMENT BY SYSTEMS   Panchito REICH Kari is a 51 year old man s/p MUD SCT for CML  - trasplanted in second chronic phase after blast crisis;    BMT/ CML:  repeat BCR/ABL from 12/15//2021 currently pending.  Patient aware, we will reach out to him if the result is unexpectedly concerning.    HEMATOLOGY/COAGULATION: Start apixaban 5 mg twice daily for A. fib.        INFECTIONS AND PROPHYLAXIS: Had all his vaccinations.  Being cautious about Covid.      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   - Advised to wear sun block when outside for extended periods of time to prevent flare of GVH    GASTROINTESTINAL/LIVER: no new issues    RENAL/FEN/Nutrition: Normal kidney function electrolytes.  Remains overweight.  Discussed diet/exercise and need for ongoing follow up with PCP to work on modifiable risk factors.  Patient's hemoglobin A1c was elevated at 5.8.    CARDIOVASCULAR: The patient has A. fib in today's visit.  That have not manifested since 2016.  Today based on [id number] score is 2.5 we started him on apixaban 5 mg twice daily.  I also increase his metoprolol to 25 mg twice daily.  I order an echocardiogram.  I refer him for a cardiology consult within the next week for further evaluation and management.    - Hx of A fib with RVR (new 12/24/16); resolved with dilt,  now on Metoprolol 12.5mg BID.     ENDOCRINE: following with Dr Guevara for thyroid dysfunction with Graves' disease.  He does not seem thyrotoxic today but we cannot rule that out.  So TSH and free T4 will be measured.  - Hx Graves Disease s/p VELA therapy. Cont synthroid and liothyronine. TSH and Free T3 low; Free T4 elevated. Mixed picture; After endocrine consult:  Mild thyrotoxicosis due to large weight loss. On levothyroxine  - On selenium supplement, T3 and T4.    General health care: He is now following regularly with his PCP which is appropriate.  Advised him it would be reasonable to recheck his PSA through his PCP which he plans to do within the next few weeks.        Plan: No plan follow-up in BMT  Start apixaban 5 mg twice daily  Cardiology consult within a week  Echocardiogram  Repeat TSH with reflex T4    Jermaine Cabrera MD on 12/16/2020 at 5:59 PM    60 minutes spent on the date of the encounter doing chart review, history and exam, documentation and further activities per the note            Again, thank you for allowing me to participate in the care of your patient.        Sincerely,        Jermaine Cabrera MD

## 2021-12-15 NOTE — NURSING NOTE
Chief Complaint   Patient presents with     Labs Only     venipuncture, vitals checked     Kemi Greene RN on 12/15/2021 at 8:56 AM

## 2021-12-16 LAB — T4 FREE SERPL-MCNC: 1.07 NG/DL (ref 0.76–1.46)

## 2021-12-17 NOTE — TELEPHONE ENCOUNTER
RECORDS RECEIVED FROM:   DATE RECEIVED:   NOTES STATUS DETAILS   OFFICE NOTE from referring provider    Internal Dr. Cabrera 12-15-21   OFFICE NOTE from other cardiologist    N/A    DISCHARGE SUMMARY from hospital    N/A    DISCHARGE REPORT from the ER   N/A    OPERATIVE REPORT    N/A    MEDICATION LIST   Internal    LABS     BMP   Internal 12-15-21   CBC   Internal 12-15-21   CMP   Internal 12-15-20   Lipids   Internal 3-31-21   TSH   Internal 12-15-21   DIAGNOSTIC PROCEDURES     EKG   Internal 12-15-21   Monitor Reports   N/A    IMAGING (DISC & REPORT)      Echo   N/A    Stress Tests   N/A    Cath   N/A    MRI/MRA   N/A    CT/CTA   N/A

## 2021-12-20 LAB
ATRIAL RATE - MUSE: 113 BPM
DIASTOLIC BLOOD PRESSURE - MUSE: NORMAL MMHG
INTERPRETATION ECG - MUSE: NORMAL
P AXIS - MUSE: NORMAL DEGREES
PR INTERVAL - MUSE: NORMAL MS
QRS DURATION - MUSE: 94 MS
QT - MUSE: 326 MS
QTC - MUSE: 468 MS
R AXIS - MUSE: 62 DEGREES
SYSTOLIC BLOOD PRESSURE - MUSE: NORMAL MMHG
T AXIS - MUSE: 31 DEGREES
VENTRICULAR RATE- MUSE: 124 BPM

## 2021-12-21 ENCOUNTER — TELEPHONE (OUTPATIENT)
Dept: ENDOCRINOLOGY | Facility: CLINIC | Age: 56
End: 2021-12-21
Payer: COMMERCIAL

## 2021-12-21 DIAGNOSIS — I73.00 RAYNAUD'S PHENOMENON WITHOUT GANGRENE: ICD-10-CM

## 2021-12-21 DIAGNOSIS — I48.0 PAROXYSMAL ATRIAL FIBRILLATION (H): Primary | ICD-10-CM

## 2021-12-21 RX ORDER — LEVOTHYROXINE SODIUM 137 UG/1
137 TABLET ORAL DAILY
Qty: 90 TABLET | Refills: 3 | Status: SHIPPED | OUTPATIENT
Start: 2021-12-21 | End: 2022-12-14

## 2021-12-21 NOTE — TELEPHONE ENCOUNTER
Will adjust his levothyroxine slightly downwards - unlikely thyroid is reason for a-fib    Patient to be on levothyroxine 137 mcg daily and Cytomel 5 mcg twice daily (previously on levothyroxine 150 mcg daily and Cytomel 5 mcg  Twice daily)-recheck labs in 2 months.      Called pt - pt not in  - message left.     ----- Message from Jermaine Cabrera MD sent at 12/16/2021  9:06 AM CST -----  Pattie, I saw him yesterday and he was in afib. I put him on apixaban, increased the metoprolol,ordered echo and sent him to cards. His TSH was low end of normal (0.61). I added T4 and total t3 to labs today that are pending. Wonder if you think this may have to do with his thyroid/Graves. I discharged him from BMT, but he has primary care. Regards, CB

## 2021-12-22 ENCOUNTER — TELEPHONE (OUTPATIENT)
Dept: ENDOCRINOLOGY | Facility: CLINIC | Age: 56
End: 2021-12-22
Payer: COMMERCIAL

## 2021-12-22 RX ORDER — NIFEDIPINE 30 MG
30 TABLET, EXTENDED RELEASE ORAL DAILY
Qty: 30 TABLET | Refills: 3 | Status: SHIPPED | OUTPATIENT
Start: 2021-12-22 | End: 2023-02-14

## 2021-12-22 NOTE — TELEPHONE ENCOUNTER
----- Message from aPttie Guevara MD sent at 12/21/2021  4:24 PM CST -----  Please review my most recent note-I called and left a message.  We will have him reduce his levothyroxine to 137 mcg daily-prescription sent to pharmacy.  Patient to continue with Cytomel 5 mcg twice daily-patient to recheck labs in 2 months

## 2021-12-22 NOTE — TELEPHONE ENCOUNTER
Attempt to reach Pt via home phone. No answer, unable to leave message.     LVM on Mobile phone with review and recommendations from Sohan Shelby message sent.   Irene Landeros RN on 12/22/2021 at 8:18 AM

## 2021-12-22 NOTE — TELEPHONE ENCOUNTER
Received refill request for nifedipine 30 mg daily during winter months as the resident on call. Reviewed patient's chart and attached communication. Patient last seen 6/8/21 for skin check. RTC not yet scheduled. After reviewing the medication list and assessment and plan from last visit and prior visits, the refill request was accepted. Patient should continue to have his blood pressure checked at least once monthly while he is taking this medication, though he has had no signs or symptoms of hypotension to date    The patient's information will be forwarded to the attending physician and clinic pool to be scheduled for follow up..    Sergio Goode MD

## 2021-12-22 NOTE — TELEPHONE ENCOUNTER
NIFEdipine ER (ADALAT CC) 30 MG 24 hr tablet      Last Written Prescription Date:  3-  Last Fill Quantity: 30,   # refills: 1  Last Office Visit : 6-8-2021  Future Office visit:  NONE    Routing refill request to provider for review/approval because:  Not on protocol.        Kathleen M Doege RN

## 2021-12-24 ENCOUNTER — PRE VISIT (OUTPATIENT)
Dept: CARDIOLOGY | Facility: CLINIC | Age: 56
End: 2021-12-24
Payer: COMMERCIAL

## 2021-12-24 ENCOUNTER — OFFICE VISIT (OUTPATIENT)
Dept: CARDIOLOGY | Facility: CLINIC | Age: 56
End: 2021-12-24
Attending: INTERNAL MEDICINE
Payer: COMMERCIAL

## 2021-12-24 VITALS
DIASTOLIC BLOOD PRESSURE: 70 MMHG | SYSTOLIC BLOOD PRESSURE: 106 MMHG | HEART RATE: 67 BPM | WEIGHT: 219 LBS | HEIGHT: 67 IN | BODY MASS INDEX: 34.37 KG/M2 | OXYGEN SATURATION: 98 %

## 2021-12-24 DIAGNOSIS — I48.91 ATRIAL FIBRILLATION, UNSPECIFIED TYPE (H): Primary | ICD-10-CM

## 2021-12-24 DIAGNOSIS — Z94.81 STATUS POST BONE MARROW TRANSPLANT (H): ICD-10-CM

## 2021-12-24 PROCEDURE — 93005 ELECTROCARDIOGRAM TRACING: CPT

## 2021-12-24 PROCEDURE — G0463 HOSPITAL OUTPT CLINIC VISIT: HCPCS | Mod: 25

## 2021-12-24 PROCEDURE — 99205 OFFICE O/P NEW HI 60 MIN: CPT | Performed by: INTERNAL MEDICINE

## 2021-12-24 RX ORDER — METOPROLOL TARTRATE 50 MG
50 TABLET ORAL 2 TIMES DAILY
Qty: 180 TABLET | Refills: 3 | Status: SHIPPED | OUTPATIENT
Start: 2021-12-24 | End: 2023-04-21

## 2021-12-24 ASSESSMENT — ENCOUNTER SYMPTOMS
ORTHOPNEA: 0
HYPERTENSION: 0
SLEEP DISTURBANCES DUE TO BREATHING: 0
HYPOTENSION: 0
SWOLLEN GLANDS: 0
SYNCOPE: 0
LIGHT-HEADEDNESS: 0
PALPITATIONS: 0
BRUISES/BLEEDS EASILY: 1
EXERCISE INTOLERANCE: 0
LEG PAIN: 0

## 2021-12-24 ASSESSMENT — PAIN SCALES - GENERAL: PAINLEVEL: NO PAIN (0)

## 2021-12-24 ASSESSMENT — MIFFLIN-ST. JEOR: SCORE: 1787.01

## 2021-12-24 NOTE — PROGRESS NOTES
SUBJECTIVE:  Panchito Pierce is a 56 year old male who presents for evaluation and management of atrial fibrillation.  Patient with CML.  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  Patient had one episode of atrial fibrillation in 2016.  This reverted to sinus rhythm on diltiazem drip.  Since then patient had no recurrence.  During recent office visit her oncologist noticed irregular heartbeat.  EKG taken showed atrial fibrillation with rapid ventricular response.  He was started on apixaban and metoprolol 25 mg twice a day.  Patient here for further evaluation and management.  Patient is unaware of atrial fibrillation.  No symptoms.  It appears that he drinks quite a bit.  This may be partly responsible for the atrial fibrillation.  He has no history of hypertension or diabetes.  No prior CVA/TIA.  No vascular disease.  No heart failure history.  Overall he is [id number] Vasc score is 0.  Patient is on nifedipine ER which was started for Raynaud's by his dermatologist.    Patient Active Problem List    Diagnosis Date Noted     Neoplasm of uncertain behavior of skin 07/03/2021     Priority: Medium     Xerosis of skin 07/03/2021     Priority: Medium     Arthropod bite, initial encounter 11/08/2019     Priority: Medium     Raynaud's phenomenon without gangrene 08/30/2017     Priority: Medium     Seborrheic keratoses, inflamed 06/14/2017     Priority: Medium     Chronic dermatitis of hands 06/14/2017     Priority: Medium     Hypogonadism male 12/01/2016     Priority: Medium     Fatigue, unspecified type 12/01/2016     Priority: Medium     Hyperthyroidism 05/31/2016     Priority: Medium     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  Current Outpatient Medications   Medication Sig     betamethasone dipropionate (DIPROSONE) 0.05 % external ointment Apply to bug bite reactions, and you can cover with bandaid     fenofibrate micronized (LOFIBRA) 67 MG capsule Take 1 capsule by mouth once daily with a meal.     levothyroxine (SYNTHROID/LEVOTHROID) 137 MCG tablet Take 1 tablet (137 mcg) by mouth daily     liothyronine (CYTOMEL) 5 MCG tablet Take 1 tablet (5 mcg) by mouth 2 times daily     metoprolol tartrate (LOPRESSOR) 25 MG tablet Take 1 tablet (25 mg) by mouth 2 times daily     mupirocin (BACTROBAN) 2 % external ointment Use 1 to 2 times a day to sores on face and arms.     NIFEdipine ER (ADALAT CC) 30 MG 24 hr tablet Take 1 tablet (30 mg) by mouth daily *DURING WINTER MONTHS TO PREVENT FLARES. Check BP once a month while taking     OYSTER SHELL CALCIUM + D3 500-400 MG-UNIT TABS TAKE TWO TABLETS BY MOUTH DAILY      pantoprazole (PROTONIX) 40 MG EC tablet Take 1 tablet (40 mg) by mouth daily     potassium 99 MG TABS      Selenium 100 MCG CAPS 1 tablet daily     sildenafil (VIAGRA) 25 MG tablet take 1-2 tablets by mouth 1 hour before needed (do not exceed 5 tablets in 24 hours)     acyclovir (ZOVIRAX) 400 MG tablet Take 2 tablets (800 mg) by mouth 2 times daily (Patient not taking: Reported on 7/5/2019)     apixaban ANTICOAGULANT (ELIQUIS) 5 MG tablet Take 1 tablet (5 mg) by mouth 2 times daily (Patient not taking: Reported on 12/24/2021)     No current facility-administered medications for this visit.     Past Medical History:   Diagnosis Date     Graves disease 1/1/2003    treated with radioiodine, meds T3 and T4     Murmur, heart     has not been fully evaluated     Past Surgical History:   Procedure Laterality Date     ------------OTHER------------- Left 1/1/1985    left hand graft flap- work accident     ------------OTHER------------- Right 1/1/1985    right foot graft-  work accident     ESOPHAGOSCOPY, GASTROSCOPY, DUODENOSCOPY (EGD), COMBINED N/A 3/23/2016    Procedure: COMBINED ESOPHAGOSCOPY, GASTROSCOPY, DUODENOSCOPY (EGD), BIOPSY SINGLE OR MULTIPLE;  Surgeon: Jay Tracy MD;  Location:  GI     Allergies   Allergen Reactions     Allopurinol Rash     Unclear if it was from allopurinol, but went away when it was stopped.  But also had steroid cream at the same time.     Social History     Socioeconomic History     Marital status:      Spouse name: Nu     Number of children: 4     Years of education: Not on file     Highest education level: Not on file   Occupational History     Not on file   Tobacco Use     Smoking status: Former Smoker     Packs/day: 0.50     Years: 30.00     Pack years: 15.00     Start date: 1985     Quit date: 2015     Years since quittin.4     Smokeless tobacco: Never Used   Substance and Sexual Activity     Alcohol use: Yes     Comment: once a week     Drug use: No     Sexual activity: Not on file     Comment:    Other Topics Concern     Parent/sibling w/ CABG, MI or angioplasty before 65F 55M? Not Asked   Social History Narrative     to wife Nu.    4 children: 1 step daughter Ro age 27, 3 biological sons, 19 yo Jose L, 15 yo Sergio (Asbergers), 11 yo Elvis    Lives in [place]    Used to work in clerical work, now in school for electrical engineering    11 brothers and sisters, 3 live in the [facility], 1 brother incarcerated, 1 brother hepatitis C     Social Determinants of Health     Financial Resource Strain: Not on file   Food Insecurity: Not on file   Transportation Needs: Not on file   Physical Activity: Not on file   Stress: Not on file   Social Connections: Not on file   Intimate Partner Violence: Not on file   Housing Stability: Not on file     Family History   Problem Relation Age of Onset     Thyroid Disease Mother      Lung Cancer Mother      Prostate Cancer Father      Dementia Father       "Heart Murmur Father      Hepatitis Brother      Asthma Son      Thyroid Disease Sister      Thyroid Disease Sister      Thyroid Disease Sister      Heart Murmur Brother      Melanoma No family hx of      Skin Cancer No family hx of           REVIEW OF SYSTEMS:  General: negative, fever, chills, night sweats  Skin: negative, acne, rash and scaling  Eyes: negative, double vision, eye pain and photophobia  Ears/Nose/Throat: negative, nasal congestion and purulent rhinorrhea  Respiratory: No dyspnea on exertion, No cough, No hemoptysis and negative  Cardiovascular: negative, palpitations, tachycardia, irregular heart beat, chest pain, exertional chest pain or pressure, paroxysmal nocturnal dyspnea, dyspnea on exertion and orthopnea       OBJECTIVE:  Blood pressure 106/70, pulse 67, height 1.702 m (5' 7\"), weight 99.3 kg (219 lb), SpO2 98 %.  General Appearance: healthy, alert, active and no distress  Head: Normocephalic. No masses, lesions, tenderness or abnormalities  Eyes: conjuctiva clear, PERRL, EOM intact  Ears: External ears normal. Canals clear. TM's normal.  Nose: Nares normal  Mouth: normal  Neck: Supple, no cervical adenopathy, no thyromegaly  Lungs: clear to auscultation  Cardiac: Afib, S1 and S2, no murmur       ASSESSMENT/PLAN:  Patient here for evaluation and management of atrial fibrillation.  Patient with CML.  Status post bone marrow transplant.  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  He had one episode of atrial fibrillation in 2016 which reverted to sinus rhythm spontaneously on diltiazem drip.  Recent office visit oncologist noticed irregular heartbeat and an EKG was requested there showed A. fib with RVR with a rate of 120 bpm.  He was started on apixaban and metoprolol 25 mg twice a day.  Patient did not start apixaban yet.  Patient is totally unaware of atrial fibrillation.  No symptoms related to atrial fibrillation.    An EKG was done today at the clinic this showed a rate of 110 bpm atrial " fibrillation.  Discussed atrial fibrillation and complications associated with atrial fibrillation including stroke.  Patient's stroke risk score is 0.  Discussed treatment options.  Rate control alone till age 66, as he is totally asymptomatic.  But considering his age though he is asymptomatic this may not be ideal.  So the next option is starting him on apixaban for a short course and after 6 weeks we will repeat the EKG.  If patient is still in atrial fibrillation we will plan for cardioversion and continuing apixaban for another 4 to 6 weeks.  Also will increase the dose of metoprolol to 50 mg twice a day.  This plan was acceptable to patient.  So I will check the EKG at 6 weeks and if patient is still in atrial fibrillation we will plan for cardioversion.  Also discussed the option of ablation but as patient is totally asymptomatic he is not a candidate for ablation at this time.  Get the echocardiogram that has been ordered.  Patient will get an EKG as planned in 6 weeks time.  Advised no alcohol consumption during anticoagulation therapy.  Per orders.   Return to Clinic 6 weeks.  Total visit duration 60 minutes.  This include face-to-face interview, physical exam, chart review, review of EKGs and documentation.  Answers for HPI/ROS submitted by the patient on 12/24/2021  General Symptoms: No  Skin Symptoms: No  HENT Symptoms: No  EYE SYMPTOMS: No  HEART SYMPTOMS: Yes  LUNG SYMPTOMS: No  INTESTINAL SYMPTOMS: No  URINARY SYMPTOMS: No  REPRODUCTIVE SYMPTOMS: No  SKELETAL SYMPTOMS: No  BLOOD SYMPTOMS: Yes  NERVOUS SYSTEM SYMPTOMS: No  MENTAL HEALTH SYMPTOMS: No  Chest pain or pressure: No  Fast or irregular heartbeat: No  Pain in legs with walking: No  Trouble breathing while lying down: No  Fingers or toes appear blue: No  High blood pressure: No  Low blood pressure: No  Fainting: No  Murmurs: No  Pacemaker: No  Varicose veins: No  Wake up at night with shortness of breath: No  Light-headedness: No  Exercise  intolerance: No  Edema or swelling: No  Anemia: No  Swollen glands: No  Easy bleeding or bruising: Yes

## 2021-12-24 NOTE — LETTER
12/24/2021      RE: Panchito Pierce  [address]  [place] [address]       Dear Colleague,    Thank you for the opportunity to participate in the care of your patient, Panchito Pierce, at the [facility] [place] [facility]. Please see a copy of my visit note below.       SUBJECTIVE:  Panchito Pierce is a 56 year old male who presents for evaluation and management of atrial fibrillation.  Patient with CML.  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  Patient had one episode of atrial fibrillation in 2016.  This reverted to sinus rhythm on diltiazem drip.  Since then patient had no recurrence.  During recent office visit her oncologist noticed irregular heartbeat.  EKG taken showed atrial fibrillation with rapid ventricular response.  He was started on apixaban and metoprolol 25 mg twice a day.  Patient here for further evaluation and management.  Patient is unaware of atrial fibrillation.  No symptoms.  It appears that he drinks quite a bit.  This may be partly responsible for the atrial fibrillation.  He has no history of hypertension or diabetes.  No prior CVA/TIA.  No vascular disease.  No heart failure history.  Overall he is [id number] Vasc score is 0.  Patient is on nifedipine ER which was started for Raynaud's by his dermatologist.    Patient Active Problem List    Diagnosis Date Noted     Neoplasm of uncertain behavior of skin 07/03/2021     Priority: Medium     Xerosis of skin 07/03/2021     Priority: Medium     Arthropod bite, initial encounter 11/08/2019     Priority: Medium     Raynaud's phenomenon without gangrene 08/30/2017     Priority: Medium     Seborrheic keratoses, inflamed 06/14/2017     Priority: Medium     Chronic dermatitis of hands 06/14/2017     Priority: Medium     Hypogonadism male 12/01/2016     Priority: Medium     Fatigue, unspecified type 12/01/2016     Priority: Medium     Hyperthyroidism 05/31/2016      Priority: Medium     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  Current Outpatient Medications   Medication Sig     betamethasone dipropionate (DIPROSONE) 0.05 % external ointment Apply to bug bite reactions, and you can cover with bandaid     fenofibrate micronized (LOFIBRA) 67 MG capsule Take 1 capsule by mouth once daily with a meal.     levothyroxine (SYNTHROID/LEVOTHROID) 137 MCG tablet Take 1 tablet (137 mcg) by mouth daily     liothyronine (CYTOMEL) 5 MCG tablet Take 1 tablet (5 mcg) by mouth 2 times daily     metoprolol tartrate (LOPRESSOR) 25 MG tablet Take 1 tablet (25 mg) by mouth 2 times daily     mupirocin (BACTROBAN) 2 % external ointment Use 1 to 2 times a day to sores on face and arms.     NIFEdipine ER (ADALAT CC) 30 MG 24 hr tablet Take 1 tablet (30 mg) by mouth daily *DURING WINTER MONTHS TO PREVENT FLARES. Check BP once a month while taking     OYSTER SHELL CALCIUM + D3 500-400 MG-UNIT TABS TAKE TWO TABLETS BY MOUTH DAILY      pantoprazole (PROTONIX) 40 MG EC tablet Take 1 tablet (40 mg) by mouth daily     potassium 99 MG TABS      Selenium 100 MCG CAPS 1 tablet daily     sildenafil (VIAGRA) 25 MG tablet take 1-2 tablets by mouth 1 hour before needed (do not exceed 5 tablets in 24 hours)     acyclovir (ZOVIRAX) 400 MG tablet Take 2 tablets (800 mg) by mouth 2 times daily (Patient not taking: Reported on 7/5/2019)     apixaban ANTICOAGULANT (ELIQUIS) 5 MG tablet Take 1 tablet (5 mg) by mouth 2 times daily (Patient not taking: Reported on 12/24/2021)     No current facility-administered medications for this visit.     Past Medical History:   Diagnosis Date      Graves disease 2003    treated with radioiodine, meds T3 and T4     Murmur, heart     has not been fully evaluated     Past Surgical History:   Procedure Laterality Date     ------------OTHER------------- Left 1985    left hand graft flap- work accident     ------------OTHER------------- Right 1985    right foot graft- work accident     ESOPHAGOSCOPY, GASTROSCOPY, DUODENOSCOPY (EGD), COMBINED N/A 3/23/2016    Procedure: COMBINED ESOPHAGOSCOPY, GASTROSCOPY, DUODENOSCOPY (EGD), BIOPSY SINGLE OR MULTIPLE;  Surgeon: Jay Tracy MD;  Location:  GI     Allergies   Allergen Reactions     Allopurinol Rash     Unclear if it was from allopurinol, but went away when it was stopped.  But also had steroid cream at the same time.     Social History     Socioeconomic History     Marital status:      Spouse name: Nu     Number of children: 4     Years of education: Not on file     Highest education level: Not on file   Occupational History     Not on file   Tobacco Use     Smoking status: Former Smoker     Packs/day: 0.50     Years: 30.00     Pack years: 15.00     Start date: 1985     Quit date: 2015     Years since quittin.4     Smokeless tobacco: Never Used   Substance and Sexual Activity     Alcohol use: Yes     Comment: once a week     Drug use: No     Sexual activity: Not on file     Comment:    Other Topics Concern     Parent/sibling w/ CABG, MI or angioplasty before 65F 55M? Not Asked   Social History Narrative     to wife Nu.    4 children: 1 step daughter Ro age 27, 3 biological sons, 17 yo Jose L, 15 yo Sergio (Asbergers), 13 yo Elvis    Lives in [place]    Used to work in clerical work, now in school for electrical engineering    11 brothers and sisters, 3 live in the [facility], 1 brother incarcerated, 1 brother hepatitis C     Social Determinants of Health     Financial Resource Strain: Not on file   Food Insecurity: Not on file   Transportation  "Needs: Not on file   Physical Activity: Not on file   Stress: Not on file   Social Connections: Not on file   Intimate Partner Violence: Not on file   Housing Stability: Not on file     Family History   Problem Relation Age of Onset     Thyroid Disease Mother      Lung Cancer Mother      Prostate Cancer Father      Dementia Father      Heart Murmur Father      Hepatitis Brother      Asthma Son      Thyroid Disease Sister      Thyroid Disease Sister      Thyroid Disease Sister      Heart Murmur Brother      Melanoma No family hx of      Skin Cancer No family hx of           REVIEW OF SYSTEMS:  General: negative, fever, chills, night sweats  Skin: negative, acne, rash and scaling  Eyes: negative, double vision, eye pain and photophobia  Ears/Nose/Throat: negative, nasal congestion and purulent rhinorrhea  Respiratory: No dyspnea on exertion, No cough, No hemoptysis and negative  Cardiovascular: negative, palpitations, tachycardia, irregular heart beat, chest pain, exertional chest pain or pressure, paroxysmal nocturnal dyspnea, dyspnea on exertion and orthopnea       OBJECTIVE:  Blood pressure 106/70, pulse 67, height 1.702 m (5' 7\"), weight 99.3 kg (219 lb), SpO2 98 %.  General Appearance: healthy, alert, active and no distress  Head: Normocephalic. No masses, lesions, tenderness or abnormalities  Eyes: conjuctiva clear, PERRL, EOM intact  Ears: External ears normal. Canals clear. TM's normal.  Nose: Nares normal  Mouth: normal  Neck: Supple, no cervical adenopathy, no thyromegaly  Lungs: clear to auscultation  Cardiac: Afib, S1 and S2, no murmur       ASSESSMENT/PLAN:  Patient here for evaluation and management of atrial fibrillation.  Patient with CML.  Status post bone marrow transplant.  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  He had one episode of atrial fibrillation in 2016 which reverted to sinus rhythm spontaneously on diltiazem drip.  Recent office visit oncologist noticed irregular heartbeat and an EKG was requested " there showed A. fib with RVR with a rate of 120 bpm.  He was started on apixaban and metoprolol 25 mg twice a day.  Patient did not start apixaban yet.  Patient is totally unaware of atrial fibrillation.  No symptoms related to atrial fibrillation.    An EKG was done today at the clinic this showed a rate of 110 bpm atrial fibrillation.  Discussed atrial fibrillation and complications associated with atrial fibrillation including stroke.  Patient's stroke risk score is 0.  Discussed treatment options.  Rate control alone till age 66, as he is totally asymptomatic.  But considering his age though he is asymptomatic this may not be ideal.  So the next option is starting him on apixaban for a short course and after 6 weeks we will repeat the EKG.  If patient is still in atrial fibrillation we will plan for cardioversion and continuing apixaban for another 4 to 6 weeks.  Also will increase the dose of metoprolol to 50 mg twice a day.  This plan was acceptable to patient.  So I will check the EKG at 6 weeks and if patient is still in atrial fibrillation we will plan for cardioversion.  Also discussed the option of ablation but as patient is totally asymptomatic he is not a candidate for ablation at this time.  Get the echocardiogram that has been ordered.  Patient will get an EKG as planned in 6 weeks time.  Advised no alcohol consumption during anticoagulation therapy.  Per orders.   Return to Clinic 6 weeks.  Total visit duration 60 minutes.  This include face-to-face interview, physical exam, chart review, review of EKGs and documentation.  Answers for HPI/ROS submitted by the patient on 12/24/2021  General Symptoms: No  Skin Symptoms: No  HENT Symptoms: No  EYE SYMPTOMS: No  HEART SYMPTOMS: Yes  LUNG SYMPTOMS: No  INTESTINAL SYMPTOMS: No  URINARY SYMPTOMS: No  REPRODUCTIVE SYMPTOMS: No  SKELETAL SYMPTOMS: No  BLOOD SYMPTOMS: Yes  NERVOUS SYSTEM SYMPTOMS: No  MENTAL HEALTH SYMPTOMS: No  Chest pain or pressure:  No  Fast or irregular heartbeat: No  Pain in legs with walking: No  Trouble breathing while lying down: No  Fingers or toes appear blue: No  High blood pressure: No  Low blood pressure: No  Fainting: No  Murmurs: No  Pacemaker: No  Varicose veins: No  Wake up at night with shortness of breath: No  Light-headedness: No  Exercise intolerance: No  Edema or swelling: No  Anemia: No  Swollen glands: No  Easy bleeding or bruising: Yes      Please do not hesitate to contact me if you have any questions/concerns.     Sincerely,     AMBER Lawler MD

## 2021-12-24 NOTE — NURSING NOTE
Chief Complaint   Patient presents with     New Patient     post BMT     Vitals were taken and medications were reconciled.   ERMELINDA Pugh  9:35 AM

## 2021-12-25 LAB
ATRIAL RATE - MUSE: 127 BPM
DIASTOLIC BLOOD PRESSURE - MUSE: NORMAL MMHG
INTERPRETATION ECG - MUSE: NORMAL
P AXIS - MUSE: NORMAL DEGREES
PR INTERVAL - MUSE: NORMAL MS
QRS DURATION - MUSE: 98 MS
QT - MUSE: 348 MS
QTC - MUSE: 470 MS
R AXIS - MUSE: 56 DEGREES
SYSTOLIC BLOOD PRESSURE - MUSE: NORMAL MMHG
T AXIS - MUSE: 25 DEGREES
VENTRICULAR RATE- MUSE: 110 BPM

## 2022-01-09 ENCOUNTER — HEALTH MAINTENANCE LETTER (OUTPATIENT)
Age: 57
End: 2022-01-09

## 2022-03-09 ENCOUNTER — TELEPHONE (OUTPATIENT)
Dept: ENDOCRINOLOGY | Facility: CLINIC | Age: 57
End: 2022-03-09

## 2022-03-09 NOTE — TELEPHONE ENCOUNTER
----- Message from Pattie Guevara MD sent at 7/16/2021 11:15 PM CDT -----  Patient had follow-up with me in July 2022

## 2022-03-16 NOTE — TELEPHONE ENCOUNTER
LVM for patient to schedule return endocrine appointment with Dr Guevara. Had to call secondary number as primary VM doesn't work.

## 2022-03-19 ENCOUNTER — PATIENT OUTREACH (OUTPATIENT)
Dept: DERMATOLOGY | Facility: CLINIC | Age: 57
End: 2022-03-19

## 2022-03-19 NOTE — CONFIDENTIAL NOTE
Attempted to reach patient to schedule follow up in the Dermatology Clinic.  No answer,  LM on VM to call office and Letter mailed.    Schedule with Dr. Thompson Landeros.

## 2022-03-19 NOTE — LETTER
March 19, 2022          Panchito Pierce  [address] [place] [address]  [place] [address]        Dear Panchito Pierce:    We recently reviewed your medical records from [facility] and found that you are due for an appointment with Dr. Thompson Landeros.     At your earliest convenience, please call the clinic at [phone] to arrange the recommended exam and possible testing.  Please kindly mention this letter when calling so that your appointment(s) can be accurately scheduled.      Sincerely,       The Clinic Staff        Medical Record Number:  [id number]

## 2022-03-26 ENCOUNTER — PATIENT OUTREACH (OUTPATIENT)
Dept: SURGERY | Facility: CLINIC | Age: 57
End: 2022-03-26
Payer: COMMERCIAL

## 2022-03-26 NOTE — LETTER
March 26, 2022          Panchito Pierce  [address] [place] [address]  [place] [address]        Dear Panchito Pierce:    We recently reviewed your medical records from [facility] and found that you are due for an appointment with Dr. Thompson Landeros.  Our office has attempted to reach you via telephone and left a message.    At your earliest convenience, please call the clinic at [phone] to arrange the recommended exam and possible testing.  Please kindly mention this letter when calling so that your appointment(s) can be accurately scheduled.      Sincerely,       The Clinic Staff        Medical Record Number:  [id number]

## 2022-03-26 NOTE — PROGRESS NOTES
Attempted to reach patient to schedule follow up in the Dermatology Clinic.  No answer,   and Letter mailed.    Schedule with Dr. Thompson Landeros.

## 2022-04-19 ENCOUNTER — TELEPHONE (OUTPATIENT)
Dept: ENDOCRINOLOGY | Facility: CLINIC | Age: 57
End: 2022-04-19
Payer: COMMERCIAL

## 2022-04-19 NOTE — LETTER
Patient:  Panchito Pierce  :   1965  MRN:     [id number]        Mr.Paul RACHANA Pierce  [address] [facility] [address]        2022    Dear ,    I see that you do not have a follow-up appointment in endocrinology. I would recommend that you be evaluated by an endocrinologist to minimize complications. If you like to be seen at the [facility], please call [phone] select option #1 for an appointment.    Regards    Pattie Guevara MD

## 2022-08-13 DIAGNOSIS — Z94.81 STATUS POST BONE MARROW TRANSPLANT (H): ICD-10-CM

## 2022-08-13 DIAGNOSIS — C92.10 CML (CHRONIC MYELOCYTIC LEUKEMIA) (H): ICD-10-CM

## 2022-08-13 DIAGNOSIS — E05.00 GRAVES DISEASE: ICD-10-CM

## 2022-08-13 DIAGNOSIS — I48.0 PAROXYSMAL ATRIAL FIBRILLATION (H): ICD-10-CM

## 2022-08-13 DIAGNOSIS — K59.00 CONSTIPATION, UNSPECIFIED CONSTIPATION TYPE: ICD-10-CM

## 2022-08-13 DIAGNOSIS — F43.21 ADJUSTMENT DISORDER WITH DEPRESSED MOOD: ICD-10-CM

## 2022-08-16 RX ORDER — PANTOPRAZOLE SODIUM 40 MG/1
40 TABLET, DELAYED RELEASE ORAL DAILY
Qty: 90 TABLET | Refills: 0 | Status: SHIPPED | OUTPATIENT
Start: 2022-08-16 | End: 2022-12-08

## 2022-08-16 NOTE — TELEPHONE ENCOUNTER
Last Clinic Visit: 7/16/2021  [facility] [place]  Recommended 1 year follow up   No upcoming appointments  90 day refill per protocol, routed to clinic scheduling for follow up

## 2022-08-17 ENCOUNTER — TELEPHONE (OUTPATIENT)
Dept: ENDOCRINOLOGY | Facility: CLINIC | Age: 57
End: 2022-08-17

## 2022-08-17 NOTE — LETTER
Patient:  Panchito Pierce  :   1965  MRN:     [id number]        Mr.Paul RACHANA Pierce  [address] [place] [address]        2022    Dear ,    I see that you do not have a follow-up appointment in endocrinology. I would recommend that you be evaluated by an endocrinologist to minimize complications. If you like to be seen at the [facility], please call [phone] select option #1 for an appointment.    Regards    Pattie Guevara MD

## 2022-08-24 NOTE — TELEPHONE ENCOUNTER
Final attempt: Called pt 08/24/2022 for pt to schedule overdue follow up with Dr. Guevara. VM Full. Letter sent. MAXIMUM NUMBER OF ATTEMPTS REACHED.

## 2022-09-18 DIAGNOSIS — E05.90 HYPERTHYROIDISM: ICD-10-CM

## 2022-09-20 RX ORDER — LIOTHYRONINE SODIUM 5 UG/1
5 TABLET ORAL 2 TIMES DAILY
Qty: 180 TABLET | Refills: 0 | Status: SHIPPED | OUTPATIENT
Start: 2022-09-20 | End: 2023-01-16

## 2022-09-20 NOTE — TELEPHONE ENCOUNTER
Last Clinic Visit: 7/16/2021  [facility] [place]  Recommended 1 year follow up  No upcoming appointments  90 day refill per protocol, routed to clinic scheduling for follow up

## 2022-09-20 NOTE — TELEPHONE ENCOUNTER
"Attempted to call -  says \"memory full\" - sent Urtak 9/20/2022 for pt to c/back to schedule next available f/up with Dr. Guevara.   "

## 2022-09-22 NOTE — TELEPHONE ENCOUNTER
Attempted to call 9/22/22 -  memory full - pt has not read [employer] message. Will attempt again. Pt to schedule f/up w/ Dr. Guevara.

## 2022-09-26 NOTE — TELEPHONE ENCOUNTER
Final attempt: LVM 09/26/2022 for pt to c/b to schedule appointment with Dr. Guevara. Letter sent. MAXIMUM NUMBER OF ATTEMPTS REACHED.

## 2022-11-11 DIAGNOSIS — I73.00 RAYNAUD'S PHENOMENON WITHOUT GANGRENE: ICD-10-CM

## 2022-11-15 RX ORDER — NIFEDIPINE 30 MG
30 TABLET, EXTENDED RELEASE ORAL DAILY
Qty: 30 TABLET | Refills: 0 | OUTPATIENT
Start: 2022-11-15

## 2022-11-15 NOTE — TELEPHONE ENCOUNTER
NIFEdipine ER Oral Tablet Extended Release 24 Hour 30 MG   Last Written Prescription Date:  12/22/2021  Last Fill Quantity: 30,   # refills: 3  Last Office Visit :  6/8/2021  Future Office visit:  None    Routing refill request to provider for review/approval because:  Second Request  Needing updated office visit  Med refused  Scheduling notified      Emily Campos RN  Central Triage Red Flags/Med Refills

## 2022-11-21 ENCOUNTER — TELEPHONE (OUTPATIENT)
Dept: DERMATOLOGY | Facility: CLINIC | Age: 57
End: 2022-11-21

## 2022-11-21 ENCOUNTER — HEALTH MAINTENANCE LETTER (OUTPATIENT)
Age: 57
End: 2022-11-21

## 2022-11-25 DIAGNOSIS — I73.00 RAYNAUD'S PHENOMENON WITHOUT GANGRENE: ICD-10-CM

## 2022-11-25 NOTE — TELEPHONE ENCOUNTER
NIFEdipine ER 30 MG 24 hr tablet      Last Written Prescription Date:  12-22-21  Last Fill Quantity: 30,   # refills: 3  Last Office Visit : 6-8-21 ( RTC 1 y)  Future Office visit:  4-20-22    Routing refill request to provider for review/approval because:  Med not on protocol  Pt asking for RF til RTC      For additional refills, please schedule a follow-up appointment at [phone]  Per note from scheduling:      From: Bianca Little  Sent: 11/21/2022   1:42 PM CST  To: [facility]  Subject: FW: Appointment Request                          Please see Pt's request to forward to Dr Landeros regarding Pt's medications    Thank you,  Bianca  Referral Specialist      Marcos,     Thank you. I knew I was overdue for the visit and your Team had reached out to me, however between work and multiple family issues - let's just say that I've had a very busy Spring, Summer and Fall. I'm now attempting to get back on track and locking in important dates. So I'll andi my calendar for the April date.     My foremost concern is the continuation of the seasonal Winter prescription Dr. Landeros started me on....  would you please refer this to Dr. Landeros for his review? We started this to encourage blood circulation in my fingers during the extreme [place] cold.     Thank you again, Marcos...have an enjoyable and safe Holiday season.

## 2022-11-29 DIAGNOSIS — I73.00 RAYNAUD'S PHENOMENON WITHOUT GANGRENE: ICD-10-CM

## 2022-12-01 RX ORDER — NIFEDIPINE 30 MG
30 TABLET, EXTENDED RELEASE ORAL DAILY
Qty: 30 TABLET | Refills: 0
Start: 2022-12-01

## 2022-12-06 ENCOUNTER — MYC MEDICAL ADVICE (OUTPATIENT)
Dept: DERMATOLOGY | Facility: CLINIC | Age: 57
End: 2022-12-06

## 2022-12-06 DIAGNOSIS — Z94.81 STATUS POST BONE MARROW TRANSPLANT (H): ICD-10-CM

## 2022-12-06 DIAGNOSIS — F43.21 ADJUSTMENT DISORDER WITH DEPRESSED MOOD: ICD-10-CM

## 2022-12-06 DIAGNOSIS — C92.10 CML (CHRONIC MYELOCYTIC LEUKEMIA) (H): ICD-10-CM

## 2022-12-06 DIAGNOSIS — E05.00 GRAVES DISEASE: ICD-10-CM

## 2022-12-06 DIAGNOSIS — K59.00 CONSTIPATION, UNSPECIFIED CONSTIPATION TYPE: ICD-10-CM

## 2022-12-06 DIAGNOSIS — I48.0 PAROXYSMAL ATRIAL FIBRILLATION (H): ICD-10-CM

## 2022-12-08 RX ORDER — PANTOPRAZOLE SODIUM 40 MG/1
40 TABLET, DELAYED RELEASE ORAL DAILY
Qty: 90 TABLET | Refills: 3 | Status: SHIPPED | OUTPATIENT
Start: 2022-12-08 | End: 2024-01-08

## 2022-12-08 NOTE — TELEPHONE ENCOUNTER
Pantoprazole Sodium Oral Tablet Delayed Release 40 MG  Last Written Prescription Date:   8/16/2022  Last Fill Quantity: 90,   # refills: 0  Last Office Visit :  7/16/2021  Future Office visit:   5/12/2023    Routing refill request to provider for review/approval because:  Gaps in refills  Over due office visit.   Now scheduled for May of next year  Refer to Provider for review and refills per providers orders.       Emily Campos RN  Central Triage Red Flags/Med Refills

## 2022-12-12 ENCOUNTER — TELEPHONE (OUTPATIENT)
Dept: ENDOCRINOLOGY | Facility: CLINIC | Age: 57
End: 2022-12-12

## 2022-12-12 DIAGNOSIS — I48.0 PAROXYSMAL ATRIAL FIBRILLATION (H): ICD-10-CM

## 2022-12-12 NOTE — TELEPHONE ENCOUNTER
Pt had recent labs in December 2022 at [facility]    Cr is 1.2. Hemoglobin A1c 6 0, hemoglobin 15.4, TG at 204, , PSA at 0.79, TSH normal at 0.49

## 2022-12-14 RX ORDER — LEVOTHYROXINE SODIUM 137 UG/1
137 TABLET ORAL DAILY
Qty: 90 TABLET | Refills: 4 | Status: SHIPPED | OUTPATIENT
Start: 2022-12-14 | End: 2023-02-10

## 2022-12-14 NOTE — TELEPHONE ENCOUNTER
Levothyroxine Sodium Oral Tablet 137 MCG  Last Written Prescription Date:   12/21/2021  Last Fill Quantity: 90,   # refills: 3  Last Office Visit :  7/16/2021  Future Office visit:   5/12/2023    Routing refill request to provider for review/approval because:  Gaps in office visit  Refer to Provider for review and refills for Pt care.       Emily Campos RN  Central Triage Red Flags/Med Refills

## 2022-12-18 DIAGNOSIS — I73.00 RAYNAUD'S PHENOMENON WITHOUT GANGRENE: ICD-10-CM

## 2022-12-18 DIAGNOSIS — Z94.81 STATUS POST BONE MARROW TRANSPLANT (H): ICD-10-CM

## 2022-12-19 ENCOUNTER — TRANSFERRED RECORDS (OUTPATIENT)
Dept: HEALTH INFORMATION MANAGEMENT | Facility: CLINIC | Age: 57
End: 2022-12-19

## 2022-12-20 RX ORDER — APIXABAN 5 MG/1
TABLET, FILM COATED ORAL
Qty: 60 TABLET | Refills: 0 | OUTPATIENT
Start: 2022-12-20

## 2022-12-20 RX ORDER — NIFEDIPINE 30 MG
30 TABLET, EXTENDED RELEASE ORAL DAILY
Qty: 30 TABLET | OUTPATIENT
Start: 2022-12-20

## 2022-12-20 NOTE — TELEPHONE ENCOUNTER
NIFEdipine ER Oral Tablet Extended Release 24 Hour 30 MG  Last Written Prescription Date:   12/22/2021  Last Fill Quantity: 30,   # refills: 3  Last Office Visit :  6/8/2021  Future Office visit:   4/20/2023    Routing refill request to provider for review/approval because:  Gaps in refills  Refer to Provider for review and refills per Providers orders for Pt care.       Emily Campos RN  Central Triage Red Flags/Med Refills

## 2022-12-21 NOTE — TELEPHONE ENCOUNTER
Received refill request as MARIANNA. Chart reviewed, refill declined. Patient to request appointment with provider to discuss further as desired. Has not been seen in over a year and it is unclear if this is still indicated. Clinic pool and attending Dr. Landeros CC'd as FYI.    Amanda Martinez MD MPH  PGY4 Medicine/Dermatology Resident

## 2022-12-27 DIAGNOSIS — Z94.81 STATUS POST BONE MARROW TRANSPLANT (H): ICD-10-CM

## 2022-12-27 RX ORDER — APIXABAN 5 MG/1
TABLET, FILM COATED ORAL
Qty: 60 TABLET | Refills: 0 | OUTPATIENT
Start: 2022-12-27

## 2022-12-27 NOTE — TELEPHONE ENCOUNTER
Please contact your primary care provider for refills of this medication. The prescription is no longer managed by Dr Cabrera.

## 2022-12-28 DIAGNOSIS — I48.91 ATRIAL FIBRILLATION, UNSPECIFIED TYPE (H): Primary | ICD-10-CM

## 2022-12-28 DIAGNOSIS — Z94.81 STATUS POST BONE MARROW TRANSPLANT (H): ICD-10-CM

## 2023-01-06 RX ORDER — NIFEDIPINE 30 MG
30 TABLET, EXTENDED RELEASE ORAL DAILY
Qty: 30 TABLET | Refills: 3 | OUTPATIENT
Start: 2023-01-06

## 2023-01-06 NOTE — TELEPHONE ENCOUNTER
Received a forwarded refill request for nifedipine that was requested back in 11/2022. Reviewed patient's chart and attached communication. Patient last seen 6/2021 with follow up 4/2023.     After reviewing the medication list and assessment and plan from last visit, the refill request was declined given the large lapse in follow up and that this is a request from months ago.    CC'ing Dr. Landeros as DELIA Wick MD

## 2023-01-13 DIAGNOSIS — E05.90 HYPERTHYROIDISM: ICD-10-CM

## 2023-01-16 RX ORDER — LIOTHYRONINE SODIUM 5 UG/1
5 TABLET ORAL 2 TIMES DAILY
Qty: 62 TABLET | Refills: 0 | Status: SHIPPED | OUTPATIENT
Start: 2023-01-16 | End: 2023-02-10

## 2023-01-16 NOTE — TELEPHONE ENCOUNTER
Liothyronine Sodium Oral Tablet 5 MCG      Last Written Prescription Date:  9-20-22  Last Fill Quantity: 180,   # refills: 0  Last Office Visit : 7-16-21  Future Office visit:  5-12-23      Outside lab: 12-7-22  TSH 0.35 - 4.94 uIU/mL 0.49          TSH   Date Value Ref Range Status   12/15/2021 0.61 0.40 - 4.00 mU/L Final   07/06/2021 0.67 0.40 - 4.00 mU/L Final       Routing refill request to provider for review/approval because:  Med not on protocol  Next appt outside of RTC timeframe

## 2023-01-24 ENCOUNTER — TELEPHONE (OUTPATIENT)
Dept: DERMATOLOGY | Facility: CLINIC | Age: 58
End: 2023-01-24
Payer: COMMERCIAL

## 2023-01-24 NOTE — TELEPHONE ENCOUNTER
Lvm 2nd attempt sent my chart msg and letter to patient to call and reschedule his 4-20-23 follow up with  in Derm. Provider out of office. [phone]

## 2023-02-09 NOTE — PROGRESS NOTES
Video-Visit Details    Type of service:  Video Visit    Virtual visit conducted by Luis.      Originating Location (pt. Location): HOME    Distant Location (provider location):  Off site    Mode of Communication:  Video Conference via i[employer]    Physician has received verbal consent for a Video Visit from the patient? YES      Pattie Guevara MD    Panchito Pierce is a 57 year old male who is being evaluated via a billable telephone visit.    Start time 1005, stop time 1030, chart review, documentation time, coordination of care, 15 minutes.  Total time spent day of encounter 40 minutes.    [facility]  Endocrinology  Pattie Guevara MD  2/10/23     Chief Complaint:   RECHECK    History of Present Illness:    #1 Graves disease status-post VELA  Per patient report, he was diagnosed with Grave's disease in 2002. He underwent radioactive iodine therapy in 2002 at [facility]. He had thyroid testing on 4/24/2018, where his T3 was 99 ng/dL, T4 free was 0.87 ng/dL, and his TSH was 1.00 mU/L. These thyroid tests have normalized since he had a TSH of 8.89 on 12/21/2017.  Has been on combination of levothyroxine and Cytomel since July 1, 2019. October 2019 TSH of 0.81.  July 2021 TSH 0.67, free T4 0.8.    Interval history: In December 2021, the patient had atrial fibrillation.  It was concerned that his thyroid hormone replacement may be exacerbating his atrial fibrillation.  We reduce his levothyroxine to 137 mcg daily and continued with the Cytomel at 5 mcg twice daily.  December 2022 TSH was 0.49.  He feels that this is stable.    #2  History of low libido in the setting of low testosterone  His low libido has improved since 12/2017 when he had shingles. His testosterone level on 12/21/2017 was low at 94 ng/dL, but this increased to 288 mg/dL on 4/24/2018. December 2018 total testosterone 187, October 2019 total testosterone at 191, December 2019 total testosterone of 176.  July 2021 total testosterone was 240  (normal) and free testosterone was normal at 6.42 (normal).  FSH at 13.3.  July 2021 PSA 1.26.    Interval history: Not discussed at current visit.    #3 Sleep apnea  The patient had a sleep study completed on 1/11/2017 showing that he has sleep apnea.     Interval history: Not discussed at current visit.    #4 Weight gain with elevated hemoglobin A1c of 5.9 noted in October 2019  Patient working on dietary lifestyle changes.  October 2019 hemoglobin A1c 5.9.  March 2021 hemoglobin A1c 6.2.July 2021 hemoglobin A1c 5.9.     Interval history: The patient reports that he is now 213 pounds and has reduced weight compared with previous visits.  November 2021 hemoglobin A1c 5.8.  December 2022 hemoglobin A1c at 6.0.  The patient reports is difficult for him to implement time restricted eating.  He is uncertain whether he would want to consider metformin.  He will think about this    #5 atrial fibrillation  Patient currently on anticoagulation.  Working with cardiology.  December 2022 TSH 0.49.    #6 skin concerns  The patient reports that he has frequent itching and nonhealing persistent ulcers especially on his arms.  Has pending dermatology appointment.    Review of Systems:   Pertinent items are noted in HPI, remainder of complete ROS is negative.      Active Medications:   Current Outpatient Medications   Medication Sig Dispense Refill     acyclovir (ZOVIRAX) 400 MG tablet Take 2 tablets (800 mg) by mouth 2 times daily (Patient not taking: Reported on 7/5/2019) 120 tablet 11     apixaban ANTICOAGULANT (ELIQUIS) 5 MG tablet Take 1 tablet (5 mg) by mouth 2 times daily 180 tablet 0     betamethasone dipropionate (DIPROSONE) 0.05 % external ointment Apply to bug bite reactions, and you can cover with bandaid 50 g 3     fenofibrate micronized (LOFIBRA) 67 MG capsule Take 1 capsule by mouth once daily with a meal.       levothyroxine (SYNTHROID/LEVOTHROID) 137 MCG tablet Take 1 tablet (137 mcg) by mouth daily 90 tablet 4  "    liothyronine (CYTOMEL) 5 MCG tablet Take 1 tablet (5 mcg) by mouth 2 times daily Please keep 5-12-23 clinic appt for refills 62 tablet 0     metoprolol tartrate (LOPRESSOR) 25 MG tablet Take 1 tablet (25 mg) by mouth 2 times daily 90 tablet 1     metoprolol tartrate (LOPRESSOR) 50 MG tablet Take 1 tablet (50 mg) by mouth 2 times daily 180 tablet 3     NIFEdipine ER (ADALAT CC) 30 MG 24 hr tablet Take 1 tablet (30 mg) by mouth daily *DURING WINTER MONTHS TO PREVENT FLARES. 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   Immunosuppressed status    Raynaud's disease   Heart murmur   Hypogonadism   Chronic dermatitis      Past Surgical History:  Left hand graft flap and right foot graft- 01/1985     Family History:   Mother: thyroid disease, lung cancer  Father: prostate cancer, dementia, heart murmur   Brother: hepatitis, heart murmur    Sister: thyroid disease   Son: asthma       Social History:   The patient was alone   Smoking Status: former; 1/2 pack per day for 30 years   Smokeless Tobacco: never   Alcohol Use: yes; once per week       Physical Exam:   GENERAL: Healthy, alert and no distress\",\"EYES: Eyes grossly normal to inspection.  No discharge or erythema, or obvious scleral/conjunctival abnormalities.\",\"RESP: No audible wheeze, cough, or visible cyanosis.  No visible retractions or increased work of breathing.  \",\"SKIN: Visible skin clear. " "No significant rash, abnormal pigmentation or lesions.\",\"NEURO: Cranial nerves grossly intact.  Mentation and speech appropriate for age.\",\"PSYCH: Mentation appears normal, affect normal/bright, judgement and insight intact, normal speech and appearance well-groomed.      Data:  December 2022 outside labs include a hemoglobin A1c of 6.0, , PSA 0.79, TSH 0.49    PLAN:  #1 Graves disease status-post VELA  December 2022 TSH 0.49.  Refills given for levothyroxine 137 mcg daily and liothyronine at 5 mcg twice daily.  Patient to continue with selenium 100 mcg daily.    #2  History of low libido in the setting of low testosterone  Not discussed at current visit.  We will recheck testosterone with future lab draw in 6 months as noted below.    #3 Sleep apnea  Not discussed at current visit    #4 Weight gain with elevated hemoglobin A1c of 5.9 noted in October 2019  Recommend the patient consider not eating after 8 PM.  He will consider metformin as a possibility-he will do some research on this and let me know. Will have  patient recheck hemoglobin A1c in 6 months.    #5 atrial fibrillation  Per cardiology.  December TSH was normal at 0.49.    #6 skin concerns  Uncertain of reason.  I am wondering whether this might be related to scratching and possibly superimposed bacterial infection.  Recommended patient consider Sarna, bacitracin if evidence for infection, and patient has follow-up with dermatology.    Labs in 6 months, return visit in 1 year.    Orders Placed This Encounter   Procedures     Hemoglobin A1c     TSH     T4 free     Testosterone Free and Total     "

## 2023-02-10 ENCOUNTER — TELEPHONE (OUTPATIENT)
Dept: ENDOCRINOLOGY | Facility: CLINIC | Age: 58
End: 2023-02-10

## 2023-02-10 ENCOUNTER — VIRTUAL VISIT (OUTPATIENT)
Dept: ENDOCRINOLOGY | Facility: CLINIC | Age: 58
End: 2023-02-10
Payer: COMMERCIAL

## 2023-02-10 DIAGNOSIS — R79.9 ABNORMAL FINDING OF BLOOD CHEMISTRY, UNSPECIFIED: ICD-10-CM

## 2023-02-10 DIAGNOSIS — R21 RASH: Primary | ICD-10-CM

## 2023-02-10 DIAGNOSIS — E05.00 GRAVES DISEASE: ICD-10-CM

## 2023-02-10 DIAGNOSIS — E05.90 HYPERTHYROIDISM: ICD-10-CM

## 2023-02-10 DIAGNOSIS — I48.0 PAROXYSMAL ATRIAL FIBRILLATION (H): ICD-10-CM

## 2023-02-10 PROCEDURE — 99215 OFFICE O/P EST HI 40 MIN: CPT | Mod: VID | Performed by: INTERNAL MEDICINE

## 2023-02-10 RX ORDER — LIOTHYRONINE SODIUM 5 UG/1
5 TABLET ORAL 2 TIMES DAILY
Qty: 180 TABLET | Refills: 4 | Status: SHIPPED | OUTPATIENT
Start: 2023-02-10 | End: 2023-09-29

## 2023-02-10 RX ORDER — LEVOTHYROXINE SODIUM 137 UG/1
137 TABLET ORAL DAILY
Qty: 90 TABLET | Refills: 4 | Status: SHIPPED | OUTPATIENT
Start: 2023-02-10 | End: 2023-09-29

## 2023-02-10 RX ORDER — MUPIROCIN 20 MG/G
OINTMENT TOPICAL 3 TIMES DAILY
Qty: 22 G | Refills: 1 | Status: SHIPPED | OUTPATIENT
Start: 2023-02-10

## 2023-02-10 RX ORDER — LIOTHYRONINE SODIUM 5 UG/1
5 TABLET ORAL 2 TIMES DAILY
Qty: 180 TABLET | Refills: 4 | Status: SHIPPED | OUTPATIENT
Start: 2023-02-10 | End: 2023-02-10

## 2023-02-10 NOTE — NURSING NOTE
Is the patient currently in the state of [place]? YES    Visit mode:VIDEO    If the visit is dropped, the patient can be reconnected by: VIDEO VISIT: Send to e-mail at: [email or web address]    Will anyone else be joining the visit? NO      How would you like to obtain your AVS? MyChart    Are changes needed to the allergy or medication list? NO    Comments or concerns regarding today's visit:

## 2023-02-10 NOTE — PATIENT INSTRUCTIONS
Consider not eating after 8 pm on weekend    Read about metformin - let me know    RESTART DRUMMING    SARNA for itching - over the counter    Bactroban - for true sore

## 2023-02-10 NOTE — LETTER
2/10/2023       RE: Panchito Pierce  [address]  [place] [address]     Dear Colleague,    Thank you for referring your patient, Panchito Pierce, to the [facility] [place] at [facility]. Please see a copy of my visit note below.    Video-Visit Details    Type of service:  Video Visit    Virtual visit conducted by Luis.      Originating Location (pt. Location): HOME    Distant Location (provider location):  Off site    Mode of Communication:  Video Conference via [employer]    Physician has received verbal consent for a Video Visit from the patient? YES      Pattie Guevara MD    Panchito Pierce is a 57 year old male who is being evaluated via a billable telephone visit.    Start time 1005, stop time 1030, chart review, documentation time, coordination of care, 15 minutes.  Total time spent day of encounter 40 minutes.    [facility]  Endocrinology  Pattie Guevara MD  2/10/23     Chief Complaint:   RECHECK    History of Present Illness:    #1 Graves disease status-post VELA  Per patient report, he was diagnosed with Grave's disease in 2002. He underwent radioactive iodine therapy in 2002 at [facility]. He had thyroid testing on 4/24/2018, where his T3 was 99 ng/dL, T4 free was 0.87 ng/dL, and his TSH was 1.00 mU/L. These thyroid tests have normalized since he had a TSH of 8.89 on 12/21/2017.  Has been on combination of levothyroxine and Cytomel since July 1, 2019. October 2019 TSH of 0.81.  July 2021 TSH 0.67, free T4 0.8.    Interval history: In December 2021, the patient had atrial fibrillation.  It was concerned that his thyroid hormone replacement may be exacerbating his atrial fibrillation.  We reduce his levothyroxine to 137 mcg daily and continued with the Cytomel at 5 mcg twice daily.  December 2022 TSH was 0.49.  He feels that this is stable.    #2  History of low libido in the setting of low testosterone  His low  libido has improved since 12/2017 when he had shingles. His testosterone level on 12/21/2017 was low at 94 ng/dL, but this increased to 288 mg/dL on 4/24/2018. December 2018 total testosterone 187, October 2019 total testosterone at 191, December 2019 total testosterone of 176.  July 2021 total testosterone was 240 (normal) and free testosterone was normal at 6.42 (normal).  FSH at 13.3.  July 2021 PSA 1.26.    Interval history: Not discussed at current visit.    #3 Sleep apnea  The patient had a sleep study completed on 1/11/2017 showing that he has sleep apnea.     Interval history: Not discussed at current visit.    #4 Weight gain with elevated hemoglobin A1c of 5.9 noted in October 2019  Patient working on dietary lifestyle changes.  October 2019 hemoglobin A1c 5.9.  March 2021 hemoglobin A1c 6.2.July 2021 hemoglobin A1c 5.9.     Interval history: The patient reports that he is now 213 pounds and has reduced weight compared with previous visits.  November 2021 hemoglobin A1c 5.8.  December 2022 hemoglobin A1c at 6.0.  The patient reports is difficult for him to implement time restricted eating.  He is uncertain whether he would want to consider metformin.  He will think about this    #5 atrial fibrillation  Patient currently on anticoagulation.  Working with cardiology.  December 2022 TSH 0.49.    #6 skin concerns  The patient reports that he has frequent itching and nonhealing persistent ulcers especially on his arms.  Has pending dermatology appointment.    Review of Systems:   Pertinent items are noted in HPI, remainder of complete ROS is negative.      Active Medications:   Current Outpatient Medications   Medication Sig Dispense Refill     acyclovir (ZOVIRAX) 400 MG tablet Take 2 tablets (800 mg) by mouth 2 times daily (Patient not taking: Reported on 7/5/2019) 120 tablet 11     apixaban ANTICOAGULANT (ELIQUIS) 5 MG tablet Take 1 tablet (5 mg) by mouth 2 times daily 180 tablet 0     betamethasone  dipropionate (DIPROSONE) 0.05 % external ointment Apply to bug bite reactions, and you can cover with bandaid 50 g 3     fenofibrate micronized (LOFIBRA) 67 MG capsule Take 1 capsule by mouth once daily with a meal.       levothyroxine (SYNTHROID/LEVOTHROID) 137 MCG tablet Take 1 tablet (137 mcg) by mouth daily 90 tablet 4     liothyronine (CYTOMEL) 5 MCG tablet Take 1 tablet (5 mcg) by mouth 2 times daily Please keep 5-12-23 clinic appt for refills 62 tablet 0     metoprolol tartrate (LOPRESSOR) 25 MG tablet Take 1 tablet (25 mg) by mouth 2 times daily 90 tablet 1     metoprolol tartrate (LOPRESSOR) 50 MG tablet Take 1 tablet (50 mg) by mouth 2 times daily 180 tablet 3     NIFEdipine ER (ADALAT CC) 30 MG 24 hr tablet Take 1 tablet (30 mg) by mouth daily *DURING WINTER MONTHS TO PREVENT FLARES. 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   Immunosuppressed status    Raynaud's disease   Heart murmur   Hypogonadism   Chronic dermatitis      Past Surgical History:  Left hand graft flap and right foot graft- 01/1985     Family History:   Mother: thyroid disease, lung cancer  Father: prostate cancer, dementia, heart murmur   Brother: hepatitis, heart murmur    Sister: thyroid disease   Son: asthma       Social History:   The patient was alone   Smoking Status: former; 1/2 pack per day for 30 years   Smokeless Tobacco: never   Alcohol Use: yes; once per  "week       Physical Exam:   GENERAL: Healthy, alert and no distress\",\"EYES: Eyes grossly normal to inspection.  No discharge or erythema, or obvious scleral/conjunctival abnormalities.\",\"RESP: No audible wheeze, cough, or visible cyanosis.  No visible retractions or increased work of breathing.  \",\"SKIN: Visible skin clear. No significant rash, abnormal pigmentation or lesions.\",\"NEURO: Cranial nerves grossly intact.  Mentation and speech appropriate for age.\",\"PSYCH: Mentation appears normal, affect normal/bright, judgement and insight intact, normal speech and appearance well-groomed.      Data:  December 2022 outside labs include a hemoglobin A1c of 6.0, , PSA 0.79, TSH 0.49    PLAN:  #1 Graves disease status-post VELA  December 2022 TSH 0.49.  Refills given for levothyroxine 137 mcg daily and liothyronine at 5 mcg twice daily.  Patient to continue with selenium 100 mcg daily.    #2  History of low libido in the setting of low testosterone  Not discussed at current visit.  We will recheck testosterone with future lab draw in 6 months as noted below.    #3 Sleep apnea  Not discussed at current visit    #4 Weight gain with elevated hemoglobin A1c of 5.9 noted in October 2019  Recommend the patient consider not eating after 8 PM.  He will consider metformin as a possibility-he will do some research on this and let me know. Will have  patient recheck hemoglobin A1c in 6 months.    #5 atrial fibrillation  Per cardiology.  December TSH was normal at 0.49.    #6 skin concerns  Uncertain of reason.  I am wondering whether this might be related to scratching and possibly superimposed bacterial infection.  Recommended patient consider Sarna, bacitracin if evidence for infection, and patient has follow-up with dermatology.    Labs in 6 months, return visit in 1 year.    Orders Placed This Encounter   Procedures     Hemoglobin A1c     TSH     T4 free     Testosterone Free and Total       Again, thank you for allowing " me to participate in the care of your patient.      Sincerely,    Pattie Guevara MD

## 2023-02-14 ENCOUNTER — OFFICE VISIT (OUTPATIENT)
Dept: DERMATOLOGY | Facility: CLINIC | Age: 58
End: 2023-02-14
Payer: COMMERCIAL

## 2023-02-14 DIAGNOSIS — L28.1 PRURIGO NODULARIS: Primary | ICD-10-CM

## 2023-02-14 DIAGNOSIS — L57.0 ACTINIC KERATOSIS: ICD-10-CM

## 2023-02-14 DIAGNOSIS — I73.00 RAYNAUD'S PHENOMENON WITHOUT GANGRENE: ICD-10-CM

## 2023-02-14 PROCEDURE — 99214 OFFICE O/P EST MOD 30 MIN: CPT | Mod: 25 | Performed by: DERMATOLOGY

## 2023-02-14 PROCEDURE — 17000 DESTRUCT PREMALG LESION: CPT | Mod: GC | Performed by: DERMATOLOGY

## 2023-02-14 RX ORDER — NIFEDIPINE 30 MG
30 TABLET, EXTENDED RELEASE ORAL DAILY
Qty: 30 TABLET | Refills: 5 | Status: SHIPPED | OUTPATIENT
Start: 2023-02-14 | End: 2024-03-14

## 2023-02-14 RX ORDER — CLOBETASOL PROPIONATE 0.5 MG/G
OINTMENT TOPICAL 2 TIMES DAILY PRN
Qty: 60 G | Refills: 5 | Status: SHIPPED | OUTPATIENT
Start: 2023-02-14

## 2023-02-14 ASSESSMENT — PAIN SCALES - GENERAL: PAINLEVEL: NO PAIN (0)

## 2023-02-14 NOTE — PROGRESS NOTES
[facility] Dermatology Note  Encounter Date: Feb 14, 2023  Office Visit     Dermatology Problem List:  1. Raynaud's phenomenon.  2. Recurrent folliculitis of beard, arms, legs.  3. Recurrent arthropod bite reactions during summer.   4. Hx of CML s/p BMT 2015 off immunosupression  5. Prurigo nodularis  ____________________________________________  Assessment & Plan:   # Prurigo nodularis  Heme crusted papules primarily on upper extremities with background of diffuse severe xerosis.  Etiology natural history discussed with patient.  Recommended topical high potency steroids to help break itch scratch cycle as well as daily emollient.  Future considerations include cryotherapy versus intralesional triamcinolone versus consideration of dupilumab however given mild severity will hold off on these at this time.  -Dry skin handout provided  -Start clobetasol ointment twice daily to active lesions as spot treatment twice daily until resolved.  Then use as needed.  Side effects of steroids discussed including skin thinning, hypopigmentation.  Advised only to use on trunk and extremities.    # AK, left temple  - LN2 today    # Raynauds, chronic active problem.  - Refill of nifedipine    # Hx of BMT for CML 2015, prior hx of GVHD, off immunosuppression  - Sunscreen and sun protection discussed  - Yearly skin checks advised    # Benign skin findings  - Physical exam demonstrating benign skin findings as below.  - Seborrheic keratoses  - Cherry hemangiomas  - Solar lentingos   - Benign nevi  - Reassurance provided.  - ABCDEs of melanoma handout provided.  - Advised patient to return to clinic for any new or concerning lesions.    Procedures Performed:   - Cryotherapy procedure note, location(s): left temple. After verbal consent and discussion of risks and benefits including, but not limited to, dyspigmentation/scar, blister, and pain, 1 lesion(s) was(were) treated with 1-2 mm freeze border for 1-2 cycles  with liquid nitrogen. Post cryotherapy instructions were provided.      Follow-up: 1 year, prn for new or changing lesions    Staff and Resident:     Bennie Abad MD  PGY-3 Dermatology  Pager: [phone]    I have seen and examined this patient and agree with the assessment and plan as documented in the resident's note, and was present for all procedures.    Thompson Landeros MD  Dermatology Attending  ____________________________________________    CC: Skin Check (Panchito is here today for a skin check )    HPI:  Mr. Panchito Pierce is a(n) 57 year old male who presents today as a return patient for skin check.    Patient returns as follow-up on his skin exam.  He notes no specific lesions of concern aside from a few itchy papules on his bilateral upper extremities.  He showers twice weekly.  He does not apply any daily emollient.  He notes no lesions that are bleeding, growing, or tender.  He states that the spots are only on areas where he is able to reach and scratch.  He needs a refill on his nifedipine for his Raynaud's whenever it is very cold in the wintertime.  He tolerates his medication well without any side effects such as lightheadedness or dizziness.      - Patient is otherwise feeling well, without additional skin concerns.    Labs Reviewed:  N/A    Physical Exam:  Vitals: There were no vitals taken for this visit.  SKIN: Total skin excluding the undergarment areas was performed. The exam included the head/face, neck, both arms, chest, back, abdomen, both legs, digits and/or nails.   - Heme crusted papules on the bilateral upper  arms and forearms  - Moderate to severe diffuse xerosis  - Erythematous papule with overyling adherent scale on the left temple.  - Waxy stuck on tan to brown papules on the trunk and extremities.   - Dome shaped bright red papules on the trunk and extremities.   - There are fine lines and dyspigmentation on sun exposed areas of the face and chest.  - Scattered brown macules on sun  exposed areas.  - Light to dark brown symmetric macules and papules with normal pigment networks on dermoscopy on trunk and extremities  - No other lesions of concern on areas examined.     Medications:  Current Outpatient Medications   Medication     apixaban ANTICOAGULANT (ELIQUIS) 5 MG tablet     betamethasone dipropionate (DIPROSONE) 0.05 % external ointment     fenofibrate micronized (LOFIBRA) 67 MG capsule     levothyroxine (SYNTHROID/LEVOTHROID) 137 MCG tablet     liothyronine (CYTOMEL) 5 MCG tablet     metoprolol tartrate (LOPRESSOR) 50 MG tablet     mupirocin (BACTROBAN) 2 % external ointment     NIFEdipine ER (ADALAT CC) 30 MG 24 hr tablet     OYSTER SHELL CALCIUM + D3 500-400 MG-UNIT TABS     pantoprazole (PROTONIX) 40 MG EC tablet     potassium 99 MG TABS     Selenium 100 MCG CAPS     sildenafil (VIAGRA) 25 MG tablet     acyclovir (ZOVIRAX) 400 MG tablet     metoprolol tartrate (LOPRESSOR) 25 MG tablet     No current facility-administered medications for this visit.      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)     Hyperthyroidism     Hypogonadism male     Fatigue, unspecified type     Seborrheic keratoses, inflamed     Chronic dermatitis of hands     Raynaud's phenomenon without gangrene     Arthropod bite, initial encounter     Neoplasm of uncertain behavior of skin     Xerosis of skin     Past Medical History:   Diagnosis Date     Graves disease 1/1/2003    treated with radioiodine, meds T3 and T4     Murmur, heart     has not been fully evaluated       CC No referring provider defined for this encounter. on close of this encounter.

## 2023-02-14 NOTE — LETTER
2/14/2023       RE: Panchito Pierce  [address]  [place] [address]     Dear Colleague,    Thank you for referring your patient, Panchito Pierce, to the [facility] [place] at [facility]. Please see a copy of my visit note below.    [facility] Dermatology Note  Encounter Date: Feb 14, 2023  Office Visit     Dermatology Problem List:  1. Raynaud's phenomenon.  2. Recurrent folliculitis of beard, arms, legs.  3. Recurrent arthropod bite reactions during summer.   4. Hx of CML s/p BMT 2015 off immunosupression  5. Prurigo nodularis  ____________________________________________  Assessment & Plan:   # Prurigo nodularis  Heme crusted papules primarily on upper extremities with background of diffuse severe xerosis.  Etiology natural history discussed with patient.  Recommended topical high potency steroids to help break itch scratch cycle as well as daily emollient.  Future considerations include cryotherapy versus intralesional triamcinolone versus consideration of dupilumab however given mild severity will hold off on these at this time.  -Dry skin handout provided  -Start clobetasol ointment twice daily to active lesions as spot treatment twice daily until resolved.  Then use as needed.  Side effects of steroids discussed including skin thinning, hypopigmentation.  Advised only to use on trunk and extremities.    # AK, left temple  - LN2 today    # Raynauds, chronic active problem.  - Refill of nifedipine    # Hx of BMT for CML 2015, prior hx of GVHD, off immunosuppression  - Sunscreen and sun protection discussed  - Yearly skin checks advised    # Benign skin findings  - Physical exam demonstrating benign skin findings as below.  - Seborrheic keratoses  - Cherry hemangiomas  - Solar lentingos   - Benign nevi  - Reassurance provided.  - ABCDEs of melanoma handout provided.  - Advised patient to return to clinic for any  new or concerning lesions.    Procedures Performed:   - Cryotherapy procedure note, location(s): left temple. After verbal consent and discussion of risks and benefits including, but not limited to, dyspigmentation/scar, blister, and pain, 1 lesion(s) was(were) treated with 1-2 mm freeze border for 1-2 cycles with liquid nitrogen. Post cryotherapy instructions were provided.      Follow-up: 1 year, prn for new or changing lesions    Staff and Resident:     Bennie Abad MD  PGY-3 Dermatology  Pager: [phone]    I have seen and examined this patient and agree with the assessment and plan as documented in the resident's note, and was present for all procedures.    Thompson Landeros MD  Dermatology Attending  ____________________________________________    CC: Skin Check (Panchito is here today for a skin check )    HPI:  Mr. Panchito Pierce is a(n) 57 year old male who presents today as a return patient for skin check.    Patient returns as follow-up on his skin exam.  He notes no specific lesions of concern aside from a few itchy papules on his bilateral upper extremities.  He showers twice weekly.  He does not apply any daily emollient.  He notes no lesions that are bleeding, growing, or tender.  He states that the spots are only on areas where he is able to reach and scratch.  He needs a refill on his nifedipine for his Raynaud's whenever it is very cold in the wintertime.  He tolerates his medication well without any side effects such as lightheadedness or dizziness.      - Patient is otherwise feeling well, without additional skin concerns.    Labs Reviewed:  N/A    Physical Exam:  Vitals: There were no vitals taken for this visit.  SKIN: Total skin excluding the undergarment areas was performed. The exam included the head/face, neck, both arms, chest, back, abdomen, both legs, digits and/or nails.   - Heme crusted papules on the bilateral upper  arms and forearms  - Moderate to severe diffuse xerosis  - Erythematous papule  with overyling adherent scale on the left temple.  - Waxy stuck on tan to brown papules on the trunk and extremities.   - Dome shaped bright red papules on the trunk and extremities.   - There are fine lines and dyspigmentation on sun exposed areas of the face and chest.  - Scattered brown macules on sun exposed areas.  - Light to dark brown symmetric macules and papules with normal pigment networks on dermoscopy on trunk and extremities  - No other lesions of concern on areas examined.     Medications:  Current Outpatient Medications   Medication     apixaban ANTICOAGULANT (ELIQUIS) 5 MG tablet     betamethasone dipropionate (DIPROSONE) 0.05 % external ointment     fenofibrate micronized (LOFIBRA) 67 MG capsule     levothyroxine (SYNTHROID/LEVOTHROID) 137 MCG tablet     liothyronine (CYTOMEL) 5 MCG tablet     metoprolol tartrate (LOPRESSOR) 50 MG tablet     mupirocin (BACTROBAN) 2 % external ointment     NIFEdipine ER (ADALAT CC) 30 MG 24 hr tablet     OYSTER SHELL CALCIUM + D3 500-400 MG-UNIT TABS     pantoprazole (PROTONIX) 40 MG EC tablet     potassium 99 MG TABS     Selenium 100 MCG CAPS     sildenafil (VIAGRA) 25 MG tablet     acyclovir (ZOVIRAX) 400 MG tablet     metoprolol tartrate (LOPRESSOR) 25 MG tablet     No current facility-administered medications for this visit.      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 on close of this encounter.

## 2023-02-14 NOTE — PATIENT INSTRUCTIONS
- Clobetasol ointment spot treatment  - Daily moisturizer    Dry Skin    What is dry skin?  Common skin problem  Can be worse during the winter   Affects all ages  Occurs in people with or without other skin problems    What does it look like?  Fine lines in the skin become more visible   Rough feeling skin   Flaky skin  Most common on the arms and legs  Skin can become cracked, especially on the hands and feet    What are some problems caused by dry skin?   Itching  Rubbing or scratching can cause thickened, rough skin patches  Cracks in skin can be painful  Red, itchy, scaly skin (called eczema) can occur  Yellow crusting or pus could be signs of an infection    What causes dry skin?  A lack of water in the top layer of the skin  Too much soapy water,  hot water, or harsh chemicals  Aging and sun damage    How do I treat dry skin?  Shower or bathe daily for under ten minutes with lukewarm water and mild soap.  Pat yourself dry with a towel gently and leave your skin slightly damp.  Use moisturizing cream or ointment right away.  Avoid lotions.    What kind of mild soap should I be using?  Camay , Dove , Tone , Neutrogena , Purpose , or Oil of Olay   A non-detergent cleanser, like Cetaphil , can be used.    What should I stay away from?  Scented soaps   Bath oils    What moisturizers should I be using?  Cetaphil Cream,CeraVe Cream, Vanicream, Aquaphilic, Eucerin, Aquaphor, or Vaseline   Always apply after showering or bathing.  Reapply throughout the day, if possible.  If dry skin affects your hands, always reapply after handwashing.    What else should I know?  Using a humidifier during winter months may help.  If dry skin gets worse or if eczema develops, a steroid cream may be needed.   Cryotherapy    What is it?  Use of a very cold liquid, such as liquid nitrogen, to freeze and destroy abnormal skin cells that need to be removed    What should I expect?  Tenderness and redness  A small blister that might grow  and fill with dark purple blood. There may be crusting.  More than one treatment may be needed if the lesions do not go away.    How do I care for the treated area?  Gently wash the area with your hands when bathing.  Use a thin layer of Vaseline to help with healing. You may use a Band-Aid.   The area should heal within 7-10 days and may leave behind a pink or lighter color.   Do not use an antibiotic or Neosporin ointment.   You may take acetaminophen (Tylenol) for pain.     Call your doctor if you have:  Severe pain  Signs of infection (warmth, redness, cloudy yellow drainage, and or a bad smell)  Questions or concerns    Who should I call with questions?      [facility]: [phone]      [facility]: [phone]      For urgent needs outside of business hours call the [facility] at [phone] and ask for the dermatology resident on call

## 2023-03-04 ENCOUNTER — TELEPHONE (OUTPATIENT)
Dept: ENDOCRINOLOGY | Facility: CLINIC | Age: 58
End: 2023-03-04
Payer: COMMERCIAL

## 2023-03-04 NOTE — TELEPHONE ENCOUNTER
LVM to schedule 1 year follow up with Dr. Ismael Christopher, [facility]

## 2023-03-11 PROBLEM — L57.0 ACTINIC KERATOSIS: Status: ACTIVE | Noted: 2023-03-11

## 2023-03-11 PROBLEM — L28.1 PRURIGO NODULARIS: Status: ACTIVE | Noted: 2023-03-11

## 2023-03-21 DIAGNOSIS — Z94.81 STATUS POST BONE MARROW TRANSPLANT (H): ICD-10-CM

## 2023-03-23 NOTE — TELEPHONE ENCOUNTER
ELIQUIS ANTICOAGULANT 5 MG  Last Written Prescription Date:  12/28/22  Last Fill Quantity: 180,   # refills: 0  Last Office Visit : 12/24/21  Future Office visit:  4/4/23  Routing refill request to provider for review/approval because:  FAILED PROTOCOL  - WEIGHT   OVER DUE PLT 2020    CR 12/7/22 (outside labs)

## 2023-04-04 ENCOUNTER — OFFICE VISIT (OUTPATIENT)
Dept: CARDIOLOGY | Facility: CLINIC | Age: 58
End: 2023-04-04
Attending: INTERNAL MEDICINE
Payer: COMMERCIAL

## 2023-04-04 VITALS
HEART RATE: 87 BPM | WEIGHT: 211.2 LBS | BODY MASS INDEX: 32.01 KG/M2 | HEIGHT: 68 IN | DIASTOLIC BLOOD PRESSURE: 74 MMHG | OXYGEN SATURATION: 98 % | SYSTOLIC BLOOD PRESSURE: 115 MMHG

## 2023-04-04 DIAGNOSIS — I48.91 ATRIAL FIBRILLATION, UNSPECIFIED TYPE (H): ICD-10-CM

## 2023-04-04 DIAGNOSIS — I42.9 CARDIOMYOPATHY, UNSPECIFIED TYPE (H): Primary | ICD-10-CM

## 2023-04-04 LAB — LVEF ECHO: NORMAL

## 2023-04-04 PROCEDURE — 99207 PR STATISTIC IV PUSH SINGLE INITIAL SUBSTANCE: CPT | Performed by: INTERNAL MEDICINE

## 2023-04-04 PROCEDURE — 99214 OFFICE O/P EST MOD 30 MIN: CPT | Mod: 25 | Performed by: INTERNAL MEDICINE

## 2023-04-04 PROCEDURE — 93306 TTE W/DOPPLER COMPLETE: CPT | Performed by: STUDENT IN AN ORGANIZED HEALTH CARE EDUCATION/TRAINING PROGRAM

## 2023-04-04 PROCEDURE — G0463 HOSPITAL OUTPT CLINIC VISIT: HCPCS | Performed by: INTERNAL MEDICINE

## 2023-04-04 RX ORDER — LIDOCAINE 40 MG/G
CREAM TOPICAL
Status: CANCELLED | OUTPATIENT
Start: 2023-04-04

## 2023-04-04 RX ORDER — POTASSIUM CHLORIDE 1500 MG/1
20 TABLET, EXTENDED RELEASE ORAL
Status: CANCELLED | OUTPATIENT
Start: 2023-04-04

## 2023-04-04 RX ORDER — MAGNESIUM SULFATE HEPTAHYDRATE 40 MG/ML
2 INJECTION, SOLUTION INTRAVENOUS
Status: CANCELLED | OUTPATIENT
Start: 2023-04-04

## 2023-04-04 RX ORDER — POTASSIUM CHLORIDE 1500 MG/1
40 TABLET, EXTENDED RELEASE ORAL
Status: CANCELLED | OUTPATIENT
Start: 2023-04-04

## 2023-04-04 RX ADMIN — Medication 5 ML: at 08:18

## 2023-04-04 NOTE — PATIENT INSTRUCTIONS
"Complete a Transesophageal Echocardiogram (JAMES) with Cardioversion (see below)  You will receive a call to schedule this.     Complete a Lexiscan (see below)    As soon as results are compiled and reviewed, you will be notified.     What is a transesophageal echocardiogram (JAMES)?  A transesophageal echocardiogram (JAMES) uses echocardiography to assess the structure and function of the heart. During the procedure, a transducer (like a microphone) sends out ultrasonic sound waves. When the transducer is placed at certain locations and angles, the ultrasonic sound waves move through the skin and other body tissues to the heart tissues, where the waves bounce or \"echo\" off of the heart structures. The transducer picks up the reflected waves and sends them to a computer. The computer displays the echoes as images of the heart walls and valves.    A traditional echocardiogram is done by putting the transducer on the surface of the chest. This is called a transthoracic echocardiogram. A transesophageal echocardiogram is done by inserting a probe with a transducer down the esophagus. This provides a clearer image of the heart because the sound waves do not have to pass through skin, muscle, or bone tissue. The JAMES probe is much closer to the heart since the esophagus and heart are right next to each other.    Transesophageal Echocardiogram   Follow these instructions:    DO NOT EAT OR DRINK ANYTHING FOR 8 HOURS PRIOR TO ARRIVAL.     The morning of your procedure you may take your scheduled medications with a SIP of water. If you take diabetic medications or a diuretic, you may hold these.     You will receive medication that makes you sleepy; you will need a  and someone to stay with you for 24 hours following this procedure.    You should not make any legal decisions for 24 hours following discharge.     Lexiscan (nuclear stress test)     Why do I need this test?  Your doctor has ordered a nuclear stress test to check " how well blood is flowing through your heart. You will either exercise or take a medicine that mimics exercise; we will watch your heart. Please allow 2 to 4 hours for this test.      What happens during this test?      1. We will place an IV (small needle) in the vein of your arm or hand.  2. We will inject a liquid through the IV. The liquid contains radioactivity. This helps your heart show up better on the pictures we will take.  3. About 30 to 60 minutes later, you will lie down on a table. A special camera will rotate around your chest taking pictures of your heart. This lasts less than 30 minutes.  4. To prepare for the stress test, we will check your blood pressure. We will also attach small pads to your chest. The pads are hooked to an EKG (electrocardiogram) machine. The machine shows how your heart is working during the test.  5. You will begin the stress test.    If you can exercise, you will walk on a treadmill for 5 to 15 minutes. You will start at a slow speed. We will slowly increase the speed and level of incline.    If you cannot exercise, we will give you medicine to mimic the effects of exercise. The medicine goes through the IV slowly.  6. During the stress test, we will again inject liquid through your IV. (See Step 2.)  7. After the stress test, you will wait 30 to 60 minutes. We will then take more pictures of your heart.    Preparation :    NO FOOD 3 hours prior, Water is ok and encouraged  NO alcohol, smoking, caffeine, or decaf products 12 hours prior    TAKE ALL medications as prescribed, hold the following medications if they pertain to you:  If you take Aggrenox or dipyridamole (Persantine, Permole), stop taking it 48 hours before your test.  If you take Viagra, Cialis or Levitra, stop taking it 48 hours before your test.  If you take theophylline or aminophylline, stop taking it 12 hours before your test.  For patients with diabetes:If you take insulin, call your diabetes care team.  Ask if you should take a 1/2 dose the morning of your test  If you take diabetes medicine by mouth, don't take it on the morning of your test. Bring it with you to take after the test. (If you have questions, call your diabetes care team.)  Do not take nitrates on the day of your test. Do not wear a Nitro-Patch.

## 2023-04-04 NOTE — LETTER
4/4/2023      RE: Panchito Pierce  [address]  [place] [address]       Dear Colleague,    Thank you for the opportunity to participate in the care of your patient, Panchito Pierce, at the [facility]. Please see a copy of my visit note below.       SUBJECTIVE:  Panchito Pierce is a 57 year old male who presents for follow-up.  Atrial fibrillation.    Patient with CML.  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  Patient had one episode of atrial fibrillation in 2016.  This reverted to sinus rhythm on diltiazem drip.  Since then patient had no recurrence.  During recent office visit his  oncologist noticed irregular heartbeat.  EKG taken showed atrial fibrillation with rapid ventricular response.  He was started on apixaban and metoprolol 25 mg twice a day.  Patient here for further evaluation and management.  Patient is unaware of atrial fibrillation.  No symptoms.  It appears that he drinks quite a bit.  This may be partly responsible for the atrial fibrillation.  He has no history of hypertension or diabetes.  No prior CVA/TIA.  No vascular disease.  No heart failure history.  Overall he is [id number] Vasc score is 0.  Patient is on nifedipine ER which was started for Raynaud's by his dermatologist.  In December 2021 patient was advised to return with an echocardiogram and EKG and even there was a plan for a cardioversion.  But patient did not.  Currently admits to sixpacks of beer  every 2 days.  He intermittently forget to take the evening dose of medications.  Denied cardiac symptoms.  Patient is totally asymptomatic from atrial fibrillation.    Patient Active Problem List    Diagnosis Date Noted    Actinic keratosis 03/11/2023     Priority: Medium    Prurigo nodularis 03/11/2023     Priority: Medium    Neoplasm of uncertain behavior of skin 07/03/2021     Priority: Medium    Xerosis of skin 07/03/2021      Priority: Medium    Arthropod bite, initial encounter 11/08/2019     Priority: Medium    Raynaud's phenomenon without gangrene 08/30/2017     Priority: Medium    Seborrheic keratoses, inflamed 06/14/2017     Priority: Medium    Chronic dermatitis of hands 06/14/2017     Priority: Medium    Hypogonadism male 12/01/2016     Priority: Medium    Fatigue, unspecified type 12/01/2016     Priority: Medium    Hyperthyroidism 05/31/2016     Priority: Medium    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  Current Outpatient Medications   Medication Sig    apixaban ANTICOAGULANT (ELIQUIS ANTICOAGULANT) 5 MG tablet Take 1 tablet (5 mg) by mouth 2 times daily    betamethasone dipropionate (DIPROSONE) 0.05 % external ointment Apply to bug bite reactions, and you can cover with bandaid    clobetasol (TEMOVATE) 0.05 % external ointment Apply topically 2 times daily as needed (pruritic spots on arms and trunk)    fenofibrate micronized (LOFIBRA) 67 MG capsule Take 1 capsule by mouth once daily with a meal.    levothyroxine (SYNTHROID/LEVOTHROID) 137 MCG tablet Take 1 tablet (137 mcg) by mouth daily    liothyronine (CYTOMEL) 5 MCG tablet Take 1 tablet (5 mcg) by mouth 2 times daily    metoprolol tartrate (LOPRESSOR) 50 MG tablet Take 1 tablet (50 mg) by mouth 2 times daily    mupirocin (BACTROBAN) 2 % external ointment Apply topically 3 times daily    NIFEdipine ER (ADALAT CC) 30 MG 24 hr tablet Take 1 tablet (30 mg) by mouth daily *DURING WINTER MONTHS TO PREVENT FLARES. Check BP once a month while taking    OYSTER SHELL CALCIUM + D3 500-400 MG-UNIT TABS TAKE TWO TABLETS BY MOUTH DAILY      pantoprazole (PROTONIX) 40 MG EC tablet Take 1 tablet (40 mg) by mouth daily    potassium 99 MG TABS     Selenium 100 MCG CAPS 1 tablet daily    sildenafil (VIAGRA) 25 MG tablet take 1-2 tablets by mouth 1 hour before needed (do not exceed 5 tablets in 24 hours)    acyclovir (ZOVIRAX) 400 MG tablet Take 2 tablets (800 mg) by mouth 2 times daily (Patient not taking: Reported on 2019)    metoprolol tartrate (LOPRESSOR) 25 MG tablet Take 1 tablet (25 mg) by mouth 2 times daily     No current facility-administered medications for this visit.     Past Medical History:   Diagnosis Date    Graves disease 2003    treated with radioiodine, meds T3 and T4    Murmur, heart     has not been fully evaluated     Past Surgical History:   Procedure Laterality Date    ------------OTHER------------- Left 1985    left hand graft flap- work accident    ------------OTHER------------- Right 1985    right foot graft- work accident    ESOPHAGOSCOPY, GASTROSCOPY, DUODENOSCOPY (EGD), COMBINED N/A 3/23/2016    Procedure: COMBINED ESOPHAGOSCOPY, GASTROSCOPY, DUODENOSCOPY (EGD), BIOPSY SINGLE OR MULTIPLE;  Surgeon: Jay Tracy MD;  Location:  GI     Allergies   Allergen Reactions    Allopurinol Rash     Unclear if it was from allopurinol, but went away when it was stopped.  But also had steroid cream at the same time.     Social History     Socioeconomic History    Marital status:      Spouse name: Nu    Number of children: 4    Years of education: Not on file    Highest education level: Not on file   Occupational History    Not on file   Tobacco Use    Smoking status: Every Day     Packs/day: 0.50     Years: 30.00     Pack years: 15.00     Types: Cigarettes     Start date: 1985     Last attempt to quit: 2015     Years since quittin.7    Smokeless tobacco: Never   Vaping Use    Vaping status: Not on file   Substance and Sexual Activity    Alcohol use: Yes     Comment: once a week    Drug use:  "No    Sexual activity: Not on file     Comment:    Other Topics Concern    Parent/sibling w/ CABG, MI or angioplasty before 65F 55M? Not Asked   Social History Narrative     to wife Nu.    4 children: 1 step daughter Ro age 27, 3 biological sons, 19 yo Jose L, 15 yo Sergio ([place]bergers), 11 yo Elvis    Lives in [place]    Used to work in clerical work, now in school for electrical engineering    11 brothers and sisters, 3 live in the [facility], 1 brother incarcerated, 1 brother hepatitis C     Social Determinants of Health     Financial Resource Strain: Not on file   Food Insecurity: Not on file   Transportation Needs: Not on file   Physical Activity: Not on file   Stress: Not on file   Social Connections: Not on file   Intimate Partner Violence: Not on file   Housing Stability: Not on file     Family History   Problem Relation Age of Onset    Thyroid Disease Mother     Lung Cancer Mother     Prostate Cancer Father     Dementia Father     Heart Murmur Father     Hepatitis Brother     Asthma Son     Thyroid Disease Sister     Thyroid Disease Sister     Thyroid Disease Sister     Heart Murmur Brother     Melanoma No family hx of     Skin Cancer No family hx of           REVIEW OF SYSTEMS:  General: negative, fever, chills, night sweats  Skin: negative, acne, rash and scaling  Eyes: negative, double vision, eye pain and photophobia  Ears/Nose/Throat: negative, nasal congestion and purulent rhinorrhea  Respiratory: No dyspnea on exertion, No cough, No hemoptysis and negative  Cardiovascular: negative, palpitations, tachycardia, irregular heart beat, chest pain, exertional chest pain or pressure, paroxysmal nocturnal dyspnea, dyspnea on exertion and orthopnea       OBJECTIVE:  Blood pressure 115/74, pulse 87, height 1.72 m (5' 7.72\"), weight 95.8 kg (211 lb 3.2 oz), SpO2 98 %.  General Appearance: alert, active and no distress  Head: Normocephalic. No masses, lesions, tenderness or abnormalities  Eyes: " conjuctiva clear, PERRL, EOM intact  Ears: External ears normal. Canals clear. TM's normal.  Nose: Nares normal  Mouth: normal  Neck: Supple, no cervical adenopathy, no thyromegaly  Lungs: clear to auscultation  Cardiac: A-fib no cardiac murmur normal cardiac sounds.     ASSESSMENT/PLAN:  Patient here for follow-up.  Persistent atrial fibrillation.  Patient was supposed to return with an EKG and echocardiogram but he missed that appointment.  Now patient denied cardiac symptoms though does admit some shortness of breath.  Patient with prior history of CML status post bone marrow transplant and currently in remission.  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  Today's EKG reviewed.  Controlled atrial fibrillation with a rate in the 90s.  Patient had an echocardiogram today.  This showed normal LV size, normal atrial size, EF is 20 to 25%.  Unclear at this time whether decreased cardiac function is due to atrial fibrillation, alcohol or coronary artery disease.  Currently patient is on Lopressor 25 mg twice daily and apixaban 5 mg twice a day.  He forgets evening dose of medication at least 2-3 times a week.  Currently drinks 6 packs of beer every 2 days.  Discussed the relationship between alcohol and atrial fibrillation.  Also alcohol and heart failure.  Patient was advised to quit completely.  As he administers intermittent doses of anticoagulation we will plan for a JAMES with cardioversion.  We will also arrange for a Lexiscan to rule out coronary artery disease.  Patient will return to clinic post cardioversion.  Echo and EKG results were discussed with patient.  Total visit duration 30 minutes.  This included face-to-face interview, physical exam, chart review, review of EKG echocardiogram and documentation.      Please do not hesitate to contact me if you have any questions/concerns.     Sincerely,     AMBER Lawler MD

## 2023-04-04 NOTE — PROGRESS NOTES
SUBJECTIVE:  Panchito Pierce is a 57 year old male who presents for follow-up.  Atrial fibrillation.    Patient with CML.  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  Patient had one episode of atrial fibrillation in 2016.  This reverted to sinus rhythm on diltiazem drip.  Since then patient had no recurrence.  During recent office visit his  oncologist noticed irregular heartbeat.  EKG taken showed atrial fibrillation with rapid ventricular response.  He was started on apixaban and metoprolol 25 mg twice a day.  Patient here for further evaluation and management.  Patient is unaware of atrial fibrillation.  No symptoms.  It appears that he drinks quite a bit.  This may be partly responsible for the atrial fibrillation.  He has no history of hypertension or diabetes.  No prior CVA/TIA.  No vascular disease.  No heart failure history.  Overall he is [id number] Vasc score is 0.  Patient is on nifedipine ER which was started for Raynaud's by his dermatologist.  In December 2021 patient was advised to return with an echocardiogram and EKG and even there was a plan for a cardioversion.  But patient did not.  Currently admits to sixpacks of beer  every 2 days.  He intermittently forget to take the evening dose of medications.  Denied cardiac symptoms.  Patient is totally asymptomatic from atrial fibrillation.    Patient Active Problem List    Diagnosis Date Noted     Actinic keratosis 03/11/2023     Priority: Medium     Prurigo nodularis 03/11/2023     Priority: Medium     Neoplasm of uncertain behavior of skin 07/03/2021     Priority: Medium     Xerosis of skin 07/03/2021     Priority: Medium     Arthropod bite, initial encounter 11/08/2019     Priority: Medium     Raynaud's phenomenon without gangrene 08/30/2017     Priority: Medium     Seborrheic keratoses, inflamed 06/14/2017     Priority: Medium     Chronic dermatitis of hands 06/14/2017     Priority: Medium     Hypogonadism male 12/01/2016     Priority:  Medium     Fatigue, unspecified type 12/01/2016     Priority: Medium     Hyperthyroidism 05/31/2016     Priority: Medium     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  Current Outpatient Medications   Medication Sig     apixaban ANTICOAGULANT (ELIQUIS ANTICOAGULANT) 5 MG tablet Take 1 tablet (5 mg) by mouth 2 times daily     betamethasone dipropionate (DIPROSONE) 0.05 % external ointment Apply to bug bite reactions, and you can cover with bandaid     clobetasol (TEMOVATE) 0.05 % external ointment Apply topically 2 times daily as needed (pruritic spots on arms and trunk)     fenofibrate micronized (LOFIBRA) 67 MG capsule Take 1 capsule by mouth once daily with a meal.     levothyroxine (SYNTHROID/LEVOTHROID) 137 MCG tablet Take 1 tablet (137 mcg) by mouth daily     liothyronine (CYTOMEL) 5 MCG tablet Take 1 tablet (5 mcg) by mouth 2 times daily     metoprolol tartrate (LOPRESSOR) 50 MG tablet Take 1 tablet (50 mg) by mouth 2 times daily     mupirocin (BACTROBAN) 2 % external ointment Apply topically 3 times daily     NIFEdipine ER (ADALAT CC) 30 MG 24 hr tablet Take 1 tablet (30 mg) by mouth daily *DURING WINTER MONTHS TO PREVENT FLARES. Check BP once a month while taking     OYSTER SHELL CALCIUM + D3 500-400 MG-UNIT TABS TAKE TWO TABLETS BY MOUTH DAILY      pantoprazole (PROTONIX) 40 MG EC tablet Take 1 tablet (40 mg) by mouth daily     potassium 99 MG TABS      Selenium 100 MCG CAPS 1 tablet daily     sildenafil (VIAGRA) 25 MG tablet take 1-2 tablets by mouth 1 hour before needed (do not exceed 5 tablets in 24 hours)     acyclovir (ZOVIRAX) 400 MG tablet Take 2 tablets  (800 mg) by mouth 2 times daily (Patient not taking: Reported on 2019)     metoprolol tartrate (LOPRESSOR) 25 MG tablet Take 1 tablet (25 mg) by mouth 2 times daily     No current facility-administered medications for this visit.     Past Medical History:   Diagnosis Date     Graves disease 2003    treated with radioiodine, meds T3 and T4     Murmur, heart     has not been fully evaluated     Past Surgical History:   Procedure Laterality Date     ------------OTHER------------- Left 1985    left hand graft flap- work accident     ------------OTHER------------- Right 1985    right foot graft- work accident     ESOPHAGOSCOPY, GASTROSCOPY, DUODENOSCOPY (EGD), COMBINED N/A 3/23/2016    Procedure: COMBINED ESOPHAGOSCOPY, GASTROSCOPY, DUODENOSCOPY (EGD), BIOPSY SINGLE OR MULTIPLE;  Surgeon: Jay Tracy MD;  Location:  GI     Allergies   Allergen Reactions     Allopurinol Rash     Unclear if it was from allopurinol, but went away when it was stopped.  But also had steroid cream at the same time.     Social History     Socioeconomic History     Marital status:      Spouse name: Nu     Number of children: 4     Years of education: Not on file     Highest education level: Not on file   Occupational History     Not on file   Tobacco Use     Smoking status: Every Day     Packs/day: 0.50     Years: 30.00     Pack years: 15.00     Types: Cigarettes     Start date: 1985     Last attempt to quit: 2015     Years since quittin.7     Smokeless tobacco: Never   Vaping Use     Vaping status: Not on file   Substance and Sexual Activity     Alcohol use: Yes     Comment: once a week     Drug use: No     Sexual activity: Not on file     Comment:    Other Topics Concern     Parent/sibling w/ CABG, MI or angioplasty before 65F 55M? Not Asked   Social History Narrative     to wife Nu.    4 children: 1 step daughter Ro age 27, 3 biological sons, 17 yo Jose L, 15 yo Sergio  "([place]), 13 yo Elvis    Lives in [place]    Used to work in clerical work, now in school for electrical engineering    11 brothers and sisters, 3 live in the [facility], 1 brother incarcerated, 1 brother hepatitis C     Social Determinants of Health     Financial Resource Strain: Not on file   Food Insecurity: Not on file   Transportation Needs: Not on file   Physical Activity: Not on file   Stress: Not on file   Social Connections: Not on file   Intimate Partner Violence: Not on file   Housing Stability: Not on file     Family History   Problem Relation Age of Onset     Thyroid Disease Mother      Lung Cancer Mother      Prostate Cancer Father      Dementia Father      Heart Murmur Father      Hepatitis Brother      Asthma Son      Thyroid Disease Sister      Thyroid Disease Sister      Thyroid Disease Sister      Heart Murmur Brother      Melanoma No family hx of      Skin Cancer No family hx of           REVIEW OF SYSTEMS:  General: negative, fever, chills, night sweats  Skin: negative, acne, rash and scaling  Eyes: negative, double vision, eye pain and photophobia  Ears/Nose/Throat: negative, nasal congestion and purulent rhinorrhea  Respiratory: No dyspnea on exertion, No cough, No hemoptysis and negative  Cardiovascular: negative, palpitations, tachycardia, irregular heart beat, chest pain, exertional chest pain or pressure, paroxysmal nocturnal dyspnea, dyspnea on exertion and orthopnea       OBJECTIVE:  Blood pressure 115/74, pulse 87, height 1.72 m (5' 7.72\"), weight 95.8 kg (211 lb 3.2 oz), SpO2 98 %.  General Appearance: alert, active and no distress  Head: Normocephalic. No masses, lesions, tenderness or abnormalities  Eyes: conjuctiva clear, PERRL, EOM intact  Ears: External ears normal. Canals clear. TM's normal.  Nose: Nares normal  Mouth: normal  Neck: Supple, no cervical adenopathy, no thyromegaly  Lungs: clear to auscultation  Cardiac: A-fib no cardiac murmur normal cardiac sounds.     " ASSESSMENT/PLAN:  Patient here for follow-up.  Persistent atrial fibrillation.  Patient was supposed to return with an EKG and echocardiogram but he missed that appointment.  Now patient denied cardiac symptoms though does admit some shortness of breath.  Patient with prior history of CML status post bone marrow transplant and currently in remission.  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  Today's EKG reviewed.  Controlled atrial fibrillation with a rate in the 90s.  Patient had an echocardiogram today.  This showed normal LV size, normal atrial size, EF is 20 to 25%.  Unclear at this time whether decreased cardiac function is due to atrial fibrillation, alcohol or coronary artery disease.  Currently patient is on Lopressor 25 mg twice daily and apixaban 5 mg twice a day.  He forgets evening dose of medication at least 2-3 times a week.  Currently drinks 6 packs of beer every 2 days.  Discussed the relationship between alcohol and atrial fibrillation.  Also alcohol and heart failure.  Patient was advised to quit completely.  As he administers intermittent doses of anticoagulation we will plan for a JAMES with cardioversion.  We will also arrange for a Lexiscan to rule out coronary artery disease.  Patient will return to clinic post cardioversion.  Echo and EKG results were discussed with patient.  Total visit duration 30 minutes.  This included face-to-face interview, physical exam, chart review, review of EKG echocardiogram and documentation.

## 2023-04-04 NOTE — NURSING NOTE
Chief Complaint   Patient presents with     Follow Up       Vitals were taken, medications reconciled.    Kaity Mueller, EMT   9:23 AM

## 2023-04-05 LAB
ATRIAL RATE - MUSE: NORMAL BPM
DIASTOLIC BLOOD PRESSURE - MUSE: NORMAL MMHG
INTERPRETATION ECG - MUSE: NORMAL
P AXIS - MUSE: NORMAL DEGREES
PR INTERVAL - MUSE: NORMAL MS
QRS DURATION - MUSE: 100 MS
QT - MUSE: 370 MS
QTC - MUSE: 462 MS
R AXIS - MUSE: 2 DEGREES
SYSTOLIC BLOOD PRESSURE - MUSE: NORMAL MMHG
T AXIS - MUSE: 14 DEGREES
VENTRICULAR RATE- MUSE: 94 BPM

## 2023-04-13 ENCOUNTER — HOSPITAL ENCOUNTER (OUTPATIENT)
Dept: NUCLEAR MEDICINE | Facility: CLINIC | Age: 58
Setting detail: NUCLEAR MEDICINE
Discharge: HOME | End: 2023-04-13
Attending: INTERNAL MEDICINE
Payer: COMMERCIAL

## 2023-04-13 ENCOUNTER — HOSPITAL ENCOUNTER (OUTPATIENT)
Dept: CARDIOLOGY | Facility: CLINIC | Age: 58
Discharge: HOME | End: 2023-04-13
Attending: INTERNAL MEDICINE
Payer: COMMERCIAL

## 2023-04-13 ENCOUNTER — ANCILLARY ORDERS (OUTPATIENT)
Dept: CARDIOLOGY | Facility: CLINIC | Age: 58
End: 2023-04-13

## 2023-04-13 DIAGNOSIS — I48.91 ATRIAL FIBRILLATION, UNSPECIFIED TYPE (H): ICD-10-CM

## 2023-04-13 DIAGNOSIS — I42.9 CARDIOMYOPATHY, UNSPECIFIED TYPE (H): ICD-10-CM

## 2023-04-13 LAB
CV STRESS MAX HR HE: 145
RATE PRESSURE PRODUCT: NORMAL
STRESS ECHO BASELINE DIASTOLIC HE: 87
STRESS ECHO BASELINE HR: 104 BPM
STRESS ECHO BASELINE SYSTOLIC BP: 115
STRESS ECHO CALCULATED PERCENT HR: 89 %
STRESS ECHO LAST STRESS DIASTOLIC BP: 82
STRESS ECHO LAST STRESS SYSTOLIC BP: 109
STRESS ECHO TARGET HR: 163

## 2023-04-13 PROCEDURE — 93017 CV STRESS TEST TRACING ONLY: CPT

## 2023-04-13 PROCEDURE — 343N000001 HC RX 343: Performed by: INTERNAL MEDICINE

## 2023-04-13 PROCEDURE — 78452 HT MUSCLE IMAGE SPECT MULT: CPT | Mod: 26 | Performed by: RADIOLOGY

## 2023-04-13 PROCEDURE — A9502 TC99M TETROFOSMIN: HCPCS | Performed by: INTERNAL MEDICINE

## 2023-04-13 PROCEDURE — 93018 CV STRESS TEST I&R ONLY: CPT | Performed by: STUDENT IN AN ORGANIZED HEALTH CARE EDUCATION/TRAINING PROGRAM

## 2023-04-13 PROCEDURE — 93016 CV STRESS TEST SUPVJ ONLY: CPT | Performed by: STUDENT IN AN ORGANIZED HEALTH CARE EDUCATION/TRAINING PROGRAM

## 2023-04-13 PROCEDURE — 78452 HT MUSCLE IMAGE SPECT MULT: CPT

## 2023-04-13 PROCEDURE — 250N000011 HC RX IP 250 OP 636: Performed by: STUDENT IN AN ORGANIZED HEALTH CARE EDUCATION/TRAINING PROGRAM

## 2023-04-13 RX ORDER — ACYCLOVIR 200 MG/1
0-1 CAPSULE ORAL
Status: DISCONTINUED | OUTPATIENT
Start: 2023-04-13 | End: 2023-04-14 | Stop reason: HOSPADM

## 2023-04-13 RX ORDER — CAFFEINE CITRATE 20 MG/ML
60 SOLUTION INTRAVENOUS
Status: DISCONTINUED | OUTPATIENT
Start: 2023-04-13 | End: 2023-04-14 | Stop reason: HOSPADM

## 2023-04-13 RX ORDER — REGADENOSON 0.08 MG/ML
0.4 INJECTION, SOLUTION INTRAVENOUS ONCE
Status: COMPLETED | OUTPATIENT
Start: 2023-04-13 | End: 2023-04-13

## 2023-04-13 RX ORDER — AMINOPHYLLINE 25 MG/ML
50-100 INJECTION, SOLUTION INTRAVENOUS
Status: DISCONTINUED | OUTPATIENT
Start: 2023-04-13 | End: 2023-04-14 | Stop reason: HOSPADM

## 2023-04-13 RX ORDER — ALBUTEROL SULFATE 90 UG/1
2 AEROSOL, METERED RESPIRATORY (INHALATION) EVERY 5 MIN PRN
Status: DISCONTINUED | OUTPATIENT
Start: 2023-04-13 | End: 2023-04-14 | Stop reason: HOSPADM

## 2023-04-13 RX ADMIN — TETROFOSMIN 10.7 MILLICURIE: 1.38 INJECTION, POWDER, LYOPHILIZED, FOR SOLUTION INTRAVENOUS at 10:23

## 2023-04-13 RX ADMIN — REGADENOSON 0.4 MG: 0.08 INJECTION, SOLUTION INTRAVENOUS at 11:45

## 2023-04-13 RX ADMIN — TETROFOSMIN 36 MILLICURIE: 1.38 INJECTION, POWDER, LYOPHILIZED, FOR SOLUTION INTRAVENOUS at 11:49

## 2023-04-13 NOTE — PROGRESS NOTES
Pt here for Lexiscan nuclear stress test.  Medication and side effects reviewed with patient. Lung sounds clear to auscultation bilaterally.  Denied caffeine use.  Patient tolerated Lexiscan dose without any adverse reactions.  VSS.  Monitored post injection and then taken to the gold waiting room and instructed to wait there for nuclear medicine tech for follow up imaging.    Najma Morley RN

## 2023-04-19 ENCOUNTER — ANESTHESIA EVENT (OUTPATIENT)
Dept: SURGERY | Facility: CLINIC | Age: 58
End: 2023-04-19
Payer: COMMERCIAL

## 2023-04-19 RX ORDER — LIDOCAINE 40 MG/G
CREAM TOPICAL
Status: CANCELLED | OUTPATIENT
Start: 2023-04-19

## 2023-04-19 RX ORDER — SODIUM CHLORIDE, SODIUM LACTATE, POTASSIUM CHLORIDE, CALCIUM CHLORIDE 600; 310; 30; 20 MG/100ML; MG/100ML; MG/100ML; MG/100ML
INJECTION, SOLUTION INTRAVENOUS CONTINUOUS
Status: CANCELLED | OUTPATIENT
Start: 2023-04-19

## 2023-04-19 ASSESSMENT — ENCOUNTER SYMPTOMS: DYSRHYTHMIAS: 1

## 2023-04-19 NOTE — ANESTHESIA PREPROCEDURE EVALUATION
Anesthesia Pre-Procedure Evaluation    Patient: Panchito Pierce   MRN: [id number] : 1965        Procedure : Procedure(s):  Anesthesia cardioversion@0930          Past Medical History:   Diagnosis Date     Graves disease 2003    treated with radioiodine, meds T3 and T4     Murmur, heart     has not been fully evaluated      Past Surgical History:   Procedure Laterality Date     ------------OTHER------------- Left 1985    left hand graft flap- work accident     ------------OTHER------------- Right 1985    right foot graft- work accident     ESOPHAGOSCOPY, GASTROSCOPY, DUODENOSCOPY (EGD), COMBINED N/A 3/23/2016    Procedure: COMBINED ESOPHAGOSCOPY, GASTROSCOPY, DUODENOSCOPY (EGD), BIOPSY SINGLE OR MULTIPLE;  Surgeon: Jay Tracy MD;  Location:  GI      Allergies   Allergen Reactions     Blood Transfusion Related (Informational Only) Other (See Comments)     Patient has a history of a clinically significant antibody against RBC antigens.  A delay in compatible RBCs may occur.      Allopurinol Rash     Unclear if it was from allopurinol, but went away when it was stopped.  But also had steroid cream at the same time.      Social History     Tobacco Use     Smoking status: Every Day     Packs/day: 0.50     Years: 30.00     Pack years: 15.00     Types: Cigarettes     Start date: 1985     Last attempt to quit: 2015     Years since quittin.8     Smokeless tobacco: Never   Vaping Use     Vaping status: Not on file   Substance Use Topics     Alcohol use: Yes     Comment: once a week      Wt Readings from Last 1 Encounters:   23 95.8 kg (211 lb 3.2 oz)        Medications Prior to Admission   Medication Sig Dispense Refill Last Dose     acyclovir (ZOVIRAX) 400 MG tablet Take 2 tablets (800 mg) by mouth 2 times daily (Patient not taking: Reported on 2019) 120 tablet 11      apixaban ANTICOAGULANT (ELIQUIS ANTICOAGULANT) 5 MG tablet Take 1 tablet (5 mg) by mouth 2 times daily  180 tablet 3      betamethasone dipropionate (DIPROSONE) 0.05 % external ointment Apply to bug bite reactions, and you can cover with bandaid 50 g 3      clobetasol (TEMOVATE) 0.05 % external ointment Apply topically 2 times daily as needed (pruritic spots on arms and trunk) 60 g 5      fenofibrate micronized (LOFIBRA) 67 MG capsule Take 1 capsule by mouth once daily with a meal.        levothyroxine (SYNTHROID/LEVOTHROID) 137 MCG tablet Take 1 tablet (137 mcg) by mouth daily 90 tablet 4      liothyronine (CYTOMEL) 5 MCG tablet Take 1 tablet (5 mcg) by mouth 2 times daily 180 tablet 4      metoprolol tartrate (LOPRESSOR) 50 MG tablet Take 1 tablet (50 mg) by mouth 2 times daily 180 tablet 3      mupirocin (BACTROBAN) 2 % external ointment Apply topically 3 times daily 22 g 1      NIFEdipine ER (ADALAT CC) 30 MG 24 hr tablet Take 1 tablet (30 mg) by mouth daily *DURING WINTER MONTHS TO PREVENT FLARES. Check BP once a month while taking 30 tablet 5      OYSTER SHELL CALCIUM + D3 500-400 MG-UNIT TABS TAKE TWO TABLETS BY MOUTH DAILY  60 tablet 11      pantoprazole (PROTONIX) 40 MG EC tablet Take 1 tablet (40 mg) by mouth daily 90 tablet 3      potassium 99 MG TABS         Selenium 100 MCG CAPS 1 tablet daily 90 capsule 4      sildenafil (VIAGRA) 25 MG tablet take 1-2 tablets by mouth 1 hour before needed (do not exceed 5 tablets in 24 hours)              Anesthesia Evaluation            ROS/MED HX  ENT/Pulmonary:  - neg pulmonary ROS     Neurologic:  - neg neurologic ROS     Cardiovascular:     (+) -----dysrhythmias, a-fib, Previous cardiac testing   Echo: Date: Results:    Stress Test: Date: Results:    ECG Reviewed: Date: 4/4/23 Results:  Atrial fibrillation with premature ventricular or aberrantly conducted complexes   Minimal voltage criteria for LVH, may be normal variant ( Migue product )   Septal infarct , age undetermined   Abnormal ECG   When compared with ECG of 24-DEC-2021 09:56,   Septal infarct is now  Present   Nonspecific T wave abnormality no longer evident in Lateral leads   Cath: Date: Results:      METS/Exercise Tolerance: >4 METS    Hematologic:  - neg hematologic  ROS     Musculoskeletal:  - neg musculoskeletal ROS     GI/Hepatic:  - neg GI/hepatic ROS     Renal/Genitourinary:  - neg Renal ROS     Endo: Comment: Graves Disease tx with radioiodine    (+) thyroid problem,     Psychiatric/Substance Use:  - neg psychiatric ROS     Infectious Disease:  - neg infectious disease ROS     Malignancy:  - neg malignancy ROS     Other:  - neg other ROS          Physical Exam    Airway        Mallampati: III    Neck ROM: full   Mouth opening: > 3 cm    Respiratory Devices and Support         Dental       (+) Multiple crowns, permanant bridges    B=Bridge, C=Chipped, L=Loose, M=Missing    Cardiovascular   cardiovascular exam normal          Pulmonary   pulmonary exam normal                OUTSIDE LABS:  CBC:   Lab Results   Component Value Date    WBC 6.3 12/15/2021    WBC 7.7 12/15/2020    HGB 15.0 12/15/2021    HGB 15.2 12/15/2020    HCT 45.2 12/15/2021    HCT 47.3 12/15/2020     12/15/2021     12/15/2020     BMP:   Lab Results   Component Value Date     12/15/2021     07/06/2021    POTASSIUM 4.2 12/15/2021    POTASSIUM 4.3 07/06/2021    CHLORIDE 107 12/15/2021    CHLORIDE 108 07/06/2021    CO2 23 12/15/2021    CO2 26 07/06/2021    BUN 20 12/15/2021    BUN 24 07/06/2021    CR 1.22 12/15/2021    CR 1.28 (H) 07/06/2021    GLC 98 12/15/2021     (H) 07/06/2021     COAGS:   Lab Results   Component Value Date    PTT 31 04/05/2016    INR 0.9 06/15/2016     POC:   Lab Results   Component Value Date     (H) 04/11/2016     HEPATIC:   Lab Results   Component Value Date    ALBUMIN 4.1 12/15/2020    PROTTOTAL 7.9 12/15/2020    ALT 39 12/15/2020    AST 20 12/15/2020    ALKPHOS 77 12/15/2020    BILITOTAL 0.4 12/15/2020     OTHER:   Lab Results   Component Value Date    LACT 0.6 12/30/2015     A1C 5.8 (H) 11/22/2021    SYD 9.7 12/15/2021    PHOS 2.8 04/27/2016    MAG 1.9 09/13/2017    TSH 0.61 12/15/2021    T4 1.07 12/15/2021    T3 112 10/22/2019       Anesthesia Plan    ASA Status:  3      Anesthesia Type: General.   Induction: Intravenous.   Maintenance: TIVA.        Consents    Anesthesia Plan(s) and associated risks, benefits, and realistic alternatives discussed. Questions answered and patient/representative(s) expressed understanding.    - Discussed:     - Discussed with:  Patient, Spouse      - Extended Intubation/Ventilatory Support Discussed: No.      - Patient is DNR/DNI Status: No    Use of blood products discussed: No .     Postoperative Care    Pain management: IV analgesics.        Comments:                Sweetie Beltrán MD

## 2023-04-20 ENCOUNTER — APPOINTMENT (OUTPATIENT)
Dept: MEDSURG UNIT | Facility: CLINIC | Age: 58
End: 2023-04-20
Payer: COMMERCIAL

## 2023-04-20 ENCOUNTER — ANESTHESIA (OUTPATIENT)
Dept: SURGERY | Facility: CLINIC | Age: 58
End: 2023-04-20
Payer: COMMERCIAL

## 2023-04-20 ENCOUNTER — HOSPITAL ENCOUNTER (OUTPATIENT)
Dept: CARDIOLOGY | Facility: CLINIC | Age: 58
Discharge: HOME | End: 2023-04-20
Attending: INTERNAL MEDICINE
Payer: COMMERCIAL

## 2023-04-20 ENCOUNTER — HOSPITAL ENCOUNTER (OUTPATIENT)
Facility: CLINIC | Age: 58
Discharge: HOME | End: 2023-04-20
Attending: INTERNAL MEDICINE | Admitting: NURSE PRACTITIONER
Payer: COMMERCIAL

## 2023-04-20 ENCOUNTER — APPOINTMENT (OUTPATIENT)
Dept: LAB | Facility: CLINIC | Age: 58
End: 2023-04-20
Payer: COMMERCIAL

## 2023-04-20 VITALS
OXYGEN SATURATION: 95 % | DIASTOLIC BLOOD PRESSURE: 80 MMHG | RESPIRATION RATE: 19 BRPM | SYSTOLIC BLOOD PRESSURE: 116 MMHG | HEART RATE: 56 BPM

## 2023-04-20 VITALS
HEART RATE: 57 BPM | OXYGEN SATURATION: 98 % | SYSTOLIC BLOOD PRESSURE: 120 MMHG | DIASTOLIC BLOOD PRESSURE: 79 MMHG | RESPIRATION RATE: 16 BRPM

## 2023-04-20 VITALS
OXYGEN SATURATION: 97 % | SYSTOLIC BLOOD PRESSURE: 117 MMHG | RESPIRATION RATE: 23 BRPM | HEART RATE: 86 BPM | DIASTOLIC BLOOD PRESSURE: 84 MMHG

## 2023-04-20 DIAGNOSIS — I48.91 ATRIAL FIBRILLATION, UNSPECIFIED TYPE (H): ICD-10-CM

## 2023-04-20 DIAGNOSIS — I42.9 CARDIOMYOPATHY, UNSPECIFIED TYPE (H): ICD-10-CM

## 2023-04-20 LAB
ATRIAL RATE - MUSE: NORMAL BPM
DIASTOLIC BLOOD PRESSURE - MUSE: NORMAL MMHG
INTERPRETATION ECG - MUSE: NORMAL
LVEF ECHO: NORMAL
MAGNESIUM SERPL-MCNC: 1.8 MG/DL (ref 1.7–2.3)
P AXIS - MUSE: NORMAL DEGREES
POTASSIUM SERPL-SCNC: 4.5 MMOL/L (ref 3.4–5.3)
PR INTERVAL - MUSE: NORMAL MS
QRS DURATION - MUSE: 98 MS
QT - MUSE: 366 MS
QTC - MUSE: 447 MS
R AXIS - MUSE: 61 DEGREES
SYSTOLIC BLOOD PRESSURE - MUSE: NORMAL MMHG
T AXIS - MUSE: 37 DEGREES
VENTRICULAR RATE- MUSE: 90 BPM

## 2023-04-20 PROCEDURE — 999N000132 HC STATISTIC PP CARE STAGE 1

## 2023-04-20 PROCEDURE — 99152 MOD SED SAME PHYS/QHP 5/>YRS: CPT | Performed by: STUDENT IN AN ORGANIZED HEALTH CARE EDUCATION/TRAINING PROGRAM

## 2023-04-20 PROCEDURE — 370N000017 HC ANESTHESIA TECHNICAL FEE, PER MIN

## 2023-04-20 PROCEDURE — 999N000054 HC STATISTIC EKG NON-CHARGEABLE

## 2023-04-20 PROCEDURE — 83735 ASSAY OF MAGNESIUM: CPT | Performed by: INTERNAL MEDICINE

## 2023-04-20 PROCEDURE — 93312 ECHO TRANSESOPHAGEAL: CPT

## 2023-04-20 PROCEDURE — 250N000009 HC RX 250: Performed by: STUDENT IN AN ORGANIZED HEALTH CARE EDUCATION/TRAINING PROGRAM

## 2023-04-20 PROCEDURE — 92960 CARDIOVERSION ELECTRIC EXT: CPT | Performed by: NURSE PRACTITIONER

## 2023-04-20 PROCEDURE — 84132 ASSAY OF SERUM POTASSIUM: CPT | Performed by: INTERNAL MEDICINE

## 2023-04-20 PROCEDURE — 93325 DOPPLER ECHO COLOR FLOW MAPG: CPT | Mod: 26 | Performed by: STUDENT IN AN ORGANIZED HEALTH CARE EDUCATION/TRAINING PROGRAM

## 2023-04-20 PROCEDURE — 93010 ELECTROCARDIOGRAM REPORT: CPT | Mod: 76 | Performed by: INTERNAL MEDICINE

## 2023-04-20 PROCEDURE — 250N000009 HC RX 250: Performed by: NURSE ANESTHETIST, CERTIFIED REGISTERED

## 2023-04-20 PROCEDURE — 93005 ELECTROCARDIOGRAM TRACING: CPT

## 2023-04-20 PROCEDURE — 250N000011 HC RX IP 250 OP 636: Performed by: STUDENT IN AN ORGANIZED HEALTH CARE EDUCATION/TRAINING PROGRAM

## 2023-04-20 PROCEDURE — 93325 DOPPLER ECHO COLOR FLOW MAPG: CPT

## 2023-04-20 PROCEDURE — 76376 3D RENDER W/INTRP POSTPROCES: CPT | Mod: 26 | Performed by: STUDENT IN AN ORGANIZED HEALTH CARE EDUCATION/TRAINING PROGRAM

## 2023-04-20 PROCEDURE — 93320 DOPPLER ECHO COMPLETE: CPT | Mod: 26 | Performed by: STUDENT IN AN ORGANIZED HEALTH CARE EDUCATION/TRAINING PROGRAM

## 2023-04-20 PROCEDURE — 250N000013 HC RX MED GY IP 250 OP 250 PS 637: Performed by: NURSE PRACTITIONER

## 2023-04-20 PROCEDURE — 76376 3D RENDER W/INTRP POSTPROCES: CPT

## 2023-04-20 PROCEDURE — 93312 ECHO TRANSESOPHAGEAL: CPT | Mod: 26 | Performed by: STUDENT IN AN ORGANIZED HEALTH CARE EDUCATION/TRAINING PROGRAM

## 2023-04-20 PROCEDURE — 258N000001 HC RX 258: Performed by: STUDENT IN AN ORGANIZED HEALTH CARE EDUCATION/TRAINING PROGRAM

## 2023-04-20 PROCEDURE — 92960 CARDIOVERSION ELECTRIC EXT: CPT

## 2023-04-20 RX ORDER — NALOXONE HYDROCHLORIDE 0.4 MG/ML
0.2 INJECTION, SOLUTION INTRAMUSCULAR; INTRAVENOUS; SUBCUTANEOUS
Status: DISCONTINUED | OUTPATIENT
Start: 2023-04-20 | End: 2023-04-21 | Stop reason: HOSPADM

## 2023-04-20 RX ORDER — LIDOCAINE 40 MG/G
CREAM TOPICAL
Status: DISCONTINUED | OUTPATIENT
Start: 2023-04-20 | End: 2023-04-21 | Stop reason: HOSPADM

## 2023-04-20 RX ORDER — POTASSIUM CHLORIDE 750 MG/1
20 TABLET, EXTENDED RELEASE ORAL
Status: DISCONTINUED | OUTPATIENT
Start: 2023-04-20 | End: 2023-04-21 | Stop reason: HOSPADM

## 2023-04-20 RX ORDER — FENTANYL CITRATE 50 UG/ML
25 INJECTION, SOLUTION INTRAMUSCULAR; INTRAVENOUS
Status: DISCONTINUED | OUTPATIENT
Start: 2023-04-20 | End: 2023-04-21 | Stop reason: HOSPADM

## 2023-04-20 RX ORDER — POTASSIUM CHLORIDE 750 MG/1
40 TABLET, EXTENDED RELEASE ORAL
Status: DISCONTINUED | OUTPATIENT
Start: 2023-04-20 | End: 2023-04-21 | Stop reason: HOSPADM

## 2023-04-20 RX ORDER — NALOXONE HYDROCHLORIDE 0.4 MG/ML
0.4 INJECTION, SOLUTION INTRAMUSCULAR; INTRAVENOUS; SUBCUTANEOUS
Status: DISCONTINUED | OUTPATIENT
Start: 2023-04-20 | End: 2023-04-21 | Stop reason: HOSPADM

## 2023-04-20 RX ORDER — ACYCLOVIR 200 MG/1
9.5 CAPSULE ORAL
Status: DISCONTINUED | OUTPATIENT
Start: 2023-04-20 | End: 2023-04-21 | Stop reason: HOSPADM

## 2023-04-20 RX ORDER — FLUMAZENIL 0.1 MG/ML
0.2 INJECTION, SOLUTION INTRAVENOUS
Status: DISCONTINUED | OUTPATIENT
Start: 2023-04-20 | End: 2023-04-21 | Stop reason: HOSPADM

## 2023-04-20 RX ORDER — FENTANYL CITRATE 50 UG/ML
50 INJECTION, SOLUTION INTRAMUSCULAR; INTRAVENOUS ONCE
Status: COMPLETED | OUTPATIENT
Start: 2023-04-20 | End: 2023-04-20

## 2023-04-20 RX ORDER — MAGNESIUM SULFATE HEPTAHYDRATE 40 MG/ML
2 INJECTION, SOLUTION INTRAVENOUS
Status: DISCONTINUED | OUTPATIENT
Start: 2023-04-20 | End: 2023-04-21 | Stop reason: HOSPADM

## 2023-04-20 RX ORDER — SODIUM CHLORIDE 9 MG/ML
INJECTION, SOLUTION INTRAVENOUS CONTINUOUS PRN
Status: DISCONTINUED | OUTPATIENT
Start: 2023-04-20 | End: 2023-04-21 | Stop reason: HOSPADM

## 2023-04-20 RX ORDER — LIDOCAINE HYDROCHLORIDE 20 MG/ML
15 SOLUTION OROPHARYNGEAL ONCE
Status: COMPLETED | OUTPATIENT
Start: 2023-04-20 | End: 2023-04-20

## 2023-04-20 RX ADMIN — SODIUM CHLORIDE 9.5 ML: 9 INJECTION, SOLUTION INTRAMUSCULAR; INTRAVENOUS; SUBCUTANEOUS at 10:35

## 2023-04-20 RX ADMIN — FENTANYL CITRATE 50 MCG: 50 INJECTION, SOLUTION INTRAMUSCULAR; INTRAVENOUS at 10:12

## 2023-04-20 RX ADMIN — FENTANYL CITRATE 50 MCG: 50 INJECTION, SOLUTION INTRAMUSCULAR; INTRAVENOUS at 10:15

## 2023-04-20 RX ADMIN — TOPICAL ANESTHETIC 0.5 ML: 200 SPRAY DENTAL; PERIODONTAL at 10:05

## 2023-04-20 RX ADMIN — MIDAZOLAM 1 MG: 1 INJECTION INTRAMUSCULAR; INTRAVENOUS at 10:15

## 2023-04-20 RX ADMIN — METHOHEXITAL SODIUM 50 MG: 500 INJECTION, POWDER, LYOPHILIZED, FOR SOLUTION INTRAMUSCULAR; INTRAVENOUS; RECTAL at 10:56

## 2023-04-20 RX ADMIN — MIDAZOLAM 2 MG: 1 INJECTION INTRAMUSCULAR; INTRAVENOUS at 10:12

## 2023-04-20 RX ADMIN — APIXABAN 5 MG: 5 TABLET, FILM COATED ORAL at 09:56

## 2023-04-20 RX ADMIN — LIDOCAINE HYDROCHLORIDE 30 ML: 20 SOLUTION ORAL; TOPICAL at 10:03

## 2023-04-20 ASSESSMENT — ACTIVITIES OF DAILY LIVING (ADL)
ADLS_ACUITY_SCORE: 35
ADLS_ACUITY_SCORE: 35

## 2023-04-20 NOTE — PROGRESS NOTES
Pt arrives to 2a from ECHO s/p JAMES and cardioversion. Pt alert, denies pain. VSS. Spouse at bedside. Pt NPO until 1205    1140 Per CRUZ Blankenship pt to continue taking metoprolol. Pt and spouse updated and verbalize understanding.

## 2023-04-20 NOTE — PRE-PROCEDURE
GENERAL PRE-PROCEDURE:   Procedure:  Transesophageal Echocardiogram with Monitored Sedatyion and Possible Cardioversion  Date/Time:  4/20/2023 9:57 AM    Verbal consent obtained?: Yes    Written consent obtained?: Yes    Risks and benefits: Risks, benefits and alternatives were discussed    Consent given by:  Patient  Patient states understanding of procedure being performed: Yes    Patient's understanding of procedure matches consent: Yes    Procedure consent matches procedure scheduled: Yes    Expected level of sedation:  Moderate  Appropriately NPO:  Yes  ASA Class:  3  Mallampati  :  Grade 3- soft palate visible, posterior pharyngeal wall not visible  Lungs:  Lungs clear with good breath sounds bilaterally  Heart:  Normal heart sounds and rate and a-fib  History & Physical reviewed:  History and physical reviewed and no updates needed  Statement of review:  I have reviewed the lab findings, diagnostic data, medications, and the plan for sedation

## 2023-04-20 NOTE — PROCEDURES
[facility]    Procedure: Cardioversion    Date/Time: 4/20/2023 11:19 AM    Performed by: Pattie Diaz APRN CNP  Authorized by: AMBER Lawler MD      UNIVERSAL PROTOCOL   Site Marked: NA  Prior Images Obtained and Reviewed:  NA  Required items: Required blood products, implants, devices and special equipment available    Patient identity confirmed:  Verbally with patient  Patient was reevaluated immediately before administering moderate or deep sedation or anesthesia  Confirmation Checklist:  Patient's identity using two indicators  Time out: Immediately prior to the procedure a time out was called    Universal Protocol: the Joint Commission Universal Protocol was followed       ANESTHESIA  Anesthesia was administered and monitored by anesthesiology.  See anesthesia documentation for details.    PROCEDURE DETAILS  Pre-procedure rhythm: atrial fibrillation  Patient position: patient was placed in a supine position  Chest area: chest area exposed  Electrodes: pads  Electrodes placed: anterior-posterior  Number of attempts: 1    Details of Attempts:  150J biphasic synchronized shock delivered with successful conversion to sinus   Post-procedure rhythm: normal sinus rhythm  Complications: no complications      PROCEDURE    Patient Tolerance:  Patient tolerated the procedure well with no immediate complications  Length of time physician/provider present for 1:1 monitoring during sedation: 0      RINA Joy CNP  Electrophysiology Consult Service  Pager: [phone]

## 2023-04-20 NOTE — PROGRESS NOTES
Pt has tolerated PO. Ambulated in guzmán with steady gait. Voided x1. Discharge instructions were reviewed with pt pre procedure per RISA Yao RN. Pt reports he doesn't have any questions. Pt denies pain. PIV discontinued.  1240 Pt's spouse has arrived with car to provide ride home, pt transported to vehicle via wheelchair.

## 2023-04-20 NOTE — ANESTHESIA POSTPROCEDURE EVALUATION
Patient: Panchito Pierce    Procedure: Procedure(s):  Anesthesia cardioversion@0925       Anesthesia Type:  General    Note:  Disposition: Outpatient   Postop Pain Control: Uneventful            Sign Out: Well controlled pain   PONV: No   Neuro/Psych: Uneventful            Sign Out: Acceptable/Baseline neuro status   Airway/Respiratory: Uneventful            Sign Out: Acceptable/Baseline resp. status   CV/Hemodynamics: Uneventful            Sign Out: Acceptable CV status; No obvious hypovolemia; No obvious fluid overload   Other NRE: NONE   DID A NON-ROUTINE EVENT OCCUR? No           Last vitals:  There were no vitals filed for this visit.    Electronically Signed By: Sweetie Beltrán MD  April 20, 2023  11:15 AM

## 2023-04-20 NOTE — ANESTHESIA CARE TRANSFER NOTE
Patient: Panchito Pierce    Procedure: Procedure(s):  Anesthesia cardioversion@0930       Diagnosis: Atrial fibrillation, unspecified type (H) [I48.91]  Diagnosis Additional Information: No value filed.    Anesthesia Type:   General     Note:    Oropharynx: oropharynx clear of all foreign objects and spontaneously breathing  Level of Consciousness: awake  Oxygen Supplementation: nasal cannula  Level of Supplemental Oxygen (L/min / FiO2): 6  Independent Airway: airway patency satisfactory and stable  Dentition: dentition unchanged  Vital Signs Stable: post-procedure vital signs reviewed and stable  Report to RN Given: handoff report given  Patient transferred to: Telemetry/Step Down Unit  Comments: Successful cardioversion x1. Pt alert and VSS throughout.  Handoff Report: Identifed the Patient, Identified the Reponsible Provider, Reviewed the pertinent medical history, Discussed the surgical course, Reviewed Intra-OP anesthesia mangement and issues during anesthesia, Set expectations for post-procedure period and Allowed opportunity for questions and acknowledgement of understanding      Vitals:  Vitals Value Taken Time   /84 04/20/23 1046   Temp     Pulse 86 04/20/23 1046   Resp 23 04/20/23 1046   SpO2 97 % 04/20/23 1046       Electronically Signed By: RINA Odell CRNA  April 20, 2023  10:58 AM

## 2023-04-20 NOTE — PROGRESS NOTES
Pt arrived in ECHO department for scheduled JAMES.   Procedure explained, questions answered and consent signed. Discharge instructions discussed with patient.  Pt's throat sprayed at 1005, therefore pt will not be able to eat or drink until 2 hours after at 1205. Informed pt of this time and encouraged to start with warm fluids and soft foods.    Pt tolerated procedure well, and was given a total of 100 mcg IV fentanyl and 3 mg IV versed for conscious sedation. Pt denied throat or chest pain after JAMES complete.   JAMES probe 63 used for procedure.  No clots visualized on JAMES so proceeded with DCCV. Anesthesia gave pt 50 mg IV brevital for sedation and pt was DCCV at 150 Joules to a SR.  Pt denied chest or throat pain after procedure and was transferred to  for further recovery. Wife Nu updated to plan of care.     Report given to Melissa WORLEY.     Najma Morley RN

## 2023-04-20 NOTE — DISCHARGE INSTRUCTIONS
GOING HOME AFTER YOUR TRANSESOPHAGEAL ECHOCARDIOGRAM AND CARDIOVERSION    FOR NEXT 24 HOURS:  An adult should stay with you.  Relax and take it easy.  DO NOT make any important legal decisions.  DO NOT drive or operate machines at home or at work.  DO NOT consume any alcohol today.  Resume your regular diet 2 HOURS after procedure @ _______ and drink plenty of fluids.  If your throat is sore, eat cold, bland, soft foods.  If you have any redness/skin sorness where the patches were placed, you may use aloe vera gel or 1% hydrocortisone cream to the skin (sold at drug stores)  Take Tylenol (Acetaminophen) per your provider's recommendations as needed to help relieve local pain.     CALL YOUR HEALTHCARE PROVIDER IF:  New pain or trouble swallowing  New stomach or chest pain  You develop nausea or vomiting  Fever above 100.6 F or greater  You develop hives or a rash or any unexplained itching  Have irregular heartbeat or fast pulse  Feel faint, dizzy, or lightheaded  Have chest pain with increased activity  Have bleeding issues from blood-thinning medicines (vomiting that looks like coffee grounds or contains blood OR black, bloody stools).    CALL 911 RIGHT AWAY IF YOU HAVE:  Pain in your chest, arm, shoulder, neck, or upper back  Shortness of breath  Loss of vision, speech, or strength or coordination in any body part  Weakness in your arm or leg  Uncontrolled bleeding  Feel unstable in any way     FOLLOW UP APPOINTMENT:    Follow up as scheduled with EP/Cardiology Provider     ADDITIONAL INFORMATION:    If you have any questions:        Call the Echo Lab Nurse at [phone], press 4 (Monday-Friday 7:00 am - 4:00 pm)        Call the hospital: [phone] and ask them to page [phone] (after 4:00 pm and on   weekends or holidays)      [facility]:   [address] [place] [address]. [place] [address]  Your Care Team:  EP Cardiology   Telephone Number     Pattie Lockett NP, Dr. *** [phone]   Carisa Suarez  RN  Leann Ellis RN  [phone]     For scheduling appts or procedures:    Gracie Eugene   [phone]   For the Device Clinic (Pacemakers and ICD's)   RN's :   Yvette Humphreys  During business hours: [phone]     After business hours:   [phone]- select option 4 and ask for job code [phone].       As always, Thank you for trusting us with your health care needs!

## 2023-04-21 LAB
ATRIAL RATE - MUSE: 56 BPM
DIASTOLIC BLOOD PRESSURE - MUSE: NORMAL MMHG
INTERPRETATION ECG - MUSE: NORMAL
P AXIS - MUSE: 50 DEGREES
PR INTERVAL - MUSE: 172 MS
QRS DURATION - MUSE: 100 MS
QT - MUSE: 472 MS
QTC - MUSE: 455 MS
R AXIS - MUSE: 47 DEGREES
SYSTOLIC BLOOD PRESSURE - MUSE: NORMAL MMHG
T AXIS - MUSE: 37 DEGREES
VENTRICULAR RATE- MUSE: 56 BPM

## 2023-04-21 RX ORDER — METOPROLOL TARTRATE 50 MG
50 TABLET ORAL 2 TIMES DAILY
Qty: 180 TABLET | Refills: 0 | Status: SHIPPED | OUTPATIENT
Start: 2023-04-21 | End: 2023-06-15

## 2023-04-21 NOTE — TELEPHONE ENCOUNTER
metoprolol tartrate (LOPRESSOR) 50 MG   Last Written Prescription Date:  12/24/21  Last Fill Quantity: 180,   # refills: 3  Last Office Visit : 4/4/23  Future Office visit:  5/15/23  Routing refill request to provider for review/approval because:  REFILL  GAP  Is pt taking med? DOSE

## 2023-05-14 NOTE — PROGRESS NOTES
General Cardiology Clinic-[place] – [place], [place]        HPI: Mr. Panchito Pierce is a 58 year old  male with PMH significant for    -Persistent atrial fibrillation status post DC cardioversion 4/20/2023  -Heart failure with reduced ejection fraction  -Alcohol abuse  -Status post bone marrow transplant  -CML  -Graves' disease  -Obesity  -Current smoker 40 years half pack a day    Patient is being seen today for follow-up after recent DC cardioversion 4/20/2023 for persistent atrial fibrillation.  Patient was recently seen in cardiology clinic on 4/4/2023 by my colleague  for persistent atrial fibrillation since 12/2021 and heart failure with reduced EF.  Reduced EF as a new diagnosis.  Was recommended cardioversion and Lexiscan.  Lexiscan showed no ischemia.  Cardioversion was successful with 1 attempt on 4/20/2023.  JAMES was negative for CAROLINA thrombus.  Today he tells me that he felt immediately better after cardioversion.  He no longer feels short of breath with physical exertion.  Denies chest pain, palpitations, dizziness, lower extremity edema.  He continues to drink alcohol 3 times a week (3 or 4 beers).  He has been a smoker all his life.  He has no plans to quit.  Patient has no prior history of HFrEF.  Prior echocardiogram was from 2015 which shows normal LVEF with no valve disease.  Patient is currently on metoprolol 50 mg twice daily, Eliquis 5 mg twice daily, nifedipine 30 mg daily for Raynaud's, fenofibrate, levothyroxine.  It appears patient has been in atrial fibrillation since December 2021 which was initially recognized by his oncologist from irregular heart rhythm.  He was started on Eliquis and metoprolol and recommended further evaluation in cardiology however the patient did not follow-up.  Patient drinks 6 packs of beer every 2 days.  Sometimes he forgets to take his evening dose of  medications.  [id number] score is 1 from HFrEF.    Medications, personal, family, and social history reviewed with patient and revised.    PAST MEDICAL HISTORY:  Past Medical History:   Diagnosis Date     Graves disease 1/1/2003    treated with radioiodine, meds T3 and T4     Murmur, heart     has not been fully evaluated       CURRENT MEDICATIONS:  Current Outpatient Medications   Medication Sig Dispense Refill     acyclovir (ZOVIRAX) 400 MG tablet Take 2 tablets (800 mg) by mouth 2 times daily (Patient not taking: Reported on 7/5/2019) 120 tablet 11     apixaban ANTICOAGULANT (ELIQUIS ANTICOAGULANT) 5 MG tablet Take 1 tablet (5 mg) by mouth 2 times daily 180 tablet 3     betamethasone dipropionate (DIPROSONE) 0.05 % external ointment Apply to bug bite reactions, and you can cover with bandaid 50 g 3     clobetasol (TEMOVATE) 0.05 % external ointment Apply topically 2 times daily as needed (pruritic spots on arms and trunk) 60 g 5     fenofibrate micronized (LOFIBRA) 67 MG capsule Take 1 capsule by mouth once daily with a meal.       levothyroxine (SYNTHROID/LEVOTHROID) 137 MCG tablet Take 1 tablet (137 mcg) by mouth daily 90 tablet 4     liothyronine (CYTOMEL) 5 MCG tablet Take 1 tablet (5 mcg) by mouth 2 times daily 180 tablet 4     metoprolol tartrate (LOPRESSOR) 50 MG tablet Take 1 tablet (50 mg) by mouth 2 times daily 180 tablet 0     mupirocin (BACTROBAN) 2 % external ointment Apply topically 3 times daily 22 g 1     NIFEdipine ER (ADALAT CC) 30 MG 24 hr tablet Take 1 tablet (30 mg) by mouth daily *DURING WINTER MONTHS TO PREVENT FLARES. Check BP once a month while taking 30 tablet 5     OYSTER SHELL CALCIUM + D3 500-400 MG-UNIT TABS TAKE TWO TABLETS BY MOUTH DAILY  60 tablet 11     pantoprazole (PROTONIX) 40 MG EC tablet Take 1 tablet (40 mg) by mouth daily 90 tablet 3     potassium 99 MG TABS        Selenium 100 MCG CAPS 1 tablet daily 90 capsule 4     sildenafil (VIAGRA) 25 MG tablet take 1-2 tablets by  mouth 1 hour before needed (do not exceed 5 tablets in 24 hours)         PAST SURGICAL HISTORY:  Past Surgical History:   Procedure Laterality Date     ------------OTHER------------- Left 1985    left hand graft flap- work accident     ------------OTHER------------- Right 1985    right foot graft- work accident     ANESTHESIA CARDIOVERSION N/A 2023    Procedure: Anesthesia cardioversion@0930;  Surgeon: GENERIC ANESTHESIA PROVIDER;  Location:  OR     ESOPHAGOSCOPY, GASTROSCOPY, DUODENOSCOPY (EGD), COMBINED N/A 3/23/2016    Procedure: COMBINED ESOPHAGOSCOPY, GASTROSCOPY, DUODENOSCOPY (EGD), BIOPSY SINGLE OR MULTIPLE;  Surgeon: Jay Tracy MD;  Location:  GI       ALLERGIES:     Allergies   Allergen Reactions     Blood Transfusion Related (Informational Only) Other (See Comments)     Patient has a history of a clinically significant antibody against RBC antigens.  A delay in compatible RBCs may occur.      Allopurinol Rash     Unclear if it was from allopurinol, but went away when it was stopped.  But also had steroid cream at the same time.       FAMILY HISTORY:  Family History   Problem Relation Age of Onset     Thyroid Disease Mother      Lung Cancer Mother      Prostate Cancer Father      Dementia Father      Heart Murmur Father      Hepatitis Brother      Asthma Son      Thyroid Disease Sister      Thyroid Disease Sister      Thyroid Disease Sister      Heart Murmur Brother      Melanoma No family hx of      Skin Cancer No family hx of          SOCIAL HISTORY:  Social History     Tobacco Use     Smoking status: Every Day     Packs/day: 0.50     Years: 30.00     Pack years: 15.00     Types: Cigarettes     Start date: 1985     Last attempt to quit: 2015     Years since quittin.8     Smokeless tobacco: Never   Substance Use Topics     Alcohol use: Yes     Comment: once a week     Drug use: No       ROS:   Constitutional: No fever, chills, or sweats. Weight stable.  "  Cardiovascular: As per HPI.       Exam:  BP (!) 148/82 (BP Location: Right arm, Patient Position: Chair, Cuff Size: Adult Large)   Pulse 50   Ht 1.734 m (5' 8.27\")   Wt 96.8 kg (213 lb 6.4 oz)   SpO2 96%   BMI 32.19 kg/m    GENERAL APPEARANCE: alert and no distress  HEENT: no icterus, no central cyanosis  LYMPH/NECK: no adenopathy, no asymmetry, JVP not elevated, no carotid bruits.  RESPIRATORY: lungs clear to auscultation - no rales, rhonchi or wheezes, no use of accessory muscles, no retractions, respirations are unlabored, normal respiratory rate  CARDIOVASCULAR: regular rhythm, normal S1, S2, no S3 or S4 and no murmur, click or rub, precordium quiet with normal PMI.  GI: soft, non tender  EXTREMITIES: no edema  NEURO: alert, normal speech,and affect  SKIN: no ecchymoses, no rashes     I have reviewed the labs and personally reviewed the imaging below and made my comment in the assessment and plan.    Labs:  CBC RESULTS:   Lab Results   Component Value Date    WBC 6.3 12/15/2021    WBC 7.7 12/15/2020    RBC 5.08 12/15/2021    RBC 5.22 12/15/2020    HGB 15.0 12/15/2021    HGB 15.2 12/15/2020    HCT 45.2 12/15/2021    HCT 47.3 12/15/2020    MCV 89 12/15/2021    MCV 91 12/15/2020    MCH 29.5 12/15/2021    MCH 29.1 12/15/2020    MCHC 33.2 12/15/2021    MCHC 32.1 12/15/2020    RDW 13.6 12/15/2021    RDW 13.6 12/15/2020     12/15/2021     12/15/2020       BMP RESULTS:  Lab Results   Component Value Date     12/15/2021     07/06/2021    POTASSIUM 4.5 04/20/2023    POTASSIUM 4.2 12/15/2021    POTASSIUM 4.3 07/06/2021    CHLORIDE 107 12/15/2021    CHLORIDE 108 07/06/2021    CO2 23 12/15/2021    CO2 26 07/06/2021    ANIONGAP 11 12/15/2021    ANIONGAP 5 07/06/2021    GLC 98 12/15/2021     (H) 07/06/2021    BUN 20 12/15/2021    BUN 24 07/06/2021    CR 1.22 12/15/2021    CR 1.28 (H) 07/06/2021    GFRESTIMATED 66 12/15/2021    GFRESTIMATED 62 07/06/2021    GFRESTBLACK 72 07/06/2021    " SYD 9.7 12/15/2021    SYD 9.3 07/06/2021   DC cardioversion 4/20/2023: 150 J, 1 attempt, successful cardioversion.    Lexiscan stress test 4/13/2023:     The nuclear stress test is negative for inducible myocardial ischemia or infarction.     Left ventricular function is severely reduced.     Left ventricular enlargement is noted.     There is no prior study for comparison.    Transesophageal echocardiogram 4/20/2023:  The left atrial appendage is normal. It is free of spontaneous echo contrast  and thrombus. The left atrial appendage Doppler velocities are normal.  Left ventricular function is severely decreased. The ejection fraction is 20-  25%. Moderate to severe diffuse hypokinesis is present.  Global right ventricular function is mildly to moderately reduced.  A small patent foramen ovale is present, with evidence of bidirectional flow,  as demonstrated by color Doppler and agitated saline bubble study.     Transthoracic echocardiogram 4/4/2023:  Left ventricular function is decreased. The ejection fraction is 20-25%  (severely reduced). Severe diffuse hypokinesis is present.  Global right ventricular function is moderately reduced. The right ventricle  is normal size.  No significant valvular abnormalities.  IVC diameter <2.1 cm collapsing >50% with sniff suggests a normal RA pressure  of 3 mmHg.  There is no prior study for direct comparison.    TTE 2015: [facility]  Normal left ventricular size, wall thickness, and systolic function with no obvious regional   wall motion abnormalities.  The ejection    fraction is visually estimated at 60-65%.    The right ventricle is normal in size and function.    No significant valvular heart disease noted.    Estimated EF: 60-65%       I reviewed EKG 4/20/2023 which shows atrial fibrillation prior to cardioversion.          I reviewed patient's EKG in clinic today which shows sinus bradycardia otherwise unremarkable.          Assessment and Plan:     #Persistent  atrial fibrillation status post cardioversion 4/20/2023  #Long history of alcohol abuse  #Current smoker  #Obersity  -Patient is currently asymptomatic.  LINDSAY resolved since cardioversion.  Sinus rhythm in clinic today.  EKG shows sinus bradycardia.  He is asymptomatic.  Euvolemic on exam.  -[id number] score is 1 given HFrEF.  Continue Eliquis 5 mg twice daily.  If patient's EF does not recover I think we will probably elect to continue.  -Discussed sleep study.  Patient declines sleep study at this time.  -Discussed extensively the possible triggers of atrial fibrillation.  Discussed the role of obesity and alcohol.  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  It is also unclear if A-fib caused heart failure or heart failure caused A-fib.  Thyroid function is normal. Recent Lexiscan stress test is normal.  -I will hold off on further guideline directed medical therapy for HFrEF until I see repeat echocardiogram.  -Continue current treatment with metoprolol 50 mg twice daily  -Schedule limited transthoracic echocardiogram to recheck LVEF in 1-2 weeks  -If LVEF does not recover he will be a candidate for guideline directed medical therapy.    No medication changes today.  Return to clinic with Dr.Madhu rasheed in 1 or 2 months.    Total time spent today for this visit is 60 minutes including precharting, face-to-face clinic visit, review of labs/imaging and medical documentation.    Please donot hesitate to contact me if you have any questions or concerns. Again, thank you for allowing me to participate in the care of your patient.    Lanette CHEEK MD  [facility] Division of Cardiology  Pager [phone]

## 2023-05-15 ENCOUNTER — OFFICE VISIT (OUTPATIENT)
Dept: CARDIOLOGY | Facility: CLINIC | Age: 58
End: 2023-05-15
Attending: INTERNAL MEDICINE
Payer: COMMERCIAL

## 2023-05-15 VITALS
SYSTOLIC BLOOD PRESSURE: 148 MMHG | BODY MASS INDEX: 32.34 KG/M2 | DIASTOLIC BLOOD PRESSURE: 82 MMHG | HEIGHT: 68 IN | OXYGEN SATURATION: 96 % | WEIGHT: 213.4 LBS | HEART RATE: 50 BPM

## 2023-05-15 DIAGNOSIS — E66.811 CLASS 1 OBESITY DUE TO EXCESS CALORIES WITH BODY MASS INDEX (BMI) OF 32.0 TO 32.9 IN ADULT, UNSPECIFIED WHETHER SERIOUS COMORBIDITY PRESENT: ICD-10-CM

## 2023-05-15 DIAGNOSIS — F17.200 CURRENT SMOKER: ICD-10-CM

## 2023-05-15 DIAGNOSIS — I48.91 ATRIAL FIBRILLATION STATUS POST CARDIOVERSION (H): Primary | ICD-10-CM

## 2023-05-15 DIAGNOSIS — I50.20 HEART FAILURE WITH REDUCED EJECTION FRACTION, NYHA CLASS I (H): ICD-10-CM

## 2023-05-15 DIAGNOSIS — Z94.81 STATUS POST BONE MARROW TRANSPLANT (H): ICD-10-CM

## 2023-05-15 DIAGNOSIS — F10.10 ALCOHOL ABUSE: ICD-10-CM

## 2023-05-15 DIAGNOSIS — E66.09 CLASS 1 OBESITY DUE TO EXCESS CALORIES WITH BODY MASS INDEX (BMI) OF 32.0 TO 32.9 IN ADULT, UNSPECIFIED WHETHER SERIOUS COMORBIDITY PRESENT: ICD-10-CM

## 2023-05-15 PROCEDURE — 93005 ELECTROCARDIOGRAM TRACING: CPT

## 2023-05-15 PROCEDURE — G0463 HOSPITAL OUTPT CLINIC VISIT: HCPCS | Performed by: INTERNAL MEDICINE

## 2023-05-15 PROCEDURE — 99215 OFFICE O/P EST HI 40 MIN: CPT | Performed by: INTERNAL MEDICINE

## 2023-05-15 PROCEDURE — 93010 ELECTROCARDIOGRAM REPORT: CPT | Performed by: INTERNAL MEDICINE

## 2023-05-15 ASSESSMENT — PAIN SCALES - GENERAL: PAINLEVEL: NO PAIN (0)

## 2023-05-15 NOTE — LETTER
5/15/2023      RE: Panchito Pierce  [address]  [place] [address]       Dear Colleague,    Thank you for the opportunity to participate in the care of your patient, Panchito Pierce, at the [facility] [place] at [facility]. Please see a copy of my visit note below.                                                                                                                                  General Cardiology Clinic-[facility] – [place]        HPI: Mr. Panchito Pierce is a 58 year old  male with PMH significant for    -Persistent atrial fibrillation status post DC cardioversion 4/20/2023  -Heart failure with reduced ejection fraction  -Alcohol abuse  -Status post bone marrow transplant  -CML  -Graves' disease  -Obesity  -Current smoker 40 years half pack a day    Patient is being seen today for follow-up after recent DC cardioversion 4/20/2023 for persistent atrial fibrillation.  Patient was recently seen in cardiology clinic on 4/4/2023 by my colleague  for persistent atrial fibrillation since 12/2021 and heart failure with reduced EF.  Reduced EF as a new diagnosis.  Was recommended cardioversion and Lexiscan.  Lexiscan showed no ischemia.  Cardioversion was successful with 1 attempt on 4/20/2023.  JAMES was negative for CAROLINA thrombus.  Today he tells me that he felt immediately better after cardioversion.  He no longer feels short of breath with physical exertion.  Denies chest pain, palpitations, dizziness, lower extremity edema.  He continues to drink alcohol 3 times a week (3 or 4 beers).  He has been a smoker all his life.  He has no plans to quit.  Patient has no prior history of HFrEF.  Prior echocardiogram was from 2015 which shows normal LVEF with no valve disease.  Patient is currently on metoprolol 50 mg twice daily, Eliquis 5 mg twice daily, nifedipine 30 mg daily for Raynaud's, fenofibrate, levothyroxine.  It appears patient has been in atrial  fibrillation since December 2021 which was initially recognized by his oncologist from irregular heart rhythm.  He was started on Eliquis and metoprolol and recommended further evaluation in cardiology however the patient did not follow-up.  Patient drinks 6 packs of beer every 2 days.  Sometimes he forgets to take his evening dose of medications.  [id number] score is 1 from HFrEF.    Medications, personal, family, and social history reviewed with patient and revised.    PAST MEDICAL HISTORY:  Past Medical History:   Diagnosis Date    Graves disease 1/1/2003    treated with radioiodine, meds T3 and T4    Murmur, heart     has not been fully evaluated       CURRENT MEDICATIONS:  Current Outpatient Medications   Medication Sig Dispense Refill    acyclovir (ZOVIRAX) 400 MG tablet Take 2 tablets (800 mg) by mouth 2 times daily (Patient not taking: Reported on 7/5/2019) 120 tablet 11    apixaban ANTICOAGULANT (ELIQUIS ANTICOAGULANT) 5 MG tablet Take 1 tablet (5 mg) by mouth 2 times daily 180 tablet 3    betamethasone dipropionate (DIPROSONE) 0.05 % external ointment Apply to bug bite reactions, and you can cover with bandaid 50 g 3    clobetasol (TEMOVATE) 0.05 % external ointment Apply topically 2 times daily as needed (pruritic spots on arms and trunk) 60 g 5    fenofibrate micronized (LOFIBRA) 67 MG capsule Take 1 capsule by mouth once daily with a meal.      levothyroxine (SYNTHROID/LEVOTHROID) 137 MCG tablet Take 1 tablet (137 mcg) by mouth daily 90 tablet 4    liothyronine (CYTOMEL) 5 MCG tablet Take 1 tablet (5 mcg) by mouth 2 times daily 180 tablet 4    metoprolol tartrate (LOPRESSOR) 50 MG tablet Take 1 tablet (50 mg) by mouth 2 times daily 180 tablet 0    mupirocin (BACTROBAN) 2 % external ointment Apply topically 3 times daily 22 g 1    NIFEdipine ER (ADALAT CC) 30 MG 24 hr tablet Take 1 tablet (30 mg) by mouth daily *DURING WINTER MONTHS TO PREVENT FLARES. Check BP once a month while taking 30 tablet 5     OYSTER SHELL CALCIUM + D3 500-400 MG-UNIT TABS TAKE TWO TABLETS BY MOUTH DAILY  60 tablet 11    pantoprazole (PROTONIX) 40 MG EC tablet Take 1 tablet (40 mg) by mouth daily 90 tablet 3    potassium 99 MG TABS       Selenium 100 MCG CAPS 1 tablet daily 90 capsule 4    sildenafil (VIAGRA) 25 MG tablet take 1-2 tablets by mouth 1 hour before needed (do not exceed 5 tablets in 24 hours)         PAST SURGICAL HISTORY:  Past Surgical History:   Procedure Laterality Date    ------------OTHER------------- Left 1/1/1985    left hand graft flap- work accident    ------------OTHER------------- Right 1/1/1985    right foot graft- work accident    ANESTHESIA CARDIOVERSION N/A 4/20/2023    Procedure: Anesthesia cardioversion@0930;  Surgeon: GENERIC ANESTHESIA PROVIDER;  Location:  OR    ESOPHAGOSCOPY, GASTROSCOPY, DUODENOSCOPY (EGD), COMBINED N/A 3/23/2016    Procedure: COMBINED ESOPHAGOSCOPY, GASTROSCOPY, DUODENOSCOPY (EGD), BIOPSY SINGLE OR MULTIPLE;  Surgeon: Jay Tracy MD;  Location:  GI       ALLERGIES:     Allergies   Allergen Reactions    Blood Transfusion Related (Informational Only) Other (See Comments)     Patient has a history of a clinically significant antibody against RBC antigens.  A delay in compatible RBCs may occur.     Allopurinol Rash     Unclear if it was from allopurinol, but went away when it was stopped.  But also had steroid cream at the same time.       FAMILY HISTORY:  Family History   Problem Relation Age of Onset    Thyroid Disease Mother     Lung Cancer Mother     Prostate Cancer Father     Dementia Father     Heart Murmur Father     Hepatitis Brother     Asthma Son     Thyroid Disease Sister     Thyroid Disease Sister     Thyroid Disease Sister     Heart Murmur Brother     Melanoma No family hx of     Skin Cancer No family hx of          SOCIAL HISTORY:  Social History     Tobacco Use    Smoking status: Every Day     Packs/day: 0.50     Years: 30.00     Pack years: 15.00     Types:  "Cigarettes     Start date: 1985     Last attempt to quit: 2015     Years since quittin.8    Smokeless tobacco: Never   Substance Use Topics    Alcohol use: Yes     Comment: once a week    Drug use: No       ROS:   Constitutional: No fever, chills, or sweats. Weight stable.   Cardiovascular: As per HPI.       Exam:  BP (!) 148/82 (BP Location: Right arm, Patient Position: Chair, Cuff Size: Adult Large)   Pulse 50   Ht 1.734 m (5' 8.27\")   Wt 96.8 kg (213 lb 6.4 oz)   SpO2 96%   BMI 32.19 kg/m    GENERAL APPEARANCE: alert and no distress  HEENT: no icterus, no central cyanosis  LYMPH/NECK: no adenopathy, no asymmetry, JVP not elevated, no carotid bruits.  RESPIRATORY: lungs clear to auscultation - no rales, rhonchi or wheezes, no use of accessory muscles, no retractions, respirations are unlabored, normal respiratory rate  CARDIOVASCULAR: regular rhythm, normal S1, S2, no S3 or S4 and no murmur, click or rub, precordium quiet with normal PMI.  GI: soft, non tender  EXTREMITIES: no edema  NEURO: alert, normal speech,and affect  SKIN: no ecchymoses, no rashes     I have reviewed the labs and personally reviewed the imaging below and made my comment in the assessment and plan.    Labs:  CBC RESULTS:   Lab Results   Component Value Date    WBC 6.3 12/15/2021    WBC 7.7 12/15/2020    RBC 5.08 12/15/2021    RBC 5.22 12/15/2020    HGB 15.0 12/15/2021    HGB 15.2 12/15/2020    HCT 45.2 12/15/2021    HCT 47.3 12/15/2020    MCV 89 12/15/2021    MCV 91 12/15/2020    MCH 29.5 12/15/2021    MCH 29.1 12/15/2020    MCHC 33.2 12/15/2021    MCHC 32.1 12/15/2020    RDW 13.6 12/15/2021    RDW 13.6 12/15/2020     12/15/2021     12/15/2020       BMP RESULTS:  Lab Results   Component Value Date     12/15/2021     2021    POTASSIUM 4.5 2023    POTASSIUM 4.2 12/15/2021    POTASSIUM 4.3 2021    CHLORIDE 107 12/15/2021    CHLORIDE 108 2021    CO2 23 12/15/2021    CO2 26 " 07/06/2021    ANIONGAP 11 12/15/2021    ANIONGAP 5 07/06/2021    GLC 98 12/15/2021     (H) 07/06/2021    BUN 20 12/15/2021    BUN 24 07/06/2021    CR 1.22 12/15/2021    CR 1.28 (H) 07/06/2021    GFRESTIMATED 66 12/15/2021    GFRESTIMATED 62 07/06/2021    GFRESTBLACK 72 07/06/2021    SYD 9.7 12/15/2021    SYD 9.3 07/06/2021   DC cardioversion 4/20/2023: 150 J, 1 attempt, successful cardioversion.    Lexiscan stress test 4/13/2023:    The nuclear stress test is negative for inducible myocardial ischemia or infarction.    Left ventricular function is severely reduced.    Left ventricular enlargement is noted.    There is no prior study for comparison.    Transesophageal echocardiogram 4/20/2023:  The left atrial appendage is normal. It is free of spontaneous echo contrast  and thrombus. The left atrial appendage Doppler velocities are normal.  Left ventricular function is severely decreased. The ejection fraction is 20-  25%. Moderate to severe diffuse hypokinesis is present.  Global right ventricular function is mildly to moderately reduced.  A small patent foramen ovale is present, with evidence of bidirectional flow,  as demonstrated by color Doppler and agitated saline bubble study.     Transthoracic echocardiogram 4/4/2023:  Left ventricular function is decreased. The ejection fraction is 20-25%  (severely reduced). Severe diffuse hypokinesis is present.  Global right ventricular function is moderately reduced. The right ventricle  is normal size.  No significant valvular abnormalities.  IVC diameter <2.1 cm collapsing >50% with sniff suggests a normal RA pressure  of 3 mmHg.  There is no prior study for direct comparison.    TTE 2015: [facility]  Normal left ventricular size, wall thickness, and systolic function with no obvious regional   wall motion abnormalities.  The ejection    fraction is visually estimated at 60-65%.    The right ventricle is normal in size and function.    No significant  valvular heart disease noted.    Estimated EF: 60-65%       I reviewed EKG 4/20/2023 which shows atrial fibrillation prior to cardioversion.          I reviewed patient's EKG in clinic today which shows sinus bradycardia otherwise unremarkable.          Assessment and Plan:     #Persistent atrial fibrillation status post cardioversion 4/20/2023  #Long history of alcohol abuse  #Current smoker  #Obersity  -Patient is currently asymptomatic.  LINDSAY resolved since cardioversion.  Sinus rhythm in clinic today.  EKG shows sinus bradycardia.  He is asymptomatic.  Euvolemic on exam.  -[id number] score is 1 given HFrEF.  Continue Eliquis 5 mg twice daily.  If patient's EF does not recover I think we will probably elect to continue.  -Discussed sleep study.  Patient declines sleep study at this time.  -Discussed extensively the possible triggers of atrial fibrillation.  Discussed the role of obesity and alcohol.  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  It is also unclear if A-fib caused heart failure or heart failure caused A-fib.  Thyroid function is normal. Recent Lexiscan stress test is normal.  -I will hold off on further guideline directed medical therapy for HFrEF until I see repeat echocardiogram.  -Continue current treatment with metoprolol 50 mg twice daily  -Schedule limited transthoracic echocardiogram to recheck LVEF in 1-2 weeks  -If LVEF does not recover he will be a candidate for guideline directed medical therapy.    No medication changes today.  Return to clinic with Dr.Madhu rasheed in 1 or 2 months.    Total time spent today for this visit is 60 minutes including precharting, face-to-face clinic visit, review of labs/imaging and medical documentation.    Please donot hesitate to contact me if  you have any questions or concerns. Again, thank you for allowing me to participate in the care of your patient.    Lanette CHEEK MD  [facility] Division of Cardiology  Pager [phone]

## 2023-05-15 NOTE — NURSING NOTE
Chief Complaint   Patient presents with     Follow Up     Cardioversion follow up     Vitals were taken, medications reconciled, and EKG was performed.    Edison Bejarano EMT  11:24 AM

## 2023-05-15 NOTE — PATIENT INSTRUCTIONS
Complete an echocardiogram prior to following up with Dr. Whitney in about one month.    Stop smoking, avoid alcohol.   Aim for weight loss via increased exercise and calorie reduction.

## 2023-05-16 LAB
ATRIAL RATE - MUSE: 48 BPM
DIASTOLIC BLOOD PRESSURE - MUSE: NORMAL MMHG
INTERPRETATION ECG - MUSE: NORMAL
P AXIS - MUSE: 46 DEGREES
PR INTERVAL - MUSE: 166 MS
QRS DURATION - MUSE: 100 MS
QT - MUSE: 480 MS
QTC - MUSE: 428 MS
R AXIS - MUSE: 36 DEGREES
SYSTOLIC BLOOD PRESSURE - MUSE: NORMAL MMHG
T AXIS - MUSE: 29 DEGREES
VENTRICULAR RATE- MUSE: 48 BPM

## 2023-06-09 ENCOUNTER — ANCILLARY PROCEDURE (OUTPATIENT)
Dept: CARDIOLOGY | Facility: CLINIC | Age: 58
End: 2023-06-09
Attending: INTERNAL MEDICINE
Payer: COMMERCIAL

## 2023-06-09 DIAGNOSIS — I50.20 HEART FAILURE WITH REDUCED EJECTION FRACTION, NYHA CLASS I (H): ICD-10-CM

## 2023-06-09 DIAGNOSIS — I48.91 ATRIAL FIBRILLATION STATUS POST CARDIOVERSION (H): ICD-10-CM

## 2023-06-09 DIAGNOSIS — Z94.81 STATUS POST BONE MARROW TRANSPLANT (H): ICD-10-CM

## 2023-06-09 LAB — LVEF ECHO: NORMAL

## 2023-06-09 PROCEDURE — 93308 TTE F-UP OR LMTD: CPT | Performed by: INTERNAL MEDICINE

## 2023-06-09 PROCEDURE — 93321 DOPPLER ECHO F-UP/LMTD STD: CPT | Performed by: INTERNAL MEDICINE

## 2023-06-09 PROCEDURE — 93325 DOPPLER ECHO COLOR FLOW MAPG: CPT | Performed by: INTERNAL MEDICINE

## 2023-06-15 ENCOUNTER — OFFICE VISIT (OUTPATIENT)
Dept: CARDIOLOGY | Facility: CLINIC | Age: 58
End: 2023-06-15
Attending: INTERNAL MEDICINE
Payer: COMMERCIAL

## 2023-06-15 VITALS
WEIGHT: 211 LBS | SYSTOLIC BLOOD PRESSURE: 137 MMHG | DIASTOLIC BLOOD PRESSURE: 74 MMHG | BODY MASS INDEX: 31.83 KG/M2 | HEART RATE: 47 BPM

## 2023-06-15 DIAGNOSIS — I48.91 ATRIAL FIBRILLATION STATUS POST CARDIOVERSION (H): ICD-10-CM

## 2023-06-15 DIAGNOSIS — I50.20 HEART FAILURE WITH REDUCED EJECTION FRACTION, NYHA CLASS I (H): ICD-10-CM

## 2023-06-15 DIAGNOSIS — I48.91 ATRIAL FIBRILLATION, UNSPECIFIED TYPE (H): ICD-10-CM

## 2023-06-15 DIAGNOSIS — Z94.81 STATUS POST BONE MARROW TRANSPLANT (H): ICD-10-CM

## 2023-06-15 PROCEDURE — 99214 OFFICE O/P EST MOD 30 MIN: CPT | Performed by: INTERNAL MEDICINE

## 2023-06-15 RX ORDER — METOPROLOL TARTRATE 25 MG/1
25 TABLET, FILM COATED ORAL 2 TIMES DAILY
Qty: 180 TABLET | Refills: 3 | Status: SHIPPED | OUTPATIENT
Start: 2023-06-15 | End: 2023-09-29

## 2023-06-15 NOTE — PATIENT INSTRUCTIONS
Thank you for coming to the [facility] @ [facility]; please note the following instructions:    1. Finish your home supply of Eliquis then STOP    2  decrease metoprolol to 25 mg twice daily    3.  Dr. Whitney recommends to follow up in 1 year with an echo prior.  The cardiology team will contact you to schedule when the time gets closer.          If you have any questions regarding your visit please contact your care team:     Cardiology  Telephone Number   Maria Eugenia MITCHELL., RN  Taylor CROOKS, RN   Ana ROBERTS, RMGRACIELA BEST, CLAUS JUNG, Visit Facilitator  Gillian SALDIVAR [facility] [phone] (option 1)   For scheduling appts:     [phone] (select option 1)       For the Device Clinic (Pacemakers and ICD's)  RN's :  Yvette Humphreys   During business hours: [phone]    *After business hours:  [phone] (select option 4)      Normal test result notifications will be released via [employer] or mailed within 7 business days.  All other test results, will be communicated via telephone once reviewed by your cardiologist.    If you need a medication refill please contact your pharmacy.  Please allow 3 business days for your refill to be completed.    As always, thank you for trusting us with your health care needs!    
IV intact/Arm band on

## 2023-06-15 NOTE — LETTER
6/15/2023      RE: Panchito Pierce  [address]  [place] [address]       Dear Colleague,    Thank you for the opportunity to participate in the care of your patient, Panchito Pierce, at the [facility]. Please see a copy of my visit note below.       SUBJECTIVE:  Panchito Pierce is a 58 year old male who presents for follow-up.  Status post DC cardioversion of persistent atrial fibrillation.  Patient remain in sinus rhythm post cardioversion.  Patient with a history of CML.  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  Also history of significant alcohol consumption.  During routine oncology follow-up visit patient noted to have irregular heartbeat.  EKG showed atrial fibrillation with rapid ventricular response.  He was started on Lopressor 25 mg daily and referred to cardiology.  Patient's cardiac function was significantly down with an EF of around 20%.  Window he was totally asymptomatic from atrial fibrillation it was decided for cardioversion due to decreased cardiac function.  Patient was started on Eliquis and had cardioversion in April of this year.  Since then patient remain in sinus rhythm.  Today patient had no cardiac complaints.  He is stroke risk score was 1 due to decreased cardiac function.    Patient Active Problem List    Diagnosis Date Noted     Actinic keratosis 03/11/2023     Priority: Medium     Prurigo nodularis 03/11/2023     Priority: Medium     Neoplasm of uncertain behavior of skin 07/03/2021     Priority: Medium     Xerosis of skin 07/03/2021     Priority: Medium     Arthropod bite, initial encounter 11/08/2019     Priority: Medium     Raynaud's phenomenon without gangrene 08/30/2017     Priority: Medium     Seborrheic keratoses, inflamed 06/14/2017     Priority: Medium     Chronic dermatitis of hands 06/14/2017     Priority: Medium     Hypogonadism male 12/01/2016     Priority:  Medium     Fatigue, unspecified type 12/01/2016     Priority: Medium     Hyperthyroidism 05/31/2016     Priority: Medium     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  Current Outpatient Medications   Medication Sig     betamethasone dipropionate (DIPROSONE) 0.05 % external ointment Apply to bug bite reactions, and you can cover with bandaid     clobetasol (TEMOVATE) 0.05 % external ointment Apply topically 2 times daily as needed (pruritic spots on arms and trunk)     fenofibrate micronized (LOFIBRA) 67 MG capsule Take 1 capsule by mouth once daily with a meal.     levothyroxine (SYNTHROID/LEVOTHROID) 137 MCG tablet Take 1 tablet (137 mcg) by mouth daily     liothyronine (CYTOMEL) 5 MCG tablet Take 1 tablet (5 mcg) by mouth 2 times daily     metoprolol tartrate (LOPRESSOR) 25 MG tablet Take 1 tablet (25 mg) by mouth 2 times daily     mupirocin (BACTROBAN) 2 % external ointment Apply topically 3 times daily     NIFEdipine ER (ADALAT CC) 30 MG 24 hr tablet Take 1 tablet (30 mg) by mouth daily *DURING WINTER MONTHS TO PREVENT FLARES. Check BP once a month while taking     OYSTER SHELL CALCIUM + D3 500-400 MG-UNIT TABS TAKE TWO TABLETS BY MOUTH DAILY      pantoprazole (PROTONIX) 40 MG EC tablet Take 1 tablet (40 mg) by mouth daily     potassium 99 MG TABS      Selenium 100 MCG CAPS 1 tablet daily     sildenafil (VIAGRA) 25 MG tablet take 1-2 tablets by mouth 1 hour before needed (do not exceed 5 tablets in 24 hours)     acyclovir (ZOVIRAX) 400 MG tablet Take 2 tablets (800 mg) by mouth 2 times daily (Patient not taking: Reported on 7/5/2019)     No current  facility-administered medications for this visit.     Past Medical History:   Diagnosis Date     Graves disease 2003    treated with radioiodine, meds T3 and T4     Murmur, heart     has not been fully evaluated     Past Surgical History:   Procedure Laterality Date     ------------OTHER------------- Left 1985    left hand graft flap- work accident     ------------OTHER------------- Right 1985    right foot graft- work accident     ANESTHESIA CARDIOVERSION N/A 2023    Procedure: Anesthesia cardioversion@0930;  Surgeon: GENERIC ANESTHESIA PROVIDER;  [facility]     ESOPHAGOSCOPY, GASTROSCOPY, DUODENOSCOPY (EGD), COMBINED N/A 3/23/2016    Procedure: COMBINED ESOPHAGOSCOPY, GASTROSCOPY, DUODENOSCOPY (EGD), BIOPSY SINGLE OR MULTIPLE;  Surgeon: Jay Tracy MD;  [facility]: [facility]     Allergies   Allergen Reactions     Blood Transfusion Related (Informational Only) Other (See Comments)     Patient has a history of a clinically significant antibody against RBC antigens.  A delay in compatible RBCs may occur.      Allopurinol Rash     Unclear if it was from allopurinol, but went away when it was stopped.  But also had steroid cream at the same time.     Social History     Socioeconomic History     Marital status:      Spouse name: Nu     Number of children: 4     Years of education: Not on file     Highest education level: Not on file   Occupational History     Not on file   Tobacco Use     Smoking status: Every Day     Packs/day: 0.50     Years: 30.00     Pack years: 15.00     Types: Cigarettes     Start date: 1985     Last attempt to quit: 2015     Years since quittin.9     Smokeless tobacco: Never   Vaping Use     Vaping status: Not on file   Substance and Sexual Activity     Alcohol use: Yes     Comment: once a week     Drug use: No     Sexual activity: Not on file     Comment:    Other Topics Concern     Parent/sibling w/ CABG, MI or angioplasty before 65F  55M? Not Asked   Social History Narrative     to wife Nu.    4 children: 1 step daughter Ro age 27, 3 biological sons, 17 yo Jose L, 15 yo Sergio (Asbergers), 13 yo Elvis    Lives in [place]    Used to work in clerical work, now in school for electrical engineering    11 brothers and sisters, 3 live in the [facility], 1 brother incarcerated, 1 brother hepatitis C     Social Determinants of Health     Financial Resource Strain: Not on file   Food Insecurity: Not on file   Transportation Needs: Not on file   Physical Activity: Not on file   Stress: Not on file   Social Connections: Not on file   Intimate Partner Violence: Not on file   Housing Stability: Not on file     Family History   Problem Relation Age of Onset     Thyroid Disease Mother      Lung Cancer Mother      Prostate Cancer Father      Dementia Father      Heart Murmur Father      Hepatitis Brother      Asthma Son      Thyroid Disease Sister      Thyroid Disease Sister      Thyroid Disease Sister      Heart Murmur Brother      Melanoma No family hx of      Skin Cancer No family hx of           REVIEW OF SYSTEMS:  General: negative, fever, chills, night sweats  Skin: negative, acne, rash and scaling  Eyes: negative, double vision, eye pain and photophobia  Ears/Nose/Throat: negative, nasal congestion and purulent rhinorrhea  Respiratory: No dyspnea on exertion, No cough, No hemoptysis and negative  Cardiovascular: negative, palpitations, tachycardia, irregular heart beat, chest pain, exertional chest pain or pressure, paroxysmal nocturnal dyspnea, dyspnea on exertion and orthopnea         OBJECTIVE:  Blood pressure 137/74, pulse (!) 47, weight 95.7 kg (211 lb).  General Appearance: healthy, alert, active and no distress  Head: Normocephalic. No masses, lesions, tenderness or abnormalities  Eyes: conjuctiva clear, PERRL, EOM intact  Ears: External ears normal. Canals clear. TM's normal.  Nose: Nares normal  Mouth: normal  Neck: Supple, no  cervical adenopathy, no thyromegaly  Lungs: clear to auscultation  Cardiac: regular rate and rhythm, normal S1 and S2, no murmur       ASSESSMENT/PLAN:  Patient here for follow-up.  Persistent atrial fibrillation with low tidal VEGF.  EF of around 20% by JAMES.  The patient was totally asymptomatic decision was made to proceed with cardioversion due to decreased cardiac function.  In April of this year patient had cardioversion.  Since then patient remain in sinus rhythm.  Today patient had no cardiac complaints.  Overall he is doing very well.  He cut down alcohol consumption significantly to occasional use.  Patient had a recent echocardiogram this showed an EF of 60 to 65%.  Complete recovery of cardiac function.  He was on Eliquis because of the stroke risk score of 1 related to heart failure.  As his cardiac function has completely recovered patient is advised to discontinue his Eliquis.  He do have significant bradycardia.  Today's heart rate of 41 bpm.  Patient is advised to cut down the dose of Lopressor to 25 mg twice a day.  He will return to clinic in 1 year with a repeat echocardiogram to reassess cardiac function.  Patient also has history of CML in the past.  Status post bone marrow transplant.  Also have GVHD.  Total visit duration 20 minutes.  This included face-to-face interview, physical exam, chart review, review of echocardiogram and documentation.      Please do not hesitate to contact me if you have any questions/concerns.     Sincerely,     AMBER Lawler MD

## 2023-06-15 NOTE — PROGRESS NOTES
SUBJECTIVE:  Panchito Pierce is a 58 year old male who presents for follow-up.  Status post DC cardioversion of persistent atrial fibrillation.  Patient remain in sinus rhythm post cardioversion.  Patient with a history of CML.  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  Also history of significant alcohol consumption.  During routine oncology follow-up visit patient noted to have irregular heartbeat.  EKG showed atrial fibrillation with rapid ventricular response.  He was started on Lopressor 25 mg daily and referred to cardiology.  Patient's cardiac function was significantly down with an EF of around 20%.  Window he was totally asymptomatic from atrial fibrillation it was decided for cardioversion due to decreased cardiac function.  Patient was started on Eliquis and had cardioversion in April of this year.  Since then patient remain in sinus rhythm.  Today patient had no cardiac complaints.  He is stroke risk score was 1 due to decreased cardiac function.    Patient Active Problem List    Diagnosis Date Noted     Actinic keratosis 03/11/2023     Priority: Medium     Prurigo nodularis 03/11/2023     Priority: Medium     Neoplasm of uncertain behavior of skin 07/03/2021     Priority: Medium     Xerosis of skin 07/03/2021     Priority: Medium     Arthropod bite, initial encounter 11/08/2019     Priority: Medium     Raynaud's phenomenon without gangrene 08/30/2017     Priority: Medium     Seborrheic keratoses, inflamed 06/14/2017     Priority: Medium     Chronic dermatitis of hands 06/14/2017     Priority: Medium     Hypogonadism male 12/01/2016     Priority: Medium     Fatigue, unspecified type 12/01/2016     Priority: Medium     Hyperthyroidism 05/31/2016     Priority: Medium     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  Current Outpatient Medications   Medication Sig     betamethasone dipropionate (DIPROSONE) 0.05 % external ointment Apply to bug bite reactions, and you can cover with bandaid     clobetasol (TEMOVATE) 0.05 % external ointment Apply topically 2 times daily as needed (pruritic spots on arms and trunk)     fenofibrate micronized (LOFIBRA) 67 MG capsule Take 1 capsule by mouth once daily with a meal.     levothyroxine (SYNTHROID/LEVOTHROID) 137 MCG tablet Take 1 tablet (137 mcg) by mouth daily     liothyronine (CYTOMEL) 5 MCG tablet Take 1 tablet (5 mcg) by mouth 2 times daily     metoprolol tartrate (LOPRESSOR) 25 MG tablet Take 1 tablet (25 mg) by mouth 2 times daily     mupirocin (BACTROBAN) 2 % external ointment Apply topically 3 times daily     NIFEdipine ER (ADALAT CC) 30 MG 24 hr tablet Take 1 tablet (30 mg) by mouth daily *DURING WINTER MONTHS TO PREVENT FLARES. Check BP once a month while taking     OYSTER SHELL CALCIUM + D3 500-400 MG-UNIT TABS TAKE TWO TABLETS BY MOUTH DAILY      pantoprazole (PROTONIX) 40 MG EC tablet Take 1 tablet (40 mg) by mouth daily     potassium 99 MG TABS      Selenium 100 MCG CAPS 1 tablet daily     sildenafil (VIAGRA) 25 MG tablet take 1-2 tablets by mouth 1 hour before needed (do not exceed 5 tablets in 24 hours)     acyclovir (ZOVIRAX) 400 MG tablet Take 2 tablets (800 mg) by mouth 2 times daily (Patient not taking: Reported on 7/5/2019)     No current facility-administered medications for this visit.     Past Medical History:   Diagnosis Date     Graves disease 1/1/2003    treated with radioiodine, meds T3 and T4     Murmur, heart     has not been fully evaluated     Past Surgical History:   Procedure Laterality Date     ------------OTHER------------- Left 1/1/1985    left hand graft flap- work accident      ------------OTHER------------- Right 1985    right foot graft- work accident     ANESTHESIA CARDIOVERSION N/A 2023    Procedure: Anesthesia cardioversion@0930;  Surgeon: GENERIC ANESTHESIA PROVIDER;  Location:  OR     ESOPHAGOSCOPY, GASTROSCOPY, DUODENOSCOPY (EGD), COMBINED N/A 3/23/2016    Procedure: COMBINED ESOPHAGOSCOPY, GASTROSCOPY, DUODENOSCOPY (EGD), BIOPSY SINGLE OR MULTIPLE;  Surgeon: Jay Tracy MD;  [facility]     Allergies   Allergen Reactions     Blood Transfusion Related (Informational Only) Other (See Comments)     Patient has a history of a clinically significant antibody against RBC antigens.  A delay in compatible RBCs may occur.      Allopurinol Rash     Unclear if it was from allopurinol, but went away when it was stopped.  But also had steroid cream at the same time.     Social History     Socioeconomic History     Marital status:      Spouse name: Nu     Number of children: 4     Years of education: Not on file     Highest education level: Not on file   Occupational History     Not on file   Tobacco Use     Smoking status: Every Day     Packs/day: 0.50     Years: 30.00     Pack years: 15.00     Types: Cigarettes     Start date: 1985     Last attempt to quit: 2015     Years since quittin.9     Smokeless tobacco: Never   Vaping Use     Vaping status: Not on file   Substance and Sexual Activity     Alcohol use: Yes     Comment: once a week     Drug use: No     Sexual activity: Not on file     Comment:    Other Topics Concern     Parent/sibling w/ CABG, MI or angioplasty before 65F 55M? Not Asked   Social History Narrative     to wife Nu.    4 children: 1 step daughter Ro age 27, 3 biological sons, 17 yo Jose L, 15 yo Sergio (Asbergers), 11 yo Elvis    Lives in [place]    Used to work in clerical work, now in school for electrical engineering    11 brothers and sisters, 3 live in the [facility], 1 brother incarcerated, 1  brother hepatitis C     Social Determinants of Health     Financial Resource Strain: Not on file   Food Insecurity: Not on file   Transportation Needs: Not on file   Physical Activity: Not on file   Stress: Not on file   Social Connections: Not on file   Intimate Partner Violence: Not on file   Housing Stability: Not on file     Family History   Problem Relation Age of Onset     Thyroid Disease Mother      Lung Cancer Mother      Prostate Cancer Father      Dementia Father      Heart Murmur Father      Hepatitis Brother      Asthma Son      Thyroid Disease Sister      Thyroid Disease Sister      Thyroid Disease Sister      Heart Murmur Brother      Melanoma No family hx of      Skin Cancer No family hx of           REVIEW OF SYSTEMS:  General: negative, fever, chills, night sweats  Skin: negative, acne, rash and scaling  Eyes: negative, double vision, eye pain and photophobia  Ears/Nose/Throat: negative, nasal congestion and purulent rhinorrhea  Respiratory: No dyspnea on exertion, No cough, No hemoptysis and negative  Cardiovascular: negative, palpitations, tachycardia, irregular heart beat, chest pain, exertional chest pain or pressure, paroxysmal nocturnal dyspnea, dyspnea on exertion and orthopnea         OBJECTIVE:  Blood pressure 137/74, pulse (!) 47, weight 95.7 kg (211 lb).  General Appearance: healthy, alert, active and no distress  Head: Normocephalic. No masses, lesions, tenderness or abnormalities  Eyes: conjuctiva clear, PERRL, EOM intact  Ears: External ears normal. Canals clear. TM's normal.  Nose: Nares normal  Mouth: normal  Neck: Supple, no cervical adenopathy, no thyromegaly  Lungs: clear to auscultation  Cardiac: regular rate and rhythm, normal S1 and S2, no murmur       ASSESSMENT/PLAN:  Patient here for follow-up.  Persistent atrial fibrillation with low tidal VEGF.  EF of around 20% by JAMES.  The patient was totally asymptomatic decision was made to proceed with cardioversion due to decreased  cardiac function.  In April of this year patient had cardioversion.  Since then patient remain in sinus rhythm.  Today patient had no cardiac complaints.  Overall he is doing very well.  He cut down alcohol consumption significantly to occasional use.  Patient had a recent echocardiogram this showed an EF of 60 to 65%.  Complete recovery of cardiac function.  He was on Eliquis because of the stroke risk score of 1 related to heart failure.  As his cardiac function has completely recovered patient is advised to discontinue his Eliquis.  He do have significant bradycardia.  Today's heart rate of 41 bpm.  Patient is advised to cut down the dose of Lopressor to 25 mg twice a day.  He will return to clinic in 1 year with a repeat echocardiogram to reassess cardiac function.  Patient also has history of CML in the past.  Status post bone marrow transplant.  Also have GVHD.  Total visit duration 20 minutes.  This included face-to-face interview, physical exam, chart review, review of echocardiogram and documentation.

## 2023-06-15 NOTE — NURSING NOTE
"Chief Complaint   Patient presents with     RECHECK     Return general cardiology for s/p testing.        Initial /74 (BP Location: Right arm, Patient Position: Chair, Cuff Size: Adult Large)   Pulse (!) 47   Wt 95.7 kg (211 lb)   BMI 31.83 kg/m   Estimated body mass index is 31.83 kg/m  as calculated from the following:    Height as of 5/15/23: 1.734 m (5' 8.27\").    Weight as of this encounter: 95.7 kg (211 lb)..  BP completed using cuff size: CLAUS Zaldivar  "

## 2023-09-07 ENCOUNTER — TELEPHONE (OUTPATIENT)
Dept: ENDOCRINOLOGY | Facility: CLINIC | Age: 58
End: 2023-09-07
Payer: COMMERCIAL

## 2023-09-11 ENCOUNTER — TELEPHONE (OUTPATIENT)
Dept: ENDOCRINOLOGY | Facility: CLINIC | Age: 58
End: 2023-09-11
Payer: COMMERCIAL

## 2023-09-19 ENCOUNTER — TELEPHONE (OUTPATIENT)
Dept: ENDOCRINOLOGY | Facility: CLINIC | Age: 58
End: 2023-09-19
Payer: COMMERCIAL

## 2023-09-19 NOTE — TELEPHONE ENCOUNTER
Per Dr. Guevara - add on virtual visit for follow-up on 9/29/2023. Ok to add at 12:00pm.     Left detailed message, sent My[employer]lement Soriano on 9/19/2023 at 3:16 PM

## 2023-09-20 NOTE — TELEPHONE ENCOUNTER
Spoke with patient, scheduled virtual follow-up with Dr. Guevara on 9/29 at 12:00pm.     Steve Soriano on 9/20/2023 at 11:12 AM

## 2023-09-21 DIAGNOSIS — I48.91 ATRIAL FIBRILLATION, UNSPECIFIED TYPE (H): ICD-10-CM

## 2023-09-25 RX ORDER — METOPROLOL TARTRATE 50 MG
50 TABLET ORAL 2 TIMES DAILY
Qty: 180 TABLET | Refills: 0 | OUTPATIENT
Start: 2023-09-25

## 2023-09-26 ENCOUNTER — LAB (OUTPATIENT)
Dept: LAB | Facility: CLINIC | Age: 58
End: 2023-09-26
Payer: COMMERCIAL

## 2023-09-26 DIAGNOSIS — E05.90 HYPERTHYROIDISM: ICD-10-CM

## 2023-09-26 DIAGNOSIS — R79.9 ABNORMAL FINDING OF BLOOD CHEMISTRY, UNSPECIFIED: ICD-10-CM

## 2023-09-26 DIAGNOSIS — E05.00 GRAVES DISEASE: ICD-10-CM

## 2023-09-26 DIAGNOSIS — R93.5 ABNORMAL CT OF THE ABDOMEN: ICD-10-CM

## 2023-09-26 DIAGNOSIS — I48.0 PAROXYSMAL ATRIAL FIBRILLATION (H): ICD-10-CM

## 2023-09-26 DIAGNOSIS — R21 RASH: ICD-10-CM

## 2023-09-26 LAB
HBA1C MFR BLD: 6.2 % (ref 0–5.6)
SHBG SERPL-SCNC: 24 NMOL/L (ref 11–80)
T4 FREE SERPL-MCNC: 1.32 NG/DL (ref 0.9–1.7)
TSH SERPL DL<=0.005 MIU/L-ACNC: 0.23 UIU/ML (ref 0.3–4.2)

## 2023-09-26 PROCEDURE — 36415 COLL VENOUS BLD VENIPUNCTURE: CPT

## 2023-09-26 PROCEDURE — 84403 ASSAY OF TOTAL TESTOSTERONE: CPT

## 2023-09-26 PROCEDURE — 84443 ASSAY THYROID STIM HORMONE: CPT

## 2023-09-26 PROCEDURE — 84439 ASSAY OF FREE THYROXINE: CPT

## 2023-09-26 PROCEDURE — 83036 HEMOGLOBIN GLYCOSYLATED A1C: CPT

## 2023-09-26 PROCEDURE — 84270 ASSAY OF SEX HORMONE GLOBUL: CPT

## 2023-09-26 PROCEDURE — 84460 ALANINE AMINO (ALT) (SGPT): CPT

## 2023-09-26 PROCEDURE — 84450 TRANSFERASE (AST) (SGOT): CPT

## 2023-09-28 ENCOUNTER — TELEPHONE (OUTPATIENT)
Dept: ENDOCRINOLOGY | Facility: CLINIC | Age: 58
End: 2023-09-28
Payer: COMMERCIAL

## 2023-09-28 LAB
TESTOST FREE SERPL-MCNC: 5.31 NG/DL
TESTOST SERPL-MCNC: 232 NG/DL (ref 240–950)

## 2023-09-29 ENCOUNTER — TELEPHONE (OUTPATIENT)
Dept: ENDOCRINOLOGY | Facility: CLINIC | Age: 58
End: 2023-09-29

## 2023-09-29 ENCOUNTER — VIRTUAL VISIT (OUTPATIENT)
Dept: ENDOCRINOLOGY | Facility: CLINIC | Age: 58
End: 2023-09-29
Payer: COMMERCIAL

## 2023-09-29 VITALS — WEIGHT: 209 LBS | BODY MASS INDEX: 31.53 KG/M2

## 2023-09-29 DIAGNOSIS — I48.91 ATRIAL FIBRILLATION, UNSPECIFIED TYPE (H): ICD-10-CM

## 2023-09-29 DIAGNOSIS — I48.0 PAROXYSMAL ATRIAL FIBRILLATION (H): ICD-10-CM

## 2023-09-29 DIAGNOSIS — E05.90 HYPERTHYROIDISM: Primary | ICD-10-CM

## 2023-09-29 DIAGNOSIS — R93.5 ABNORMAL CT OF THE ABDOMEN: ICD-10-CM

## 2023-09-29 DIAGNOSIS — R79.9 ABNORMAL FINDING OF BLOOD CHEMISTRY, UNSPECIFIED: ICD-10-CM

## 2023-09-29 LAB
ALT SERPL W P-5'-P-CCNC: 33 U/L (ref 0–70)
AST SERPL W P-5'-P-CCNC: 27 U/L (ref 0–45)

## 2023-09-29 PROCEDURE — 99214 OFFICE O/P EST MOD 30 MIN: CPT | Mod: VID | Performed by: INTERNAL MEDICINE

## 2023-09-29 RX ORDER — LEVOTHYROXINE SODIUM 137 UG/1
137 TABLET ORAL DAILY
Qty: 90 TABLET | Refills: 4 | Status: SHIPPED | OUTPATIENT
Start: 2023-09-29

## 2023-09-29 RX ORDER — LIOTHYRONINE SODIUM 5 UG/1
5 TABLET ORAL 2 TIMES DAILY
Qty: 180 TABLET | Refills: 4 | Status: SHIPPED | OUTPATIENT
Start: 2023-09-29

## 2023-09-29 RX ORDER — METOPROLOL TARTRATE 25 MG/1
25 TABLET, FILM COATED ORAL 2 TIMES DAILY
Qty: 180 TABLET | Refills: 3 | Status: SHIPPED | OUTPATIENT
Start: 2023-09-29

## 2023-09-29 NOTE — TELEPHONE ENCOUNTER
As patient has a mildly elevated hemoglobin A1c 6.2, will try dietary lifestyle changes first, followed by metformin.  However it is good to know that his medications are well covered as noted below if needed.    -  Patients plan only allows a one month supply      1) cost of SGLT2 inhibitor   Jardiance, Invokana, Farxiga all covered copay is $31.54 for all   2) Cost of GLP-1      Victoza, Bydureon are covered copay $31.54  Ozempic & Trulicity PA required    3) Cost of Mounjaro   NON formulary    4) Cost of CGM (dexcom 7, lee 2 , lee 3)   All are covered zero copay

## 2023-09-29 NOTE — NURSING NOTE
Is the patient currently in the state of [place]? YES    Visit mode:VIDEO    If the visit is dropped, the patient can be reconnected by: VIDEO VISIT: Text to cell phone:   Telephone Information:   Mobile [phone]       Will anyone else be joining the visit? NO  (If patient encounters technical issues they should call [phone]    How would you like to obtain your AVS? MyChart    Are changes needed to the allergy or medication list? No    Reason for visit: RECHECK and Video Visit    Kassy HANNON

## 2023-09-29 NOTE — PROGRESS NOTES
Video-Visit Details    Type of service:  Video Visit    Virtual visit conducted by Luis.      Originating Location (pt. Location): HOME    Distant Location (provider location):  Off site    Mode of Communication:  Video Conference via [facility]    Physician has received verbal consent for a Video Visit from the patient? YES      Pattie Guevara MD    Panchito Pierce is a 58 year old male who is being evaluated via a billable telephone visit.      Start time 1216, stop time 1236, chart review, documentation time, coordination of care, 10 minutes.  Total time spent day of the encounter 30 minutes.    [facility]  Endocrinology  Pattie Guevara MD  9/29/23     Chief Complaint:   RECHECK    History of Present Illness:  #1 Graves disease status-post VELA  Per patient report, he was diagnosed with Grave's disease in 2002. He underwent radioactive iodine therapy in 2002 at [facility]. He had thyroid testing on 4/24/2018, where his T3 was 99 ng/dL, T4 free was 0.87 ng/dL, and his TSH was 1.00 mU/L. These thyroid tests have normalized since he had a TSH of 8.89 on 12/21/2017.  Has been on combination of levothyroxine and Cytomel since July 1, 2019. October 2019 TSH of 0.81.  July 2021 TSH 0.67, free T4 0.8. In December 2021, the patient had atrial fibrillation.  It was concerned that his thyroid hormone replacement may be exacerbating his atrial fibrillation.  We reduce his levothyroxine to 137 mcg daily and continued with the Cytomel at 5 mcg twice daily.  December 2022 TSH was 0.49    Interval history: The patient denies any recurrence of his atrial fibrillation.  Continues on liothyronine at 5 mcg twice daily and levothyroxine at 137 mcg daily.  September 2023 TSH at 2 3 and he reports that he is doing very well on this program.    #2  History of low libido in the setting of low testosterone  His low libido has improved since 12/2017 when he had shingles. His testosterone level on 12/21/2017 was low at 94 ng/dL,  but this increased to 288 mg/dL on 4/24/2018. December 2018 total testosterone 187, October 2019 total testosterone at 191, December 2019 total testosterone of 176.  July 2021 total testosterone was 240 (normal) and free testosterone was normal at 6.42 (normal).  FSH at 13.3.  July 2021 PSA 1.26.    Interval history: September 2023 total testosterone at 232.  Not currently on testosterone treatment.    #3 Sleep apnea  The patient had a sleep study completed on 1/11/2017 showing that he has sleep apnea.     Interval history: Not discussed at current visit.    #4 Weight gain with elevated hemoglobin A1c of 5.9 noted in October 2019  Patient working on dietary lifestyle changes.  October 2019 hemoglobin A1c 5.9.  March 2021 hemoglobin A1c 6.2.July 2021 hemoglobin A1c 5.9.   November 2021 hemoglobin A1c 5.8.  December 2022 hemoglobin A1c at 6.0.     Interval history: September 2023 hemoglobin A1c at 6.2.    #5 atrial fibrillation  Patient currently on anticoagulation.  Working with cardiology.  December 2022 TSH 0.49.    Interval history: Per patient report, status post cardioversion in April 2023 and doing significantly better.  June 2023 echocardiogram showed a normal EF at 60% with no evidence for heart failure    #6 incidental liver cirrhosis seen on June 2023 CT scan  Noted on CT scan from June 2023 done for evaluation of abdominal pain at [facility].    Review of Systems:   Pertinent items are noted in HPI, remainder of complete ROS is negative.      Active Medications:   Current Outpatient Medications   Medication Sig Dispense Refill     acyclovir (ZOVIRAX) 400 MG tablet Take 2 tablets (800 mg) by mouth 2 times daily (Patient not taking: Reported on 7/5/2019) 120 tablet 11     betamethasone dipropionate (DIPROSONE) 0.05 % external ointment Apply to bug bite reactions, and you can cover with bandaid 50 g 3     clobetasol (TEMOVATE) 0.05 % external ointment Apply topically 2 times daily as needed (pruritic  "spots on arms and trunk) 60 g 5     fenofibrate micronized (LOFIBRA) 67 MG capsule Take 1 capsule by mouth once daily with a meal.       levothyroxine (SYNTHROID/LEVOTHROID) 137 MCG tablet Take 1 tablet (137 mcg) by mouth daily 90 tablet 4     liothyronine (CYTOMEL) 5 MCG tablet Take 1 tablet (5 mcg) by mouth 2 times daily 180 tablet 4     metoprolol tartrate (LOPRESSOR) 25 MG tablet Take 1 tablet (25 mg) by mouth 2 times daily 180 tablet 3     mupirocin (BACTROBAN) 2 % external ointment Apply topically 3 times daily 22 g 1     NIFEdipine ER (ADALAT CC) 30 MG 24 hr tablet Take 1 tablet (30 mg) by mouth daily *DURING WINTER MONTHS TO PREVENT FLARES. Check BP once a month while taking 30 tablet 5     OYSTER SHELL CALCIUM + D3 500-400 MG-UNIT TABS TAKE TWO TABLETS BY MOUTH DAILY  60 tablet 11     pantoprazole (PROTONIX) 40 MG EC tablet Take 1 tablet (40 mg) by mouth daily 90 tablet 3     potassium 99 MG TABS        Selenium 100 MCG CAPS 1 tablet daily 90 capsule 4     sildenafil (VIAGRA) 25 MG tablet take 1-2 tablets by mouth 1 hour before needed (do not exceed 5 tablets in 24 hours)       Allergies:   Allopurinol      Past Medical History:  Graves disease; treated with radioiodine  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   Immunosuppressed status    Raynaud's disease   Heart murmur   Hypogonadism   Chronic dermatitis      Past Surgical History:  Left hand graft flap and right foot graft- 01/1985     Family History:   Mother: thyroid disease, lung cancer  Father: prostate cancer, dementia, heart murmur   Brother: hepatitis, heart murmur    Sister: thyroid disease   Son: asthma       Social History:   The patient was alone   Smoking Status: former; 1/2 pack per day for 30 years   Smokeless Tobacco: never   Alcohol Use: yes; once per week       Physical Exam:   GENERAL: Healthy, alert and no distress\",\"EYES: Eyes grossly normal to inspection.  No discharge or erythema, or obvious " "scleral/conjunctival abnormalities.\",\"RESP: No audible wheeze, cough, or visible cyanosis.  No visible retractions or increased work of breathing.  \",\"SKIN: Visible skin clear. No significant rash, abnormal pigmentation or lesions.\",\"NEURO: Cranial nerves grossly intact.  Mentation and speech appropriate for age.\",\"PSYCH: Mentation appears normal, affect normal/bright, judgement and insight intact, normal speech and appearance well-groomed.      Data:  Lab on 09/26/2023   Component Date Value Ref Range Status     Free T4 09/26/2023 1.32  0.90 - 1.70 ng/dL Final     TSH 09/26/2023 0.23 (L)  0.30 - 4.20 uIU/mL Final     Hemoglobin A1C 09/26/2023 6.2 (H)  0.0 - 5.6 % Final    Normal <5.7%   Prediabetes 5.7-6.4%    Diabetes 6.5% or higher     Note: Adopted from ADA consensus guidelines.     Sex Hormone Binding Globulin 09/26/2023 24  11 - 80 nmol/L Final     Free Testosterone Calculated 09/26/2023 5.31  ng/dL Final    Male Max Ranges:  Max Stage I: Less than or equal to 0.37 ng/dL  Max Stage II: 0.03-2.1 ng/dL  Max Stage III: 0.10-9.8 ng/dL  Max Stage IV: 3.5-16.9 ng/dL  Max Stage V: 4.1-23.9 ng/dL     Testosterone Total 09/26/2023 232 (L)  240 - 950 ng/dL Final           PLAN:  #1 Graves disease status-post VELA  September 2023 TSH was 0.23.  Refills given for levothyroxine 137 mcg daily and liothyronine at 5 mcg twice daily.  Patient to continue with selenium 100 mcg daily.  We will have patient recheck labs in 3 months and if still the TSH is lower, will consider cutting back on his levothyroxine.    #2  History of low libido in the setting of low testosterone  Recheck testosterone September 2023 was reasonable.    #3 Sleep apnea  Not discussed at current visit    #4 hyperglycemia   September 2023 hemoglobin A1c at 6.2.  This is slightly higher than previous.  Diet and lifestyle changes recommended.  We will hold off on metformin at this time.    #5 atrial fibrillation  Per cardiology.  Doing better. "  We will have patient recheck labs in 3 months and likely reduce his thyroid hormone at that time.    #6 incidental liver cirrhosis seen on June 2023 CT scan  We will add LFTs to recent lab draw and have patient see GI.  Referral made.    ADDENDUM: LFTs normal    Component      Latest Ref Rng 9/26/2023  9:04 AM   AST      0 - 45 U/L 27    ALT      0 - 70 U/L 33            Labs in 3 months, return to see me in 1 year  Orders Placed This Encounter   Procedures     TSH     Hemoglobin A1c     Basic metabolic panel     AST     ALT     Adult GI  Referral - Consult Only

## 2023-09-29 NOTE — LETTER
9/29/2023       RE: Panchito Pierce  [address]  [place] [address]     Dear Colleague,    Thank you for referring your patient, Panchito Pierce, to the [facility] ENDOCRINOLOGY CLINIC [place] at [facility]. Please see a copy of my visit note below.    Video-Visit Details    Type of service:  Video Visit    Virtual visit conducted by Luis.      Originating Location (pt. Location): HOME    Distant Location (provider location):  Off site    Mode of Communication:  Video Conference via [employer]    Physician has received verbal consent for a Video Visit from the patient? YES      Pattie Guevara MD    Panchito Pierce is a 58 year old male who is being evaluated via a billable telephone visit.      Start time 1216, stop time 1236, chart review, documentation time, coordination of care, 10 minutes.  Total time spent day of the encounter 30 minutes.    [facility]  Endocrinology  Pattie Guevara MD  9/29/23     Chief Complaint:   RECHECK    History of Present Illness:  #1 Graves disease status-post VELA  Per patient report, he was diagnosed with Grave's disease in 2002. He underwent radioactive iodine therapy in 2002 at [facility]. He had thyroid testing on 4/24/2018, where his T3 was 99 ng/dL, T4 free was 0.87 ng/dL, and his TSH was 1.00 mU/L. These thyroid tests have normalized since he had a TSH of 8.89 on 12/21/2017.  Has been on combination of levothyroxine and Cytomel since July 1, 2019. October 2019 TSH of 0.81.  July 2021 TSH 0.67, free T4 0.8. In December 2021, the patient had atrial fibrillation.  It was concerned that his thyroid hormone replacement may be exacerbating his atrial fibrillation.  We reduce his levothyroxine to 137 mcg daily and continued with the Cytomel at 5 mcg twice daily.  December 2022 TSH was 0.49    Interval history: The patient denies any recurrence of his atrial fibrillation.  Continues on liothyronine at 5 mcg  twice daily and levothyroxine at 137 mcg daily.  September 2023 TSH at 2 3 and he reports that he is doing very well on this program.    #2  History of low libido in the setting of low testosterone  His low libido has improved since 12/2017 when he had shingles. His testosterone level on 12/21/2017 was low at 94 ng/dL, but this increased to 288 mg/dL on 4/24/2018. December 2018 total testosterone 187, October 2019 total testosterone at 191, December 2019 total testosterone of 176.  July 2021 total testosterone was 240 (normal) and free testosterone was normal at 6.42 (normal).  FSH at 13.3.  July 2021 PSA 1.26.    Interval history: September 2023 total testosterone at 232.  Not currently on testosterone treatment.    #3 Sleep apnea  The patient had a sleep study completed on 1/11/2017 showing that he has sleep apnea.     Interval history: Not discussed at current visit.    #4 Weight gain with elevated hemoglobin A1c of 5.9 noted in October 2019  Patient working on dietary lifestyle changes.  October 2019 hemoglobin A1c 5.9.  March 2021 hemoglobin A1c 6.2.July 2021 hemoglobin A1c 5.9.   November 2021 hemoglobin A1c 5.8.  December 2022 hemoglobin A1c at 6.0.     Interval history: September 2023 hemoglobin A1c at 6.2.    #5 atrial fibrillation  Patient currently on anticoagulation.  Working with cardiology.  December 2022 TSH 0.49.    Interval history: Per patient report, status post cardioversion in April 2023 and doing significantly better.  June 2023 echocardiogram showed a normal EF at 60% with no evidence for heart failure    #6 incidental liver cirrhosis seen on June 2023 CT scan  Noted on CT scan from June 2023 done for evaluation of abdominal pain at [facility].    Review of Systems:   Pertinent items are noted in HPI, remainder of complete ROS is negative.      Active Medications:   Current Outpatient Medications   Medication Sig Dispense Refill    acyclovir (ZOVIRAX) 400 MG tablet Take 2 tablets (800 mg)  by mouth 2 times daily (Patient not taking: Reported on 7/5/2019) 120 tablet 11    betamethasone dipropionate (DIPROSONE) 0.05 % external ointment Apply to bug bite reactions, and you can cover with bandaid 50 g 3    clobetasol (TEMOVATE) 0.05 % external ointment Apply topically 2 times daily as needed (pruritic spots on arms and trunk) 60 g 5    fenofibrate micronized (LOFIBRA) 67 MG capsule Take 1 capsule by mouth once daily with a meal.      levothyroxine (SYNTHROID/LEVOTHROID) 137 MCG tablet Take 1 tablet (137 mcg) by mouth daily 90 tablet 4    liothyronine (CYTOMEL) 5 MCG tablet Take 1 tablet (5 mcg) by mouth 2 times daily 180 tablet 4    metoprolol tartrate (LOPRESSOR) 25 MG tablet Take 1 tablet (25 mg) by mouth 2 times daily 180 tablet 3    mupirocin (BACTROBAN) 2 % external ointment Apply topically 3 times daily 22 g 1    NIFEdipine ER (ADALAT CC) 30 MG 24 hr tablet Take 1 tablet (30 mg) by mouth daily *DURING WINTER MONTHS TO PREVENT FLARES. 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   Immunosuppressed status    Raynaud's disease   Heart murmur   Hypogonadism   Chronic dermatitis      Past Surgical History:  Left hand graft flap and right foot graft- 01/1985     Family History:   Mother: thyroid disease, lung cancer  Father: prostate cancer, dementia, heart murmur   Brother: hepatitis, heart murmur    Sister: thyroid disease   Son: asthma      "  Social History:   The patient was alone   Smoking Status: former; 1/2 pack per day for 30 years   Smokeless Tobacco: never   Alcohol Use: yes; once per week       Physical Exam:   GENERAL: Healthy, alert and no distress\",\"EYES: Eyes grossly normal to inspection.  No discharge or erythema, or obvious scleral/conjunctival abnormalities.\",\"RESP: No audible wheeze, cough, or visible cyanosis.  No visible retractions or increased work of breathing.  \",\"SKIN: Visible skin clear. No significant rash, abnormal pigmentation or lesions.\",\"NEURO: Cranial nerves grossly intact.  Mentation and speech appropriate for age.\",\"PSYCH: Mentation appears normal, affect normal/bright, judgement and insight intact, normal speech and appearance well-groomed.      Data:  Lab on 09/26/2023   Component Date Value Ref Range Status    Free T4 09/26/2023 1.32  0.90 - 1.70 ng/dL Final    TSH 09/26/2023 0.23 (L)  0.30 - 4.20 uIU/mL Final    Hemoglobin A1C 09/26/2023 6.2 (H)  0.0 - 5.6 % Final    Normal <5.7%   Prediabetes 5.7-6.4%    Diabetes 6.5% or higher     Note: Adopted from ADA consensus guidelines.    Sex Hormone Binding Globulin 09/26/2023 24  11 - 80 nmol/L Final    Free Testosterone Calculated 09/26/2023 5.31  ng/dL Final    Male Max Ranges:  Max Stage I: Less than or equal to 0.37 ng/dL  Max Stage II: 0.03-2.1 ng/dL  Max Stage III: 0.10-9.8 ng/dL  Max Stage IV: 3.5-16.9 ng/dL  Max Stage V: 4.1-23.9 ng/dL    Testosterone Total 09/26/2023 232 (L)  240 - 950 ng/dL Final           PLAN:  #1 Graves disease status-post VELA  September 2023 TSH was 0.23.  Refills given for levothyroxine 137 mcg daily and liothyronine at 5 mcg twice daily.  Patient to continue with selenium 100 mcg daily.  We will have patient recheck labs in 3 months and if still the TSH is lower, will consider cutting back on his levothyroxine.    #2  History of low libido in the setting of low testosterone  Recheck testosterone September 2023 was " reasonable.    #3 Sleep apnea  Not discussed at current visit    #4 hyperglycemia   September 2023 hemoglobin A1c at 6.2.  This is slightly higher than previous.  Diet and lifestyle changes recommended.  We will hold off on metformin at this time.    #5 atrial fibrillation  Per cardiology.  Doing better.  We will have patient recheck labs in 3 months and likely reduce his thyroid hormone at that time.    #6 incidental liver cirrhosis seen on June 2023 CT scan  We will add LFTs to recent lab draw and have patient see GI.  Referral made.    ADDENDUM: LFTs normal    Component      Latest Ref Rng 9/26/2023  9:04 AM   AST      0 - 45 U/L 27    ALT      0 - 70 U/L 33            Labs in 3 months, return to see me in 1 year  Orders Placed This Encounter   Procedures    TSH    Hemoglobin A1c    Basic metabolic panel    AST    ALT    Adult GI  Referral - Consult Only

## 2023-09-29 NOTE — TELEPHONE ENCOUNTER
Patients plan only allows a one month supply     1) cost of SGLT2 inhibitor   Jardiance, Invokana, Farxiga all covered copay is $31.54 for all   2) Cost of GLP-1     Victoza, Bydureon are covered copay $31.54  Ozempic & Trulicity PA required    3) Cost of Mounjaro   NON formulary    4) Cost of CGM (dexcom 7, lee 2 , lee 3)   All are covered zero copay

## 2023-09-29 NOTE — PATIENT INSTRUCTIONS
1) exercise at least 1/2-hour   Please do something you find fun and enjoyable  2) do not eat after 7  3) limit your liquid intake to water, skim milk, or tea from a bag  4) eat less carbs  5) eat more raw fruits and vegetables     -  Consider [employer] for glasses hemoglobin A1c slightly higher at 6.3.  That being said,

## 2023-10-10 ENCOUNTER — TELEPHONE (OUTPATIENT)
Dept: ENDOCRINOLOGY | Facility: CLINIC | Age: 58
End: 2023-10-10
Payer: COMMERCIAL

## 2023-10-10 NOTE — TELEPHONE ENCOUNTER
----- Message from Pattie Guevara MD sent at 10/10/2023  2:39 PM CDT -----  I have asked GI to schedule follow-up with patient.  I do not see this scheduled.  Can you let the GI appointment office know to reach out to him?

## 2023-10-10 NOTE — TELEPHONE ENCOUNTER
Left Voicemail (2nd Attempt) and Sent Jayme (2nd Attempt) for the patient to call back and schedule the following:    Appointment type: Return Endocrine   Provider:   Return date: September 29,2024  Specialty phone number: [phone]  Additional appointment(s) needed: N/A  Additonal Notes: LVM, and sent Makenna Parson on 10/10/2023 at 2:03 PM

## 2023-10-13 ENCOUNTER — TELEPHONE (OUTPATIENT)
Dept: GASTROENTEROLOGY | Facility: CLINIC | Age: 58
End: 2023-10-13
Payer: COMMERCIAL

## 2023-10-17 ENCOUNTER — TELEPHONE (OUTPATIENT)
Dept: GASTROENTEROLOGY | Facility: CLINIC | Age: 58
End: 2023-10-17
Payer: COMMERCIAL

## 2023-10-24 ENCOUNTER — IMMUNIZATION (OUTPATIENT)
Dept: FAMILY MEDICINE | Facility: CLINIC | Age: 58
End: 2023-10-24
Payer: COMMERCIAL

## 2023-10-24 DIAGNOSIS — Z23 ENCOUNTER FOR IMMUNIZATION: Primary | ICD-10-CM

## 2023-10-24 PROCEDURE — 91320 SARSCV2 VAC 30MCG TRS-SUC IM: CPT

## 2023-10-24 PROCEDURE — 90471 IMMUNIZATION ADMIN: CPT

## 2023-10-24 PROCEDURE — 90480 ADMN SARSCOV2 VAC 1/ONLY CMP: CPT

## 2023-10-24 PROCEDURE — 99207 PR NO CHARGE NURSE ONLY: CPT

## 2023-10-24 PROCEDURE — 90682 RIV4 VACC RECOMBINANT DNA IM: CPT

## 2024-01-03 DIAGNOSIS — E05.00 GRAVES DISEASE: ICD-10-CM

## 2024-01-03 DIAGNOSIS — I48.0 PAROXYSMAL ATRIAL FIBRILLATION (H): ICD-10-CM

## 2024-01-03 DIAGNOSIS — C92.10 CML (CHRONIC MYELOCYTIC LEUKEMIA) (H): ICD-10-CM

## 2024-01-03 DIAGNOSIS — F43.21 ADJUSTMENT DISORDER WITH DEPRESSED MOOD: ICD-10-CM

## 2024-01-03 DIAGNOSIS — Z94.81 STATUS POST BONE MARROW TRANSPLANT (H): ICD-10-CM

## 2024-01-03 DIAGNOSIS — K59.00 CONSTIPATION, UNSPECIFIED CONSTIPATION TYPE: ICD-10-CM

## 2024-01-08 RX ORDER — PANTOPRAZOLE SODIUM 40 MG/1
40 TABLET, DELAYED RELEASE ORAL DAILY
Qty: 90 TABLET | Refills: 2 | Status: SHIPPED | OUTPATIENT
Start: 2024-01-08

## 2024-01-12 ENCOUNTER — TELEPHONE (OUTPATIENT)
Dept: ENDOCRINOLOGY | Facility: CLINIC | Age: 59
End: 2024-01-12
Payer: COMMERCIAL

## 2024-01-12 NOTE — TELEPHONE ENCOUNTER
[facility]   Irene Landeros RN on 1/12/2024 at 11:00 AM

## 2024-02-04 ENCOUNTER — HEALTH MAINTENANCE LETTER (OUTPATIENT)
Age: 59
End: 2024-02-04

## 2024-02-12 ENCOUNTER — TELEPHONE (OUTPATIENT)
Dept: ENDOCRINOLOGY | Facility: CLINIC | Age: 59
End: 2024-02-12
Payer: COMMERCIAL

## 2024-02-12 NOTE — LETTER
Patient:  Panchito Pierce  :   1965  MRN:     [id number]        Mr.Paul RACHANA Pierce  [address]  [place] [address]        2024    Dear Panchito    [place]    This is a letter just to remind you to get your labs checked. Please call below to schedule. If you need any help, please call us.    If you have any questions, please feel free to contact my nurse at [phone] select option #3 for triage nurse  or  option #1 for scheduling related questions.    Regards    Pattie Guevara MD     Please reach out to the following centers to schedule your appointment:       Imaging (DEXA, CT, MRI, XRAY)    [facility] ([facility], [facility], [place]) [phone]   [facility] ([place], [place]) [phone]   [facility] ([facility]) [phone]   [facility] ([facility]) [phone]       Lab    General [phone]   [facility] [phone]   [place] [phone]   [facility]  [phone]   [facility] [phone]   [place] [phone]   [facility]) [phone]   [facility] Walk-In Only   [place] [phone]   [place] [phone]   [place] [phone]   [place] [phone]       Infusion    [facility] [phone]   [place] [phone]   [place] [phone]   [place] [phone]   [place] [phone]   [place] [phone]   [facility] [phone]     For any questions, please reach out to the [facility] Endocrinology Clinic Number for assistance: [phone].

## 2024-02-13 ENCOUNTER — TELEPHONE (OUTPATIENT)
Dept: ENDOCRINOLOGY | Facility: CLINIC | Age: 59
End: 2024-02-13
Payer: COMMERCIAL

## 2024-02-13 NOTE — TELEPHONE ENCOUNTER
Pt did not do labs - letter sent.     ----- Message from Pattie Guevara MD sent at 1/8/2024  9:55 PM CST -----    ----- Message -----  From: Pattie Guevara MD  Sent: 1/8/2024  12:00 AM CST  To: Pattie Guevara MD      ----- Message -----  From: Pattie Guevara MD  Sent: 12/18/2023  12:00 AM CST  To: Pattie Guevara MD    Pt od labs?

## 2024-02-14 NOTE — LETTER
Patient:  Panchito Pierce  :   1965  MRN:     [id number]        Mr.Paul RACHANA Pierce  [address] [place] [address]        2021    Dear Panchito    Here is your hemoglobin A1c, 3-month measure of your blood sugar, which continues to improve at 5.8.  This is great news.  Wishing you and your family the very best during this holiday season.    If you have any questions, please feel free to contact my nurse at [phone] select option #3 for triage nurse  or  option #1 for scheduling related questions.    Regards    Pattie Guevara MD     Resulted Orders   Hemoglobin A1c   Result Value Ref Range    Hemoglobin A1C 5.8 (H) 0.0 - 5.6 %      Comment:      Normal <5.7%   Prediabetes 5.7-6.4%    Diabetes 6.5% or higher     Note: Adopted from ADA consensus guidelines.       [place] [facility]       Pt s/p FNA in 02/2018 for thyroid nodules, path came back benign, adenomatous nodular goiter.  Currently not grossly enlarged on physical exam.  CTA chest 2/12: Enlarged, heterogeneous thyroid gland with substernal extension bilaterally with associated narrowing of the trachea at the level of the thoracic inlet. Multiple subcentimeter in short axis mediastinal nodes.    - TSH wnl. T3 wnl. fT4 slightly elevated 2.2.  - outpatient f/u

## 2024-03-06 DIAGNOSIS — I73.00 RAYNAUD'S PHENOMENON WITHOUT GANGRENE: ICD-10-CM

## 2024-03-11 NOTE — TELEPHONE ENCOUNTER
NIFEdipine ER Oral Tablet Extended Release 24 Hour 30 MG       Take 1 tablet (30 mg) by mouth daily *DURING WINTER MONTHS TO PREVENT FLARES. Check BP once a month while taking  Last Written Prescription Date:  2-14-23  Last Fill Quantity: 30,   # refills: 5  Last Office Visit : 2-14-23  ( RTC 1 Y)  Future Office visit:  none    Outside labs 6-1-23  CREATININE  0.70 - 1.20 mg/dL 1.06      Routing refill request to provider for review/approval because:  Med not on Derm protocol    Scheduling has been notified to contact the pt for appointment.

## 2024-03-13 ENCOUNTER — TELEPHONE (OUTPATIENT)
Dept: DERMATOLOGY | Facility: CLINIC | Age: 59
End: 2024-03-13
Payer: COMMERCIAL

## 2024-03-13 NOTE — TELEPHONE ENCOUNTER
Left Voicemail (1st Attempt) for the patient to call back and schedule the following:    Appointment type: Return   Provider: Dr. Landeros  Return date: 1st available  Specialty phone number: [phone]

## 2024-03-14 RX ORDER — NIFEDIPINE 30 MG
30 TABLET, EXTENDED RELEASE ORAL DAILY
Qty: 30 TABLET | Refills: 0 | Status: SHIPPED | OUTPATIENT
Start: 2024-03-14

## 2024-10-15 DIAGNOSIS — I73.00 RAYNAUD'S PHENOMENON WITHOUT GANGRENE: ICD-10-CM

## 2024-10-15 DIAGNOSIS — E05.90 HYPERTHYROIDISM: ICD-10-CM

## 2024-10-22 ENCOUNTER — TELEPHONE (OUTPATIENT)
Dept: ENDOCRINOLOGY | Facility: CLINIC | Age: 59
End: 2024-10-22
Payer: COMMERCIAL

## 2024-10-22 RX ORDER — LIOTHYRONINE SODIUM 5 UG/1
5 TABLET ORAL 2 TIMES DAILY
Qty: 180 TABLET | Refills: 0 | Status: SHIPPED | OUTPATIENT
Start: 2024-10-22

## 2024-10-22 NOTE — TELEPHONE ENCOUNTER
liothyronine (CYTOMEL) 5 MCG tablet 180 tablet 4 9/29/2023     Last Office Visit: 9/29/23  Future Office visit:   none    Routing refill request to provider for review/approval because:  Not on Endocrine refill protocol    Radha Bolivar RN  [facility] Central Nursing/Red Flag Triage & Med Refill Team

## 2024-10-22 NOTE — TELEPHONE ENCOUNTER
NIFEdipine ER (ADALAT CC) 30 MG 24 hr tablet 30 tablet 0 3/14/2024     Last Office Visit: 2/14/23--Follow-up: 1 year, prn for new or changing lesions   Future Office visit:   none      Calcium Channel Blockers Protocol  Ftvdck00/15/2024 09:09 AM   Protocol Details Most recent blood pressure under 140/90 in past 12 months    GFR is on file in the past 12 months and most recent GFR is normal    Recent (12 mo) or future (90 days) visit within the authorizing provider's specialty          BP Readings from Last 3 Encounters:   06/15/23 137/74   05/15/23 (!) 148/82   04/20/23 116/80   Last Filed Vital Signs  Vital Sign Reading Time Taken Comments   Blood Pressure 112/78 12/12/2023 8:08 AM CST      Routing refill request to provider for review/approval because:  Not on Derm refill protocol    Radha Bolivar, RN  P Central Nursing/Red Flag Triage & Med Refill Team

## 2024-10-22 NOTE — LETTER
Patient:  Panchito Pierce  :   1965  MRN:     [id number]        Mr.Paul RACHANA Pierce  [address] [facility] [address]        2024    Dear Panchito    I see that you do not have a follow up appointment. I would recommend that you be evaluated by an endocrinologist to minimize complications. Of note, I am scheduling out about 6 months.  If you like to be seen at the [facility], please call [phone] select option #[phone] for an appointment.    Regards    Pattie Guevara MD

## 2024-10-28 NOTE — TELEPHONE ENCOUNTER
Pt has upcoming appt    -  Dear Panchito    Here are your labs which we will review at your upcoming visit tomorrow.  The main finding is that your TSH is a little bit lower, and your hemoglobin A1c, the 3-month measure of your blood sugar, is a little higher.  Your testosterone is just fine.    If you have any questions, please feel free to contact my nurse at [phone] select option #3 for triage nurse  or  option #1 for scheduling related questions.    Regards    Pattie Guevara MD     Lab on 09/26/2023   Component Date Value Ref Range Status     Free T4 09/26/2023 1.32  0.90 - 1.70 ng/dL Final     TSH 09/26/2023 0.23 (L)  0.30 - 4.20 uIU/mL Final     Hemoglobin A1C 09/26/2023 6.2 (H)  0.0 - 5.6 % Final    Normal <5.7%   Prediabetes 5.7-6.4%    Diabetes 6.5% or higher     Note: Adopted from ADA consensus guidelines.     Sex Hormone Binding Globulin 09/26/2023 24  11 - 80 nmol/L Final     Free Testosterone Calculated 09/26/2023 5.31  ng/dL Final    Male Max Ranges:  Max Stage I: Less than or equal to 0.37 ng/dL  Max Stage II: 0.03-2.1 ng/dL  Max Stage III: 0.10-9.8 ng/dL  Max Stage IV: 3.5-16.9 ng/dL  Max Stage V: 4.1-23.9 ng/dL     Testosterone Total 09/26/2023 232 (L)  240 - 950 ng/dL Final          soft none

## 2024-10-28 NOTE — TELEPHONE ENCOUNTER
Follow up appointment needed before this medication can be refilled. This patient hasn't been seen since 02/2023. My Dentistt sent to patient

## 2024-12-03 DIAGNOSIS — I48.0 PAROXYSMAL ATRIAL FIBRILLATION (H): ICD-10-CM

## 2024-12-09 ENCOUNTER — TELEPHONE (OUTPATIENT)
Dept: ENDOCRINOLOGY | Facility: CLINIC | Age: 59
End: 2024-12-09
Payer: COMMERCIAL

## 2024-12-09 DIAGNOSIS — E03.9 HYPOTHYROIDISM (ACQUIRED): Primary | ICD-10-CM

## 2024-12-09 RX ORDER — LEVOTHYROXINE SODIUM 137 UG/1
137 TABLET ORAL DAILY
Qty: 90 TABLET | Refills: 0 | Status: SHIPPED | OUTPATIENT
Start: 2024-12-09

## 2024-12-09 NOTE — TELEPHONE ENCOUNTER
levothyroxine (SYNTHROID/LEVOTHROID) 137 MCG tablet 90 tablet 4 9/29/2023       Last Office Visit: 9/29/23  Future Office visit:  none       Thyroid Protocol [id number]/03/2024 09:40 AM   Protocol Details Recent (12 mo) or future (90 days) visit within the authorizing provider's specialty    Medication indicated for associated diagnosis    Normal TSH on file in past 12 months          Routing refill request to provider for review/approval because:  Abnormal lab  Diagnosis does not match medication indication, cannot fill per protocol.   Overdue for appt      Radha Bolivar RN  P Central Nursing/Red Flag Triage & Med Refill Team

## 2024-12-10 ENCOUNTER — TELEPHONE (OUTPATIENT)
Dept: ENDOCRINOLOGY | Facility: CLINIC | Age: 59
End: 2024-12-10
Payer: COMMERCIAL

## 2024-12-10 DIAGNOSIS — I48.0 PAROXYSMAL ATRIAL FIBRILLATION (H): ICD-10-CM

## 2024-12-10 DIAGNOSIS — K59.00 CONSTIPATION, UNSPECIFIED CONSTIPATION TYPE: ICD-10-CM

## 2024-12-10 DIAGNOSIS — F43.21 ADJUSTMENT DISORDER WITH DEPRESSED MOOD: ICD-10-CM

## 2024-12-10 DIAGNOSIS — E05.00 GRAVES DISEASE: ICD-10-CM

## 2024-12-10 DIAGNOSIS — C92.10 CML (CHRONIC MYELOCYTIC LEUKEMIA) (H): Primary | ICD-10-CM

## 2024-12-10 DIAGNOSIS — Z94.81 STATUS POST BONE MARROW TRANSPLANT (H): ICD-10-CM

## 2024-12-10 NOTE — TELEPHONE ENCOUNTER
Left Voicemail (1st Attempt) and Sent Mychart (1st Attempt) for the patient to call back and schedule the following:    Appointment type: return endo  Provider: Ismael  Return date: 12/13 add on virtual  Specialty phone number: [phone]  Additional appointment(s) needed: labs prior  Additonal Notes:   LVM, MyC x1    Patient can be scheduled on 12/13 at end of clinic virtually. MUST have labs prior to follow up     Please note that the above appointment(s) will require manual scheduling as they are marked as ANAM and will not appear using auto search. Do not schedule the patient if another patient has already been scheduled in the requested appointment slot.     If patient cannot do 12/13, please send TE to clinic and we will ask provider for a different add on day.    Pattie Guevara MD  [id number]  Patient needs to redraw labs with return visit.  I can see him on Friday morning if he can get his labs drawn before then as a virtual visit.    Bettye Rodriguez on 12/10/2024 at 11:14 AM

## 2024-12-12 NOTE — TELEPHONE ENCOUNTER
Left Voicemail (2nd Attempt) for the patient to call back and schedule the following:    Appointment type: return endo  Provider: Ismael  Return date: 12/13 add on virtual  Specialty phone number: [phone]  Additional appointment(s) needed: labs prior  Additonal Notes:   LVM x2     Patient can be scheduled on 12/13 at end of clinic virtually. MUST have labs prior to follow up      Please note that the above appointment(s) will require manual scheduling as they are marked as ANAM and will not appear using auto search. Do not schedule the patient if another patient has already been scheduled in the requested appointment slot.     If patient cannot do 12/13, please send TE to clinic and we will ask provider for a different add on day.     Pattie Guevara MD  P [id number]  Patient needs to redraw labs with return visit.  I can see him on Friday morning if he can get his labs drawn before then as a virtual visit.    Bettye Rodriguez on 12/12/2024 at 12:09 PM

## 2024-12-16 RX ORDER — PANTOPRAZOLE SODIUM 40 MG/1
40 TABLET, DELAYED RELEASE ORAL DAILY
Qty: 90 TABLET | Refills: 1 | Status: SHIPPED | OUTPATIENT
Start: 2024-12-16

## 2024-12-16 NOTE — TELEPHONE ENCOUNTER
Pantoprazole Sodium Oral Tablet Delayed Release 40 MG     Last Written Prescription Date:  1/8/24  Last Fill Quantity: 90,   # refills: 2  Last Office Visit : 9/29/23  Future Office visit:  None    Routing refill request to provider for review/approval because:    PPI Protocol Failed    Medication indicated for associated diagnosis    The medication is prescribed for one or more of the following conditions:                Erosive esophagitis               Gastritis              Gastric hypersecretion              Gastric ulcer              Gastroesophageal reflux disease              Helicobacter pylori gastrointestinal tract infection              Ulcer of duodenum              Drug-induced peptic ulcer              Esophageal stricture              Gastrointestinal hemorrhage              Indigestion              Stress ulcer              Zollinger-Sanchez syndrome              Chu s esophagus              Laryngopharyngeal reflux              Epigastric Pain              Morbid Obesity              Cough              History of Peptic Ulcer              Esophageal Atresia, Stenosis and Fistula              Cystic Fibrosis  Bronchiectasis    Overdue for follow up appointment

## 2025-01-13 RX ORDER — NIFEDIPINE 30 MG
30 TABLET, EXTENDED RELEASE ORAL DAILY
Qty: 30 TABLET | Refills: 0 | OUTPATIENT
Start: 2025-01-13

## 2025-02-02 ENCOUNTER — HEALTH MAINTENANCE LETTER (OUTPATIENT)
Age: 60
End: 2025-02-02

## 2025-02-28 DIAGNOSIS — E05.90 HYPERTHYROIDISM: ICD-10-CM

## 2025-03-05 ENCOUNTER — TELEPHONE (OUTPATIENT)
Dept: ENDOCRINOLOGY | Facility: CLINIC | Age: 60
End: 2025-03-05
Payer: COMMERCIAL

## 2025-03-05 RX ORDER — LIOTHYRONINE SODIUM 5 UG/1
5 TABLET ORAL 2 TIMES DAILY
Qty: 180 TABLET | Refills: 0 | Status: SHIPPED | OUTPATIENT
Start: 2025-03-05

## 2025-03-05 NOTE — TELEPHONE ENCOUNTER
Last Written Prescription:  liothyronine (CYTOMEL) 5 MCG tablet 180 tablet 0 10/22/2024 -- No   Sig - Route: Take 1 tablet (5 mcg) by mouth 2 times daily - Oral     ----------------------  Last Visit Date: 9-29-23  ( RTC 1 Y)  Future Visit Date: none        Refill decision: Medication unable to be refilled by RN due to: Other:  Overdue appt and lab: TSH- previous maurice rf  Med not on endocrine protocol      Scheduling has been notified to contact the pt for appointment.           Request from pharmacy:  Requested Prescriptions   Pending Prescriptions Disp Refills    liothyronine (CYTOMEL) 5 MCG tablet [Pharmacy Med Name: Liothyronine Sodium Oral Tablet 5 MCG] 180 tablet 0     Sig: TAKE 1 TABLET (5 MCG) BY MOUTH 2 TIMES DAILY       Thyroid Protocol Failed - 3/5/2025 12:36 PM        Failed - Recent (12 mo) or future (90 days) visit within the authorizing provider's specialty     The patient must have completed an in-person or virtual visit within the past 12 months or has a future visit scheduled within the next 90 days with the authorizing provider s specialty.  Urgent care and e-visits do not qualify as an office visit for this protocol.          Failed - Medication indicated for associated diagnosis     Medication is associated with one or more of the following diagnoses:  Hypothyroidism  Thyroid stimulating hormone suppression therapy  Thyroid cancer  Acquired atrophy of thyroid          Failed - Normal TSH on file in past 12 months     Recent Labs   Lab Test 09/26/23  0904   TSH 0.23*              Passed - Patient is 12 years or older        Passed - Medication is active on med list and the sig matches. RN to manually verify dose and sig if red X/fail.     If the protocol passes (green check), you do not need to verify med dose and sig.    A prescription matches if they are the same clinical intention.    For Example: once daily and every morning are the same.    The protocol can not identify upper and lower case  letters as matching and will fail.     For Example: Take 1 tablet (50 mg) by mouth daily     TAKE 1 TABLET (50 MG) BY MOUTH DAILY    For all fails (red x), verify dose and sig.    If the refill does match what is on file, the RN can still proceed to approve the refill request.       If they do not match, route to the appropriate provider.

## 2025-03-05 NOTE — TELEPHONE ENCOUNTER
Left Voicemail (1st Attempt) and Sent Mychart (1st Attempt) for the patient to call back and schedule the following:    Appointment type: RETURN ENDOCRINE  Provider: Pattie Guevara MD  Return date: next available, ANAM MANCINI  Specialty phone number: [phone]  Additional appointment(s) needed: Labs next available  Additonal Notes: LVM, MYCX1. 3 separate attempts made previously.    liothyronine (CYTOMEL) 5 MCG tablet: DELIA-- Dr. Guevara out of clinic today   Scheduling: pt of Dr. Guevara- past due for appt, pls contact pt for scheduling-thanks     Available appts:  05/27 9:30/10:30 virtual [facility]  07/22 virtual [facility]  07/23 in person [facility]  10/03 3:30 [facility]  OK to use reschedule slots.    Teresita Gar on 3/5/2025 at 3:15 PM

## 2025-05-20 ENCOUNTER — LAB (OUTPATIENT)
Dept: LAB | Facility: CLINIC | Age: 60
End: 2025-05-20
Payer: COMMERCIAL

## 2025-05-20 DIAGNOSIS — E03.9 HYPOTHYROIDISM (ACQUIRED): ICD-10-CM

## 2025-05-20 LAB
T3 SERPL-MCNC: 107 NG/DL (ref 85–202)
T4 FREE SERPL-MCNC: 0.78 NG/DL (ref 0.9–1.7)
TSH SERPL DL<=0.005 MIU/L-ACNC: 0.18 UIU/ML (ref 0.3–4.2)

## 2025-05-20 PROCEDURE — 84480 ASSAY TRIIODOTHYRONINE (T3): CPT

## 2025-05-20 PROCEDURE — 84443 ASSAY THYROID STIM HORMONE: CPT

## 2025-05-20 PROCEDURE — 36415 COLL VENOUS BLD VENIPUNCTURE: CPT

## 2025-05-20 PROCEDURE — 84439 ASSAY OF FREE THYROXINE: CPT

## 2025-05-21 ENCOUNTER — RESULTS FOLLOW-UP (OUTPATIENT)
Dept: ENDOCRINOLOGY | Facility: CLINIC | Age: 60
End: 2025-05-21
Payer: COMMERCIAL

## 2025-05-21 NOTE — LETTER
May 21, 2025      Panchito Pierce  [address]  [place] [address]      Dear Panchito    Here are your labs which we will discuss at your return visit.  I think we can possibly cut back on your thyroid hormone just a bit.  We will talk more at your return visit.    If you have any questions, please feel free to contact my nurse at [phone] select option #3 for triage nurse  or  option #1 for scheduling related questions.    Regards    Pattie Guevara MD     Resulted Orders   TSH   Result Value Ref Range    TSH 0.18 (L) 0.30 - 4.20 uIU/mL   T4 free   Result Value Ref Range    Free T4 0.78 (L) 0.90 - 1.70 ng/dL   T3 total   Result Value Ref Range    T3 Total 107 85 - 202 ng/dL

## 2025-05-21 NOTE — TELEPHONE ENCOUNTER
Pt has upcmoing appt    May 21, 2025      Panchito Pierce  [address]  [place] [address]      Dear Panchito    Here are your labs which we will discuss at your return visit.  I think we can possibly cut back on your thyroid hormone just a bit.  We will talk more at your return visit.    If you have any questions, please feel free to contact my nurse at [phone] select option #3 for triage nurse  or  option #1 for scheduling related questions.    Regards    Pattie Guevara MD     Resulted Orders   TSH   Result Value Ref Range    TSH 0.18 (L) 0.30 - 4.20 uIU/mL   T4 free   Result Value Ref Range    Free T4 0.78 (L) 0.90 - 1.70 ng/dL   T3 total   Result Value Ref Range    T3 Total 107 85 - 202 ng/dL

## 2025-05-27 ENCOUNTER — VIRTUAL VISIT (OUTPATIENT)
Dept: ENDOCRINOLOGY | Facility: CLINIC | Age: 60
End: 2025-05-27
Payer: COMMERCIAL

## 2025-05-27 DIAGNOSIS — E05.90 HYPERTHYROIDISM: ICD-10-CM

## 2025-05-27 DIAGNOSIS — R79.9 ABNORMAL FINDING OF BLOOD CHEMISTRY, UNSPECIFIED: Primary | ICD-10-CM

## 2025-05-27 RX ORDER — LEVOTHYROXINE SODIUM 100 UG/1
100 TABLET ORAL DAILY
Qty: 90 TABLET | Refills: 3 | Status: SHIPPED | OUTPATIENT
Start: 2025-05-27

## 2025-05-27 RX ORDER — LIOTHYRONINE SODIUM 5 UG/1
5 TABLET ORAL 2 TIMES DAILY
Qty: 180 TABLET | Refills: 4 | Status: SHIPPED | OUTPATIENT
Start: 2025-05-27

## 2025-05-27 ASSESSMENT — PAIN SCALES - GENERAL: PAINLEVEL_OUTOF10: NO PAIN (0)

## 2025-05-27 NOTE — PATIENT INSTRUCTIONS
Reduce levothyroxine 100 mcg daily and continue with cytomel at 5 mcg twice daily    Think about the GI MD referral for the liver    -  CT CHEST ABDOMEN PELVIS W  Order: [id number]  Impression    1.  Dense infiltrate in the right midlung most compatible with acute pneumonitis.    2.  There is also generalized bronchial wall thickening, more so in the lower lungs, suggestive of bronchiolitis.    3.  Emphysematous changes in the lungs.    4.  Cardiac enlargement with small bilateral pleural effusions. Correlation for any signs of CHF or fluid overload.    5.  Subtle nodular contour of the liver suggests underlying hepatic parenchymal disease. No ascites. Normal-sized spleen.    6.  No acute findings in the abdomen or pelvis.    7.  Sigmoid diverticulosis without evidence for diverticulitis.  Narrative    For Patients: As a result of the 21st Century Cures Act, medical imaging exams and procedure reports are released immediately into your electronic medical record. You may view this report before your referring provider. If you have questions, please contact your health care provider.    EXAM: CT CHEST ABDOMEN PELVIS W  LOCATION: [facility]  DATE: 12/26/2024    INDICATION: Cough, abdominal pain, likely ascites.  COMPARISON: Abdomen and pelvis CT from 6/2/2023. Chest CT from 6/30/2015.  TECHNIQUE: CT scan of the chest, abdomen, and pelvis was performed following injection of IV contrast. Multiplanar reformats were obtained. Dose reduction techniques were used.  CONTRAST: Omnipaque [id number]    FINDINGS:  LUNGS AND PLEURA: Moderate emphysematous changes in the lungs. Dense area of consolidation in the right midlung mostly involving the inferior aspect of the right upper lobe most compatible with acute pneumonitis. Patchy additional atelectasis versus less likely infiltrate both lung bases. Generalized bronchial wall thickening suggests bronchiolitis. Small bilateral pleural effusions including some fluid in the major  fissure on the right.    MEDIASTINUM/AXILLAE: Multiple borderline to mildly enlarged hilar and mediastinal lymph nodes are favored to be reactive in nature. Cardiac enlargement. No pericardial effusion. Normal caliber thoracic aorta. Esophagus is grossly negative.    CORONARY ARTERY CALCIFICATION: None.    HEPATOBILIARY: Subtle nodular contour of the liver suggests underlying hepatic parenchymal disease. Subcentimeter low-attenuation lesion in the right hepatic lobe is unchanged and should be of doubtful significance. No calcified gallstones or biliary dilatation.    PANCREAS: Normal.    SPLEEN: Normal.    ADRENAL GLANDS: Normal.    KIDNEYS/BLADDER: Normal.    BOWEL: Bowel is normal in caliber with no evidence for obstruction. Sigmoid diverticulosis without evidence for diverticulitis. No acute bowel findings.    LYMPH NODES: Normal.    VASCULATURE: Aortic calcification without aneurysm.    PELVIC ORGANS: Normal.    MUSCULOSKELETAL: Mild degenerative changes in the spine. Small fat-containing left inguinal hernia. Small fat-containing umbilical hernia.  Images on Order [id number]

## 2025-05-27 NOTE — NURSING NOTE
Current patient location: [address] [place] [address] [place] [address]    Is the patient currently in the state of [place]? YES    Visit mode: VIDEO    If the visit is dropped, the patient can be reconnected by:VIDEO VISIT: Text to cell phone:   Telephone Information:   Mobile [phone]       Will anyone else be joining the visit? NO  (If patient encounters technical issues they should call [phone]    Are changes needed to the allergy or medication list? No    Are refills needed on medications prescribed by this physician? YES    Rooming Documentation:  Questionnaire(s) completed    Reason for visit: RECHECK    Latha HANNON

## 2025-05-27 NOTE — PROGRESS NOTES
Video-Visit Details    Type of service:  Video Visit    Virtual visit conducted by teresita.      Originating Location (pt. Location): HOME    Distant Location (provider location):  Off site    Mode of Communication:  Video Conference via [employer]    Physician has received verbal consent for a Video Visit from the patient? YES      Pattie Guevara MD    Panchito Pierce is a 60 year old male who is being evaluated via a video visit.       [facility]  Endocrinology  Pattie Guevara MD  5/27/25     Chief Complaint:   RECHECK    Start time 940, stop time 10:00, chart review, documentation time, coordination of care, 10 minutes.    I spent a total of  30  minutes on the date of encounter reviewing medical records, evaluating the patient, coordinating care and documenting in the EHR, as detailed,     The longitudinal plan of care for the diagnosis(es)/condition(s) as documented were addressed during this visit. Due to the added complexity in care, I will continue to support the pt in the subsequent management and with ongoing continuity of care.      History of Present Illness:  #1 Graves disease status-post VELA  Per patient report, he was diagnosed with Grave's disease in 2002. He underwent radioactive iodine therapy in 2002 at [facility]. He had thyroid testing on 4/24/2018, where his T3 was 99 ng/dL, T4 free was 0.87 ng/dL, and his TSH was 1.00 mU/L. These thyroid tests have normalized since he had a TSH of 8.89 on 12/21/2017.  Has been on combination of levothyroxine and Cytomel since July 1, 2019. October 2019 TSH of 0.81.  July 2021 TSH 0.67, free T4 0.8. In December 2021, the patient had atrial fibrillation.  It was concerned that his thyroid hormone replacement may be exacerbating his atrial fibrillation.  We reduce his levothyroxine to 137 mcg daily and continued with the Cytomel at 5 mcg twice daily.  December 2022 TSH was 0.49    Interval history: The patient has been struggling with his atrial  "fibrillation.  March 2025 TSH 0.04.  He reports that his levothyroxine was reduced to 112 mcg daily.  May 2025 TSH 0.18.  He is currently on levothyroxine 112 mcg daily and liothyronine at 5 mcg twice daily.    #2  History of low libido in the setting of low testosterone  His low libido has improved since 12/2017 when he had shingles. His testosterone level on 12/21/2017 was low at 94 ng/dL, but this increased to 288 mg/dL on 4/24/2018. December 2018 total testosterone 187, October 2019 total testosterone at 191, December 2019 total testosterone of 176.  July 2021 total testosterone was 240 (normal) and free testosterone was normal at 6.42 (normal).  FSH at 13.3.  July 2021 PSA 1.26. September 2023 total testosterone at 232.    Interval history:   Not currently on testosterone treatment.    #3 Sleep apnea  The patient had a sleep study completed on 1/11/2017 showing that he has sleep apnea.     Interval history: Not discussed at current visit    #4 Weight gain with elevated hemoglobin A1c of 5.9 noted in October 2019  Patient working on dietary lifestyle changes.  October 2019 hemoglobin A1c 5.9.  March 2021 hemoglobin A1c 6.2.July 2021 hemoglobin A1c 5.9.   November 2021 hemoglobin A1c 5.8.  December 2022 hemoglobin A1c at 6.0. September 2023 hemoglobin A1c at 6.2.    Interval history: No recent hemoglobin A1c    #5 atrial fibrillation  Patient currently on anticoagulation.  Working with cardiology.  December 2022 TSH 0.49.  Status post cardioversion in April 2023. June 2023 echocardiogram showed a normal EF at 60% with no evidence for heart failure    Interval history: Per patient report, status post cardioversion in April 2023 and doing significantly better.     #6 incidental liver cirrhosis seen on June 2023 CT scan  Noted on CT scan from June 2023 done for evaluation of abdominal [facility] at [facility].    Interval history: Also noted again in December 2024 imaging as noted \"Subtle nodular contour of the liver " "suggests underlying hepatic parenchymal disease. No ascites. Normal-sized spleen. \"    Review of Systems:   Pertinent items are noted in HPI, remainder of complete ROS is negative.      Active Medications:   Current Outpatient Medications   Medication Sig Dispense Refill    acyclovir (ZOVIRAX) 400 MG tablet Take 2 tablets (800 mg) by mouth 2 times daily (Patient not taking: Reported on 7/5/2019) 120 tablet 11    betamethasone dipropionate (DIPROSONE) 0.05 % external ointment Apply to bug bite reactions, and you can cover with bandaid 50 g 3    clobetasol (TEMOVATE) 0.05 % external ointment Apply topically 2 times daily as needed (pruritic spots on arms and trunk) 60 g 5    fenofibrate micronized (LOFIBRA) 67 MG capsule Take 1 capsule by mouth once daily with a meal.      levothyroxine (SYNTHROID/LEVOTHROID) 137 MCG tablet Take 1 tablet (137 mcg) by mouth daily 90 tablet 0    liothyronine (CYTOMEL) 5 MCG tablet Take 1 tablet (5 mcg) by mouth 2 times daily. For additional refills, please schedule a follow-up appointment, labs due 180 tablet 0    metoprolol tartrate (LOPRESSOR) 25 MG tablet Take 1 tablet (25 mg) by mouth 2 times daily 180 tablet 3    mupirocin (BACTROBAN) 2 % external ointment Apply topically 3 times daily 22 g 1    NIFEdipine ER (ADALAT CC) 30 MG 24 hr tablet Take 1 tablet (30 mg) by mouth daily *DURING WINTER MONTHS TO PREVENT FLARES. Check BP once a month while taking. Appt due for refills 30 tablet 0    OYSTER SHELL CALCIUM + D3 500-400 MG-UNIT TABS TAKE TWO TABLETS BY MOUTH DAILY  60 tablet 11    pantoprazole (PROTONIX) 40 MG EC tablet Take 1 tablet (40 mg) by mouth daily. 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   Immunosuppressed status    Raynaud's disease   Heart murmur   Hypogonadism   Chronic dermatitis      Past Surgical History:  Left hand graft flap and right foot graft- 01/1985     Family History:   Mother: thyroid disease, lung cancer  Father: prostate cancer, dementia, heart murmur   Brother: hepatitis, heart murmur    Sister: thyroid disease   Son: asthma       Social History:   The patient was alone   Smoking Status: former; 1/2 pack per day for 30 years   Smokeless Tobacco: never   Alcohol Use: yes; once per week       Physical Exam:   GENERAL: Healthy, alert and no distress\",\"EYES: Eyes grossly normal to inspection.  No discharge or erythema, or obvious scleral/conjunctival abnormalities.\",\"RESP: No audible wheeze, cough, or visible cyanosis.  No visible retractions or increased work of breathing.  \",\"SKIN: Visible skin clear. No significant rash, abnormal pigmentation or lesions.\",\"NEURO: Cranial nerves grossly intact.  Mentation and speech appropriate for age.\",\"PSYCH: Mentation appears normal, affect normal/bright, judgement and insight intact, normal speech and appearance well-groomed.      Data:  No visits with results within 1 Week(s) from this visit.   Latest known visit with results is:   Lab on 05/20/2025   Component Date Value Ref Range Status    TSH 05/20/2025 0.18 (L)  0.30 - 4.20 uIU/mL Final    Free T4 05/20/2025 0.78 (L)  0.90 - 1.70 ng/dL Final    T3 Total 05/20/2025 107  85 - 202 ng/dL Final         PLAN:  #1 Graves disease status-post VELA  TSH still slightly suppressed.  Will have the patient reduce his levothyroxine to 100 mcg daily, continue with liothyronine at 5 mcg twice daily, and recheck labs in about a month.    #2  History of low libido in the setting of low testosterone  Recheck testosterone September 2023 was reasonable.    #3 Sleep apnea  Not discussed at current visit    #4 hyperglycemia   Patient primarily " working at dietary lifestyle changes.  Will recheck hemoglobin A1c when we recheck his labs for TFTs as noted above.    #5 atrial fibrillation  Per cardiology.    #6 incidental liver cirrhosis seen on June 2023 CT scan   Discussion with patient.  I would recommend that patient see GI for follow-up.  He will think about this and and let either me or his primary provider know.  There is a history of alcohol use for which she has stopped.    Labs below in 2 months, return visit in 1 year.    Orders Placed This Encounter   Procedures    TSH    T4 free    T3 total    Hemoglobin A1c

## 2025-05-27 NOTE — LETTER
5/27/2025       RE: Panchito Pierce  [address]  [place] [address]     Dear Colleague,    Thank you for referring your patient, Panchito Pierce, to the [facility] ENDO[facility] [place] at [facility]. Please see a copy of my visit note below.    Video-Visit Details    Type of service:  Video Visit    Virtual visit conducted by Luis.      Originating Location (pt. Location): HOME    Distant Location (provider location):  Off site    Mode of Communication:  Video Conference via [employer]    Physician has received verbal consent for a Video Visit from the patient? YES      Pattie Guevara MD    Panchito Pierce is a 60 year old male who is being evaluated via a video visit.       [facility]  Endocrinology  Pattie Guevara MD  5/27/25     Chief Complaint:   RECHECK    Start time 940, stop time 10:00, chart review, documentation time, coordination of care, 10 minutes.    I spent a total of  30  minutes on the date of encounter reviewing medical records, evaluating the patient, coordinating care and documenting in the EHR, as detailed,     The longitudinal plan of care for the diagnosis(es)/condition(s) as documented were addressed during this visit. Due to the added complexity in care, I will continue to support the pt in the subsequent management and with ongoing continuity of care.      History of Present Illness:  #1 Graves disease status-post VELA  Per patient report, he was diagnosed with Grave's disease in 2002. He underwent radioactive iodine therapy in 2002 at [facility]. He had thyroid testing on 4/24/2018, where his T3 was 99 ng/dL, T4 free was 0.87 ng/dL, and his TSH was 1.00 mU/L. These thyroid tests have normalized since he had a TSH of 8.89 on 12/21/2017.  Has been on combination of levothyroxine and Cytomel since July 1, 2019. October 2019 TSH of 0.81.  July 2021 TSH 0.67, free T4 0.8. In December 2021, the patient had atrial  fibrillation.  It was concerned that his thyroid hormone replacement may be exacerbating his atrial fibrillation.  We reduce his levothyroxine to 137 mcg daily and continued with the Cytomel at 5 mcg twice daily.  December 2022 TSH was 0.49    Interval history: The patient has been struggling with his atrial fibrillation.  March 2025 TSH 0.04.  He reports that his levothyroxine was reduced to 112 mcg daily.  May 2025 TSH 0.18.  He is currently on levothyroxine 112 mcg daily and liothyronine at 5 mcg twice daily.    #2  History of low libido in the setting of low testosterone  His low libido has improved since 12/2017 when he had shingles. His testosterone level on 12/21/2017 was low at 94 ng/dL, but this increased to 288 mg/dL on 4/24/2018. December 2018 total testosterone 187, October 2019 total testosterone at 191, December 2019 total testosterone of 176.  July 2021 total testosterone was 240 (normal) and free testosterone was normal at 6.42 (normal).  FSH at 13.3.  July 2021 PSA 1.26. September 2023 total testosterone at 232.    Interval history:   Not currently on testosterone treatment.    #3 Sleep apnea  The patient had a sleep study completed on 1/11/2017 showing that he has sleep apnea.     Interval history: Not discussed at current visit    #4 Weight gain with elevated hemoglobin A1c of 5.9 noted in October 2019  Patient working on dietary lifestyle changes.  October 2019 hemoglobin A1c 5.9.  March 2021 hemoglobin A1c 6.2.July 2021 hemoglobin A1c 5.9.   November 2021 hemoglobin A1c 5.8.  December 2022 hemoglobin A1c at 6.0. September 2023 hemoglobin A1c at 6.2.    Interval history: No recent hemoglobin A1c    #5 atrial fibrillation  Patient currently on anticoagulation.  Working with cardiology.  December 2022 TSH 0.49.  Status post cardioversion in April 2023. June 2023 echocardiogram showed a normal EF at 60% with no evidence for heart failure    Interval history: Per patient report, status post  "cardioversion in April 2023 and doing significantly better.     #6 incidental liver cirrhosis seen on June 2023 CT scan  Noted on CT scan from June 2023 done for evaluation of abdominal pain at [facility].    Interval history: Also noted again in December 2024 imaging as noted \"Subtle nodular contour of the liver suggests underlying hepatic parenchymal disease. No ascites. Normal-sized spleen. \"    Review of Systems:   Pertinent items are noted in HPI, remainder of complete ROS is negative.      Active Medications:   Current Outpatient Medications   Medication Sig Dispense Refill     acyclovir (ZOVIRAX) 400 MG tablet Take 2 tablets (800 mg) by mouth 2 times daily (Patient not taking: Reported on 7/5/2019) 120 tablet 11     betamethasone dipropionate (DIPROSONE) 0.05 % external ointment Apply to bug bite reactions, and you can cover with bandaid 50 g 3     clobetasol (TEMOVATE) 0.05 % external ointment Apply topically 2 times daily as needed (pruritic spots on arms and trunk) 60 g 5     fenofibrate micronized (LOFIBRA) 67 MG capsule Take 1 capsule by mouth once daily with a meal.       levothyroxine (SYNTHROID/LEVOTHROID) 137 MCG tablet Take 1 tablet (137 mcg) by mouth daily 90 tablet 0     liothyronine (CYTOMEL) 5 MCG tablet Take 1 tablet (5 mcg) by mouth 2 times daily. For additional refills, please schedule a follow-up appointment, labs due 180 tablet 0     metoprolol tartrate (LOPRESSOR) 25 MG tablet Take 1 tablet (25 mg) by mouth 2 times daily 180 tablet 3     mupirocin (BACTROBAN) 2 % external ointment Apply topically 3 times daily 22 g 1     NIFEdipine ER (ADALAT CC) 30 MG 24 hr tablet Take 1 tablet (30 mg) by mouth daily *DURING WINTER MONTHS TO PREVENT FLARES. Check BP once a month while taking. Appt due for refills 30 tablet 0     OYSTER SHELL CALCIUM + D3 500-400 MG-UNIT TABS TAKE TWO TABLETS BY MOUTH DAILY  60 tablet 11     pantoprazole (PROTONIX) 40 MG EC tablet Take 1 tablet (40 mg) by mouth daily. 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   Immunosuppressed status    Raynaud's disease   Heart murmur   Hypogonadism   Chronic dermatitis      Past Surgical History:  Left hand graft flap and right foot graft- 01/1985     Family History:   Mother: thyroid disease, lung cancer  Father: prostate cancer, dementia, heart murmur   Brother: hepatitis, heart murmur    Sister: thyroid disease   Son: asthma       Social History:   The patient was alone   Smoking Status: former; 1/2 pack per day for 30 years   Smokeless Tobacco: never   Alcohol Use: yes; once per week       Physical Exam:   GENERAL: Healthy, alert and no distress\",\"EYES: Eyes grossly normal to inspection.  No discharge or erythema, or obvious scleral/conjunctival abnormalities.\",\"RESP: No audible wheeze, cough, or visible cyanosis.  No visible retractions or increased work of breathing.  \",\"SKIN: Visible skin clear. No significant rash, abnormal pigmentation or lesions.\",\"NEURO: Cranial nerves grossly intact.  Mentation and speech appropriate for age.\",\"PSYCH: Mentation appears normal, affect normal/bright, judgement and insight intact, normal speech and appearance well-groomed.      Data:  No visits with results within 1 Week(s) from this visit.   Latest known visit with results is:   Lab on 05/20/2025   Component Date Value Ref Range Status     TSH 05/20/2025 0.18 (L)  0.30 - 4.20 uIU/mL Final     Free T4 05/20/2025 0.78 (L)  0.90 - 1.70 ng/dL Final     T3 Total 05/20/2025 107  85 - 202 ng/dL Final         PLAN:  #1 Graves disease status-post VELA  TSH still slightly suppressed.  Will have the patient reduce " his levothyroxine to 100 mcg daily, continue with liothyronine at 5 mcg twice daily, and recheck labs in about a month.    #2  History of low libido in the setting of low testosterone  Recheck testosterone September 2023 was reasonable.    #3 Sleep apnea  Not discussed at current visit    #4 hyperglycemia   Patient primarily working at dietary lifestyle changes.  Will recheck hemoglobin A1c when we recheck his labs for TFTs as noted above.    #5 atrial fibrillation  Per cardiology.    #6 incidental liver cirrhosis seen on June 2023 CT scan   Discussion with patient.  I would recommend that patient see GI for follow-up.  He will think about this and and let either me or his primary provider know.  There is a history of alcohol use for which she has stopped.    Labs below in 2 months, return visit in 1 year.    Orders Placed This Encounter   Procedures     TSH     T4 free     T3 total     Hemoglobin A1c        Again, thank you for allowing me to participate in the care of your patient.      Sincerely,    Pattie Guevara MD

## 2025-06-02 ENCOUNTER — TELEPHONE (OUTPATIENT)
Dept: ENDOCRINOLOGY | Facility: CLINIC | Age: 60
End: 2025-06-02
Payer: COMMERCIAL

## 2025-06-02 DIAGNOSIS — K76.9 DISORDER OF LIVER: Primary | ICD-10-CM

## 2025-06-03 ENCOUNTER — PATIENT OUTREACH (OUTPATIENT)
Dept: CARE COORDINATION | Facility: CLINIC | Age: 60
End: 2025-06-03
Payer: COMMERCIAL

## 2025-06-05 ENCOUNTER — PATIENT OUTREACH (OUTPATIENT)
Dept: CARE COORDINATION | Facility: CLINIC | Age: 60
End: 2025-06-05
Payer: COMMERCIAL

## 2025-06-13 ENCOUNTER — DOCUMENTATION ONLY (OUTPATIENT)
Dept: GASTROENTEROLOGY | Facility: CLINIC | Age: 60
End: 2025-06-13
Payer: COMMERCIAL

## 2025-06-16 ENCOUNTER — OFFICE VISIT (OUTPATIENT)
Dept: GASTROENTEROLOGY | Facility: CLINIC | Age: 60
End: 2025-06-16
Attending: INTERNAL MEDICINE
Payer: COMMERCIAL

## 2025-06-16 ENCOUNTER — TELEPHONE (OUTPATIENT)
Dept: ENDOCRINOLOGY | Facility: CLINIC | Age: 60
End: 2025-06-16

## 2025-06-16 ENCOUNTER — LAB (OUTPATIENT)
Dept: LAB | Facility: CLINIC | Age: 60
End: 2025-06-16
Payer: COMMERCIAL

## 2025-06-16 VITALS
OXYGEN SATURATION: 97 % | TEMPERATURE: 97.4 F | BODY MASS INDEX: 32.94 KG/M2 | HEIGHT: 69 IN | HEART RATE: 66 BPM | WEIGHT: 222.4 LBS | DIASTOLIC BLOOD PRESSURE: 70 MMHG | SYSTOLIC BLOOD PRESSURE: 111 MMHG

## 2025-06-16 DIAGNOSIS — K76.9 DISORDER OF LIVER: Primary | ICD-10-CM

## 2025-06-16 DIAGNOSIS — K76.9 DISORDER OF LIVER: ICD-10-CM

## 2025-06-16 DIAGNOSIS — D84.9 IMMUNOSUPPRESSION: ICD-10-CM

## 2025-06-16 DIAGNOSIS — Z94.81 STATUS POST BONE MARROW TRANSPLANT (H): ICD-10-CM

## 2025-06-16 LAB
ALBUMIN SERPL BCG-MCNC: 4.2 G/DL (ref 3.5–5.2)
ALP SERPL-CCNC: 105 U/L (ref 40–150)
ALT SERPL W P-5'-P-CCNC: 29 U/L (ref 0–70)
ANION GAP SERPL CALCULATED.3IONS-SCNC: 11 MMOL/L (ref 7–15)
AST SERPL W P-5'-P-CCNC: 22 U/L (ref 0–45)
BILIRUB SERPL-MCNC: 0.4 MG/DL
BILIRUBIN DIRECT (ROCHE PRO & PURE): 0.19 MG/DL (ref 0–0.45)
BUN SERPL-MCNC: 21.1 MG/DL (ref 8–23)
CALCIUM SERPL-MCNC: 10 MG/DL (ref 8.8–10.4)
CHLORIDE SERPL-SCNC: 102 MMOL/L (ref 98–107)
CREAT SERPL-MCNC: 1.25 MG/DL (ref 0.67–1.17)
EGFRCR SERPLBLD CKD-EPI 2021: 66 ML/MIN/1.73M2
ERYTHROCYTE [DISTWIDTH] IN BLOOD BY AUTOMATED COUNT: 14.2 % (ref 10–15)
EST. AVERAGE GLUCOSE BLD GHB EST-MCNC: 180 MG/DL
FERRITIN SERPL-MCNC: 292 NG/ML (ref 31–409)
GLUCOSE SERPL-MCNC: 110 MG/DL (ref 70–99)
HBA1C MFR BLD: 7.9 %
HBV CORE AB SERPL QL IA: NONREACTIVE
HBV SURFACE AB SERPL IA-ACNC: 201 M[IU]/ML
HBV SURFACE AB SERPL IA-ACNC: REACTIVE M[IU]/ML
HBV SURFACE AG SERPL QL IA: NONREACTIVE
HCO3 SERPL-SCNC: 26 MMOL/L (ref 22–29)
HCT VFR BLD AUTO: 44.5 % (ref 40–53)
HCV AB SERPL QL IA: NONREACTIVE
HGB BLD-MCNC: 14.1 G/DL (ref 13.3–17.7)
INR PPP: 1.03 (ref 0.85–1.15)
IRON BINDING CAPACITY (ROCHE): 287 UG/DL (ref 240–430)
IRON SATN MFR SERPL: 29 % (ref 15–46)
IRON SERPL-MCNC: 82 UG/DL (ref 61–157)
MCH RBC QN AUTO: 26.7 PG (ref 26.5–33)
MCHC RBC AUTO-ENTMCNC: 31.7 G/DL (ref 31.5–36.5)
MCV RBC AUTO: 84 FL (ref 78–100)
PLATELET # BLD AUTO: 215 10E3/UL (ref 150–450)
POTASSIUM SERPL-SCNC: 4.4 MMOL/L (ref 3.4–5.3)
PROT SERPL-MCNC: 7 G/DL (ref 6.4–8.3)
PROTHROMBIN TIME: 13.7 SECONDS (ref 11.8–14.8)
RBC # BLD AUTO: 5.28 10E6/UL (ref 4.4–5.9)
SODIUM SERPL-SCNC: 139 MMOL/L (ref 135–145)
WBC # BLD AUTO: 8.1 10E3/UL (ref 4–11)

## 2025-06-16 PROCEDURE — 1126F AMNT PAIN NOTED NONE PRSNT: CPT | Performed by: INTERNAL MEDICINE

## 2025-06-16 PROCEDURE — 3074F SYST BP LT 130 MM HG: CPT | Performed by: INTERNAL MEDICINE

## 2025-06-16 PROCEDURE — 87340 HEPATITIS B SURFACE AG IA: CPT | Performed by: INTERNAL MEDICINE

## 2025-06-16 PROCEDURE — 83550 IRON BINDING TEST: CPT | Performed by: PATHOLOGY

## 2025-06-16 PROCEDURE — 83036 HEMOGLOBIN GLYCOSYLATED A1C: CPT | Performed by: INTERNAL MEDICINE

## 2025-06-16 PROCEDURE — 99204 OFFICE O/P NEW MOD 45 MIN: CPT | Mod: 25 | Performed by: INTERNAL MEDICINE

## 2025-06-16 PROCEDURE — 86803 HEPATITIS C AB TEST: CPT | Performed by: INTERNAL MEDICINE

## 2025-06-16 PROCEDURE — 82728 ASSAY OF FERRITIN: CPT | Performed by: PATHOLOGY

## 2025-06-16 PROCEDURE — 80053 COMPREHEN METABOLIC PANEL: CPT | Performed by: PATHOLOGY

## 2025-06-16 PROCEDURE — 86706 HEP B SURFACE ANTIBODY: CPT | Performed by: INTERNAL MEDICINE

## 2025-06-16 PROCEDURE — 99000 SPECIMEN HANDLING OFFICE-LAB: CPT | Performed by: PATHOLOGY

## 2025-06-16 PROCEDURE — 83540 ASSAY OF IRON: CPT | Performed by: PATHOLOGY

## 2025-06-16 PROCEDURE — 82248 BILIRUBIN DIRECT: CPT | Performed by: PATHOLOGY

## 2025-06-16 PROCEDURE — 85610 PROTHROMBIN TIME: CPT | Performed by: PATHOLOGY

## 2025-06-16 PROCEDURE — G0463 HOSPITAL OUTPT CLINIC VISIT: HCPCS | Performed by: INTERNAL MEDICINE

## 2025-06-16 PROCEDURE — 36415 COLL VENOUS BLD VENIPUNCTURE: CPT | Performed by: PATHOLOGY

## 2025-06-16 PROCEDURE — 86704 HEP B CORE ANTIBODY TOTAL: CPT | Performed by: INTERNAL MEDICINE

## 2025-06-16 PROCEDURE — 3078F DIAST BP <80 MM HG: CPT | Performed by: INTERNAL MEDICINE

## 2025-06-16 PROCEDURE — 85027 COMPLETE CBC AUTOMATED: CPT | Performed by: PATHOLOGY

## 2025-06-16 ASSESSMENT — PAIN SCALES - GENERAL: PAINLEVEL_OUTOF10: NO PAIN (0)

## 2025-06-16 NOTE — TELEPHONE ENCOUNTER
Pt saw GI - dietary and lifstyle changes - labs normal and no fiborisis on  scan - -called pt -he will let me know if he would like weight mgmt referral done  -he is thinking about dietary and lifestyle changes either by himself or through his primary

## 2025-06-16 NOTE — PATIENT INSTRUCTIONS
"Summary:    1.  Your prior CT CT scan suggested the possibility of cirrhosis based on the appearance of the liver.  However, your other testing (including the FibroScan today) do not suggest cirrhosis.    2.  The FibroScan does suggest that you have steatotic liver disease (MASLD, formerly known as fatty liver).  This condition is very common, and in some patients may lead to cirrhosis over a long period of time.  As we discussed, the primary approach to MASLD is sustained weight loss.    3.  I suggest that you initiate measures to help achieve weight loss including a healthy diet (such as a \"Mediterranean diet\"), as well as daily physical activity, as we discussed.  If these measures or not successful to help achieve weight loss, then it would be reasonable to have you on a GLP-1 agonist (semaglutide or tirzepatide) to help encourage weight loss.  These medications also appeared to be beneficial for MASLD.  I would discuss this possibility with Dr. Guevara.    At this time, will not schedule follow-up in this clinic, but you are welcome to return if liver or GI issues arise.    If you have any questions about your liver disease care or tests, you can call the clinic at [phone].   "

## 2025-06-16 NOTE — LETTER
6/16/2025      Panchito Pierce  [address] [facility] [address]      Dear Colleague,    Thank you for referring your patient, Panchito Pierce, to the [facility] HEPATOLOGY [facility] [place]. Please see a copy of my visit note below.    [facility]       Hepatology Clinic    Date of Service: 6/16/2025     Primary Care Provider: Leann Berry    History of Present Illness     Mr. Pierce is sent in consultation by Dr. Guevara because of a concern about cirrhosis raised by a recent CT scan obtained for other reasons.    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  He reports that he is in remission from this.  He sees Dr. Guevara for hypothyroidism following radioiodine treatment for Graves' disease.  He reports no history of diabetes.  He also has atrial fibrillation.    He reports a history of drinking a substantial amount about once weekly (a sixpack plus hard liquor).    He reports no prior knowledge of liver disease.  There is no family history of liver disease.  He is obese, but reports that his weight has been relatively stable.    Overall he feels well and reports no concerns on constitutional, GI, and hepatic ROS.    He lives with his wife in [place].  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)     Hyperthyroidism     Hypogonadism male     Fatigue, unspecified type     Seborrheic keratoses, inflamed     Chronic dermatitis of hands     Raynaud's phenomenon without  gangrene     Arthropod bite, initial encounter     Neoplasm of uncertain behavior of skin     Xerosis of skin     Actinic keratosis     Prurigo nodularis       Surgical History:  Past Surgical History:   Procedure Laterality Date     ------------OTHER------------- Left 1985    left hand graft flap- work accident     ------------OTHER------------- Right 1985    right foot graft- work accident     ANESTHESIA CARDIOVERSION N/A 2023    Procedure: Anesthesia cardioversion@0930;  Surgeon: GENERIC ANESTHESIA PROVIDER;  Location:  OR     ESOPHAGOSCOPY, GASTROSCOPY, DUODENOSCOPY (EGD), COMBINED N/A 3/23/2016    Procedure: COMBINED ESOPHAGOSCOPY, GASTROSCOPY, DUODENOSCOPY (EGD), BIOPSY SINGLE OR MULTIPLE;  Surgeon: Jay Tracy MD;  Location:  GI       Social History:  Social History     Tobacco Use     Smoking status: Former     Current packs/day: 0.00     Average packs/day: 0.5 packs/day for 30.5 years (15.2 ttl pk-yrs)     Types: Cigarettes     Start date: 1985     Quit date: 2015     Years since quittin.9     Smokeless tobacco: Never   Substance Use Topics     Alcohol use: Yes     Comment: once a week     Drug use: No       Family History:  Family History   Problem Relation Age of Onset     Thyroid Disease Mother      Lung Cancer Mother      Prostate Cancer Father      Dementia Father      Heart Murmur Father      Hepatitis Brother      Asthma Son      Thyroid Disease Sister      Thyroid Disease Sister      Thyroid Disease Sister      Heart Murmur Brother      Melanoma No family hx of      Skin Cancer No family hx of        Medications:  Current Outpatient Medications   Medication Sig Dispense Refill     acyclovir (ZOVIRAX) 400 MG tablet Take 2 tablets (800 mg) by mouth 2 times daily 120 tablet 11     fenofibrate micronized (LOFIBRA) 67 MG capsule Take 1 capsule by mouth once daily with a meal.       levothyroxine (SYNTHROID/LEVOTHROID) 100 MCG tablet Take 1 tablet (100 mcg) by mouth  "daily. 90 tablet 3     liothyronine (CYTOMEL) 5 MCG tablet Take 1 tablet (5 mcg) by mouth 2 times daily. 180 tablet 4     metoprolol tartrate (LOPRESSOR) 25 MG tablet Take 1 tablet (25 mg) by mouth 2 times daily 180 tablet 3     pantoprazole (PROTONIX) 40 MG EC tablet Take 1 tablet (40 mg) by mouth daily. 90 tablet 1     potassium 99 MG TABS        Selenium 100 MCG CAPS 1 tablet daily 90 capsule 4     betamethasone dipropionate (DIPROSONE) 0.05 % external ointment Apply to bug bite reactions, and you can cover with bandaid (Patient not taking: Reported on 6/16/2025) 50 g 3     clobetasol (TEMOVATE) 0.05 % external ointment Apply topically 2 times daily as needed (pruritic spots on arms and trunk) (Patient not taking: Reported on 6/16/2025) 60 g 5     mupirocin (BACTROBAN) 2 % external ointment Apply topically 3 times daily (Patient not taking: Reported on 6/16/2025) 22 g 1     NIFEdipine ER (ADALAT CC) 30 MG 24 hr tablet Take 1 tablet (30 mg) by mouth daily *DURING WINTER MONTHS TO PREVENT FLARES. Check BP once a month while taking. Appt due for refills (Patient not taking: Reported on 6/16/2025) 30 tablet 0     OYSTER SHELL CALCIUM + D3 500-400 MG-UNIT TABS TAKE TWO TABLETS BY MOUTH DAILY  (Patient not taking: Reported on 6/16/2025) 60 tablet 11     sildenafil (VIAGRA) 25 MG tablet take 1-2 tablets by mouth 1 hour before needed (do not exceed 5 tablets in 24 hours) (Patient not taking: Reported on 6/16/2025)       No current facility-administered medications for this visit.         Objective:         Vitals:    06/16/25 1313   BP: 111/70   BP Location: Right arm   Patient Position: Sitting   Cuff Size: Adult Regular   Pulse: 66   Temp: 97.4  F (36.3  C)   TempSrc: Oral   SpO2: 97%   Weight: 100.9 kg (222 lb 6.4 oz)   Height: 1.753 m (5' 9\")     Body mass index is 32.84 kg/m .     Physical Exam  Constitutional: Well-developed, well-nourished, in no apparent distress.    HEENT: Normocephalic.  No scleral icterus. "   GI:  Abdomen soft, non-distended, non-tender. No overt hepatosplenomegaly.     Skin:  No spider nevi noted.    Peripheral Vascular: No lower extremity edema.   Musculoskeletal:  ROM intact, grossly normal muscle bulk    Psychiatric: Normal mood and affect. Behavior is normal.  Neuro: No asterixis    Labs and Diagnostic tests:  Lab Results   Component Value Date     06/16/2025     07/06/2021    POTASSIUM 4.4 06/16/2025    POTASSIUM 4.2 12/15/2021    POTASSIUM 4.3 07/06/2021    CHLORIDE 102 06/16/2025    CHLORIDE 107 12/15/2021    CHLORIDE 108 07/06/2021    CO2 26 06/16/2025    CO2 23 12/15/2021    CO2 26 07/06/2021    BUN 21.1 06/16/2025    BUN 20 12/15/2021    BUN 24 07/06/2021    CR 1.25 06/16/2025    CR 1.28 07/06/2021     Lab Results   Component Value Date    BILITOTAL 0.4 06/16/2025    BILITOTAL 0.4 12/15/2020    ALT 29 06/16/2025    ALT 39 12/15/2020    AST 22 06/16/2025    AST 20 12/15/2020    ALKPHOS 105 06/16/2025    ALKPHOS 77 12/15/2020     Lab Results   Component Value Date    ALBUMIN 4.2 06/16/2025    ALBUMIN 4.1 12/15/2020    PROTTOTAL 7.0 06/16/2025    PROTTOTAL 7.9 12/15/2020      Lab Results   Component Value Date    WBC 8.1 06/16/2025    WBC 7.7 12/15/2020    HGB 14.1 06/16/2025    HGB 15.2 12/15/2020    MCV 84 06/16/2025    MCV 91 12/15/2020     06/16/2025     12/15/2020     Lab Results   Component Value Date    INR 1.03 06/16/2025    INR 0.9 06/15/2016    INR 1.01 04/11/2016       MELD 3.0: 9 at 6/16/2025 12:32 PM  MELD-Na: 9 at 6/16/2025 12:32 PM  Calculated from:  Serum Creatinine: 1.25 mg/dL at 6/16/2025 12:32 PM  Serum Sodium: 139 mmol/L (Using max of 137 mmol/L) at 6/16/2025 12:32 PM  Total Bilirubin: 0.4 mg/dL (Using min of 1 mg/dL) at 6/16/2025 12:32 PM  Serum Albumin: 4.2 g/dL (Using max of 3.5 g/dL) at 6/16/2025 12:32 PM  INR(ratio): 1.03 at 6/16/2025 12:32 PM  Age at listing (hypothetical): 60 years  Sex: Male at 6/16/2025 12:32 PM    Previous work-up:   Lab  Results   Component Value Date    HEPBANG Nonreactive 12/13/2016    HBCAB Nonreactive 12/13/2016    AUSAB 0.36 11/30/2015    HCVAB  12/13/2016     Nonreactive   Assay performance characteristics have not been established for newborns,   infants, and children      NAVARRO 292 06/16/2025    IRONSAT 29 06/16/2025    TSH 0.18 (L) 05/20/2025    TRIG 377 (H) 04/11/2016    A1C 6.2 (H) 09/26/2023      Lab Results   Component Value Date    SPECDES  12/15/2021     Blood: ACD      LDRESULTS  12/15/2021     BCR-ABL1 MAJOR(p210) QUANTITATION RESULTS:  BCR-ABL1/ABL1 Major(p210):  UNDETECTABLE    INTERNAL CONTROL (NUMBER OF ABL1 TRANSCRIPTS): 8,420        CT 12-26-24  HEPATOBILIARY: Subtle nodular contour of the liver suggests underlying hepatic parenchymal disease. Subcentimeter low-attenuation lesion in the right hepatic lobe is unchanged and should be of doubtful significance. No calcified gallstones or biliary dilatation.   SPLEEN: Normal.    FIB-4 Calculation: 1.12 at 6/16/2025 12:32 PM  Calculated from:  SGOT/AST: 22 U/L at 6/16/2025 12:32 PM  SGPT/ALT: 29 U/L at 6/16/2025 12:32 PM  Platelets: 215 10e3/uL at 6/16/2025 12:32 PM  Age: 60 years     Fibroscan today (preliminary result)  6.5 kPa      Assessment and Plan:    Likely steatotic liver disease.  In the past, the patient had regular alcohol use, but this has not been the case for several months.  I suspect that the main issue for him is MASLD.  Fortunately, his labs and FibroScan do not suggest significant fibrosis.    In regard to MASLD, the principal approach is sustained weight loss through diet and exercise.  I reviewed this at some length with the patient.  If this is unsuccessful, then this patient may benefit from the use of a GLP-1 agonist (semaglutide or tirzepatide) which also appears to be beneficial for MASLD.  I suggest that he discuss this with Dr. Guevara during a future visit.    These issues were discussed at some length.  His questions were  answered.    At this time, I do not think he needs a follow-up in hepatology clinic, but he is welcome to return if new issues arise.      45 minutes spent with patient, reviewing results, and coordinating care.    This note was created with voice recognition software, and while reviewed for accuracy, typos may remain.        Zafar Landa MD  Professor of Medicine  [facility]  Division of Gastroenterology, Hepatology, and Nutrition     Again, thank you for allowing me to participate in the care of your patient.        Sincerely,        Zafar Landa MD    Electronically signed

## 2025-06-16 NOTE — NURSING NOTE
"Chief Complaint   Patient presents with    RECHECK     Follow up. PT reports no new or worsening symptoms./     Vitals:    06/16/25 1313   BP: 111/70   BP Location: Right arm   Patient Position: Sitting   Cuff Size: Adult Regular   Pulse: 66   Temp: 97.4  F (36.3  C)   TempSrc: Oral   SpO2: 97%   Weight: 100.9 kg (222 lb 6.4 oz)   Height: 1.753 m (5' 9\")       BP Readings from Last 3 Encounters:   06/16/25 111/70   06/15/23 137/74   05/15/23 (!) 148/82       /70 (BP Location: Right arm, Patient Position: Sitting, Cuff Size: Adult Regular)   Pulse 66   Temp 97.4  F (36.3  C) (Oral)   Ht 1.753 m (5' 9\")   Wt 100.9 kg (222 lb 6.4 oz)   SpO2 97%   BMI 32.84 kg/m       Diamond Hunt    "

## 2025-06-16 NOTE — PROGRESS NOTES
[facility]       Hepatology Clinic    Date of Service: 6/16/2025     Primary Care Provider: Leann Berry    History of Present Illness     Mr. Pierce is sent in consultation by Dr. Guevara because of a concern about cirrhosis raised by a recent CT scan obtained for other reasons.    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  He reports that he is in remission from this.  He sees Dr. Guevara for hypothyroidism following radioiodine treatment for Graves' disease.  He reports no history of diabetes.  He also has atrial fibrillation.    He reports a history of drinking a substantial amount about once weekly (a sixpack plus hard liquor). He reports no alcohol in recent  months.    He reports no prior knowledge of liver disease.  There is no family history of liver disease.  He is obese, but reports that his weight has been relatively stable.    Overall he feels well and reports no concerns on constitutional, GI, and hepatic ROS.    He lives with his wife in [place].  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)    Hyperthyroidism    Hypogonadism male    Fatigue, unspecified type    Seborrheic keratoses, inflamed    Chronic dermatitis of hands    Raynaud's phenomenon without gangrene    Arthropod bite, initial encounter    Neoplasm of uncertain behavior of skin    Xerosis of skin    Actinic keratosis    Prurigo nodularis       Surgical History:  Past Surgical History:   Procedure Laterality Date     ------------OTHER------------- Left 1985    left hand graft flap- work accident    ------------OTHER------------- Right 1985    right foot graft- work accident    ANESTHESIA CARDIOVERSION N/A 2023    Procedure: Anesthesia cardioversion@0930;  Surgeon: GENERIC ANESTHESIA PROVIDER;  Location:  OR    ESOPHAGOSCOPY, GASTROSCOPY, DUODENOSCOPY (EGD), COMBINED N/A 3/23/2016    Procedure: COMBINED ESOPHAGOSCOPY, GASTROSCOPY, DUODENOSCOPY (EGD), BIOPSY SINGLE OR MULTIPLE;  Surgeon: aJy Tracy MD;  Location:  GI       Social History:  Social History     Tobacco Use    Smoking status: Former     Current packs/day: 0.00     Average packs/day: 0.5 packs/day for 30.5 years (15.2 ttl pk-yrs)     Types: Cigarettes     Start date: 1985     Quit date: 2015     Years since quittin.9    Smokeless tobacco: Never   Substance Use Topics    Alcohol use: Yes     Comment: once a week    Drug use: No       Family History:  Family History   Problem Relation Age of Onset    Thyroid Disease Mother     Lung Cancer Mother     Prostate Cancer Father     Dementia Father     Heart Murmur Father     Hepatitis Brother     Asthma Son     Thyroid Disease Sister     Thyroid Disease Sister     Thyroid Disease Sister     Heart Murmur Brother     Melanoma No family hx of     Skin Cancer No family hx of        Medications:  Current Outpatient Medications   Medication Sig Dispense Refill    acyclovir (ZOVIRAX) 400 MG tablet Take 2 tablets (800 mg) by mouth 2 times daily 120 tablet 11    fenofibrate micronized (LOFIBRA) 67 MG capsule Take 1 capsule by mouth once daily with a meal.      levothyroxine (SYNTHROID/LEVOTHROID) 100 MCG tablet Take 1 tablet (100 mcg) by mouth daily. 90 tablet 3    liothyronine (CYTOMEL) 5 MCG tablet Take 1 tablet (5 mcg) by mouth 2 times daily. 180 tablet 4    metoprolol tartrate (LOPRESSOR) 25 MG tablet Take 1 tablet (25 mg) by mouth 2 times daily 180 tablet 3    pantoprazole (PROTONIX) 40 MG  "EC tablet Take 1 tablet (40 mg) by mouth daily. 90 tablet 1    potassium 99 MG TABS       Selenium 100 MCG CAPS 1 tablet daily 90 capsule 4    betamethasone dipropionate (DIPROSONE) 0.05 % external ointment Apply to bug bite reactions, and you can cover with bandaid (Patient not taking: Reported on 6/16/2025) 50 g 3    clobetasol (TEMOVATE) 0.05 % external ointment Apply topically 2 times daily as needed (pruritic spots on arms and trunk) (Patient not taking: Reported on 6/16/2025) 60 g 5    mupirocin (BACTROBAN) 2 % external ointment Apply topically 3 times daily (Patient not taking: Reported on 6/16/2025) 22 g 1    NIFEdipine ER (ADALAT CC) 30 MG 24 hr tablet Take 1 tablet (30 mg) by mouth daily *DURING WINTER MONTHS TO PREVENT FLARES. Check BP once a month while taking. Appt due for refills (Patient not taking: Reported on 6/16/2025) 30 tablet 0    OYSTER SHELL CALCIUM + D3 500-400 MG-UNIT TABS TAKE TWO TABLETS BY MOUTH DAILY  (Patient not taking: Reported on 6/16/2025) 60 tablet 11    sildenafil (VIAGRA) 25 MG tablet take 1-2 tablets by mouth 1 hour before needed (do not exceed 5 tablets in 24 hours) (Patient not taking: Reported on 6/16/2025)       No current facility-administered medications for this visit.         Objective:         Vitals:    06/16/25 1313   BP: 111/70   BP Location: Right arm   Patient Position: Sitting   Cuff Size: Adult Regular   Pulse: 66   Temp: 97.4  F (36.3  C)   TempSrc: Oral   SpO2: 97%   Weight: 100.9 kg (222 lb 6.4 oz)   Height: 1.753 m (5' 9\")     Body mass index is 32.84 kg/m .     Physical Exam  Constitutional: Well-developed, well-nourished, in no apparent distress.    HEENT: Normocephalic.  No scleral icterus.   GI:  Abdomen soft, non-distended, non-tender. No overt hepatosplenomegaly.     Skin:  No spider nevi noted.    Peripheral Vascular: No lower extremity edema.   Musculoskeletal:  ROM intact, grossly normal muscle bulk    Psychiatric: Normal mood and affect. Behavior is " normal.  Neuro: No asterixis    Labs and Diagnostic tests:  Lab Results   Component Value Date     06/16/2025     07/06/2021    POTASSIUM 4.4 06/16/2025    POTASSIUM 4.2 12/15/2021    POTASSIUM 4.3 07/06/2021    CHLORIDE 102 06/16/2025    CHLORIDE 107 12/15/2021    CHLORIDE 108 07/06/2021    CO2 26 06/16/2025    CO2 23 12/15/2021    CO2 26 07/06/2021    BUN 21.1 06/16/2025    BUN 20 12/15/2021    BUN 24 07/06/2021    CR 1.25 06/16/2025    CR 1.28 07/06/2021     Lab Results   Component Value Date    BILITOTAL 0.4 06/16/2025    BILITOTAL 0.4 12/15/2020    ALT 29 06/16/2025    ALT 39 12/15/2020    AST 22 06/16/2025    AST 20 12/15/2020    ALKPHOS 105 06/16/2025    ALKPHOS 77 12/15/2020     Lab Results   Component Value Date    ALBUMIN 4.2 06/16/2025    ALBUMIN 4.1 12/15/2020    PROTTOTAL 7.0 06/16/2025    PROTTOTAL 7.9 12/15/2020      Lab Results   Component Value Date    WBC 8.1 06/16/2025    WBC 7.7 12/15/2020    HGB 14.1 06/16/2025    HGB 15.2 12/15/2020    MCV 84 06/16/2025    MCV 91 12/15/2020     06/16/2025     12/15/2020     Lab Results   Component Value Date    INR 1.03 06/16/2025    INR 0.9 06/15/2016    INR 1.01 04/11/2016       MELD 3.0: 9 at 6/16/2025 12:32 PM  MELD-Na: 9 at 6/16/2025 12:32 PM  Calculated from:  Serum Creatinine: 1.25 mg/dL at 6/16/2025 12:32 PM  Serum Sodium: 139 mmol/L (Using max of 137 mmol/L) at 6/16/2025 12:32 PM  Total Bilirubin: 0.4 mg/dL (Using min of 1 mg/dL) at 6/16/2025 12:32 PM  Serum Albumin: 4.2 g/dL (Using max of 3.5 g/dL) at 6/16/2025 12:32 PM  INR(ratio): 1.03 at 6/16/2025 12:32 PM  Age at listing (hypothetical): 60 years  Sex: Male at 6/16/2025 12:32 PM    Previous work-up:   Lab Results   Component Value Date    HEPBANG Nonreactive 12/13/2016    HBCAB Nonreactive 12/13/2016    AUSAB 0.36 11/30/2015    HCVAB  12/13/2016     Nonreactive   Assay performance characteristics have not been established for newborns,   infants, and children      NAVARRO 292  06/16/2025    IRONSAT 29 06/16/2025    TSH 0.18 (L) 05/20/2025    TRIG 377 (H) 04/11/2016    A1C 6.2 (H) 09/26/2023      Lab Results   Component Value Date    SPECDES  12/15/2021     Blood: ACD      LDRESULTS  12/15/2021     BCR-ABL1 MAJOR(p210) QUANTITATION RESULTS:  BCR-ABL1/ABL1 Major(p210):  UNDETECTABLE    INTERNAL CONTROL (NUMBER OF ABL1 TRANSCRIPTS): 8,420        CT 12-26-24  HEPATOBILIARY: Subtle nodular contour of the liver suggests underlying hepatic parenchymal disease. Subcentimeter low-attenuation lesion in the right hepatic lobe is unchanged and should be of doubtful significance. No calcified gallstones or biliary dilatation.   SPLEEN: Normal.    FIB-4 Calculation: 1.12 at 6/16/2025 12:32 PM  Calculated from:  SGOT/AST: 22 U/L at 6/16/2025 12:32 PM  SGPT/ALT: 29 U/L at 6/16/2025 12:32 PM  Platelets: 215 10e3/uL at 6/16/2025 12:32 PM  Age: 60 years     Fibroscan today (preliminary result)  6.5 kPa      Assessment and Plan:    Likely steatotic liver disease.  In the past, the patient had regular alcohol use, but this has not been the case for several months.  I suspect that the main issue for him is MASLD.  Fortunately, his labs and FibroScan do not suggest significant fibrosis.    In regard to MASLD, the principal approach is sustained weight loss through diet and exercise.  I reviewed this at some length with the patient.  If this is unsuccessful, then this patient may benefit from the use of a GLP-1 agonist (semaglutide or tirzepatide) which also appears to be beneficial for MASLD.  I suggest that he discuss this with Dr. Guevara during a future visit.    These issues were discussed at some length.  His questions were answered.    At this time, I do not think he needs a follow-up in hepatology clinic, but he is welcome to return if new issues arise.      45 minutes spent with patient, reviewing results, and coordinating care.    This note was created with voice recognition software, and while  reviewed for accuracy, typos may remain.        Zafar Landa MD  Professor of Medicine  [facility]  Division of Gastroenterology, Hepatology, and Nutrition

## 2025-06-17 ENCOUNTER — RESULTS FOLLOW-UP (OUTPATIENT)
Dept: GASTROENTEROLOGY | Facility: CLINIC | Age: 60
End: 2025-06-17

## 2025-06-17 ENCOUNTER — TELEPHONE (OUTPATIENT)
Dept: ENDOCRINOLOGY | Facility: CLINIC | Age: 60
End: 2025-06-17
Payer: COMMERCIAL

## 2025-06-17 DIAGNOSIS — R73.9 HYPERGLYCEMIA: Primary | ICD-10-CM

## 2025-06-17 NOTE — TELEPHONE ENCOUNTER
Called patient.  Patient would like diet and lifestyle changes-patient to do labs in 3 months.    I will get patient to see me sooner than 6 months.    .  Orders Placed This Encounter   Procedures    Hemoglobin A1c    Basic metabolic panel

## 2025-06-18 NOTE — TELEPHONE ENCOUNTER
Left Voicemail (1st Attempt) and Sent Mychart (1st Attempt) for the patient to call back and schedule the following:    Appointment type: RETURN ENDOCRINE  Provider: Pattie Guevara MD  Return date: reschedule June 2026 appt to November/December 2025  Specialty phone number: [phone]  Additional appointment(s) needed: Reschedule July labs to ~09/18/25  Additonal Notes: LVM, Cl Guevara, Pattie Flores MD  [id number]  Please schedule patient for lab draw in 3 months, and to see me in roughly 5 to 7 months from now.    Teresita Gar on 6/18/2025 at 1:21 PM

## 2025-07-14 NOTE — TELEPHONE ENCOUNTER
I have left messages  For Panchito and sent a my chart message on starting metformin due to A1C result. Waiting for him to reply  Per Dr Guevara . Juliann Fam RN on 8/10/2021 at 11:40 AM     No PA needed

## 2025-08-10 DIAGNOSIS — F43.21 ADJUSTMENT DISORDER WITH DEPRESSED MOOD: ICD-10-CM

## 2025-08-10 DIAGNOSIS — K59.00 CONSTIPATION, UNSPECIFIED CONSTIPATION TYPE: ICD-10-CM

## 2025-08-10 DIAGNOSIS — E05.00 GRAVES DISEASE: ICD-10-CM

## 2025-08-10 DIAGNOSIS — I48.0 PAROXYSMAL ATRIAL FIBRILLATION (H): ICD-10-CM

## 2025-08-10 DIAGNOSIS — C92.10 CML (CHRONIC MYELOCYTIC LEUKEMIA) (H): ICD-10-CM

## 2025-08-10 DIAGNOSIS — Z94.81 STATUS POST BONE MARROW TRANSPLANT (H): ICD-10-CM

## 2025-08-12 RX ORDER — PANTOPRAZOLE SODIUM 40 MG/1
40 TABLET, DELAYED RELEASE ORAL DAILY
Qty: 90 TABLET | Refills: 0 | Status: SHIPPED | OUTPATIENT
Start: 2025-08-12

## (undated) RX ORDER — REGADENOSON 0.08 MG/ML
INJECTION, SOLUTION INTRAVENOUS
Status: DISPENSED
Start: 2023-04-13

## (undated) RX ORDER — LIDOCAINE HYDROCHLORIDE 20 MG/ML
SOLUTION OROPHARYNGEAL
Status: DISPENSED
Start: 2023-04-20

## (undated) RX ORDER — FENTANYL CITRATE 50 UG/ML
INJECTION, SOLUTION INTRAMUSCULAR; INTRAVENOUS
Status: DISPENSED
Start: 2023-04-20